# Patient Record
Sex: FEMALE | Race: WHITE | NOT HISPANIC OR LATINO | Employment: OTHER
[De-identification: names, ages, dates, MRNs, and addresses within clinical notes are randomized per-mention and may not be internally consistent; named-entity substitution may affect disease eponyms.]

---

## 2017-07-14 PROBLEM — N32.81 OAB (OVERACTIVE BLADDER): Status: ACTIVE | Noted: 2017-07-14

## 2017-08-09 ENCOUNTER — HOSPITAL (OUTPATIENT)
Dept: OTHER | Age: 82
End: 2017-08-09
Attending: INTERNAL MEDICINE

## 2017-08-09 LAB
ANALYZER ANC (IANC): ABNORMAL
BASOPHILS # BLD: 0 THOUSAND/MCL (ref 0–0.3)
BASOPHILS NFR BLD: 0 %
DIFFERENTIAL METHOD BLD: ABNORMAL
EOSINOPHIL # BLD: 0 THOUSAND/MCL (ref 0.1–0.5)
EOSINOPHIL NFR BLD: 0 %
ERYTHROCYTE [DISTWIDTH] IN BLOOD: 17 % (ref 11–15)
HEMATOCRIT: 35.6 % (ref 36–46.5)
HGB BLD-MCNC: 11.8 GM/DL (ref 12–15.5)
LYMPHOCYTES # BLD: 0.6 THOUSAND/MCL (ref 1–4)
LYMPHOCYTES NFR BLD: 12 %
MCH RBC QN AUTO: 28 PG (ref 26–34)
MCHC RBC AUTO-ENTMCNC: 33.1 GM/DL (ref 32–36.5)
MCV RBC AUTO: 84.4 FL (ref 78–100)
MONOCYTES # BLD: 0.5 THOUSAND/MCL (ref 0.3–0.9)
MONOCYTES NFR BLD: 9 %
NEUTROPHILS # BLD: 4 THOUSAND/MCL (ref 1.8–7.7)
NEUTS BAND NFR BLD: 6 % (ref 0–10)
NEUTS SEG NFR BLD: 73 %
PATH REV BLD -IMP: ABNORMAL
PLAT MORPH BLD: NORMAL
PLATELET # BLD: 151 THOUSAND/MCL (ref 140–450)
PROCALCITONIN SERPL IA-MCNC: <0.05 NG/ML
RBC # BLD: 4.22 MILLION/MCL (ref 4–5.2)
RBC MORPH BLD: NORMAL
WBC # BLD: 5 THOUSAND/MCL (ref 4.2–11)
WBC MORPH BLD: NORMAL

## 2017-11-27 ENCOUNTER — HOSPITAL (OUTPATIENT)
Dept: OTHER | Age: 82
End: 2017-11-27
Attending: INTERNAL MEDICINE

## 2017-11-29 ENCOUNTER — HOSPITAL (OUTPATIENT)
Dept: OTHER | Age: 82
End: 2017-11-29
Attending: INTERNAL MEDICINE

## 2017-12-18 ENCOUNTER — HOSPITAL (OUTPATIENT)
Dept: OTHER | Age: 82
End: 2017-12-18
Attending: INTERNAL MEDICINE

## 2018-08-30 PROBLEM — K21.9 GERD WITHOUT ESOPHAGITIS: Chronic | Status: ACTIVE | Noted: 2018-08-30

## 2018-08-30 PROBLEM — I10 ESSENTIAL HYPERTENSION: Chronic | Status: ACTIVE | Noted: 2018-08-30

## 2018-08-30 PROBLEM — J30.1 SEASONAL ALLERGIC RHINITIS DUE TO POLLEN: Chronic | Status: ACTIVE | Noted: 2018-08-30

## 2018-08-30 PROBLEM — E78.00 HYPERCHOLESTEROLEMIA: Chronic | Status: ACTIVE | Noted: 2018-08-30

## 2018-08-30 PROBLEM — M85.89 OSTEOPENIA OF MULTIPLE SITES: Chronic | Status: ACTIVE | Noted: 2018-08-30

## 2018-09-20 PROBLEM — F39 MOOD DISORDER IN FULL REMISSION (HCC): Chronic | Status: ACTIVE | Noted: 2018-09-20

## 2018-09-20 PROBLEM — F51.04 PSYCHOPHYSIOLOGICAL INSOMNIA: Chronic | Status: ACTIVE | Noted: 2018-09-20

## 2018-11-20 PROBLEM — R33.9 INCOMPLETE EMPTYING OF BLADDER: Status: ACTIVE | Noted: 2018-11-20

## 2021-10-13 PROBLEM — M54.50 CHRONIC MIDLINE LOW BACK PAIN WITHOUT SCIATICA: Chronic | Status: ACTIVE | Noted: 2021-10-13

## 2021-10-13 PROBLEM — G89.29 CHRONIC MIDLINE LOW BACK PAIN WITHOUT SCIATICA: Chronic | Status: ACTIVE | Noted: 2021-10-13

## 2022-11-11 ENCOUNTER — HOSPITAL ENCOUNTER (OUTPATIENT)
Dept: GENERAL RADIOLOGY | Age: 87
Discharge: HOME | End: 2022-11-11
Attending: FAMILY MEDICINE

## 2022-11-11 DIAGNOSIS — R05.2 SUBACUTE COUGH: ICD-10-CM

## 2022-11-11 PROCEDURE — 71046 X-RAY EXAM CHEST 2 VIEWS: CPT

## 2023-02-24 ENCOUNTER — HOSPITAL ENCOUNTER (OUTPATIENT)
Dept: GENERAL RADIOLOGY | Age: 88
Discharge: HOME | End: 2023-02-24
Attending: FAMILY MEDICINE

## 2023-02-24 DIAGNOSIS — R05.2 SUBACUTE COUGH: ICD-10-CM

## 2023-02-24 PROCEDURE — 71046 X-RAY EXAM CHEST 2 VIEWS: CPT

## 2023-03-13 ENCOUNTER — WALK IN (OUTPATIENT)
Dept: URGENT CARE | Age: 88
End: 2023-03-13
Attending: EMERGENCY MEDICINE

## 2023-03-13 VITALS
DIASTOLIC BLOOD PRESSURE: 73 MMHG | TEMPERATURE: 97.3 F | OXYGEN SATURATION: 98 % | HEART RATE: 88 BPM | SYSTOLIC BLOOD PRESSURE: 125 MMHG | RESPIRATION RATE: 16 BRPM

## 2023-03-13 DIAGNOSIS — M54.50 LOW BACK PAIN, UNSPECIFIED BACK PAIN LATERALITY, UNSPECIFIED CHRONICITY, UNSPECIFIED WHETHER SCIATICA PRESENT: Primary | ICD-10-CM

## 2023-03-13 DIAGNOSIS — N30.00 ACUTE CYSTITIS WITHOUT HEMATURIA: ICD-10-CM

## 2023-03-13 LAB
APPEARANCE, POC: CLEAR
BILIRUBIN, POC: NEGATIVE
COLOR, POC: YELLOW
GLUCOSE UR-MCNC: NORMAL MG/DL
KETONES, POC: NEGATIVE MG/DL
NITRITE, POC: NEGATIVE
OCCULT BLOOD, POC: NEGATIVE
PH UR: 7 [PH] (ref 5–7)
PROT UR-MCNC: NEGATIVE MG/DL
SP GR UR: 1.01 (ref 1–1.03)
UROBILINOGEN UR-MCNC: 0.2 MG/DL (ref 0–1)
WBC (LEUKOCYTE) ESTERASE, POC: ABNORMAL

## 2023-03-13 PROCEDURE — 87186 SC STD MICRODIL/AGAR DIL: CPT | Performed by: EMERGENCY MEDICINE

## 2023-03-13 PROCEDURE — 99203 OFFICE O/P NEW LOW 30 MIN: CPT

## 2023-03-13 PROCEDURE — 81003 URINALYSIS AUTO W/O SCOPE: CPT | Performed by: EMERGENCY MEDICINE

## 2023-03-13 RX ORDER — TRAZODONE HYDROCHLORIDE 50 MG/1
25 TABLET ORAL NIGHTLY
COMMUNITY
Start: 2023-01-19

## 2023-03-13 RX ORDER — CEPHALEXIN 500 MG/1
500 CAPSULE ORAL 3 TIMES DAILY
Qty: 30 CAPSULE | Refills: 0 | Status: SHIPPED | OUTPATIENT
Start: 2023-03-13 | End: 2023-03-23

## 2023-03-13 RX ORDER — VIBEGRON 75 MG/1
1 TABLET, FILM COATED ORAL DAILY
COMMUNITY
Start: 2023-03-08

## 2023-03-13 RX ORDER — TRAMADOL HYDROCHLORIDE 50 MG/1
50 TABLET ORAL
Status: ON HOLD | COMMUNITY
Start: 2023-03-10 | End: 2023-06-10 | Stop reason: HOSPADM

## 2023-03-13 RX ORDER — FEXOFENADINE HCL 180 MG/1
1 TABLET ORAL DAILY
COMMUNITY

## 2023-03-13 RX ORDER — SOLIFENACIN SUCCINATE 10 MG/1
10 TABLET, FILM COATED ORAL
COMMUNITY
Start: 2022-12-20 | End: 2023-12-20

## 2023-03-13 RX ORDER — UBIDECARENONE 100 MG
CAPSULE ORAL
COMMUNITY

## 2023-03-13 RX ORDER — LANSOPRAZOLE 30 MG/1
30 CAPSULE, DELAYED RELEASE ORAL DAILY
COMMUNITY
Start: 2023-03-07

## 2023-03-13 RX ORDER — CELECOXIB 200 MG/1
200 CAPSULE ORAL DAILY
COMMUNITY
Start: 2023-01-30

## 2023-03-13 RX ORDER — ATORVASTATIN CALCIUM 10 MG/1
10 TABLET, FILM COATED ORAL DAILY
COMMUNITY
Start: 2023-01-17

## 2023-03-13 RX ORDER — GABAPENTIN 300 MG/1
300 CAPSULE ORAL
COMMUNITY
Start: 2022-10-28

## 2023-03-13 RX ORDER — PSEUDOEPHEDRINE HCL 30 MG
100 TABLET ORAL
COMMUNITY

## 2023-03-13 RX ORDER — RAMIPRIL 5 MG/1
5 CAPSULE ORAL
COMMUNITY
Start: 2022-10-28

## 2023-03-13 RX ORDER — ESCITALOPRAM OXALATE 5 MG/1
5 TABLET ORAL DAILY
COMMUNITY

## 2023-03-13 ASSESSMENT — ENCOUNTER SYMPTOMS
RESPIRATORY NEGATIVE: 1
NEUROLOGICAL NEGATIVE: 1
ALLERGIC/IMMUNOLOGIC NEGATIVE: 1
PSYCHIATRIC NEGATIVE: 1
ENDOCRINE NEGATIVE: 1
GASTROINTESTINAL NEGATIVE: 1
HEMATOLOGIC/LYMPHATIC NEGATIVE: 1
EYES NEGATIVE: 1
CONSTITUTIONAL NEGATIVE: 1

## 2023-03-13 ASSESSMENT — PAIN SCALES - GENERAL: PAINLEVEL: 0

## 2023-03-15 LAB — BACTERIA UR CULT: ABNORMAL

## 2023-06-01 ENCOUNTER — APPOINTMENT (OUTPATIENT)
Dept: GENERAL RADIOLOGY | Age: 88
End: 2023-06-01
Attending: PHYSICIAN ASSISTANT

## 2023-06-01 ENCOUNTER — APPOINTMENT (OUTPATIENT)
Dept: ULTRASOUND IMAGING | Age: 88
End: 2023-06-01
Attending: EMERGENCY MEDICINE

## 2023-06-01 ENCOUNTER — HOSPITAL ENCOUNTER (EMERGENCY)
Age: 88
Discharge: HOME | End: 2023-06-01
Attending: EMERGENCY MEDICINE

## 2023-06-01 VITALS
TEMPERATURE: 99.4 F | RESPIRATION RATE: 20 BRPM | HEART RATE: 76 BPM | DIASTOLIC BLOOD PRESSURE: 53 MMHG | WEIGHT: 125 LBS | BODY MASS INDEX: 23 KG/M2 | OXYGEN SATURATION: 96 % | HEIGHT: 62 IN | SYSTOLIC BLOOD PRESSURE: 93 MMHG

## 2023-06-01 DIAGNOSIS — S42.291A CLOSED FRACTURE OF HEAD OF RIGHT HUMERUS, INITIAL ENCOUNTER: Primary | ICD-10-CM

## 2023-06-01 LAB
ALBUMIN SERPL-MCNC: 2.9 G/DL (ref 3.6–5.1)
ALBUMIN/GLOB SERPL: 1.4 {RATIO} (ref 1–2.4)
ALP SERPL-CCNC: 48 UNITS/L (ref 45–117)
ALT SERPL-CCNC: 20 UNITS/L
ANION GAP SERPL CALC-SCNC: 14 MMOL/L (ref 7–19)
APTT PPP: 22 SEC (ref 22–30)
AST SERPL-CCNC: 8 UNITS/L
BASOPHILS # BLD: 0 K/MCL (ref 0–0.3)
BASOPHILS NFR BLD: 0 %
BILIRUB SERPL-MCNC: 0.5 MG/DL (ref 0.2–1)
BUN SERPL-MCNC: 24 MG/DL (ref 6–20)
BUN/CREAT SERPL: 34 (ref 7–25)
CALCIUM SERPL-MCNC: 7.8 MG/DL (ref 8.4–10.2)
CHLORIDE SERPL-SCNC: 102 MMOL/L (ref 97–110)
CO2 SERPL-SCNC: 24 MMOL/L (ref 21–32)
CREAT SERPL-MCNC: 0.7 MG/DL (ref 0.51–0.95)
DEPRECATED RDW RBC: 53.4 FL (ref 39–50)
EOSINOPHIL # BLD: 0.1 K/MCL (ref 0–0.5)
EOSINOPHIL NFR BLD: 1 %
ERYTHROCYTE [DISTWIDTH] IN BLOOD: 17.2 % (ref 11–15)
FASTING DURATION TIME PATIENT: ABNORMAL H
GFR SERPLBLD BASED ON 1.73 SQ M-ARVRAT: 84 ML/MIN
GLOBULIN SER-MCNC: 2.1 G/DL (ref 2–4)
GLUCOSE SERPL-MCNC: 97 MG/DL (ref 70–99)
HCT VFR BLD CALC: 31.3 % (ref 36–46.5)
HGB BLD-MCNC: 9.9 G/DL (ref 12–15.5)
IMM GRANULOCYTES # BLD AUTO: 0 K/MCL (ref 0–0.2)
IMM GRANULOCYTES # BLD: 0 %
INR PPP: 1
LYMPHOCYTES # BLD: 0.4 K/MCL (ref 1–4)
LYMPHOCYTES NFR BLD: 5 %
MCH RBC QN AUTO: 26.8 PG (ref 26–34)
MCHC RBC AUTO-ENTMCNC: 31.6 G/DL (ref 32–36.5)
MCV RBC AUTO: 84.8 FL (ref 78–100)
MONOCYTES # BLD: 0.5 K/MCL (ref 0.3–0.9)
MONOCYTES NFR BLD: 6 %
NEUTROPHILS # BLD: 6.9 K/MCL (ref 1.8–7.7)
NEUTROPHILS NFR BLD: 88 %
NRBC BLD MANUAL-RTO: 0 /100 WBC
PLATELET # BLD AUTO: 187 K/MCL (ref 140–450)
POTASSIUM SERPL-SCNC: 4.5 MMOL/L (ref 3.4–5.1)
PROT SERPL-MCNC: 5 G/DL (ref 6.4–8.2)
PROTHROMBIN TIME: 10.1 SEC (ref 9.7–11.8)
RBC # BLD: 3.69 MIL/MCL (ref 4–5.2)
SODIUM SERPL-SCNC: 135 MMOL/L (ref 135–145)
WBC # BLD: 7.8 K/MCL (ref 4.2–11)

## 2023-06-01 PROCEDURE — 36415 COLL VENOUS BLD VENIPUNCTURE: CPT

## 2023-06-01 PROCEDURE — 80053 COMPREHEN METABOLIC PANEL: CPT | Performed by: PHYSICIAN ASSISTANT

## 2023-06-01 PROCEDURE — 85025 COMPLETE CBC W/AUTO DIFF WBC: CPT | Performed by: PHYSICIAN ASSISTANT

## 2023-06-01 PROCEDURE — 85730 THROMBOPLASTIN TIME PARTIAL: CPT | Performed by: PHYSICIAN ASSISTANT

## 2023-06-01 PROCEDURE — 99284 EMERGENCY DEPT VISIT MOD MDM: CPT

## 2023-06-01 PROCEDURE — 85610 PROTHROMBIN TIME: CPT | Performed by: PHYSICIAN ASSISTANT

## 2023-06-01 PROCEDURE — 73060 X-RAY EXAM OF HUMERUS: CPT

## 2023-06-01 ASSESSMENT — ENCOUNTER SYMPTOMS
PSYCHIATRIC NEGATIVE: 1
ABDOMINAL PAIN: 0
HEADACHES: 0
ENDOCRINE NEGATIVE: 1
WEAKNESS: 0
SHORTNESS OF BREATH: 0
GASTROINTESTINAL NEGATIVE: 1
RESPIRATORY NEGATIVE: 1
VOMITING: 0
BACK PAIN: 0
ALLERGIC/IMMUNOLOGIC NEGATIVE: 1
CHEST TIGHTNESS: 0
CONSTIPATION: 0
NAUSEA: 0
NEUROLOGICAL NEGATIVE: 1
BLOOD IN STOOL: 0
CONSTITUTIONAL NEGATIVE: 1
HEMATOLOGIC/LYMPHATIC NEGATIVE: 1
DIARRHEA: 0
EYES NEGATIVE: 1

## 2023-06-01 ASSESSMENT — PAIN DESCRIPTION - PAIN TYPE: TYPE: ACUTE PAIN

## 2023-06-01 ASSESSMENT — PAIN SCALES - GENERAL: PAINLEVEL_OUTOF10: 8

## 2023-06-05 ENCOUNTER — APPOINTMENT (OUTPATIENT)
Dept: GENERAL RADIOLOGY | Age: 88
DRG: 543 | End: 2023-06-05
Attending: EMERGENCY MEDICINE

## 2023-06-05 ENCOUNTER — APPOINTMENT (OUTPATIENT)
Dept: CT IMAGING | Age: 88
DRG: 543 | End: 2023-06-05
Attending: EMERGENCY MEDICINE

## 2023-06-05 ENCOUNTER — HOSPITAL ENCOUNTER (INPATIENT)
Age: 88
LOS: 2 days | Discharge: HOME HEALTH | DRG: 543 | End: 2023-06-08
Attending: EMERGENCY MEDICINE | Admitting: FAMILY MEDICINE

## 2023-06-05 DIAGNOSIS — S42.91XA CLOSED FRACTURE OF RIGHT SHOULDER, INITIAL ENCOUNTER: ICD-10-CM

## 2023-06-05 DIAGNOSIS — R29.6 FALLS FREQUENTLY: Primary | ICD-10-CM

## 2023-06-05 LAB
ALBUMIN SERPL-MCNC: 2.9 G/DL (ref 3.6–5.1)
ALBUMIN/GLOB SERPL: 1.4 {RATIO} (ref 1–2.4)
ALP SERPL-CCNC: 53 UNITS/L (ref 45–117)
ALT SERPL-CCNC: 26 UNITS/L
ANION GAP SERPL CALC-SCNC: 11 MMOL/L (ref 7–19)
AST SERPL-CCNC: 6 UNITS/L
BASOPHILS # BLD: 0 K/MCL (ref 0–0.3)
BASOPHILS NFR BLD: 0 %
BILIRUB SERPL-MCNC: 0.6 MG/DL (ref 0.2–1)
BUN SERPL-MCNC: 20 MG/DL (ref 6–20)
BUN/CREAT SERPL: 29 (ref 7–25)
CALCIUM SERPL-MCNC: 7.7 MG/DL (ref 8.4–10.2)
CHLORIDE SERPL-SCNC: 99 MMOL/L (ref 97–110)
CO2 SERPL-SCNC: 26 MMOL/L (ref 21–32)
CREAT SERPL-MCNC: 0.68 MG/DL (ref 0.51–0.95)
DEPRECATED RDW RBC: 55.5 FL (ref 39–50)
EOSINOPHIL # BLD: 0.2 K/MCL (ref 0–0.5)
EOSINOPHIL NFR BLD: 3 %
ERYTHROCYTE [DISTWIDTH] IN BLOOD: 17.7 % (ref 11–15)
FASTING DURATION TIME PATIENT: ABNORMAL H
GFR SERPLBLD BASED ON 1.73 SQ M-ARVRAT: 85 ML/MIN
GLOBULIN SER-MCNC: 2.1 G/DL (ref 2–4)
GLUCOSE SERPL-MCNC: 71 MG/DL (ref 70–99)
HCT VFR BLD CALC: 34.1 % (ref 36–46.5)
HGB BLD-MCNC: 10.6 G/DL (ref 12–15.5)
IMM GRANULOCYTES # BLD AUTO: 0.1 K/MCL (ref 0–0.2)
IMM GRANULOCYTES # BLD: 1 %
LYMPHOCYTES # BLD: 1.5 K/MCL (ref 1–4)
LYMPHOCYTES NFR BLD: 20 %
MCH RBC QN AUTO: 26.7 PG (ref 26–34)
MCHC RBC AUTO-ENTMCNC: 31.1 G/DL (ref 32–36.5)
MCV RBC AUTO: 85.9 FL (ref 78–100)
MONOCYTES # BLD: 0.9 K/MCL (ref 0.3–0.9)
MONOCYTES NFR BLD: 12 %
NEUTROPHILS # BLD: 4.8 K/MCL (ref 1.8–7.7)
NEUTROPHILS NFR BLD: 64 %
NRBC BLD MANUAL-RTO: 0 /100 WBC
PLATELET # BLD AUTO: 165 K/MCL (ref 140–450)
POTASSIUM SERPL-SCNC: 4.6 MMOL/L (ref 3.4–5.1)
PROT SERPL-MCNC: 5 G/DL (ref 6.4–8.2)
RBC # BLD: 3.97 MIL/MCL (ref 4–5.2)
SODIUM SERPL-SCNC: 131 MMOL/L (ref 135–145)
WBC # BLD: 7.5 K/MCL (ref 4.2–11)

## 2023-06-05 PROCEDURE — 80053 COMPREHEN METABOLIC PANEL: CPT | Performed by: EMERGENCY MEDICINE

## 2023-06-05 PROCEDURE — 10002807 HB RX 258: Performed by: EMERGENCY MEDICINE

## 2023-06-05 PROCEDURE — G0378 HOSPITAL OBSERVATION PER HR: HCPCS

## 2023-06-05 PROCEDURE — 10003579 HB TRAUMA W/O CRITICAL CARE

## 2023-06-05 PROCEDURE — G1004 CDSM NDSC: HCPCS

## 2023-06-05 PROCEDURE — 93005 ELECTROCARDIOGRAM TRACING: CPT | Performed by: EMERGENCY MEDICINE

## 2023-06-05 PROCEDURE — 72125 CT NECK SPINE W/O DYE: CPT

## 2023-06-05 PROCEDURE — 71045 X-RAY EXAM CHEST 1 VIEW: CPT

## 2023-06-05 PROCEDURE — 99285 EMERGENCY DEPT VISIT HI MDM: CPT

## 2023-06-05 PROCEDURE — 70450 CT HEAD/BRAIN W/O DYE: CPT

## 2023-06-05 PROCEDURE — 96360 HYDRATION IV INFUSION INIT: CPT

## 2023-06-05 PROCEDURE — 85025 COMPLETE CBC W/AUTO DIFF WBC: CPT | Performed by: EMERGENCY MEDICINE

## 2023-06-05 PROCEDURE — 73060 X-RAY EXAM OF HUMERUS: CPT

## 2023-06-05 RX ORDER — ESCITALOPRAM OXALATE 5 MG/1
5 TABLET ORAL DAILY
Status: DISCONTINUED | OUTPATIENT
Start: 2023-06-06 | End: 2023-06-06 | Stop reason: ALTCHOICE

## 2023-06-05 RX ORDER — ASPIRIN 81 MG/1
81 TABLET ORAL DAILY
COMMUNITY

## 2023-06-05 RX ORDER — ACYCLOVIR 200 MG/1
200 CAPSULE ORAL DAILY
Status: DISCONTINUED | OUTPATIENT
Start: 2023-06-06 | End: 2023-06-08 | Stop reason: HOSPADM

## 2023-06-05 RX ORDER — LENALIDOMIDE 10 MG/1
10 CAPSULE ORAL DAILY
COMMUNITY
Start: 2023-04-05

## 2023-06-05 RX ORDER — SERTRALINE HYDROCHLORIDE 25 MG/1
25 TABLET, FILM COATED ORAL DAILY
COMMUNITY
Start: 2023-04-21

## 2023-06-05 RX ORDER — ASPIRIN 81 MG/1
81 TABLET ORAL DAILY
Status: DISCONTINUED | OUTPATIENT
Start: 2023-06-06 | End: 2023-06-08 | Stop reason: HOSPADM

## 2023-06-05 RX ORDER — CELECOXIB 200 MG/1
200 CAPSULE ORAL DAILY
Status: DISCONTINUED | OUTPATIENT
Start: 2023-06-06 | End: 2023-06-08 | Stop reason: HOSPADM

## 2023-06-05 RX ORDER — LENALIDOMIDE 10 MG/1
10 CAPSULE ORAL SEE ADMIN INSTRUCTIONS
Status: DISCONTINUED | OUTPATIENT
Start: 2023-06-05 | End: 2023-06-06

## 2023-06-05 RX ORDER — GABAPENTIN 300 MG/1
300 CAPSULE ORAL NIGHTLY
Status: DISCONTINUED | OUTPATIENT
Start: 2023-06-06 | End: 2023-06-08 | Stop reason: HOSPADM

## 2023-06-05 RX ORDER — ACYCLOVIR 200 MG/1
200 CAPSULE ORAL DAILY
COMMUNITY

## 2023-06-05 RX ORDER — ATORVASTATIN CALCIUM 10 MG/1
10 TABLET, FILM COATED ORAL DAILY
Status: DISCONTINUED | OUTPATIENT
Start: 2023-06-06 | End: 2023-06-08 | Stop reason: HOSPADM

## 2023-06-05 RX ORDER — DEXAMETHASONE 4 MG/1
4 TABLET ORAL
COMMUNITY

## 2023-06-05 RX ORDER — POLYETHYLENE GLYCOL 3350 17 G/17G
17 POWDER, FOR SOLUTION ORAL DAILY PRN
COMMUNITY

## 2023-06-05 RX ORDER — SERTRALINE HYDROCHLORIDE 25 MG/1
25 TABLET, FILM COATED ORAL DAILY
Status: DISCONTINUED | OUTPATIENT
Start: 2023-06-06 | End: 2023-06-08 | Stop reason: HOSPADM

## 2023-06-05 RX ORDER — TRAMADOL HYDROCHLORIDE 50 MG/1
50 TABLET ORAL EVERY 12 HOURS PRN
Status: DISCONTINUED | OUTPATIENT
Start: 2023-06-05 | End: 2023-06-06

## 2023-06-05 RX ORDER — SENNOSIDES A AND B 8.6 MG/1
1 TABLET, FILM COATED ORAL DAILY PRN
COMMUNITY

## 2023-06-05 RX ADMIN — SODIUM CHLORIDE 500 ML: 9 INJECTION, SOLUTION INTRAVENOUS at 15:06

## 2023-06-05 SDOH — ECONOMIC STABILITY: HOUSING INSECURITY: WHAT IS YOUR LIVING SITUATION TODAY?: OTHER FACILITY RESIDENTS

## 2023-06-05 SDOH — ECONOMIC STABILITY: HOUSING INSECURITY: WHAT IS YOUR LIVING SITUATION TODAY?: ASSISTED LIVING

## 2023-06-05 SDOH — SOCIAL STABILITY: SOCIAL NETWORK: SUPPORT SYSTEMS: FAMILY MEMBERS

## 2023-06-05 SDOH — HEALTH STABILITY: PHYSICAL HEALTH: DO YOU HAVE SERIOUS DIFFICULTY WALKING OR CLIMBING STAIRS?: NO

## 2023-06-05 SDOH — HEALTH STABILITY: GENERAL: BECAUSE OF A PHYSICAL, MENTAL, OR EMOTIONAL CONDITION, DO YOU HAVE DIFFICULTY DOING ERRANDS ALONE?: YES

## 2023-06-05 SDOH — ECONOMIC STABILITY: HOUSING INSECURITY: ARE YOU WORRIED ABOUT LOSING YOUR HOUSING?: NO

## 2023-06-05 SDOH — HEALTH STABILITY: GENERAL
BECAUSE OF A PHYSICAL, MENTAL, OR EMOTIONAL CONDITION, DO YOU HAVE SERIOUS DIFFICULTY CONCENTRATING, REMEMBERING OR MAKING DECISIONS?: NO

## 2023-06-05 SDOH — ECONOMIC STABILITY: TRANSPORTATION INSECURITY
IN THE PAST 12 MONTHS, HAS THE LACK OF TRANSPORTATION KEPT YOU FROM MEDICAL APPOINTMENTS OR FROM GETTING MEDICATIONS?: NO

## 2023-06-05 SDOH — HEALTH STABILITY: PHYSICAL HEALTH: DO YOU HAVE DIFFICULTY DRESSING OR BATHING?: NO

## 2023-06-05 SDOH — ECONOMIC STABILITY: GENERAL

## 2023-06-05 SDOH — SOCIAL STABILITY: SOCIAL NETWORK
HOW OFTEN DO YOU SEE OR TALK TO PEOPLE THAT YOU CARE ABOUT AND FEEL CLOSE TO? (FOR EXAMPLE: TALKING TO FRIENDS ON THE PHONE, VISITING FRIENDS OR FAMILY, GOING TO CHURCH OR CLUB MEETINGS): 3 TO 5 TIMES A WEEK

## 2023-06-05 SDOH — ECONOMIC STABILITY: TRANSPORTATION INSECURITY
IN THE PAST 12 MONTHS, HAS LACK OF TRANSPORTATION KEPT YOU FROM MEETINGS, WORK, OR FROM GETTING THINGS NEEDED FOR DAILY LIVING?: NO

## 2023-06-05 SDOH — ECONOMIC STABILITY: FOOD INSECURITY: HOW OFTEN IN THE PAST 12 MONTHS WERE YOU WORRIED OR STRESSED ABOUT HAVING ENOUGH MONEY TO BUY NUTRITIOUS MEALS?: NEVER

## 2023-06-05 ASSESSMENT — COLUMBIA-SUICIDE SEVERITY RATING SCALE - C-SSRS
2. HAVE YOU ACTUALLY HAD ANY THOUGHTS OF KILLING YOURSELF?: NO
1. WITHIN THE PAST MONTH, HAVE YOU WISHED YOU WERE DEAD OR WISHED YOU COULD GO TO SLEEP AND NOT WAKE UP?: NO
6. HAVE YOU EVER DONE ANYTHING, STARTED TO DO ANYTHING, OR PREPARED TO DO ANYTHING TO END YOUR LIFE?: NO
IS THE PATIENT ABLE TO COMPLETE C-SSRS: YES

## 2023-06-05 ASSESSMENT — LIFESTYLE VARIABLES
HOW MANY STANDARD DRINKS CONTAINING ALCOHOL DO YOU HAVE ON A TYPICAL DAY: 0,1 OR 2
ALCOHOL_USE_STATUS: NO OR LOW RISK WITH VALIDATED TOOL
HOW OFTEN DO YOU HAVE A DRINK CONTAINING ALCOHOL: NEVER
HOW OFTEN DO YOU HAVE 6 OR MORE DRINKS ON ONE OCCASION: NEVER
AUDIT-C TOTAL SCORE: 0

## 2023-06-05 ASSESSMENT — PATIENT HEALTH QUESTIONNAIRE - PHQ9
IS PATIENT ABLE TO COMPLETE PHQ2 OR PHQ9: YES
SUM OF ALL RESPONSES TO PHQ9 QUESTIONS 1 AND 2: 0
CLINICAL INTERPRETATION OF PHQ2 SCORE: NO FURTHER SCREENING NEEDED
SUM OF ALL RESPONSES TO PHQ9 QUESTIONS 1 AND 2: 0
2. FEELING DOWN, DEPRESSED OR HOPELESS: NOT AT ALL
1. LITTLE INTEREST OR PLEASURE IN DOING THINGS: NOT AT ALL

## 2023-06-05 ASSESSMENT — ACTIVITIES OF DAILY LIVING (ADL)
ADL_SCORE: 12
ADL_BEFORE_ADMISSION: INDEPENDENT
RECENT_DECLINE_ADL: NO
ADL_SHORT_OF_BREATH: NO

## 2023-06-05 ASSESSMENT — PAIN SCALES - GENERAL
PAINLEVEL_OUTOF10: 4
PAINLEVEL_OUTOF10: 3

## 2023-06-06 LAB
ALBUMIN SERPL-MCNC: 2.6 G/DL (ref 3.6–5.1)
ALBUMIN/GLOB SERPL: 1.3 {RATIO} (ref 1–2.4)
ALP SERPL-CCNC: 49 UNITS/L (ref 45–117)
ALT SERPL-CCNC: 23 UNITS/L
ANION GAP SERPL CALC-SCNC: 12 MMOL/L (ref 7–19)
AST SERPL-CCNC: <5 UNITS/L
BASOPHILS # BLD: 0 K/MCL (ref 0–0.3)
BASOPHILS NFR BLD: 0 %
BILIRUB SERPL-MCNC: 0.6 MG/DL (ref 0.2–1)
BUN SERPL-MCNC: 15 MG/DL (ref 6–20)
BUN/CREAT SERPL: 24 (ref 7–25)
CALCIUM SERPL-MCNC: 7.6 MG/DL (ref 8.4–10.2)
CHLORIDE SERPL-SCNC: 103 MMOL/L (ref 97–110)
CO2 SERPL-SCNC: 26 MMOL/L (ref 21–32)
CREAT SERPL-MCNC: 0.63 MG/DL (ref 0.51–0.95)
DEPRECATED RDW RBC: 55 FL (ref 39–50)
EOSINOPHIL # BLD: 0.2 K/MCL (ref 0–0.5)
EOSINOPHIL NFR BLD: 5 %
ERYTHROCYTE [DISTWIDTH] IN BLOOD: 17.6 % (ref 11–15)
FASTING DURATION TIME PATIENT: ABNORMAL H
GFR SERPLBLD BASED ON 1.73 SQ M-ARVRAT: 86 ML/MIN
GLOBULIN SER-MCNC: 2 G/DL (ref 2–4)
GLUCOSE SERPL-MCNC: 66 MG/DL (ref 70–99)
HCT VFR BLD CALC: 32.6 % (ref 36–46.5)
HGB BLD-MCNC: 9.9 G/DL (ref 12–15.5)
IMM GRANULOCYTES # BLD AUTO: 0 K/MCL (ref 0–0.2)
IMM GRANULOCYTES # BLD: 1 %
LYMPHOCYTES # BLD: 0.7 K/MCL (ref 1–4)
LYMPHOCYTES NFR BLD: 17 %
MCH RBC QN AUTO: 26.1 PG (ref 26–34)
MCHC RBC AUTO-ENTMCNC: 30.4 G/DL (ref 32–36.5)
MCV RBC AUTO: 86 FL (ref 78–100)
MONOCYTES # BLD: 0.2 K/MCL (ref 0.3–0.9)
MONOCYTES NFR BLD: 6 %
NEUTROPHILS # BLD: 2.8 K/MCL (ref 1.8–7.7)
NEUTROPHILS NFR BLD: 71 %
NRBC BLD MANUAL-RTO: 0 /100 WBC
P AXIS (DEGREES): 47
PLATELET # BLD AUTO: 127 K/MCL (ref 140–450)
POTASSIUM SERPL-SCNC: 4 MMOL/L (ref 3.4–5.1)
PR-INTERVAL (MSEC): 193
PROT SERPL-MCNC: 4.6 G/DL (ref 6.4–8.2)
QRS-INTERVAL (MSEC): 165
QT-INTERVAL (MSEC): 459
QTC: 469
R AXIS (DEGREES): -22
RAINBOW EXTRA TUBES HOLD SPECIMEN: NORMAL
RBC # BLD: 3.79 MIL/MCL (ref 4–5.2)
REPORT TEXT: NORMAL
SODIUM SERPL-SCNC: 137 MMOL/L (ref 135–145)
T AXIS (DEGREES): -11
VENTRICULAR RATE EKG/MIN (BPM): 64
WBC # BLD: 4 K/MCL (ref 4.2–11)

## 2023-06-06 PROCEDURE — 10006031 HB ROOM CHARGE TELEMETRY

## 2023-06-06 PROCEDURE — 97535 SELF CARE MNGMENT TRAINING: CPT

## 2023-06-06 PROCEDURE — 36415 COLL VENOUS BLD VENIPUNCTURE: CPT | Performed by: FAMILY MEDICINE

## 2023-06-06 PROCEDURE — 85025 COMPLETE CBC W/AUTO DIFF WBC: CPT | Performed by: FAMILY MEDICINE

## 2023-06-06 PROCEDURE — 97530 THERAPEUTIC ACTIVITIES: CPT

## 2023-06-06 PROCEDURE — 10002803 HB RX 637: Performed by: FAMILY MEDICINE

## 2023-06-06 PROCEDURE — 97116 GAIT TRAINING THERAPY: CPT

## 2023-06-06 PROCEDURE — G0378 HOSPITAL OBSERVATION PER HR: HCPCS

## 2023-06-06 PROCEDURE — 10004651 HB RX, NO CHARGE ITEM: Performed by: FAMILY MEDICINE

## 2023-06-06 PROCEDURE — 97162 PT EVAL MOD COMPLEX 30 MIN: CPT

## 2023-06-06 PROCEDURE — 97166 OT EVAL MOD COMPLEX 45 MIN: CPT

## 2023-06-06 PROCEDURE — 80053 COMPREHEN METABOLIC PANEL: CPT | Performed by: FAMILY MEDICINE

## 2023-06-06 RX ORDER — TRAMADOL HYDROCHLORIDE 50 MG/1
50 TABLET ORAL EVERY 6 HOURS PRN
Status: DISCONTINUED | OUTPATIENT
Start: 2023-06-06 | End: 2023-06-08 | Stop reason: HOSPADM

## 2023-06-06 RX ORDER — PANTOPRAZOLE SODIUM 40 MG/1
40 TABLET, DELAYED RELEASE ORAL NIGHTLY
Status: DISCONTINUED | OUTPATIENT
Start: 2023-06-06 | End: 2023-06-08 | Stop reason: HOSPADM

## 2023-06-06 RX ORDER — LENALIDOMIDE 10 MG/1
10 CAPSULE ORAL NIGHTLY
Status: COMPLETED | OUTPATIENT
Start: 2023-06-07 | End: 2023-06-07

## 2023-06-06 RX ADMIN — PANTOPRAZOLE SODIUM 40 MG: 40 TABLET, DELAYED RELEASE ORAL at 21:16

## 2023-06-06 RX ADMIN — ATORVASTATIN CALCIUM 10 MG: 10 TABLET, FILM COATED ORAL at 09:19

## 2023-06-06 RX ADMIN — ACYCLOVIR 200 MG: 200 CAPSULE ORAL at 09:19

## 2023-06-06 RX ADMIN — CELECOXIB 200 MG: 200 CAPSULE ORAL at 09:19

## 2023-06-06 RX ADMIN — SERTRALINE HYDROCHLORIDE 25 MG: 25 TABLET, FILM COATED ORAL at 09:19

## 2023-06-06 RX ADMIN — ASPIRIN 81 MG: 81 TABLET, COATED ORAL at 09:19

## 2023-06-06 RX ADMIN — GABAPENTIN 300 MG: 300 CAPSULE ORAL at 20:10

## 2023-06-06 RX ADMIN — LENALIDOMIDE 10 MG: 10 CAPSULE ORAL at 21:14

## 2023-06-06 ASSESSMENT — ENCOUNTER SYMPTOMS
ABDOMINAL PAIN: 0
COLOR CHANGE: 0
APPETITE CHANGE: 0
SORE THROAT: 0
UNEXPECTED WEIGHT CHANGE: 0
BACK PAIN: 0
NAUSEA: 0
CONFUSION: 0
VOMITING: 0
DIARRHEA: 0
PAIN SEVERITY NOW: 0
HEADACHES: 0
SINUS PAIN: 0
VOICE CHANGE: 0
SINUS PRESSURE: 0
CHILLS: 0
NUMBNESS: 0
FEVER: 0
WHEEZING: 0
CONSTIPATION: 0
DIZZINESS: 0
COUGH: 0
AGITATION: 0
EYE REDNESS: 0
ADENOPATHY: 0
EYE PAIN: 0
SHORTNESS OF BREATH: 0
LIGHT-HEADEDNESS: 0
CHEST TIGHTNESS: 0

## 2023-06-06 ASSESSMENT — ACTIVITIES OF DAILY LIVING (ADL)
PRIOR_ADL_BATHING: TOTAL ASSIST (TOTAL)
GROOMING: SUPERVISION (SUPV)
PRIOR_ADL_TOILETING: MODIFIED INDEPENDENT
HOME_MANAGEMENT_TIME_ENTRY: 23

## 2023-06-06 ASSESSMENT — COGNITIVE AND FUNCTIONAL STATUS - GENERAL
HELP NEEDED FOR TOILETING: TOTAL
DAILY_ACTIVITY_RAW_SCORE: 13
BASIC_MOBILITY_CONVERTED_SCORE: 41.05
BASIC_MOBILITY_RAW_SCORE: 18
DAILY_ACTIVITY_CONVERTED_SCORE: 32.03
HELP NEEDED FOR PERSONAL GROOMING: A LITTLE
HELP NEEDED DRESSING REGULAR LOWER BODY CLOTHING: A LOT
HELP NEEDED DRESSING REGULAR UPPER BODY CLOTHING: A LOT
HELP NEEDED FOR BATHING: A LOT

## 2023-06-06 ASSESSMENT — PAIN SCALES - GENERAL
PAINLEVEL_OUTOF10: 2
PAINLEVEL_OUTOF10: 1
PAINLEVEL_OUTOF10: 5

## 2023-06-07 LAB
ALBUMIN SERPL-MCNC: 2.7 G/DL (ref 3.6–5.1)
ALBUMIN/GLOB SERPL: 1.1 {RATIO} (ref 1–2.4)
ALP SERPL-CCNC: 61 UNITS/L (ref 45–117)
ALT SERPL-CCNC: 26 UNITS/L
ANION GAP SERPL CALC-SCNC: 12 MMOL/L (ref 7–19)
AST SERPL-CCNC: 5 UNITS/L
BASOPHILS # BLD: 0 K/MCL (ref 0–0.3)
BASOPHILS NFR BLD: 1 %
BILIRUB SERPL-MCNC: 0.6 MG/DL (ref 0.2–1)
BUN SERPL-MCNC: 14 MG/DL (ref 6–20)
BUN/CREAT SERPL: 25 (ref 7–25)
CALCIUM SERPL-MCNC: 7.9 MG/DL (ref 8.4–10.2)
CHLORIDE SERPL-SCNC: 104 MMOL/L (ref 97–110)
CO2 SERPL-SCNC: 25 MMOL/L (ref 21–32)
CREAT SERPL-MCNC: 0.57 MG/DL (ref 0.51–0.95)
DEPRECATED RDW RBC: 55.3 FL (ref 39–50)
EOSINOPHIL # BLD: 0.3 K/MCL (ref 0–0.5)
EOSINOPHIL NFR BLD: 6 %
ERYTHROCYTE [DISTWIDTH] IN BLOOD: 18.1 % (ref 11–15)
FASTING DURATION TIME PATIENT: ABNORMAL H
GFR SERPLBLD BASED ON 1.73 SQ M-ARVRAT: 88 ML/MIN
GLOBULIN SER-MCNC: 2.4 G/DL (ref 2–4)
GLUCOSE SERPL-MCNC: 72 MG/DL (ref 70–99)
HCT VFR BLD CALC: 33.9 % (ref 36–46.5)
HGB BLD-MCNC: 10.7 G/DL (ref 12–15.5)
IMM GRANULOCYTES # BLD AUTO: 0.1 K/MCL (ref 0–0.2)
IMM GRANULOCYTES # BLD: 1 %
LYMPHOCYTES # BLD: 0.9 K/MCL (ref 1–4)
LYMPHOCYTES NFR BLD: 17 %
MCH RBC QN AUTO: 26.7 PG (ref 26–34)
MCHC RBC AUTO-ENTMCNC: 31.6 G/DL (ref 32–36.5)
MCV RBC AUTO: 84.5 FL (ref 78–100)
MONOCYTES # BLD: 0.3 K/MCL (ref 0.3–0.9)
MONOCYTES NFR BLD: 6 %
NEUTROPHILS # BLD: 3.8 K/MCL (ref 1.8–7.7)
NEUTROPHILS NFR BLD: 69 %
NRBC BLD MANUAL-RTO: 0 /100 WBC
PLATELET # BLD AUTO: 140 K/MCL (ref 140–450)
POTASSIUM SERPL-SCNC: 4.1 MMOL/L (ref 3.4–5.1)
PROT SERPL-MCNC: 5.1 G/DL (ref 6.4–8.2)
RAINBOW EXTRA TUBES HOLD SPECIMEN: NORMAL
RBC # BLD: 4.01 MIL/MCL (ref 4–5.2)
SODIUM SERPL-SCNC: 137 MMOL/L (ref 135–145)
WBC # BLD: 5.4 K/MCL (ref 4.2–11)

## 2023-06-07 PROCEDURE — 36415 COLL VENOUS BLD VENIPUNCTURE: CPT | Performed by: FAMILY MEDICINE

## 2023-06-07 PROCEDURE — 10002803 HB RX 637: Performed by: FAMILY MEDICINE

## 2023-06-07 PROCEDURE — 10004651 HB RX, NO CHARGE ITEM: Performed by: FAMILY MEDICINE

## 2023-06-07 PROCEDURE — 97535 SELF CARE MNGMENT TRAINING: CPT

## 2023-06-07 PROCEDURE — 97116 GAIT TRAINING THERAPY: CPT

## 2023-06-07 PROCEDURE — 85025 COMPLETE CBC W/AUTO DIFF WBC: CPT | Performed by: FAMILY MEDICINE

## 2023-06-07 PROCEDURE — 97530 THERAPEUTIC ACTIVITIES: CPT

## 2023-06-07 PROCEDURE — 80053 COMPREHEN METABOLIC PANEL: CPT | Performed by: FAMILY MEDICINE

## 2023-06-07 PROCEDURE — 10006031 HB ROOM CHARGE TELEMETRY

## 2023-06-07 RX ADMIN — SERTRALINE HYDROCHLORIDE 25 MG: 25 TABLET, FILM COATED ORAL at 08:57

## 2023-06-07 RX ADMIN — PANTOPRAZOLE SODIUM 40 MG: 40 TABLET, DELAYED RELEASE ORAL at 21:52

## 2023-06-07 RX ADMIN — CELECOXIB 200 MG: 200 CAPSULE ORAL at 08:57

## 2023-06-07 RX ADMIN — ATORVASTATIN CALCIUM 10 MG: 10 TABLET, FILM COATED ORAL at 08:57

## 2023-06-07 RX ADMIN — ASPIRIN 81 MG: 81 TABLET, COATED ORAL at 08:57

## 2023-06-07 RX ADMIN — ACYCLOVIR 200 MG: 200 CAPSULE ORAL at 08:57

## 2023-06-07 RX ADMIN — LENALIDOMIDE 10 MG: 10 CAPSULE ORAL at 21:53

## 2023-06-07 RX ADMIN — GABAPENTIN 300 MG: 300 CAPSULE ORAL at 21:52

## 2023-06-07 ASSESSMENT — PAIN SCALES - GENERAL
PAINLEVEL_OUTOF10: 0
PAINLEVEL_OUTOF10: 6

## 2023-06-07 ASSESSMENT — ENCOUNTER SYMPTOMS
HEADACHES: 0
APPETITE CHANGE: 0
DIZZINESS: 0
ACTIVITY CHANGE: 1
VOMITING: 0
FATIGUE: 1
DIARRHEA: 0
AGITATION: 0
ABDOMINAL PAIN: 0
NAUSEA: 0
COUGH: 0
FEVER: 0
WHEEZING: 0
SHORTNESS OF BREATH: 0
PAIN SEVERITY NOW: 5
WEAKNESS: 1
LIGHT-HEADEDNESS: 0

## 2023-06-07 ASSESSMENT — COGNITIVE AND FUNCTIONAL STATUS - GENERAL
HELP NEEDED DRESSING REGULAR LOWER BODY CLOTHING: A LOT
HELP NEEDED FOR TOILETING: A LOT
HELP NEEDED FOR BATHING: A LOT
HELP NEEDED FOR PERSONAL GROOMING: A LITTLE
DAILY_ACTIVITY_RAW_SCORE: 14
BASIC_MOBILITY_RAW_SCORE: 18
DAILY_ACTIVITY_CONVERTED_SCORE: 33.39
HELP NEEDED DRESSING REGULAR UPPER BODY CLOTHING: A LOT
BASIC_MOBILITY_CONVERTED_SCORE: 41.05

## 2023-06-07 ASSESSMENT — ACTIVITIES OF DAILY LIVING (ADL): HOME_MANAGEMENT_TIME_ENTRY: 32

## 2023-06-08 VITALS
WEIGHT: 133.38 LBS | OXYGEN SATURATION: 94 % | RESPIRATION RATE: 16 BRPM | BODY MASS INDEX: 25.18 KG/M2 | TEMPERATURE: 98.6 F | HEART RATE: 85 BPM | DIASTOLIC BLOOD PRESSURE: 66 MMHG | HEIGHT: 61 IN | SYSTOLIC BLOOD PRESSURE: 100 MMHG

## 2023-06-08 PROCEDURE — 10002803 HB RX 637: Performed by: FAMILY MEDICINE

## 2023-06-08 PROCEDURE — 97530 THERAPEUTIC ACTIVITIES: CPT

## 2023-06-08 PROCEDURE — 10004651 HB RX, NO CHARGE ITEM: Performed by: FAMILY MEDICINE

## 2023-06-08 PROCEDURE — 97116 GAIT TRAINING THERAPY: CPT

## 2023-06-08 RX ADMIN — SERTRALINE HYDROCHLORIDE 25 MG: 25 TABLET, FILM COATED ORAL at 08:33

## 2023-06-08 RX ADMIN — ACYCLOVIR 200 MG: 200 CAPSULE ORAL at 08:33

## 2023-06-08 RX ADMIN — CELECOXIB 200 MG: 200 CAPSULE ORAL at 08:33

## 2023-06-08 RX ADMIN — ATORVASTATIN CALCIUM 10 MG: 10 TABLET, FILM COATED ORAL at 08:33

## 2023-06-08 RX ADMIN — ASPIRIN 81 MG: 81 TABLET, COATED ORAL at 08:33

## 2023-06-08 ASSESSMENT — ORIENTATION MEMORY CONCENTRATION TEST (OMCT)
COUNT BACKWARDS FROM 20 TO 1: CORRECT
SAY THE MONTHS IN REVERSE ORDER STARTING WITH LAST MONTH: CORRECT
WHAT TIME IS IT (NO WATCH OR CLOCK): CORRECT
OMCT SCORE: 2
REPEAT THE NAME AND ADDRESS I ASKED YOU TO REMEMBER: 1 ERROR
WHAT YEAR IS IT NOW (MUST BE EXACT): CORRECT
WHAT MONTH IS IT NOW: CORRECT
OMCT INTERPRETATION: 0-6: NO SIGNIFICANT IMPAIRMENT

## 2023-06-08 ASSESSMENT — ENCOUNTER SYMPTOMS
AGITATION: 0
FEVER: 0
WHEEZING: 0
DIZZINESS: 0
LIGHT-HEADEDNESS: 0
ABDOMINAL PAIN: 0
APPETITE CHANGE: 0
FATIGUE: 1
PAIN SEVERITY NOW: 0
VOMITING: 0
COUGH: 0
ACTIVITY CHANGE: 1
SHORTNESS OF BREATH: 0
NAUSEA: 0
DIARRHEA: 0
HEADACHES: 0

## 2023-06-08 ASSESSMENT — COGNITIVE AND FUNCTIONAL STATUS - GENERAL
BASIC_MOBILITY_CONVERTED_SCORE: 39.67
BASIC_MOBILITY_RAW_SCORE: 17

## 2023-06-08 ASSESSMENT — PAIN SCALES - GENERAL: PAINLEVEL_OUTOF10: 1

## 2023-06-09 ENCOUNTER — APPOINTMENT (OUTPATIENT)
Dept: GENERAL RADIOLOGY | Age: 88
DRG: 842 | End: 2023-06-09
Attending: EMERGENCY MEDICINE

## 2023-06-09 ENCOUNTER — HOSPITAL ENCOUNTER (INPATIENT)
Age: 88
LOS: 1 days | Discharge: HOME HEALTH | DRG: 842 | End: 2023-06-10
Attending: EMERGENCY MEDICINE | Admitting: FAMILY MEDICINE

## 2023-06-09 DIAGNOSIS — R53.1 GENERALIZED WEAKNESS: Primary | ICD-10-CM

## 2023-06-09 DIAGNOSIS — N39.0 URINARY TRACT INFECTION WITHOUT HEMATURIA, SITE UNSPECIFIED: ICD-10-CM

## 2023-06-09 LAB
ALBUMIN SERPL-MCNC: 2.6 G/DL (ref 3.6–5.1)
ALBUMIN/GLOB SERPL: 1.1 {RATIO} (ref 1–2.4)
ALP SERPL-CCNC: 63 UNITS/L (ref 45–117)
ALT SERPL-CCNC: 22 UNITS/L
ANION GAP SERPL CALC-SCNC: 14 MMOL/L (ref 7–19)
APPEARANCE UR: ABNORMAL
APTT PPP: 24 SEC (ref 22–30)
AST SERPL-CCNC: 6 UNITS/L
BACTERIA #/AREA URNS HPF: ABNORMAL /HPF
BASOPHILS # BLD: 0 K/MCL (ref 0–0.3)
BASOPHILS NFR BLD: 0 %
BILIRUB SERPL-MCNC: 0.6 MG/DL (ref 0.2–1)
BILIRUB UR QL STRIP: NEGATIVE
BUN SERPL-MCNC: 14 MG/DL (ref 6–20)
BUN/CREAT SERPL: 25 (ref 7–25)
CALCIUM SERPL-MCNC: 7.8 MG/DL (ref 8.4–10.2)
CHLORIDE SERPL-SCNC: 100 MMOL/L (ref 97–110)
CO2 SERPL-SCNC: 26 MMOL/L (ref 21–32)
COLOR UR: ABNORMAL
CREAT SERPL-MCNC: 0.57 MG/DL (ref 0.51–0.95)
DEPRECATED RDW RBC: 55.6 FL (ref 39–50)
EOSINOPHIL # BLD: 0.2 K/MCL (ref 0–0.5)
EOSINOPHIL NFR BLD: 5 %
ERYTHROCYTE [DISTWIDTH] IN BLOOD: 18 % (ref 11–15)
FASTING DURATION TIME PATIENT: ABNORMAL H
GFR SERPLBLD BASED ON 1.73 SQ M-ARVRAT: 88 ML/MIN
GLOBULIN SER-MCNC: 2.4 G/DL (ref 2–4)
GLUCOSE SERPL-MCNC: 85 MG/DL (ref 70–99)
GLUCOSE UR STRIP-MCNC: NEGATIVE MG/DL
HCT VFR BLD CALC: 32.2 % (ref 36–46.5)
HGB BLD-MCNC: 9.9 G/DL (ref 12–15.5)
HGB UR QL STRIP: NEGATIVE
HYALINE CASTS #/AREA URNS LPF: ABNORMAL /LPF
IMM GRANULOCYTES # BLD AUTO: 0 K/MCL (ref 0–0.2)
IMM GRANULOCYTES # BLD: 0 %
INR PPP: 1
KETONES UR STRIP-MCNC: NEGATIVE MG/DL
LEUKOCYTE ESTERASE UR QL STRIP: ABNORMAL
LYMPHOCYTES # BLD: 0.5 K/MCL (ref 1–4)
LYMPHOCYTES NFR BLD: 11 %
MCH RBC QN AUTO: 26 PG (ref 26–34)
MCHC RBC AUTO-ENTMCNC: 30.7 G/DL (ref 32–36.5)
MCV RBC AUTO: 84.5 FL (ref 78–100)
MONOCYTES # BLD: 0.3 K/MCL (ref 0.3–0.9)
MONOCYTES NFR BLD: 8 %
NEUTROPHILS # BLD: 3.4 K/MCL (ref 1.8–7.7)
NEUTROPHILS NFR BLD: 76 %
NITRITE UR QL STRIP: POSITIVE
NRBC BLD MANUAL-RTO: 0 /100 WBC
NT-PROBNP SERPL-MCNC: 195 PG/ML
P AXIS (DEGREES): 24
PH UR STRIP: 7 [PH] (ref 5–7)
PLATELET # BLD AUTO: 158 K/MCL (ref 140–450)
POTASSIUM SERPL-SCNC: 3.8 MMOL/L (ref 3.4–5.1)
PR-INTERVAL (MSEC): 196
PROT SERPL-MCNC: 5 G/DL (ref 6.4–8.2)
PROT UR STRIP-MCNC: NEGATIVE MG/DL
PROTHROMBIN TIME: 10.5 SEC (ref 9.7–11.8)
QRS-INTERVAL (MSEC): 169
QT-INTERVAL (MSEC): 426
QTC: 457
R AXIS (DEGREES): -26
RBC # BLD: 3.81 MIL/MCL (ref 4–5.2)
RBC #/AREA URNS HPF: ABNORMAL /HPF
REPORT TEXT: NORMAL
SODIUM SERPL-SCNC: 136 MMOL/L (ref 135–145)
SP GR UR STRIP: 1.01 (ref 1–1.03)
SQUAMOUS #/AREA URNS HPF: ABNORMAL /HPF
T AXIS (DEGREES): -12
TROPONIN I SERPL DL<=0.01 NG/ML-MCNC: 14 NG/L
UROBILINOGEN UR STRIP-MCNC: 0.2 MG/DL
VENTRICULAR RATE EKG/MIN (BPM): 77
WBC # BLD: 4.5 K/MCL (ref 4.2–11)
WBC #/AREA URNS HPF: ABNORMAL /HPF

## 2023-06-09 PROCEDURE — 36415 COLL VENOUS BLD VENIPUNCTURE: CPT

## 2023-06-09 PROCEDURE — 96374 THER/PROPH/DIAG INJ IV PUSH: CPT

## 2023-06-09 PROCEDURE — 10002807 HB RX 258: Performed by: FAMILY MEDICINE

## 2023-06-09 PROCEDURE — 10004651 HB RX, NO CHARGE ITEM: Performed by: EMERGENCY MEDICINE

## 2023-06-09 PROCEDURE — 10004651 HB RX, NO CHARGE ITEM: Performed by: FAMILY MEDICINE

## 2023-06-09 PROCEDURE — 10000002 HB ROOM CHARGE MED SURG

## 2023-06-09 PROCEDURE — 10002803 HB RX 637: Performed by: FAMILY MEDICINE

## 2023-06-09 PROCEDURE — 81001 URINALYSIS AUTO W/SCOPE: CPT | Performed by: EMERGENCY MEDICINE

## 2023-06-09 PROCEDURE — 84484 ASSAY OF TROPONIN QUANT: CPT | Performed by: EMERGENCY MEDICINE

## 2023-06-09 PROCEDURE — 93005 ELECTROCARDIOGRAM TRACING: CPT | Performed by: EMERGENCY MEDICINE

## 2023-06-09 PROCEDURE — 85025 COMPLETE CBC W/AUTO DIFF WBC: CPT | Performed by: EMERGENCY MEDICINE

## 2023-06-09 PROCEDURE — 85610 PROTHROMBIN TIME: CPT | Performed by: EMERGENCY MEDICINE

## 2023-06-09 PROCEDURE — 10002807 HB RX 258: Performed by: EMERGENCY MEDICINE

## 2023-06-09 PROCEDURE — 99285 EMERGENCY DEPT VISIT HI MDM: CPT

## 2023-06-09 PROCEDURE — 87186 SC STD MICRODIL/AGAR DIL: CPT | Performed by: EMERGENCY MEDICINE

## 2023-06-09 PROCEDURE — 10002800 HB RX 250 W HCPCS: Performed by: EMERGENCY MEDICINE

## 2023-06-09 PROCEDURE — 71045 X-RAY EXAM CHEST 1 VIEW: CPT

## 2023-06-09 PROCEDURE — 85730 THROMBOPLASTIN TIME PARTIAL: CPT | Performed by: EMERGENCY MEDICINE

## 2023-06-09 PROCEDURE — 93010 ELECTROCARDIOGRAM REPORT: CPT | Performed by: INTERNAL MEDICINE

## 2023-06-09 PROCEDURE — P9612 CATHETERIZE FOR URINE SPEC: HCPCS

## 2023-06-09 PROCEDURE — 83880 ASSAY OF NATRIURETIC PEPTIDE: CPT | Performed by: EMERGENCY MEDICINE

## 2023-06-09 PROCEDURE — 80053 COMPREHEN METABOLIC PANEL: CPT | Performed by: EMERGENCY MEDICINE

## 2023-06-09 RX ORDER — SODIUM CHLORIDE 9 MG/ML
INJECTION, SOLUTION INTRAVENOUS CONTINUOUS
Status: DISCONTINUED | OUTPATIENT
Start: 2023-06-09 | End: 2023-06-10 | Stop reason: HOSPADM

## 2023-06-09 RX ORDER — POLYETHYLENE GLYCOL 3350 17 G/17G
17 POWDER, FOR SOLUTION ORAL DAILY PRN
Status: DISCONTINUED | OUTPATIENT
Start: 2023-06-09 | End: 2023-06-10 | Stop reason: HOSPADM

## 2023-06-09 RX ORDER — ACYCLOVIR 200 MG/1
200 CAPSULE ORAL DAILY
Status: DISCONTINUED | OUTPATIENT
Start: 2023-06-09 | End: 2023-06-10 | Stop reason: HOSPADM

## 2023-06-09 RX ORDER — CELECOXIB 200 MG/1
200 CAPSULE ORAL DAILY
Status: DISCONTINUED | OUTPATIENT
Start: 2023-06-09 | End: 2023-06-10 | Stop reason: HOSPADM

## 2023-06-09 RX ORDER — ATORVASTATIN CALCIUM 10 MG/1
10 TABLET, FILM COATED ORAL NIGHTLY
Status: DISCONTINUED | OUTPATIENT
Start: 2023-06-09 | End: 2023-06-10 | Stop reason: HOSPADM

## 2023-06-09 RX ORDER — DOCUSATE SODIUM 100 MG/1
100 CAPSULE, LIQUID FILLED ORAL DAILY PRN
Status: DISCONTINUED | OUTPATIENT
Start: 2023-06-09 | End: 2023-06-10 | Stop reason: HOSPADM

## 2023-06-09 RX ORDER — CEFAZOLIN SODIUM/WATER 1 G/10 ML
1000 SYRINGE (ML) INTRAVENOUS ONCE
Status: COMPLETED | OUTPATIENT
Start: 2023-06-09 | End: 2023-06-09

## 2023-06-09 RX ORDER — TOLTERODINE 4 MG/1
4 CAPSULE, EXTENDED RELEASE ORAL DAILY
Status: DISCONTINUED | OUTPATIENT
Start: 2023-06-09 | End: 2023-06-10 | Stop reason: HOSPADM

## 2023-06-09 RX ORDER — GABAPENTIN 300 MG/1
300 CAPSULE ORAL NIGHTLY
Status: DISCONTINUED | OUTPATIENT
Start: 2023-06-09 | End: 2023-06-10 | Stop reason: HOSPADM

## 2023-06-09 RX ORDER — 0.9 % SODIUM CHLORIDE 0.9 %
2 VIAL (ML) INJECTION EVERY 12 HOURS SCHEDULED
Status: DISCONTINUED | OUTPATIENT
Start: 2023-06-09 | End: 2023-06-10 | Stop reason: HOSPADM

## 2023-06-09 RX ORDER — TRAZODONE HYDROCHLORIDE 50 MG/1
25 TABLET ORAL NIGHTLY
Status: DISCONTINUED | OUTPATIENT
Start: 2023-06-09 | End: 2023-06-09

## 2023-06-09 RX ORDER — ASPIRIN 81 MG/1
81 TABLET ORAL DAILY
Status: DISCONTINUED | OUTPATIENT
Start: 2023-06-09 | End: 2023-06-10 | Stop reason: HOSPADM

## 2023-06-09 RX ORDER — ESCITALOPRAM OXALATE 5 MG/1
5 TABLET ORAL DAILY
Status: DISCONTINUED | OUTPATIENT
Start: 2023-06-09 | End: 2023-06-09

## 2023-06-09 RX ORDER — SERTRALINE HYDROCHLORIDE 25 MG/1
25 TABLET, FILM COATED ORAL DAILY
Status: DISCONTINUED | OUTPATIENT
Start: 2023-06-09 | End: 2023-06-10 | Stop reason: HOSPADM

## 2023-06-09 RX ORDER — TRAMADOL HYDROCHLORIDE 50 MG/1
50 TABLET ORAL EVERY 6 HOURS PRN
Status: DISCONTINUED | OUTPATIENT
Start: 2023-06-09 | End: 2023-06-10 | Stop reason: HOSPADM

## 2023-06-09 RX ORDER — LISINOPRIL 20 MG/1
20 TABLET ORAL DAILY
Status: DISCONTINUED | OUTPATIENT
Start: 2023-06-09 | End: 2023-06-10 | Stop reason: HOSPADM

## 2023-06-09 RX ORDER — CEFAZOLIN SODIUM/WATER 1 G/10 ML
1000 SYRINGE (ML) INTRAVENOUS DAILY
Status: DISCONTINUED | OUTPATIENT
Start: 2023-06-10 | End: 2023-06-10 | Stop reason: HOSPADM

## 2023-06-09 RX ORDER — DEXAMETHASONE 4 MG/1
4 TABLET ORAL
Status: DISCONTINUED | OUTPATIENT
Start: 2023-06-09 | End: 2023-06-10 | Stop reason: HOSPADM

## 2023-06-09 RX ADMIN — SODIUM CHLORIDE, PRESERVATIVE FREE 2 ML: 5 INJECTION INTRAVENOUS at 22:06

## 2023-06-09 RX ADMIN — SODIUM CHLORIDE: 9 INJECTION, SOLUTION INTRAVENOUS at 15:27

## 2023-06-09 RX ADMIN — GABAPENTIN 300 MG: 300 CAPSULE ORAL at 21:52

## 2023-06-09 RX ADMIN — ASPIRIN 81 MG: 81 TABLET, COATED ORAL at 21:52

## 2023-06-09 RX ADMIN — TOLTERODINE 4 MG: 4 CAPSULE, EXTENDED RELEASE ORAL at 21:52

## 2023-06-09 RX ADMIN — CELECOXIB 200 MG: 200 CAPSULE ORAL at 22:05

## 2023-06-09 RX ADMIN — CEFTRIAXONE SODIUM 1000 MG: 10 INJECTION, POWDER, FOR SOLUTION INTRAVENOUS at 15:28

## 2023-06-09 RX ADMIN — SODIUM CHLORIDE: 9 INJECTION, SOLUTION INTRAVENOUS at 17:32

## 2023-06-09 RX ADMIN — ATORVASTATIN CALCIUM 10 MG: 10 TABLET, FILM COATED ORAL at 21:52

## 2023-06-09 RX ADMIN — DEXAMETHASONE 4 MG: 4 TABLET ORAL at 21:52

## 2023-06-09 RX ADMIN — MIRABEGRON 25 MG: 25 TABLET, FILM COATED, EXTENDED RELEASE ORAL at 21:52

## 2023-06-09 RX ADMIN — LISINOPRIL 20 MG: 20 TABLET ORAL at 21:52

## 2023-06-09 RX ADMIN — ACYCLOVIR 200 MG: 200 CAPSULE ORAL at 21:52

## 2023-06-09 RX ADMIN — SERTRALINE HYDROCHLORIDE 25 MG: 25 TABLET, FILM COATED ORAL at 21:52

## 2023-06-09 SDOH — SOCIAL STABILITY: SOCIAL NETWORK: SUPPORT SYSTEMS: FAMILY MEMBERS

## 2023-06-09 SDOH — HEALTH STABILITY: PHYSICAL HEALTH: DO YOU HAVE SERIOUS DIFFICULTY WALKING OR CLIMBING STAIRS?: YES

## 2023-06-09 SDOH — HEALTH STABILITY: GENERAL: BECAUSE OF A PHYSICAL, MENTAL, OR EMOTIONAL CONDITION, DO YOU HAVE DIFFICULTY DOING ERRANDS ALONE?: YES

## 2023-06-09 SDOH — ECONOMIC STABILITY: HOUSING INSECURITY: WHAT IS YOUR LIVING SITUATION TODAY?: ASSISTED LIVING

## 2023-06-09 SDOH — ECONOMIC STABILITY: FOOD INSECURITY: HOW OFTEN IN THE PAST 12 MONTHS WERE YOU WORRIED OR STRESSED ABOUT HAVING ENOUGH MONEY TO BUY NUTRITIOUS MEALS?: NEVER

## 2023-06-09 SDOH — ECONOMIC STABILITY: HOUSING INSECURITY: ARE YOU WORRIED ABOUT LOSING YOUR HOUSING?: NO

## 2023-06-09 SDOH — ECONOMIC STABILITY: HOUSING INSECURITY: WHAT IS YOUR LIVING SITUATION TODAY?: OTHER FACILITY RESIDENTS

## 2023-06-09 SDOH — HEALTH STABILITY: PHYSICAL HEALTH: DO YOU HAVE DIFFICULTY DRESSING OR BATHING?: NO

## 2023-06-09 SDOH — ECONOMIC STABILITY: GENERAL

## 2023-06-09 ASSESSMENT — ENCOUNTER SYMPTOMS
PSYCHIATRIC NEGATIVE: 1
BLOOD IN STOOL: 0
COLOR CHANGE: 0
CONFUSION: 0
SINUS PAIN: 0
AGITATION: 0
DIARRHEA: 0
SINUS PRESSURE: 0
EYES NEGATIVE: 1
LIGHT-HEADEDNESS: 0
NUMBNESS: 0
ENDOCRINE NEGATIVE: 1
VOMITING: 0
NAUSEA: 0
CONSTIPATION: 0
CHILLS: 0
APPETITE CHANGE: 0
SORE THROAT: 0
DIZZINESS: 0
WEAKNESS: 1
HEMATOLOGIC/LYMPHATIC NEGATIVE: 1
ACTIVITY CHANGE: 1
ABDOMINAL PAIN: 0
CHEST TIGHTNESS: 0
COUGH: 0
EYE PAIN: 0
VOICE CHANGE: 0
GASTROINTESTINAL NEGATIVE: 1
UNEXPECTED WEIGHT CHANGE: 0
FATIGUE: 1
SHORTNESS OF BREATH: 1
BACK PAIN: 0
ADENOPATHY: 0
HEADACHES: 0
FEVER: 0
EYE REDNESS: 0
WHEEZING: 0
ALLERGIC/IMMUNOLOGIC NEGATIVE: 1

## 2023-06-09 ASSESSMENT — ORIENTATION MEMORY CONCENTRATION TEST (OMCT)
REPEAT THE NAME AND ADDRESS I ASKED YOU TO REMEMBER: CORRECT
WHAT MONTH IS IT NOW: CORRECT
SAY THE MONTHS IN REVERSE ORDER STARTING WITH LAST MONTH: 1 ERROR
WHAT TIME IS IT (NO WATCH OR CLOCK): INCORRECT
OMCT SCORE: 5
OMCT INTERPRETATION: 0-6: NO SIGNIFICANT IMPAIRMENT
WHAT YEAR IS IT NOW (MUST BE EXACT): CORRECT
COUNT BACKWARDS FROM 20 TO 1: CORRECT

## 2023-06-09 ASSESSMENT — ACTIVITIES OF DAILY LIVING (ADL)
RECENT_DECLINE_ADL: YES, DECLINE IN AMBULATION/TRANSFERRING, COLLABORATE WITH PROVIDER (T)
ADL_SHORT_OF_BREATH: YES
FEEDING: INDEPENDENT
ADL_SCORE: 7
TOILETING: NEEDS ASSISTANCE
DRESSING: NEEDS ASSISTANCE
ADL_BEFORE_ADMISSION: NEEDS/REQUIRES ASSISTANCE
BATHING: NEEDS ASSISTANCE

## 2023-06-09 ASSESSMENT — PATIENT HEALTH QUESTIONNAIRE - PHQ9
SUM OF ALL RESPONSES TO PHQ9 QUESTIONS 1 AND 2: 0
IS PATIENT ABLE TO COMPLETE PHQ2 OR PHQ9: YES
2. FEELING DOWN, DEPRESSED OR HOPELESS: NOT AT ALL
CLINICAL INTERPRETATION OF PHQ2 SCORE: NO FURTHER SCREENING NEEDED
SUM OF ALL RESPONSES TO PHQ9 QUESTIONS 1 AND 2: 0
1. LITTLE INTEREST OR PLEASURE IN DOING THINGS: NOT AT ALL

## 2023-06-09 ASSESSMENT — COLUMBIA-SUICIDE SEVERITY RATING SCALE - C-SSRS
IS THE PATIENT ABLE TO COMPLETE C-SSRS: YES
1. WITHIN THE PAST MONTH, HAVE YOU WISHED YOU WERE DEAD OR WISHED YOU COULD GO TO SLEEP AND NOT WAKE UP?: NO
6. HAVE YOU EVER DONE ANYTHING, STARTED TO DO ANYTHING, OR PREPARED TO DO ANYTHING TO END YOUR LIFE?: NO
2. HAVE YOU ACTUALLY HAD ANY THOUGHTS OF KILLING YOURSELF?: NO

## 2023-06-09 ASSESSMENT — LIFESTYLE VARIABLES
AUDIT-C TOTAL SCORE: 1
ALCOHOL_USE_STATUS: NO OR LOW RISK WITH VALIDATED TOOL
HOW OFTEN DO YOU HAVE A DRINK CONTAINING ALCOHOL: MONTHLY OR LESS
HOW OFTEN DO YOU HAVE 6 OR MORE DRINKS ON ONE OCCASION: NEVER
HOW MANY STANDARD DRINKS CONTAINING ALCOHOL DO YOU HAVE ON A TYPICAL DAY: 0,1 OR 2

## 2023-06-09 ASSESSMENT — PAIN SCALES - GENERAL
PAINLEVEL_OUTOF10: 0

## 2023-06-10 VITALS
HEART RATE: 84 BPM | HEIGHT: 60 IN | DIASTOLIC BLOOD PRESSURE: 75 MMHG | SYSTOLIC BLOOD PRESSURE: 113 MMHG | TEMPERATURE: 98.2 F | OXYGEN SATURATION: 95 % | WEIGHT: 121.69 LBS | RESPIRATION RATE: 16 BRPM | BODY MASS INDEX: 23.89 KG/M2

## 2023-06-10 LAB
ALBUMIN SERPL-MCNC: 2.4 G/DL (ref 3.6–5.1)
ALBUMIN/GLOB SERPL: 0.9 {RATIO} (ref 1–2.4)
ALP SERPL-CCNC: 64 UNITS/L (ref 45–117)
ALT SERPL-CCNC: 21 UNITS/L
ANION GAP SERPL CALC-SCNC: 11 MMOL/L (ref 7–19)
AST SERPL-CCNC: 6 UNITS/L
BASOPHILS # BLD: 0.1 K/MCL (ref 0–0.3)
BASOPHILS NFR BLD: 1 %
BILIRUB SERPL-MCNC: 0.5 MG/DL (ref 0.2–1)
BUN SERPL-MCNC: 11 MG/DL (ref 6–20)
BUN/CREAT SERPL: 24 (ref 7–25)
CALCIUM SERPL-MCNC: 7.4 MG/DL (ref 8.4–10.2)
CHLORIDE SERPL-SCNC: 104 MMOL/L (ref 97–110)
CO2 SERPL-SCNC: 24 MMOL/L (ref 21–32)
CREAT SERPL-MCNC: 0.45 MG/DL (ref 0.51–0.95)
DEPRECATED RDW RBC: 55 FL (ref 39–50)
EOSINOPHIL # BLD: 0 K/MCL (ref 0–0.5)
EOSINOPHIL NFR BLD: 0 %
ERYTHROCYTE [DISTWIDTH] IN BLOOD: 18 % (ref 11–15)
FASTING DURATION TIME PATIENT: ABNORMAL H
GFR SERPLBLD BASED ON 1.73 SQ M-ARVRAT: >90 ML/MIN
GLOBULIN SER-MCNC: 2.6 G/DL (ref 2–4)
GLUCOSE SERPL-MCNC: 122 MG/DL (ref 70–99)
HCT VFR BLD CALC: 32.6 % (ref 36–46.5)
HGB BLD-MCNC: 10.3 G/DL (ref 12–15.5)
LYMPHOCYTES # BLD: 0.4 K/MCL (ref 1–4)
LYMPHOCYTES NFR BLD: 6 %
MCH RBC QN AUTO: 27 PG (ref 26–34)
MCHC RBC AUTO-ENTMCNC: 31.6 G/DL (ref 32–36.5)
MCV RBC AUTO: 85.3 FL (ref 78–100)
MONOCYTES # BLD: 0.1 K/MCL (ref 0.3–0.9)
MONOCYTES NFR BLD: 1 %
NEUTROPHILS # BLD: 4.6 K/MCL (ref 1.8–7.7)
NEUTS BAND NFR BLD: 5 % (ref 0–10)
NEUTS SEG NFR BLD: 86 %
NRBC BLD MANUAL-RTO: 0 /100 WBC
OVALOCYTES BLD QL SMEAR: ABNORMAL
PLAT MORPH BLD: NORMAL
PLATELET # BLD AUTO: 181 K/MCL (ref 140–450)
POTASSIUM SERPL-SCNC: 4 MMOL/L (ref 3.4–5.1)
PROT SERPL-MCNC: 5 G/DL (ref 6.4–8.2)
RBC # BLD: 3.82 MIL/MCL (ref 4–5.2)
SODIUM SERPL-SCNC: 135 MMOL/L (ref 135–145)
VARIANT LYMPHS NFR BLD: 1 % (ref 0–5)
WBC # BLD: 5 K/MCL (ref 4.2–11)

## 2023-06-10 PROCEDURE — 36415 COLL VENOUS BLD VENIPUNCTURE: CPT | Performed by: FAMILY MEDICINE

## 2023-06-10 PROCEDURE — 97530 THERAPEUTIC ACTIVITIES: CPT

## 2023-06-10 PROCEDURE — G0378 HOSPITAL OBSERVATION PER HR: HCPCS

## 2023-06-10 PROCEDURE — 10002803 HB RX 637: Performed by: FAMILY MEDICINE

## 2023-06-10 PROCEDURE — 10004651 HB RX, NO CHARGE ITEM: Performed by: EMERGENCY MEDICINE

## 2023-06-10 PROCEDURE — 10002800 HB RX 250 W HCPCS: Performed by: FAMILY MEDICINE

## 2023-06-10 PROCEDURE — 96376 TX/PRO/DX INJ SAME DRUG ADON: CPT

## 2023-06-10 PROCEDURE — 10004651 HB RX, NO CHARGE ITEM: Performed by: FAMILY MEDICINE

## 2023-06-10 PROCEDURE — 80053 COMPREHEN METABOLIC PANEL: CPT | Performed by: FAMILY MEDICINE

## 2023-06-10 PROCEDURE — 97161 PT EVAL LOW COMPLEX 20 MIN: CPT

## 2023-06-10 PROCEDURE — 85027 COMPLETE CBC AUTOMATED: CPT | Performed by: FAMILY MEDICINE

## 2023-06-10 RX ORDER — CIPROFLOXACIN 500 MG/1
500 TABLET, FILM COATED ORAL 2 TIMES DAILY
Qty: 10 TABLET | Refills: 0 | Status: SHIPPED | OUTPATIENT
Start: 2023-06-10 | End: 2023-06-11 | Stop reason: HOSPADM

## 2023-06-10 RX ADMIN — MIRABEGRON 25 MG: 25 TABLET, FILM COATED, EXTENDED RELEASE ORAL at 08:29

## 2023-06-10 RX ADMIN — ASPIRIN 81 MG: 81 TABLET, COATED ORAL at 08:29

## 2023-06-10 RX ADMIN — TOLTERODINE 4 MG: 4 CAPSULE, EXTENDED RELEASE ORAL at 08:29

## 2023-06-10 RX ADMIN — LISINOPRIL 20 MG: 20 TABLET ORAL at 08:29

## 2023-06-10 RX ADMIN — CEFTRIAXONE SODIUM 1000 MG: 10 INJECTION, POWDER, FOR SOLUTION INTRAVENOUS at 08:26

## 2023-06-10 RX ADMIN — CELECOXIB 200 MG: 200 CAPSULE ORAL at 08:29

## 2023-06-10 RX ADMIN — SERTRALINE HYDROCHLORIDE 25 MG: 25 TABLET, FILM COATED ORAL at 08:29

## 2023-06-10 RX ADMIN — SODIUM CHLORIDE, PRESERVATIVE FREE 2 ML: 5 INJECTION INTRAVENOUS at 08:25

## 2023-06-10 RX ADMIN — DEXAMETHASONE 4 MG: 4 TABLET ORAL at 08:29

## 2023-06-10 RX ADMIN — ACYCLOVIR 200 MG: 200 CAPSULE ORAL at 08:29

## 2023-06-10 ASSESSMENT — ENCOUNTER SYMPTOMS
AGITATION: 0
DIARRHEA: 0
SHORTNESS OF BREATH: 0
ACTIVITY CHANGE: 0
WHEEZING: 0
NAUSEA: 0
HEADACHES: 0
DIZZINESS: 0
LIGHT-HEADEDNESS: 0
VOMITING: 0
COUGH: 0
APPETITE CHANGE: 0
FEVER: 0
SHORTNESS OF BREATH: 1
PAIN SEVERITY NOW: 6
ABDOMINAL PAIN: 0

## 2023-06-10 ASSESSMENT — COGNITIVE AND FUNCTIONAL STATUS - GENERAL
BASIC_MOBILITY_CONVERTED_SCORE: 39.67
BASIC_MOBILITY_RAW_SCORE: 17

## 2023-06-10 ASSESSMENT — PAIN SCALES - GENERAL: PAINLEVEL_OUTOF10: 0

## 2023-06-10 ASSESSMENT — PAIN SCALES - WONG BAKER: WONGBAKER_NUMERICALRESPONSE: 0

## 2023-06-11 LAB — BACTERIA UR CULT: ABNORMAL

## 2023-06-11 RX ORDER — NITROFURANTOIN 25; 75 MG/1; MG/1
100 CAPSULE ORAL 2 TIMES DAILY
Qty: 10 CAPSULE | Refills: 0 | Status: SHIPPED | OUTPATIENT
Start: 2023-06-11 | End: 2023-06-16

## 2023-08-08 ENCOUNTER — HOSPITAL ENCOUNTER (OUTPATIENT)
Dept: GENERAL RADIOLOGY | Age: 88
Discharge: HOME | End: 2023-08-08
Attending: INTERNAL MEDICINE

## 2023-08-08 DIAGNOSIS — C90.00 MULTIPLE MYELOMA NOT HAVING ACHIEVED REMISSION (CMD): Primary | ICD-10-CM

## 2023-08-08 DIAGNOSIS — E86.0 DEHYDRATION: ICD-10-CM

## 2023-08-08 DIAGNOSIS — D75.9 DISEASE OF BLOOD AND BLOOD-FORMING ORGANS, UNSPECIFIED: ICD-10-CM

## 2023-08-08 DIAGNOSIS — D47.2 MONOCLONAL GAMMOPATHY: ICD-10-CM

## 2023-08-08 DIAGNOSIS — C90.00 MULTIPLE MYELOMA NOT HAVING ACHIEVED REMISSION (CMD): ICD-10-CM

## 2023-08-08 DIAGNOSIS — R06.02 SOB (SHORTNESS OF BREATH): ICD-10-CM

## 2023-08-08 DIAGNOSIS — R06.2 WHEEZING: ICD-10-CM

## 2023-08-08 PROCEDURE — 71046 X-RAY EXAM CHEST 2 VIEWS: CPT

## 2023-08-09 ENCOUNTER — HOSPITAL ENCOUNTER (OUTPATIENT)
Dept: ULTRASOUND IMAGING | Age: 88
Discharge: HOME | End: 2023-08-09
Attending: INTERNAL MEDICINE

## 2023-08-09 DIAGNOSIS — E86.0 DEHYDRATION: ICD-10-CM

## 2023-08-09 DIAGNOSIS — D47.2 MONOCLONAL GAMMOPATHY: ICD-10-CM

## 2023-08-09 DIAGNOSIS — D75.9 DISEASE OF BLOOD AND BLOOD-FORMING ORGANS, UNSPECIFIED: ICD-10-CM

## 2023-08-09 PROCEDURE — 76700 US EXAM ABDOM COMPLETE: CPT

## 2023-09-19 DIAGNOSIS — K86.2 PANCREATIC CYST: Primary | ICD-10-CM

## 2023-10-02 ENCOUNTER — HOSPITAL ENCOUNTER (OUTPATIENT)
Dept: CT IMAGING | Age: 88
Discharge: HOME | End: 2023-10-02
Attending: STUDENT IN AN ORGANIZED HEALTH CARE EDUCATION/TRAINING PROGRAM

## 2023-10-02 DIAGNOSIS — K86.2 PANCREATIC CYST: ICD-10-CM

## 2023-10-02 PROCEDURE — 10002805 HB CONTRAST AGENT: Performed by: STUDENT IN AN ORGANIZED HEALTH CARE EDUCATION/TRAINING PROGRAM

## 2023-10-02 PROCEDURE — 74170 CT ABD WO CNTRST FLWD CNTRST: CPT

## 2023-10-02 PROCEDURE — G1004 CDSM NDSC: HCPCS

## 2023-10-02 RX ADMIN — IOHEXOL 75 ML: 350 INJECTION, SOLUTION INTRAVENOUS at 15:31

## 2023-11-02 ENCOUNTER — HOSPITAL ENCOUNTER (OUTPATIENT)
Dept: GENERAL RADIOLOGY | Age: 88
Discharge: HOME | End: 2023-11-02
Attending: INTERNAL MEDICINE

## 2023-11-02 DIAGNOSIS — D51.9 VITAMIN B12 DEFICIENCY ANEMIA, UNSPECIFIED: ICD-10-CM

## 2023-11-02 DIAGNOSIS — C90.00 MULTIPLE MYELOMA NOT HAVING ACHIEVED REMISSION (CMD): ICD-10-CM

## 2023-11-02 DIAGNOSIS — D75.9 DISEASE OF BLOOD AND BLOOD-FORMING ORGANS, UNSPECIFIED: ICD-10-CM

## 2023-11-02 DIAGNOSIS — D47.2 MONOCLONAL GAMMOPATHY: ICD-10-CM

## 2023-11-02 DIAGNOSIS — E86.0 DEHYDRATION: ICD-10-CM

## 2023-11-02 DIAGNOSIS — R05.9 COUGH: ICD-10-CM

## 2023-11-02 DIAGNOSIS — K86.2 CYST OF PANCREAS: ICD-10-CM

## 2023-11-02 PROCEDURE — 71046 X-RAY EXAM CHEST 2 VIEWS: CPT

## 2023-11-10 ENCOUNTER — LAB SERVICES (OUTPATIENT)
Dept: LAB | Age: 88
End: 2023-11-10

## 2023-11-10 DIAGNOSIS — R35.0 FREQUENCY OF URINATION: ICD-10-CM

## 2023-11-10 DIAGNOSIS — R82.90 BAD ODOR OF URINE: ICD-10-CM

## 2023-11-10 DIAGNOSIS — R30.0 DYSURIA: Primary | ICD-10-CM

## 2023-11-10 LAB
APPEARANCE UR: ABNORMAL
BACTERIA #/AREA URNS HPF: ABNORMAL /HPF
BILIRUB UR QL STRIP: NEGATIVE
COLOR UR: YELLOW
GLUCOSE UR STRIP-MCNC: NEGATIVE MG/DL
HGB UR QL STRIP: NEGATIVE
HYALINE CASTS #/AREA URNS LPF: ABNORMAL /LPF
KETONES UR STRIP-MCNC: NEGATIVE MG/DL
LEUKOCYTE ESTERASE UR QL STRIP: ABNORMAL
MUCOUS THREADS URNS QL MICRO: PRESENT
NITRITE UR QL STRIP: POSITIVE
PH UR STRIP: 6.5 [PH] (ref 5–7)
PROT UR STRIP-MCNC: NEGATIVE MG/DL
RBC #/AREA URNS HPF: ABNORMAL /HPF
SP GR UR STRIP: 1.01 (ref 1–1.03)
SQUAMOUS #/AREA URNS HPF: ABNORMAL /HPF
UROBILINOGEN UR STRIP-MCNC: 0.2 MG/DL
WBC #/AREA URNS HPF: ABNORMAL /HPF

## 2023-11-10 PROCEDURE — 81001 URINALYSIS AUTO W/SCOPE: CPT

## 2023-11-10 PROCEDURE — 87186 SC STD MICRODIL/AGAR DIL: CPT

## 2023-11-12 LAB — BACTERIA UR CULT: ABNORMAL

## 2023-11-30 RX ORDER — LIDOCAINE 50 MG/G
1 PATCH TOPICAL DAILY PRN
COMMUNITY

## 2023-11-30 RX ORDER — ACETAMINOPHEN 325 MG/1
650 TABLET ORAL EVERY 4 HOURS PRN
COMMUNITY

## 2023-11-30 RX ORDER — LOPERAMIDE HYDROCHLORIDE 2 MG/1
2 CAPSULE ORAL DAILY PRN
COMMUNITY

## 2023-11-30 RX ORDER — IPRATROPIUM BROMIDE 21 UG/1
2 SPRAY, METERED NASAL DAILY PRN
COMMUNITY

## 2023-11-30 RX ORDER — AMLODIPINE BESYLATE 2.5 MG/1
2.5 TABLET ORAL EVERY MORNING
COMMUNITY

## 2023-11-30 RX ORDER — MENTHOL 5 %
ADHESIVE PATCH, MEDICATED TOPICAL DAILY PRN
COMMUNITY

## 2023-11-30 RX ORDER — MIDODRINE HYDROCHLORIDE 5 MG/1
5 TABLET ORAL 3 TIMES DAILY PRN
COMMUNITY

## 2023-11-30 ASSESSMENT — ACTIVITIES OF DAILY LIVING (ADL)
CONTINENCE: INDEPENDENT
TRANSFERRING: INDEPENDENT
HISTORY OF FALLING IN THE LAST YEAR (PRIOR TO ADMISSION): YES
MOBILITY_ASSIST_DEVICES: WHEELCHAIR;STANDARD WALKER
BATHING: NEEDS ASSISTANCE
TOILETING: INDEPENDENT
FEEDING: INDEPENDENT
ADL_BEFORE_ADMISSION: NEEDS/REQUIRES ASSISTANCE
RECENT_DECLINE_ADL: NO
ADL_SCORE: 10
DRESSING: NEEDS ASSISTANCE

## 2023-11-30 ASSESSMENT — COGNITIVE AND FUNCTIONAL STATUS - GENERAL
ARE YOU BLIND OR DO YOU HAVE SERIOUS DIFFICULTY SEEING, EVEN WHEN WEARING GLASSES: NO
ARE YOU DEAF OR DO YOU HAVE SERIOUS DIFFICULTY  HEARING: NO

## 2023-12-04 ENCOUNTER — HOSPITAL ENCOUNTER (OUTPATIENT)
Dept: GASTROENTEROLOGY | Age: 88
Discharge: HOME | End: 2023-12-04
Attending: INTERNAL MEDICINE

## 2023-12-04 DIAGNOSIS — Z01.818 PREOP EXAMINATION: Primary | ICD-10-CM

## 2023-12-07 ENCOUNTER — LAB REQUISITION (OUTPATIENT)
Dept: LAB | Facility: HOSPITAL | Age: 88
End: 2023-12-07
Payer: MEDICARE

## 2023-12-07 DIAGNOSIS — Z00.00 ENCOUNTER FOR GENERAL ADULT MEDICAL EXAMINATION WITHOUT ABNORMAL FINDINGS: ICD-10-CM

## 2023-12-07 LAB
ALBUMIN SERPL-MCNC: 2.5 G/DL (ref 3.4–5)
ALBUMIN/GLOB SERPL: 0.9 {RATIO} (ref 1–2)
ALP LIVER SERPL-CCNC: 60 U/L
ALT SERPL-CCNC: 38 U/L
ANION GAP SERPL CALC-SCNC: 8 MMOL/L (ref 0–18)
AST SERPL-CCNC: 7 U/L (ref 15–37)
BASOPHILS # BLD AUTO: 0.01 X10(3) UL (ref 0–0.2)
BASOPHILS NFR BLD AUTO: 0.1 %
BILIRUB SERPL-MCNC: 0.4 MG/DL (ref 0.1–2)
BUN BLD-MCNC: 16 MG/DL (ref 9–23)
CALCIUM BLD-MCNC: 8.2 MG/DL (ref 8.5–10.1)
CHLORIDE SERPL-SCNC: 102 MMOL/L (ref 98–112)
CO2 SERPL-SCNC: 25 MMOL/L (ref 21–32)
CREAT BLD-MCNC: 0.48 MG/DL
EGFRCR SERPLBLD CKD-EPI 2021: 92 ML/MIN/1.73M2 (ref 60–?)
EOSINOPHIL # BLD AUTO: 0.03 X10(3) UL (ref 0–0.7)
EOSINOPHIL NFR BLD AUTO: 0.4 %
ERYTHROCYTE [DISTWIDTH] IN BLOOD BY AUTOMATED COUNT: 21.2 %
FASTING STATUS PATIENT QL REPORTED: YES
GLOBULIN PLAS-MCNC: 2.8 G/DL (ref 2.8–4.4)
GLUCOSE BLD-MCNC: 80 MG/DL (ref 70–99)
HCT VFR BLD AUTO: 33.2 %
HGB BLD-MCNC: 10.4 G/DL
IMM GRANULOCYTES # BLD AUTO: 0.17 X10(3) UL (ref 0–1)
IMM GRANULOCYTES NFR BLD: 2.2 %
LYMPHOCYTES # BLD AUTO: 1.05 X10(3) UL (ref 1–4)
LYMPHOCYTES NFR BLD AUTO: 13.5 %
MCH RBC QN AUTO: 23 PG (ref 26–34)
MCHC RBC AUTO-ENTMCNC: 31.3 G/DL (ref 31–37)
MCV RBC AUTO: 73.3 FL
MONOCYTES # BLD AUTO: 0.52 X10(3) UL (ref 0.1–1)
MONOCYTES NFR BLD AUTO: 6.7 %
NEUTROPHILS # BLD AUTO: 6.01 X10 (3) UL (ref 1.5–7.7)
NEUTROPHILS # BLD AUTO: 6.01 X10(3) UL (ref 1.5–7.7)
NEUTROPHILS NFR BLD AUTO: 77.1 %
OSMOLALITY SERPL CALC.SUM OF ELEC: 280 MOSM/KG (ref 275–295)
PLATELET # BLD AUTO: 229 10(3)UL (ref 150–450)
POTASSIUM SERPL-SCNC: 4.2 MMOL/L (ref 3.5–5.1)
PROT SERPL-MCNC: 5.3 G/DL (ref 6.4–8.2)
RBC # BLD AUTO: 4.53 X10(6)UL
SODIUM SERPL-SCNC: 135 MMOL/L (ref 136–145)
WBC # BLD AUTO: 7.8 X10(3) UL (ref 4–11)

## 2023-12-07 PROCEDURE — 85025 COMPLETE CBC W/AUTO DIFF WBC: CPT | Performed by: FAMILY MEDICINE

## 2023-12-07 PROCEDURE — 80053 COMPREHEN METABOLIC PANEL: CPT | Performed by: FAMILY MEDICINE

## 2023-12-11 ENCOUNTER — INITIAL APN SNF VISIT (OUTPATIENT)
Dept: INTERNAL MEDICINE CLINIC | Age: 88
End: 2023-12-11

## 2023-12-11 VITALS
TEMPERATURE: 98 F | HEART RATE: 91 BPM | SYSTOLIC BLOOD PRESSURE: 134 MMHG | RESPIRATION RATE: 19 BRPM | DIASTOLIC BLOOD PRESSURE: 81 MMHG | OXYGEN SATURATION: 93 %

## 2023-12-11 DIAGNOSIS — K21.9 GERD WITHOUT ESOPHAGITIS: Chronic | ICD-10-CM

## 2023-12-11 DIAGNOSIS — N39.0 URINARY TRACT INFECTION WITHOUT HEMATURIA, SITE UNSPECIFIED: ICD-10-CM

## 2023-12-11 DIAGNOSIS — Z86.718 HISTORY OF DVT (DEEP VEIN THROMBOSIS): ICD-10-CM

## 2023-12-11 DIAGNOSIS — I48.91 ATRIAL FIBRILLATION, UNSPECIFIED TYPE (HCC): ICD-10-CM

## 2023-12-11 DIAGNOSIS — I26.99 PULMONARY EMBOLISM, UNSPECIFIED CHRONICITY, UNSPECIFIED PULMONARY EMBOLISM TYPE, UNSPECIFIED WHETHER ACUTE COR PULMONALE PRESENT (HCC): Primary | ICD-10-CM

## 2023-12-11 DIAGNOSIS — R53.1 WEAKNESS: ICD-10-CM

## 2023-12-11 DIAGNOSIS — N39.0 FREQUENT UTI: ICD-10-CM

## 2023-12-11 DIAGNOSIS — E78.00 HYPERCHOLESTEROLEMIA: Chronic | ICD-10-CM

## 2023-12-11 DIAGNOSIS — Z85.51 HISTORY OF BLADDER CANCER: ICD-10-CM

## 2023-12-11 DIAGNOSIS — I10 ESSENTIAL HYPERTENSION: Chronic | ICD-10-CM

## 2023-12-11 DIAGNOSIS — B34.8 RHINOVIRUS: ICD-10-CM

## 2023-12-11 DIAGNOSIS — C90.00 MULTIPLE MYELOMA, REMISSION STATUS UNSPECIFIED (HCC): ICD-10-CM

## 2023-12-11 DIAGNOSIS — I95.9 HYPOTENSION, UNSPECIFIED HYPOTENSION TYPE: ICD-10-CM

## 2023-12-11 DIAGNOSIS — F32.A DEPRESSION, UNSPECIFIED DEPRESSION TYPE: ICD-10-CM

## 2023-12-11 PROCEDURE — 1123F ACP DISCUSS/DSCN MKR DOCD: CPT | Performed by: NURSE PRACTITIONER

## 2023-12-11 PROCEDURE — 99310 SBSQ NF CARE HIGH MDM 45: CPT | Performed by: NURSE PRACTITIONER

## 2023-12-11 PROCEDURE — 99499 UNLISTED E&M SERVICE: CPT | Performed by: NURSE PRACTITIONER

## 2023-12-11 RX ORDER — ACETAMINOPHEN 325 MG/1
650 TABLET ORAL EVERY 4 HOURS PRN
COMMUNITY

## 2023-12-11 RX ORDER — IPRATROPIUM BROMIDE 21 UG/1
2 SPRAY, METERED NASAL DAILY PRN
COMMUNITY

## 2023-12-11 RX ORDER — RUFINAMIDE 40 MG/ML
1 SUSPENSION ORAL DAILY
COMMUNITY

## 2023-12-11 RX ORDER — SENNOSIDES 8.6 MG
17.2 TABLET ORAL NIGHTLY
COMMUNITY

## 2023-12-11 RX ORDER — POLYETHYLENE GLYCOL 3350 17 G/17G
17 POWDER, FOR SOLUTION ORAL DAILY PRN
COMMUNITY

## 2023-12-11 RX ORDER — SOLIFENACIN SUCCINATE 10 MG/1
10 TABLET, FILM COATED ORAL DAILY
COMMUNITY

## 2023-12-11 RX ORDER — MIDODRINE HYDROCHLORIDE 5 MG/1
5 TABLET ORAL EVERY 8 HOURS PRN
COMMUNITY

## 2023-12-11 RX ORDER — DEXAMETHASONE 2 MG/1
2 TABLET ORAL
COMMUNITY

## 2023-12-11 RX ORDER — AMLODIPINE BESYLATE 2.5 MG/1
2.5 TABLET ORAL DAILY
COMMUNITY

## 2023-12-11 RX ORDER — SERTRALINE HYDROCHLORIDE 25 MG/1
25 TABLET, FILM COATED ORAL DAILY
COMMUNITY

## 2023-12-11 RX ORDER — GARLIC EXTRACT 500 MG
1 CAPSULE ORAL DAILY
COMMUNITY

## 2023-12-12 ENCOUNTER — HOSPITAL ENCOUNTER (OUTPATIENT)
Dept: GASTROENTEROLOGY | Age: 88
End: 2023-12-12
Attending: INTERNAL MEDICINE

## 2023-12-15 ENCOUNTER — SNF VISIT (OUTPATIENT)
Dept: INTERNAL MEDICINE CLINIC | Age: 88
End: 2023-12-15

## 2023-12-15 VITALS
BODY MASS INDEX: 29 KG/M2 | TEMPERATURE: 97 F | RESPIRATION RATE: 20 BRPM | WEIGHT: 150.38 LBS | DIASTOLIC BLOOD PRESSURE: 66 MMHG | HEART RATE: 62 BPM | SYSTOLIC BLOOD PRESSURE: 140 MMHG | OXYGEN SATURATION: 94 %

## 2023-12-15 DIAGNOSIS — N30.00 ACUTE CYSTITIS WITHOUT HEMATURIA: ICD-10-CM

## 2023-12-15 DIAGNOSIS — N39.0 FREQUENT UTI: ICD-10-CM

## 2023-12-15 DIAGNOSIS — I82.4Y3 ACUTE DEEP VEIN THROMBOSIS (DVT) OF PROXIMAL VEIN OF BOTH LOWER EXTREMITIES (HCC): ICD-10-CM

## 2023-12-15 DIAGNOSIS — C90.00 MULTIPLE MYELOMA, REMISSION STATUS UNSPECIFIED (HCC): ICD-10-CM

## 2023-12-15 DIAGNOSIS — Z78.9 DECREASED ACTIVITIES OF DAILY LIVING (ADL): ICD-10-CM

## 2023-12-15 DIAGNOSIS — I10 ESSENTIAL HYPERTENSION: Chronic | ICD-10-CM

## 2023-12-15 DIAGNOSIS — R53.1 WEAKNESS: ICD-10-CM

## 2023-12-15 DIAGNOSIS — I26.99 ACUTE PULMONARY EMBOLISM WITHOUT ACUTE COR PULMONALE, UNSPECIFIED PULMONARY EMBOLISM TYPE (HCC): Primary | ICD-10-CM

## 2023-12-15 PROCEDURE — 99310 SBSQ NF CARE HIGH MDM 45: CPT | Performed by: NURSE PRACTITIONER

## 2023-12-17 ENCOUNTER — HOSPITAL ENCOUNTER (EMERGENCY)
Facility: HOSPITAL | Age: 88
Discharge: HOME | End: 2023-12-17
Attending: EMERGENCY MEDICINE
Payer: MEDICARE

## 2023-12-17 ENCOUNTER — APPOINTMENT (OUTPATIENT)
Dept: GENERAL RADIOLOGY | Facility: HOSPITAL | Age: 88
End: 2023-12-17
Attending: EMERGENCY MEDICINE
Payer: MEDICARE

## 2023-12-17 ENCOUNTER — HOSPITAL ENCOUNTER (EMERGENCY)
Facility: HOSPITAL | Age: 88
Discharge: NURSING, REHAB OR LONG-TERM CARE | End: 2023-12-17
Attending: EMERGENCY MEDICINE
Payer: MEDICARE

## 2023-12-17 VITALS
WEIGHT: 150.38 LBS | HEART RATE: 58 BPM | BODY MASS INDEX: 29 KG/M2 | DIASTOLIC BLOOD PRESSURE: 88 MMHG | OXYGEN SATURATION: 99 % | SYSTOLIC BLOOD PRESSURE: 142 MMHG | TEMPERATURE: 97 F | RESPIRATION RATE: 21 BRPM

## 2023-12-17 DIAGNOSIS — B33.8 RSV INFECTION: ICD-10-CM

## 2023-12-17 DIAGNOSIS — R06.00 DYSPNEA, UNSPECIFIED TYPE: Primary | ICD-10-CM

## 2023-12-17 LAB
ALBUMIN SERPL-MCNC: 2.5 G/DL (ref 3.4–5)
ALBUMIN/GLOB SERPL: 1 {RATIO} (ref 1–2)
ALP LIVER SERPL-CCNC: 63 U/L
ALT SERPL-CCNC: 37 U/L
ANION GAP SERPL CALC-SCNC: 2 MMOL/L (ref 0–18)
AST SERPL-CCNC: 3 U/L (ref 15–37)
ATRIAL RATE: 81 BPM
BASOPHILS # BLD AUTO: 0.01 X10(3) UL (ref 0–0.2)
BASOPHILS NFR BLD AUTO: 0.1 %
BILIRUB SERPL-MCNC: 0.2 MG/DL (ref 0.1–2)
BUN BLD-MCNC: 14 MG/DL (ref 9–23)
CALCIUM BLD-MCNC: 7.9 MG/DL (ref 8.5–10.1)
CHLORIDE SERPL-SCNC: 103 MMOL/L (ref 98–112)
CO2 SERPL-SCNC: 30 MMOL/L (ref 21–32)
CREAT BLD-MCNC: 0.5 MG/DL
EGFRCR SERPLBLD CKD-EPI 2021: 91 ML/MIN/1.73M2 (ref 60–?)
EOSINOPHIL # BLD AUTO: 0.02 X10(3) UL (ref 0–0.7)
EOSINOPHIL NFR BLD AUTO: 0.2 %
ERYTHROCYTE [DISTWIDTH] IN BLOOD BY AUTOMATED COUNT: 21.1 %
FLUAV + FLUBV RNA SPEC NAA+PROBE: NEGATIVE
FLUAV + FLUBV RNA SPEC NAA+PROBE: NEGATIVE
GLOBULIN PLAS-MCNC: 2.6 G/DL (ref 2.8–4.4)
GLUCOSE BLD-MCNC: 84 MG/DL (ref 70–99)
HCT VFR BLD AUTO: 31.8 %
HGB BLD-MCNC: 10.1 G/DL
IMM GRANULOCYTES # BLD AUTO: 0.3 X10(3) UL (ref 0–1)
IMM GRANULOCYTES NFR BLD: 2.6 %
LYMPHOCYTES # BLD AUTO: 0.81 X10(3) UL (ref 1–4)
LYMPHOCYTES NFR BLD AUTO: 7 %
MCH RBC QN AUTO: 23.4 PG (ref 26–34)
MCHC RBC AUTO-ENTMCNC: 31.8 G/DL (ref 31–37)
MCV RBC AUTO: 73.8 FL
MONOCYTES # BLD AUTO: 0.56 X10(3) UL (ref 0.1–1)
MONOCYTES NFR BLD AUTO: 4.9 %
NEUTROPHILS # BLD AUTO: 9.84 X10 (3) UL (ref 1.5–7.7)
NEUTROPHILS # BLD AUTO: 9.84 X10(3) UL (ref 1.5–7.7)
NEUTROPHILS NFR BLD AUTO: 85.2 %
NT-PROBNP SERPL-MCNC: 177 PG/ML (ref ?–450)
OSMOLALITY SERPL CALC.SUM OF ELEC: 280 MOSM/KG (ref 275–295)
P AXIS: 45 DEGREES
P-R INTERVAL: 182 MS
PLATELET # BLD AUTO: 292 10(3)UL (ref 150–450)
POTASSIUM SERPL-SCNC: 3.3 MMOL/L (ref 3.5–5.1)
PROT SERPL-MCNC: 5.1 G/DL (ref 6.4–8.2)
Q-T INTERVAL: 444 MS
QRS DURATION: 164 MS
QTC CALCULATION (BEZET): 515 MS
R AXIS: -4 DEGREES
RBC # BLD AUTO: 4.31 X10(6)UL
RSV RNA SPEC NAA+PROBE: POSITIVE
SARS-COV-2 RNA RESP QL NAA+PROBE: NOT DETECTED
SODIUM SERPL-SCNC: 135 MMOL/L (ref 136–145)
T AXIS: -25 DEGREES
TROPONIN I SERPL HS-MCNC: 26 NG/L
VENTRICULAR RATE: 81 BPM
WBC # BLD AUTO: 11.5 X10(3) UL (ref 4–11)

## 2023-12-17 PROCEDURE — 80053 COMPREHEN METABOLIC PANEL: CPT | Performed by: EMERGENCY MEDICINE

## 2023-12-17 PROCEDURE — 93010 ELECTROCARDIOGRAM REPORT: CPT

## 2023-12-17 PROCEDURE — 99285 EMERGENCY DEPT VISIT HI MDM: CPT

## 2023-12-17 PROCEDURE — 71045 X-RAY EXAM CHEST 1 VIEW: CPT | Performed by: EMERGENCY MEDICINE

## 2023-12-17 PROCEDURE — 84484 ASSAY OF TROPONIN QUANT: CPT | Performed by: EMERGENCY MEDICINE

## 2023-12-17 PROCEDURE — 36415 COLL VENOUS BLD VENIPUNCTURE: CPT

## 2023-12-17 PROCEDURE — 85025 COMPLETE CBC W/AUTO DIFF WBC: CPT | Performed by: EMERGENCY MEDICINE

## 2023-12-17 PROCEDURE — 99284 EMERGENCY DEPT VISIT MOD MDM: CPT

## 2023-12-17 PROCEDURE — 0241U SARS-COV-2/FLU A AND B/RSV BY PCR (GENEXPERT): CPT | Performed by: EMERGENCY MEDICINE

## 2023-12-17 PROCEDURE — 93005 ELECTROCARDIOGRAM TRACING: CPT

## 2023-12-17 PROCEDURE — 83880 ASSAY OF NATRIURETIC PEPTIDE: CPT | Performed by: EMERGENCY MEDICINE

## 2023-12-17 NOTE — ED INITIAL ASSESSMENT (HPI)
PT states that she has been experiencing shortness of breath since yesterday afternoon.  PT currently receiving IV antibiotic treatment via PICC line due to UTI infection

## 2023-12-18 ENCOUNTER — HOSPITAL ENCOUNTER (EMERGENCY)
Facility: HOSPITAL | Age: 88
Discharge: HOME | End: 2023-12-18
Attending: EMERGENCY MEDICINE
Payer: MEDICARE

## 2023-12-18 VITALS
WEIGHT: 149.94 LBS | HEART RATE: 88 BPM | DIASTOLIC BLOOD PRESSURE: 64 MMHG | SYSTOLIC BLOOD PRESSURE: 119 MMHG | BODY MASS INDEX: 29 KG/M2 | OXYGEN SATURATION: 96 % | RESPIRATION RATE: 11 BRPM

## 2023-12-18 DIAGNOSIS — J21.0 ACUTE BRONCHIOLITIS DUE TO RESPIRATORY SYNCYTIAL VIRUS (RSV): Primary | ICD-10-CM

## 2023-12-18 PROCEDURE — 99284 EMERGENCY DEPT VISIT MOD MDM: CPT

## 2023-12-18 PROCEDURE — 94640 AIRWAY INHALATION TREATMENT: CPT

## 2023-12-18 RX ORDER — IPRATROPIUM BROMIDE AND ALBUTEROL SULFATE 2.5; .5 MG/3ML; MG/3ML
3 SOLUTION RESPIRATORY (INHALATION) ONCE
Status: COMPLETED | OUTPATIENT
Start: 2023-12-18 | End: 2023-12-18

## 2023-12-18 RX ORDER — ALBUTEROL SULFATE 90 UG/1
2 AEROSOL, METERED RESPIRATORY (INHALATION) EVERY 4 HOURS PRN
Qty: 1 EACH | Refills: 0 | Status: SHIPPED | OUTPATIENT
Start: 2023-12-18 | End: 2024-01-17

## 2023-12-18 NOTE — ED QUICK NOTES
Report received from [facility] at [employer]. RN reports patient woke up around 0400 complaining of SOB, no other symptoms.

## 2023-12-19 ENCOUNTER — SNF VISIT (OUTPATIENT)
Dept: INTERNAL MEDICINE CLINIC | Age: 88
End: 2023-12-19

## 2023-12-19 VITALS
SYSTOLIC BLOOD PRESSURE: 160 MMHG | TEMPERATURE: 98 F | DIASTOLIC BLOOD PRESSURE: 89 MMHG | RESPIRATION RATE: 19 BRPM | WEIGHT: 150.38 LBS | OXYGEN SATURATION: 95 % | HEART RATE: 93 BPM | BODY MASS INDEX: 29 KG/M2

## 2023-12-19 DIAGNOSIS — I26.99 ACUTE PULMONARY EMBOLISM WITHOUT ACUTE COR PULMONALE, UNSPECIFIED PULMONARY EMBOLISM TYPE (HCC): ICD-10-CM

## 2023-12-19 DIAGNOSIS — J21.0 RSV BRONCHIOLITIS: Primary | ICD-10-CM

## 2023-12-19 DIAGNOSIS — Z78.9 DECREASED ACTIVITIES OF DAILY LIVING (ADL): ICD-10-CM

## 2023-12-19 DIAGNOSIS — C90.00 MULTIPLE MYELOMA, REMISSION STATUS UNSPECIFIED (HCC): ICD-10-CM

## 2023-12-19 DIAGNOSIS — I82.4Y3 ACUTE DEEP VEIN THROMBOSIS (DVT) OF PROXIMAL VEIN OF BOTH LOWER EXTREMITIES (HCC): ICD-10-CM

## 2023-12-19 DIAGNOSIS — I10 ESSENTIAL HYPERTENSION: ICD-10-CM

## 2023-12-19 DIAGNOSIS — R53.1 WEAKNESS: ICD-10-CM

## 2023-12-19 DIAGNOSIS — N30.00 ACUTE CYSTITIS WITHOUT HEMATURIA: ICD-10-CM

## 2023-12-19 PROCEDURE — 99309 SBSQ NF CARE MODERATE MDM 30: CPT | Performed by: NURSE PRACTITIONER

## 2023-12-19 RX ORDER — IPRATROPIUM BROMIDE AND ALBUTEROL SULFATE 2.5; .5 MG/3ML; MG/3ML
3 SOLUTION RESPIRATORY (INHALATION) EVERY 4 HOURS PRN
COMMUNITY

## 2023-12-19 RX ORDER — GUAIFENESIN 600 MG/1
600 TABLET, EXTENDED RELEASE ORAL 2 TIMES DAILY
COMMUNITY

## 2023-12-21 ENCOUNTER — SNF VISIT (OUTPATIENT)
Dept: INTERNAL MEDICINE CLINIC | Age: 88
End: 2023-12-21

## 2023-12-21 VITALS
WEIGHT: 151 LBS | HEART RATE: 96 BPM | DIASTOLIC BLOOD PRESSURE: 68 MMHG | BODY MASS INDEX: 29 KG/M2 | TEMPERATURE: 97 F | RESPIRATION RATE: 18 BRPM | OXYGEN SATURATION: 95 % | SYSTOLIC BLOOD PRESSURE: 122 MMHG

## 2023-12-21 DIAGNOSIS — C90.00 MULTIPLE MYELOMA, REMISSION STATUS UNSPECIFIED (HCC): ICD-10-CM

## 2023-12-21 DIAGNOSIS — R53.1 WEAKNESS: ICD-10-CM

## 2023-12-21 DIAGNOSIS — N30.00 ACUTE CYSTITIS WITHOUT HEMATURIA: ICD-10-CM

## 2023-12-21 DIAGNOSIS — I82.4Y3 ACUTE DEEP VEIN THROMBOSIS (DVT) OF PROXIMAL VEIN OF BOTH LOWER EXTREMITIES (HCC): ICD-10-CM

## 2023-12-21 DIAGNOSIS — Z78.9 DECREASED ACTIVITIES OF DAILY LIVING (ADL): ICD-10-CM

## 2023-12-21 DIAGNOSIS — I26.99 ACUTE PULMONARY EMBOLISM WITHOUT ACUTE COR PULMONALE, UNSPECIFIED PULMONARY EMBOLISM TYPE (HCC): ICD-10-CM

## 2023-12-21 DIAGNOSIS — J21.0 RSV BRONCHIOLITIS: Primary | ICD-10-CM

## 2023-12-21 PROCEDURE — 99309 SBSQ NF CARE MODERATE MDM 30: CPT | Performed by: NURSE PRACTITIONER

## 2023-12-26 ENCOUNTER — SNF VISIT (OUTPATIENT)
Dept: INTERNAL MEDICINE CLINIC | Age: 88
End: 2023-12-26

## 2023-12-26 DIAGNOSIS — I82.4Y3 ACUTE DEEP VEIN THROMBOSIS (DVT) OF PROXIMAL VEIN OF BOTH LOWER EXTREMITIES (HCC): ICD-10-CM

## 2023-12-26 DIAGNOSIS — I10 ESSENTIAL HYPERTENSION: ICD-10-CM

## 2023-12-26 DIAGNOSIS — N39.0 FREQUENT UTI: ICD-10-CM

## 2023-12-26 DIAGNOSIS — I26.99 ACUTE PULMONARY EMBOLISM WITHOUT ACUTE COR PULMONALE, UNSPECIFIED PULMONARY EMBOLISM TYPE (HCC): ICD-10-CM

## 2023-12-26 DIAGNOSIS — Z78.9 DECREASED ACTIVITIES OF DAILY LIVING (ADL): ICD-10-CM

## 2023-12-26 DIAGNOSIS — R53.1 WEAKNESS: ICD-10-CM

## 2023-12-26 DIAGNOSIS — J18.9 PNEUMONIA OF LEFT LOWER LOBE DUE TO INFECTIOUS ORGANISM: ICD-10-CM

## 2023-12-26 DIAGNOSIS — J21.0 RSV BRONCHIOLITIS: Primary | ICD-10-CM

## 2023-12-26 DIAGNOSIS — C90.00 MULTIPLE MYELOMA, REMISSION STATUS UNSPECIFIED (HCC): ICD-10-CM

## 2023-12-26 PROCEDURE — 99309 SBSQ NF CARE MODERATE MDM 30: CPT | Performed by: NURSE PRACTITIONER

## 2023-12-27 VITALS
BODY MASS INDEX: 30 KG/M2 | OXYGEN SATURATION: 93 % | WEIGHT: 152.31 LBS | TEMPERATURE: 98 F | DIASTOLIC BLOOD PRESSURE: 76 MMHG | RESPIRATION RATE: 18 BRPM | SYSTOLIC BLOOD PRESSURE: 134 MMHG | HEART RATE: 100 BPM

## 2023-12-28 ENCOUNTER — SNF VISIT (OUTPATIENT)
Dept: INTERNAL MEDICINE CLINIC | Age: 88
End: 2023-12-28

## 2023-12-28 VITALS
TEMPERATURE: 97 F | OXYGEN SATURATION: 93 % | DIASTOLIC BLOOD PRESSURE: 95 MMHG | BODY MASS INDEX: 30 KG/M2 | WEIGHT: 153.81 LBS | RESPIRATION RATE: 18 BRPM | SYSTOLIC BLOOD PRESSURE: 159 MMHG | HEART RATE: 89 BPM

## 2023-12-28 DIAGNOSIS — C90.00 MULTIPLE MYELOMA, REMISSION STATUS UNSPECIFIED (HCC): ICD-10-CM

## 2023-12-28 DIAGNOSIS — I26.99 ACUTE PULMONARY EMBOLISM WITHOUT ACUTE COR PULMONALE, UNSPECIFIED PULMONARY EMBOLISM TYPE (HCC): ICD-10-CM

## 2023-12-28 DIAGNOSIS — N39.0 FREQUENT UTI: ICD-10-CM

## 2023-12-28 DIAGNOSIS — I10 ESSENTIAL HYPERTENSION: ICD-10-CM

## 2023-12-28 DIAGNOSIS — R53.1 WEAKNESS: ICD-10-CM

## 2023-12-28 DIAGNOSIS — J18.9 PNEUMONIA OF LEFT LOWER LOBE DUE TO INFECTIOUS ORGANISM: ICD-10-CM

## 2023-12-28 DIAGNOSIS — I82.4Y3 ACUTE DEEP VEIN THROMBOSIS (DVT) OF PROXIMAL VEIN OF BOTH LOWER EXTREMITIES (HCC): ICD-10-CM

## 2023-12-28 DIAGNOSIS — J21.0 RSV BRONCHIOLITIS: Primary | ICD-10-CM

## 2023-12-28 DIAGNOSIS — Z78.9 DECREASED ACTIVITIES OF DAILY LIVING (ADL): ICD-10-CM

## 2023-12-28 PROCEDURE — 99309 SBSQ NF CARE MODERATE MDM 30: CPT | Performed by: NURSE PRACTITIONER

## 2024-01-01 ENCOUNTER — APPOINTMENT (OUTPATIENT)
Dept: GENERAL RADIOLOGY | Facility: HOSPITAL | Age: 89
End: 2024-01-01
Attending: NURSE PRACTITIONER
Payer: MEDICARE

## 2024-01-01 ENCOUNTER — LAB REQUISITION (OUTPATIENT)
Dept: LAB | Facility: HOSPITAL | Age: 89
End: 2024-01-01
Payer: MEDICARE

## 2024-01-01 ENCOUNTER — APPOINTMENT (OUTPATIENT)
Dept: CV DIAGNOSTICS | Facility: HOSPITAL | Age: 89
End: 2024-01-01
Attending: NURSE PRACTITIONER
Payer: MEDICARE

## 2024-01-01 ENCOUNTER — APPOINTMENT (OUTPATIENT)
Dept: LAB | Age: 89
End: 2024-01-01
Attending: FAMILY MEDICINE
Payer: MEDICARE

## 2024-01-01 ENCOUNTER — APPOINTMENT (OUTPATIENT)
Dept: GENERAL RADIOLOGY | Facility: HOSPITAL | Age: 89
End: 2024-01-01
Payer: MEDICARE

## 2024-01-01 ENCOUNTER — HOSPITAL ENCOUNTER (INPATIENT)
Facility: HOSPITAL | Age: 89
LOS: 6 days | End: 2024-01-01
Attending: HOSPITALIST | Admitting: HOSPITALIST
Payer: OTHER MISCELLANEOUS

## 2024-01-01 ENCOUNTER — APPOINTMENT (OUTPATIENT)
Dept: GENERAL RADIOLOGY | Facility: HOSPITAL | Age: 89
End: 2024-01-01
Attending: HOSPITALIST
Payer: MEDICARE

## 2024-01-01 ENCOUNTER — APPOINTMENT (OUTPATIENT)
Dept: GENERAL RADIOLOGY | Facility: HOSPITAL | Age: 89
End: 2024-01-01
Attending: EMERGENCY MEDICINE
Payer: MEDICARE

## 2024-01-01 ENCOUNTER — HOSPITAL ENCOUNTER (INPATIENT)
Facility: HOSPITAL | Age: 89
LOS: 14 days | Discharge: HOSPICE | End: 2024-01-01
Attending: EMERGENCY MEDICINE | Admitting: INTERNAL MEDICINE
Payer: MEDICARE

## 2024-01-01 ENCOUNTER — APPOINTMENT (OUTPATIENT)
Dept: GENERAL RADIOLOGY | Facility: HOSPITAL | Age: 89
End: 2024-01-01
Attending: INTERNAL MEDICINE
Payer: MEDICARE

## 2024-01-01 ENCOUNTER — APPOINTMENT (OUTPATIENT)
Dept: ULTRASOUND IMAGING | Facility: HOSPITAL | Age: 89
End: 2024-01-01
Attending: HOSPITALIST
Payer: MEDICARE

## 2024-01-01 VITALS
TEMPERATURE: 97 F | DIASTOLIC BLOOD PRESSURE: 55 MMHG | BODY MASS INDEX: 29.08 KG/M2 | HEART RATE: 75 BPM | SYSTOLIC BLOOD PRESSURE: 128 MMHG | OXYGEN SATURATION: 100 % | HEIGHT: 62 IN | RESPIRATION RATE: 17 BRPM | WEIGHT: 158.06 LBS

## 2024-01-01 VITALS
OXYGEN SATURATION: 55 % | TEMPERATURE: 98 F | RESPIRATION RATE: 12 BRPM | DIASTOLIC BLOOD PRESSURE: 54 MMHG | SYSTOLIC BLOOD PRESSURE: 78 MMHG | HEART RATE: 64 BPM

## 2024-01-01 DIAGNOSIS — R79.89 ELEVATED TROPONIN: ICD-10-CM

## 2024-01-01 DIAGNOSIS — J96.01 ACUTE RESPIRATORY FAILURE WITH HYPOXIA (HCC): ICD-10-CM

## 2024-01-01 DIAGNOSIS — I26.99 OTHER PULMONARY EMBOLISM WITHOUT ACUTE COR PULMONALE (HCC): ICD-10-CM

## 2024-01-01 DIAGNOSIS — Z00.00 ENCOUNTER FOR GENERAL ADULT MEDICAL EXAMINATION WITHOUT ABNORMAL FINDINGS: ICD-10-CM

## 2024-01-01 DIAGNOSIS — E87.20 LACTIC ACIDOSIS: ICD-10-CM

## 2024-01-01 DIAGNOSIS — B33.8 RSV INFECTION: ICD-10-CM

## 2024-01-01 DIAGNOSIS — J18.9 MULTIFOCAL PNEUMONIA: Primary | ICD-10-CM

## 2024-01-01 DIAGNOSIS — A41.9 SEPSIS, DUE TO UNSPECIFIED ORGANISM, UNSPECIFIED WHETHER ACUTE ORGAN DYSFUNCTION PRESENT (HCC): ICD-10-CM

## 2024-01-01 DIAGNOSIS — J96.91: ICD-10-CM

## 2024-01-01 DIAGNOSIS — E87.6 HYPOKALEMIA: ICD-10-CM

## 2024-01-01 DIAGNOSIS — I48.91 UNSPECIFIED ATRIAL FIBRILLATION (HCC): ICD-10-CM

## 2024-01-01 LAB
ADENOVIRUS PCR:: NOT DETECTED
ALBUMIN SERPL-MCNC: 1.7 G/DL (ref 3.4–5)
ALBUMIN SERPL-MCNC: 1.8 G/DL (ref 3.4–5)
ALBUMIN SERPL-MCNC: 1.8 G/DL (ref 3.4–5)
ALBUMIN SERPL-MCNC: 1.9 G/DL (ref 3.4–5)
ALBUMIN SERPL-MCNC: 2 G/DL (ref 3.4–5)
ALBUMIN SERPL-MCNC: 2 G/DL (ref 3.4–5)
ALBUMIN SERPL-MCNC: 2.2 G/DL (ref 3.4–5)
ALBUMIN SERPL-MCNC: 2.4 G/DL (ref 3.4–5)
ALBUMIN/GLOB SERPL: 0.5 {RATIO} (ref 1–2)
ALBUMIN/GLOB SERPL: 0.6 {RATIO} (ref 1–2)
ALBUMIN/GLOB SERPL: 0.8 {RATIO} (ref 1–2)
ALBUMIN/GLOB SERPL: 0.9 {RATIO} (ref 1–2)
ALP LIVER SERPL-CCNC: 156 U/L
ALP LIVER SERPL-CCNC: 171 U/L
ALP LIVER SERPL-CCNC: 176 U/L
ALP LIVER SERPL-CCNC: 69 U/L
ALP LIVER SERPL-CCNC: 74 U/L
ALP LIVER SERPL-CCNC: 75 U/L
ALP LIVER SERPL-CCNC: 76 U/L
ALP LIVER SERPL-CCNC: 85 U/L
ALP LIVER SERPL-CCNC: 91 U/L
ALP LIVER SERPL-CCNC: 95 U/L
ALT SERPL-CCNC: 20 U/L
ALT SERPL-CCNC: 21 U/L
ALT SERPL-CCNC: 24 U/L
ALT SERPL-CCNC: 24 U/L
ALT SERPL-CCNC: 25 U/L
ALT SERPL-CCNC: 32 U/L
ALT SERPL-CCNC: 32 U/L
ALT SERPL-CCNC: 40 U/L
ALT SERPL-CCNC: 45 U/L
ALT SERPL-CCNC: 45 U/L
AMMONIA PLAS-MCNC: 23 UMOL/L (ref 11–32)
ANION GAP SERPL CALC-SCNC: 0 MMOL/L (ref 0–18)
ANION GAP SERPL CALC-SCNC: 10 MMOL/L (ref 0–18)
ANION GAP SERPL CALC-SCNC: 2 MMOL/L (ref 0–18)
ANION GAP SERPL CALC-SCNC: 3 MMOL/L (ref 0–18)
ANION GAP SERPL CALC-SCNC: 3 MMOL/L (ref 0–18)
ANION GAP SERPL CALC-SCNC: 4 MMOL/L (ref 0–18)
ANION GAP SERPL CALC-SCNC: 4 MMOL/L (ref 0–18)
ANION GAP SERPL CALC-SCNC: 5 MMOL/L (ref 0–18)
ANION GAP SERPL CALC-SCNC: 6 MMOL/L (ref 0–18)
ANION GAP SERPL CALC-SCNC: 7 MMOL/L (ref 0–18)
ANION GAP SERPL CALC-SCNC: 8 MMOL/L (ref 0–18)
ANION GAP SERPL CALC-SCNC: 8 MMOL/L (ref 0–18)
ANTIBODY SCREEN: NEGATIVE
ANTIBODY SCREEN: NEGATIVE
ARTERIAL PATENCY WRIST A: POSITIVE
AST SERPL-CCNC: 10 U/L (ref 15–37)
AST SERPL-CCNC: 15 U/L (ref 15–37)
AST SERPL-CCNC: 4 U/L (ref 15–37)
AST SERPL-CCNC: 6 U/L (ref 15–37)
AST SERPL-CCNC: 8 U/L (ref 15–37)
AST SERPL-CCNC: <3 U/L (ref 15–37)
AST SERPL-CCNC: <3 U/L (ref 15–37)
ATRIAL RATE: 108 BPM
ATRIAL RATE: 147 BPM
ATRIAL RATE: 75 BPM
ATRIAL RATE: 86 BPM
ATRIAL RATE: 89 BPM
B PARAPERT DNA SPEC QL NAA+PROBE: NOT DETECTED
B PERT DNA SPEC QL NAA+PROBE: NOT DETECTED
BASE EXCESS BLDA CALC-SCNC: 16 MMOL/L (ref ?–2)
BASE EXCESS BLDA CALC-SCNC: 3.4 MMOL/L (ref ?–2)
BASE EXCESS BLDA CALC-SCNC: 8.5 MMOL/L (ref ?–2)
BASOPHILS # BLD AUTO: 0 X10(3) UL (ref 0–0.2)
BASOPHILS # BLD AUTO: 0.01 X10(3) UL (ref 0–0.2)
BASOPHILS # BLD AUTO: 0.02 X10(3) UL (ref 0–0.2)
BASOPHILS # BLD AUTO: 0.04 X10(3) UL (ref 0–0.2)
BASOPHILS # BLD AUTO: 0.04 X10(3) UL (ref 0–0.2)
BASOPHILS # BLD: 0 X10(3) UL (ref 0–0.2)
BASOPHILS NFR BLD AUTO: 0 %
BASOPHILS NFR BLD AUTO: 0.1 %
BASOPHILS NFR BLD AUTO: 0.2 %
BASOPHILS NFR BLD AUTO: 0.4 %
BASOPHILS NFR BLD AUTO: 0.6 %
BASOPHILS NFR BLD AUTO: 0.8 %
BASOPHILS NFR BLD: 0 %
BILIRUB DIRECT SERPL-MCNC: 0.1 MG/DL (ref 0–0.2)
BILIRUB SERPL-MCNC: 0.3 MG/DL (ref 0.1–2)
BILIRUB SERPL-MCNC: 0.3 MG/DL (ref 0.1–2)
BILIRUB SERPL-MCNC: 0.4 MG/DL (ref 0.1–2)
BILIRUB SERPL-MCNC: 0.4 MG/DL (ref 0.1–2)
BILIRUB SERPL-MCNC: 0.5 MG/DL (ref 0.1–2)
BILIRUB SERPL-MCNC: 0.5 MG/DL (ref 0.1–2)
BILIRUB SERPL-MCNC: 0.6 MG/DL (ref 0.1–2)
BILIRUB UR QL STRIP.AUTO: NEGATIVE
BLOOD TYPE BARCODE: 6200
BLOOD TYPE BARCODE: 6200
BODY TEMPERATURE: 98.6 F
BUN BLD-MCNC: 12 MG/DL (ref 9–23)
BUN BLD-MCNC: 15 MG/DL (ref 9–23)
BUN BLD-MCNC: 18 MG/DL (ref 9–23)
BUN BLD-MCNC: 19 MG/DL (ref 9–23)
BUN BLD-MCNC: 21 MG/DL (ref 9–23)
BUN BLD-MCNC: 22 MG/DL (ref 9–23)
BUN BLD-MCNC: 22 MG/DL (ref 9–23)
BUN BLD-MCNC: 23 MG/DL (ref 9–23)
BUN BLD-MCNC: 25 MG/DL (ref 9–23)
BUN BLD-MCNC: 26 MG/DL (ref 9–23)
BUN BLD-MCNC: 26 MG/DL (ref 9–23)
BUN BLD-MCNC: 28 MG/DL (ref 9–23)
BUN BLD-MCNC: 29 MG/DL (ref 9–23)
BUN BLD-MCNC: 29 MG/DL (ref 9–23)
BUN BLD-MCNC: 30 MG/DL (ref 9–23)
BUN BLD-MCNC: 32 MG/DL (ref 9–23)
BUN BLD-MCNC: 36 MG/DL (ref 9–23)
C PNEUM DNA SPEC QL NAA+PROBE: NOT DETECTED
CA-I BLD-SCNC: 1.15 MMOL/L (ref 0.95–1.32)
CA-I BLD-SCNC: 1.29 MMOL/L (ref 0.95–1.32)
CALCIUM BLD-MCNC: 10 MG/DL (ref 8.5–10.1)
CALCIUM BLD-MCNC: 7.4 MG/DL (ref 8.5–10.1)
CALCIUM BLD-MCNC: 7.6 MG/DL (ref 8.5–10.1)
CALCIUM BLD-MCNC: 7.7 MG/DL (ref 8.5–10.1)
CALCIUM BLD-MCNC: 7.8 MG/DL (ref 8.5–10.1)
CALCIUM BLD-MCNC: 7.9 MG/DL (ref 8.5–10.1)
CALCIUM BLD-MCNC: 7.9 MG/DL (ref 8.5–10.1)
CALCIUM BLD-MCNC: 8 MG/DL (ref 8.5–10.1)
CALCIUM BLD-MCNC: 8.2 MG/DL (ref 8.5–10.1)
CALCIUM BLD-MCNC: 8.2 MG/DL (ref 8.5–10.1)
CALCIUM BLD-MCNC: 8.3 MG/DL (ref 8.5–10.1)
CALCIUM BLD-MCNC: 8.5 MG/DL (ref 8.5–10.1)
CALCIUM BLD-MCNC: 9.2 MG/DL (ref 8.5–10.1)
CALCIUM BLD-MCNC: 9.2 MG/DL (ref 8.5–10.1)
CHLORIDE SERPL-SCNC: 100 MMOL/L (ref 98–112)
CHLORIDE SERPL-SCNC: 101 MMOL/L (ref 98–112)
CHLORIDE SERPL-SCNC: 101 MMOL/L (ref 98–112)
CHLORIDE SERPL-SCNC: 102 MMOL/L (ref 98–112)
CHLORIDE SERPL-SCNC: 102 MMOL/L (ref 98–112)
CHLORIDE SERPL-SCNC: 105 MMOL/L (ref 98–112)
CHLORIDE SERPL-SCNC: 106 MMOL/L (ref 98–112)
CHLORIDE SERPL-SCNC: 108 MMOL/L (ref 98–112)
CHLORIDE SERPL-SCNC: 109 MMOL/L (ref 98–112)
CHLORIDE SERPL-SCNC: 109 MMOL/L (ref 98–112)
CHLORIDE SERPL-SCNC: 110 MMOL/L (ref 98–112)
CHLORIDE SERPL-SCNC: 111 MMOL/L (ref 98–112)
CHLORIDE SERPL-SCNC: 111 MMOL/L (ref 98–112)
CHLORIDE SERPL-SCNC: 87 MMOL/L (ref 98–112)
CHLORIDE SERPL-SCNC: 90 MMOL/L (ref 98–112)
CHLORIDE SERPL-SCNC: 97 MMOL/L (ref 98–112)
CHLORIDE SERPL-SCNC: 98 MMOL/L (ref 98–112)
CHOLEST SERPL-MCNC: 118 MG/DL (ref ?–200)
CK SERPL-CCNC: 11 U/L
CLARITY UR REFRACT.AUTO: CLEAR
CO2 SERPL-SCNC: 28 MMOL/L (ref 21–32)
CO2 SERPL-SCNC: 29 MMOL/L (ref 21–32)
CO2 SERPL-SCNC: 29 MMOL/L (ref 21–32)
CO2 SERPL-SCNC: 30 MMOL/L (ref 21–32)
CO2 SERPL-SCNC: 31 MMOL/L (ref 21–32)
CO2 SERPL-SCNC: 31 MMOL/L (ref 21–32)
CO2 SERPL-SCNC: 32 MMOL/L (ref 21–32)
CO2 SERPL-SCNC: 33 MMOL/L (ref 21–32)
CO2 SERPL-SCNC: 33 MMOL/L (ref 21–32)
CO2 SERPL-SCNC: 34 MMOL/L (ref 21–32)
CO2 SERPL-SCNC: 35 MMOL/L (ref 21–32)
COHGB MFR BLD: 1.3 % SAT (ref 0–3)
COHGB MFR BLD: 2.2 % SAT (ref 0–3)
COHGB MFR BLD: 2.5 % SAT (ref 0–3)
COLOR UR AUTO: YELLOW
CORONAVIRUS 229E PCR:: NOT DETECTED
CORONAVIRUS HKU1 PCR:: NOT DETECTED
CORONAVIRUS NL63 PCR:: NOT DETECTED
CORONAVIRUS OC43 PCR:: NOT DETECTED
CREAT BLD-MCNC: 0.28 MG/DL
CREAT BLD-MCNC: 0.32 MG/DL
CREAT BLD-MCNC: 0.34 MG/DL
CREAT BLD-MCNC: 0.36 MG/DL
CREAT BLD-MCNC: 0.36 MG/DL
CREAT BLD-MCNC: 0.37 MG/DL
CREAT BLD-MCNC: 0.39 MG/DL
CREAT BLD-MCNC: 0.41 MG/DL
CREAT BLD-MCNC: 0.43 MG/DL
CREAT BLD-MCNC: 0.46 MG/DL
CREAT BLD-MCNC: 0.49 MG/DL
CREAT BLD-MCNC: 0.49 MG/DL
CREAT BLD-MCNC: 0.51 MG/DL
CREAT BLD-MCNC: 0.53 MG/DL
CREAT BLD-MCNC: 0.53 MG/DL
CREAT BLD-MCNC: 0.6 MG/DL
CREAT BLD-MCNC: 0.69 MG/DL
CRP SERPL-MCNC: 0.73 MG/DL (ref ?–0.3)
DEPRECATED HBV CORE AB SER IA-ACNC: 271.9 NG/ML
EGFRCR SERPLBLD CKD-EPI 2021: 100 ML/MIN/1.73M2 (ref 60–?)
EGFRCR SERPLBLD CKD-EPI 2021: 101 ML/MIN/1.73M2 (ref 60–?)
EGFRCR SERPLBLD CKD-EPI 2021: 104 ML/MIN/1.73M2 (ref 60–?)
EGFRCR SERPLBLD CKD-EPI 2021: 84 ML/MIN/1.73M2 (ref 60–?)
EGFRCR SERPLBLD CKD-EPI 2021: 87 ML/MIN/1.73M2 (ref 60–?)
EGFRCR SERPLBLD CKD-EPI 2021: 89 ML/MIN/1.73M2 (ref 60–?)
EGFRCR SERPLBLD CKD-EPI 2021: 89 ML/MIN/1.73M2 (ref 60–?)
EGFRCR SERPLBLD CKD-EPI 2021: 90 ML/MIN/1.73M2 (ref 60–?)
EGFRCR SERPLBLD CKD-EPI 2021: 91 ML/MIN/1.73M2 (ref 60–?)
EGFRCR SERPLBLD CKD-EPI 2021: 91 ML/MIN/1.73M2 (ref 60–?)
EGFRCR SERPLBLD CKD-EPI 2021: 93 ML/MIN/1.73M2 (ref 60–?)
EGFRCR SERPLBLD CKD-EPI 2021: 94 ML/MIN/1.73M2 (ref 60–?)
EGFRCR SERPLBLD CKD-EPI 2021: 95 ML/MIN/1.73M2 (ref 60–?)
EGFRCR SERPLBLD CKD-EPI 2021: 96 ML/MIN/1.73M2 (ref 60–?)
EGFRCR SERPLBLD CKD-EPI 2021: 98 ML/MIN/1.73M2 (ref 60–?)
EOSINOPHIL # BLD AUTO: 0 X10(3) UL (ref 0–0.7)
EOSINOPHIL # BLD AUTO: 0.04 X10(3) UL (ref 0–0.7)
EOSINOPHIL # BLD AUTO: 0.04 X10(3) UL (ref 0–0.7)
EOSINOPHIL # BLD: 0 X10(3) UL (ref 0–0.7)
EOSINOPHIL # BLD: 0.07 X10(3) UL (ref 0–0.7)
EOSINOPHIL NFR BLD AUTO: 0 %
EOSINOPHIL NFR BLD AUTO: 0.6 %
EOSINOPHIL NFR BLD AUTO: 0.6 %
EOSINOPHIL NFR BLD: 0 %
EOSINOPHIL NFR BLD: 1 %
ERYTHROCYTE [DISTWIDTH] IN BLOOD BY AUTOMATED COUNT: 19.2 %
ERYTHROCYTE [DISTWIDTH] IN BLOOD BY AUTOMATED COUNT: 19.3 %
ERYTHROCYTE [DISTWIDTH] IN BLOOD BY AUTOMATED COUNT: 19.4 %
ERYTHROCYTE [DISTWIDTH] IN BLOOD BY AUTOMATED COUNT: 19.9 %
ERYTHROCYTE [DISTWIDTH] IN BLOOD BY AUTOMATED COUNT: 19.9 %
ERYTHROCYTE [DISTWIDTH] IN BLOOD BY AUTOMATED COUNT: 20 %
ERYTHROCYTE [DISTWIDTH] IN BLOOD BY AUTOMATED COUNT: 20.1 %
ERYTHROCYTE [DISTWIDTH] IN BLOOD BY AUTOMATED COUNT: 20.3 %
ERYTHROCYTE [DISTWIDTH] IN BLOOD BY AUTOMATED COUNT: 20.4 %
ERYTHROCYTE [DISTWIDTH] IN BLOOD BY AUTOMATED COUNT: 22.2 %
ERYTHROCYTE [DISTWIDTH] IN BLOOD BY AUTOMATED COUNT: 22.3 %
ERYTHROCYTE [DISTWIDTH] IN BLOOD BY AUTOMATED COUNT: 22.4 %
ERYTHROCYTE [DISTWIDTH] IN BLOOD BY AUTOMATED COUNT: 22.5 %
ERYTHROCYTE [DISTWIDTH] IN BLOOD BY AUTOMATED COUNT: 23.1 %
ERYTHROCYTE [DISTWIDTH] IN BLOOD BY AUTOMATED COUNT: 24.5 %
ERYTHROCYTE [DISTWIDTH] IN BLOOD BY AUTOMATED COUNT: 25 %
FASTING STATUS PATIENT QL REPORTED: YES
FASTING STATUS PATIENT QL REPORTED: YES
FIO2: 50 %
FIO2: 50 %
FLUAV + FLUBV RNA SPEC NAA+PROBE: NEGATIVE
FLUAV + FLUBV RNA SPEC NAA+PROBE: NEGATIVE
FLUAV RNA SPEC QL NAA+PROBE: NOT DETECTED
FLUBV RNA SPEC QL NAA+PROBE: NOT DETECTED
GLOBULIN PLAS-MCNC: 2.6 G/DL (ref 2.8–4.4)
GLOBULIN PLAS-MCNC: 2.8 G/DL (ref 2.8–4.4)
GLOBULIN PLAS-MCNC: 3.1 G/DL (ref 2.8–4.4)
GLOBULIN PLAS-MCNC: 3.2 G/DL (ref 2.8–4.4)
GLOBULIN PLAS-MCNC: 3.3 G/DL (ref 2.8–4.4)
GLOBULIN PLAS-MCNC: 3.5 G/DL (ref 2.8–4.4)
GLOBULIN PLAS-MCNC: 3.5 G/DL (ref 2.8–4.4)
GLOBULIN PLAS-MCNC: 3.6 G/DL (ref 2.8–4.4)
GLOBULIN PLAS-MCNC: 3.7 G/DL (ref 2.8–4.4)
GLUCOSE BLD-MCNC: 112 MG/DL (ref 70–99)
GLUCOSE BLD-MCNC: 112 MG/DL (ref 70–99)
GLUCOSE BLD-MCNC: 113 MG/DL (ref 70–99)
GLUCOSE BLD-MCNC: 122 MG/DL (ref 70–99)
GLUCOSE BLD-MCNC: 126 MG/DL (ref 70–99)
GLUCOSE BLD-MCNC: 127 MG/DL (ref 70–99)
GLUCOSE BLD-MCNC: 129 MG/DL (ref 70–99)
GLUCOSE BLD-MCNC: 130 MG/DL (ref 70–99)
GLUCOSE BLD-MCNC: 134 MG/DL (ref 70–99)
GLUCOSE BLD-MCNC: 135 MG/DL (ref 70–99)
GLUCOSE BLD-MCNC: 138 MG/DL (ref 70–99)
GLUCOSE BLD-MCNC: 139 MG/DL (ref 70–99)
GLUCOSE BLD-MCNC: 139 MG/DL (ref 70–99)
GLUCOSE BLD-MCNC: 143 MG/DL (ref 70–99)
GLUCOSE BLD-MCNC: 145 MG/DL (ref 70–99)
GLUCOSE BLD-MCNC: 147 MG/DL (ref 70–99)
GLUCOSE BLD-MCNC: 148 MG/DL (ref 70–99)
GLUCOSE BLD-MCNC: 149 MG/DL (ref 70–99)
GLUCOSE BLD-MCNC: 156 MG/DL (ref 70–99)
GLUCOSE BLD-MCNC: 162 MG/DL (ref 70–99)
GLUCOSE BLD-MCNC: 167 MG/DL (ref 70–99)
GLUCOSE BLD-MCNC: 167 MG/DL (ref 70–99)
GLUCOSE BLD-MCNC: 169 MG/DL (ref 70–99)
GLUCOSE BLD-MCNC: 172 MG/DL (ref 70–99)
GLUCOSE BLD-MCNC: 172 MG/DL (ref 70–99)
GLUCOSE BLD-MCNC: 177 MG/DL (ref 70–99)
GLUCOSE BLD-MCNC: 180 MG/DL (ref 70–99)
GLUCOSE BLD-MCNC: 181 MG/DL (ref 70–99)
GLUCOSE BLD-MCNC: 183 MG/DL (ref 70–99)
GLUCOSE BLD-MCNC: 185 MG/DL (ref 70–99)
GLUCOSE BLD-MCNC: 188 MG/DL (ref 70–99)
GLUCOSE BLD-MCNC: 190 MG/DL (ref 70–99)
GLUCOSE BLD-MCNC: 193 MG/DL (ref 70–99)
GLUCOSE BLD-MCNC: 197 MG/DL (ref 70–99)
GLUCOSE BLD-MCNC: 198 MG/DL (ref 70–99)
GLUCOSE BLD-MCNC: 206 MG/DL (ref 70–99)
GLUCOSE BLD-MCNC: 206 MG/DL (ref 70–99)
GLUCOSE BLD-MCNC: 207 MG/DL (ref 70–99)
GLUCOSE BLD-MCNC: 208 MG/DL (ref 70–99)
GLUCOSE BLD-MCNC: 218 MG/DL (ref 70–99)
GLUCOSE BLD-MCNC: 218 MG/DL (ref 70–99)
GLUCOSE BLD-MCNC: 220 MG/DL (ref 70–99)
GLUCOSE BLD-MCNC: 222 MG/DL (ref 70–99)
GLUCOSE BLD-MCNC: 229 MG/DL (ref 70–99)
GLUCOSE BLD-MCNC: 230 MG/DL (ref 70–99)
GLUCOSE BLD-MCNC: 232 MG/DL (ref 70–99)
GLUCOSE BLD-MCNC: 239 MG/DL (ref 70–99)
GLUCOSE BLD-MCNC: 240 MG/DL (ref 70–99)
GLUCOSE BLD-MCNC: 241 MG/DL (ref 70–99)
GLUCOSE BLD-MCNC: 245 MG/DL (ref 70–99)
GLUCOSE BLD-MCNC: 247 MG/DL (ref 70–99)
GLUCOSE BLD-MCNC: 248 MG/DL (ref 70–99)
GLUCOSE BLD-MCNC: 249 MG/DL (ref 70–99)
GLUCOSE BLD-MCNC: 251 MG/DL (ref 70–99)
GLUCOSE BLD-MCNC: 259 MG/DL (ref 70–99)
GLUCOSE BLD-MCNC: 263 MG/DL (ref 70–99)
GLUCOSE BLD-MCNC: 76 MG/DL (ref 70–99)
GLUCOSE BLD-MCNC: 85 MG/DL (ref 70–99)
GLUCOSE UR STRIP.AUTO-MCNC: NORMAL MG/DL
HCO3 BLDA-SCNC: 27.6 MEQ/L (ref 21–27)
HCO3 BLDA-SCNC: 31.6 MEQ/L (ref 21–27)
HCO3 BLDA-SCNC: 37.4 MEQ/L (ref 21–27)
HCT VFR BLD AUTO: 19.1 %
HCT VFR BLD AUTO: 22 %
HCT VFR BLD AUTO: 23.4 %
HCT VFR BLD AUTO: 23.5 %
HCT VFR BLD AUTO: 23.7 %
HCT VFR BLD AUTO: 23.8 %
HCT VFR BLD AUTO: 24.8 %
HCT VFR BLD AUTO: 25.7 %
HCT VFR BLD AUTO: 25.9 %
HCT VFR BLD AUTO: 26.4 %
HCT VFR BLD AUTO: 26.9 %
HCT VFR BLD AUTO: 27.1 %
HCT VFR BLD AUTO: 27.1 %
HCT VFR BLD AUTO: 27.2 %
HCT VFR BLD AUTO: 27.2 %
HCT VFR BLD AUTO: 28.1 %
HCT VFR BLD AUTO: 28.2 %
HCT VFR BLD AUTO: 29 %
HCT VFR BLD AUTO: 29.9 %
HDLC SERPL-MCNC: 72 MG/DL (ref 40–59)
HGB BLD-MCNC: 10.5 G/DL
HGB BLD-MCNC: 6 G/DL
HGB BLD-MCNC: 6.7 G/DL
HGB BLD-MCNC: 7.5 G/DL
HGB BLD-MCNC: 7.7 G/DL
HGB BLD-MCNC: 7.8 G/DL
HGB BLD-MCNC: 7.8 G/DL
HGB BLD-MCNC: 8.1 G/DL
HGB BLD-MCNC: 8.2 G/DL
HGB BLD-MCNC: 8.3 G/DL
HGB BLD-MCNC: 8.6 G/DL
HGB BLD-MCNC: 8.6 G/DL
HGB BLD-MCNC: 8.7 G/DL
HGB BLD-MCNC: 8.8 G/DL
HGB BLD-MCNC: 8.8 G/DL
HGB BLD-MCNC: 8.9 G/DL
HGB BLD-MCNC: 8.9 G/DL
HGB BLD-MCNC: 9.1 G/DL
HGB BLD-MCNC: 9.1 G/DL
HGB BLD-MCNC: 9.2 G/DL
HGB BLD-MCNC: 9.5 G/DL
HGB BLD-MCNC: 9.6 G/DL
HYALINE CASTS #/AREA URNS AUTO: PRESENT /LPF
IMM GRANULOCYTES # BLD AUTO: 0.05 X10(3) UL (ref 0–1)
IMM GRANULOCYTES # BLD AUTO: 0.09 X10(3) UL (ref 0–1)
IMM GRANULOCYTES # BLD AUTO: 0.21 X10(3) UL (ref 0–1)
IMM GRANULOCYTES # BLD AUTO: 0.22 X10(3) UL (ref 0–1)
IMM GRANULOCYTES # BLD AUTO: 0.23 X10(3) UL (ref 0–1)
IMM GRANULOCYTES # BLD AUTO: 0.24 X10(3) UL (ref 0–1)
IMM GRANULOCYTES # BLD AUTO: 0.24 X10(3) UL (ref 0–1)
IMM GRANULOCYTES # BLD AUTO: 0.25 X10(3) UL (ref 0–1)
IMM GRANULOCYTES # BLD AUTO: 0.26 X10(3) UL (ref 0–1)
IMM GRANULOCYTES # BLD AUTO: 0.27 X10(3) UL (ref 0–1)
IMM GRANULOCYTES # BLD AUTO: 0.29 X10(3) UL (ref 0–1)
IMM GRANULOCYTES # BLD AUTO: 0.3 X10(3) UL (ref 0–1)
IMM GRANULOCYTES NFR BLD: 0.7 %
IMM GRANULOCYTES NFR BLD: 1 %
IMM GRANULOCYTES NFR BLD: 2.8 %
IMM GRANULOCYTES NFR BLD: 3.2 %
IMM GRANULOCYTES NFR BLD: 3.8 %
IMM GRANULOCYTES NFR BLD: 3.9 %
IMM GRANULOCYTES NFR BLD: 4.2 %
IMM GRANULOCYTES NFR BLD: 4.2 %
IMM GRANULOCYTES NFR BLD: 4.3 %
IMM GRANULOCYTES NFR BLD: 4.5 %
IMM GRANULOCYTES NFR BLD: 4.7 %
IMM GRANULOCYTES NFR BLD: 4.7 %
IRON SATN MFR SERPL: 21 %
IRON SERPL-MCNC: 50 UG/DL
KETONES UR STRIP.AUTO-MCNC: NEGATIVE MG/DL
L PNEUMO AG UR QL: NEGATIVE
L/M: 15 L/MIN
L/M: 30 L/MIN
L/M: 30 L/MIN
LACTATE BLD-SCNC: 1.7 MMOL/L (ref 0.5–2)
LACTATE BLD-SCNC: 2.6 MMOL/L (ref 0.5–2)
LACTATE SERPL-SCNC: 1.6 MMOL/L (ref 0.4–2)
LACTATE SERPL-SCNC: 1.9 MMOL/L (ref 0.4–2)
LACTATE SERPL-SCNC: 2.6 MMOL/L (ref 0.4–2)
LACTATE SERPL-SCNC: 3.1 MMOL/L (ref 0.4–2)
LDLC SERPL CALC-MCNC: 25 MG/DL (ref ?–100)
LEUKOCYTE ESTERASE UR QL STRIP.AUTO: NEGATIVE
LIPASE SERPL-CCNC: 12 U/L (ref ?–300)
LYMPHOCYTES # BLD AUTO: 0.18 X10(3) UL (ref 1–4)
LYMPHOCYTES # BLD AUTO: 0.18 X10(3) UL (ref 1–4)
LYMPHOCYTES # BLD AUTO: 0.22 X10(3) UL (ref 1–4)
LYMPHOCYTES # BLD AUTO: 0.26 X10(3) UL (ref 1–4)
LYMPHOCYTES # BLD AUTO: 0.28 X10(3) UL (ref 1–4)
LYMPHOCYTES # BLD AUTO: 0.3 X10(3) UL (ref 1–4)
LYMPHOCYTES # BLD AUTO: 0.32 X10(3) UL (ref 1–4)
LYMPHOCYTES # BLD AUTO: 0.34 X10(3) UL (ref 1–4)
LYMPHOCYTES # BLD AUTO: 0.39 X10(3) UL (ref 1–4)
LYMPHOCYTES # BLD AUTO: 0.46 X10(3) UL (ref 1–4)
LYMPHOCYTES # BLD AUTO: 0.48 X10(3) UL (ref 1–4)
LYMPHOCYTES # BLD AUTO: 0.65 X10(3) UL (ref 1–4)
LYMPHOCYTES NFR BLD AUTO: 10.1 %
LYMPHOCYTES NFR BLD AUTO: 2.7 %
LYMPHOCYTES NFR BLD AUTO: 3.5 %
LYMPHOCYTES NFR BLD AUTO: 3.7 %
LYMPHOCYTES NFR BLD AUTO: 3.9 %
LYMPHOCYTES NFR BLD AUTO: 4.5 %
LYMPHOCYTES NFR BLD AUTO: 4.7 %
LYMPHOCYTES NFR BLD AUTO: 4.9 %
LYMPHOCYTES NFR BLD AUTO: 4.9 %
LYMPHOCYTES NFR BLD AUTO: 5.1 %
LYMPHOCYTES NFR BLD AUTO: 6.2 %
LYMPHOCYTES NFR BLD AUTO: 7.2 %
LYMPHOCYTES NFR BLD: 0 %
LYMPHOCYTES NFR BLD: 0 X10(3) UL (ref 1–4)
LYMPHOCYTES NFR BLD: 0.18 X10(3) UL (ref 1–4)
LYMPHOCYTES NFR BLD: 0.22 X10(3) UL (ref 1–4)
LYMPHOCYTES NFR BLD: 0.32 X10(3) UL (ref 1–4)
LYMPHOCYTES NFR BLD: 0.49 X10(3) UL (ref 1–4)
LYMPHOCYTES NFR BLD: 0.58 X10(3) UL (ref 1–4)
LYMPHOCYTES NFR BLD: 3 %
LYMPHOCYTES NFR BLD: 3 %
LYMPHOCYTES NFR BLD: 6 %
MAGNESIUM SERPL-MCNC: 2.1 MG/DL (ref 1.6–2.6)
MAGNESIUM SERPL-MCNC: 2.3 MG/DL (ref 1.6–2.6)
MAGNESIUM SERPL-MCNC: 2.8 MG/DL (ref 1.6–2.6)
MCH RBC QN AUTO: 26.1 PG (ref 26–34)
MCH RBC QN AUTO: 26.2 PG (ref 26–34)
MCH RBC QN AUTO: 26.2 PG (ref 26–34)
MCH RBC QN AUTO: 26.5 PG (ref 26–34)
MCH RBC QN AUTO: 26.6 PG (ref 26–34)
MCH RBC QN AUTO: 26.8 PG (ref 26–34)
MCH RBC QN AUTO: 27 PG (ref 26–34)
MCH RBC QN AUTO: 27.4 PG (ref 26–34)
MCH RBC QN AUTO: 27.5 PG (ref 26–34)
MCH RBC QN AUTO: 27.6 PG (ref 26–34)
MCH RBC QN AUTO: 27.8 PG (ref 26–34)
MCH RBC QN AUTO: 27.8 PG (ref 26–34)
MCH RBC QN AUTO: 27.9 PG (ref 26–34)
MCH RBC QN AUTO: 28 PG (ref 26–34)
MCH RBC QN AUTO: 28 PG (ref 26–34)
MCH RBC QN AUTO: 28.2 PG (ref 26–34)
MCH RBC QN AUTO: 28.4 PG (ref 26–34)
MCH RBC QN AUTO: 28.4 PG (ref 26–34)
MCHC RBC AUTO-ENTMCNC: 30.5 G/DL (ref 31–37)
MCHC RBC AUTO-ENTMCNC: 31.4 G/DL (ref 31–37)
MCHC RBC AUTO-ENTMCNC: 31.6 G/DL (ref 31–37)
MCHC RBC AUTO-ENTMCNC: 31.7 G/DL (ref 31–37)
MCHC RBC AUTO-ENTMCNC: 32 G/DL (ref 31–37)
MCHC RBC AUTO-ENTMCNC: 32.1 G/DL (ref 31–37)
MCHC RBC AUTO-ENTMCNC: 32.1 G/DL (ref 31–37)
MCHC RBC AUTO-ENTMCNC: 32.3 G/DL (ref 31–37)
MCHC RBC AUTO-ENTMCNC: 32.3 G/DL (ref 31–37)
MCHC RBC AUTO-ENTMCNC: 32.4 G/DL (ref 31–37)
MCHC RBC AUTO-ENTMCNC: 32.4 G/DL (ref 31–37)
MCHC RBC AUTO-ENTMCNC: 32.5 G/DL (ref 31–37)
MCHC RBC AUTO-ENTMCNC: 32.7 G/DL (ref 31–37)
MCHC RBC AUTO-ENTMCNC: 32.7 G/DL (ref 31–37)
MCHC RBC AUTO-ENTMCNC: 32.8 G/DL (ref 31–37)
MCHC RBC AUTO-ENTMCNC: 33 G/DL (ref 31–37)
MCHC RBC AUTO-ENTMCNC: 33.2 G/DL (ref 31–37)
MCHC RBC AUTO-ENTMCNC: 33.6 G/DL (ref 31–37)
MCV RBC AUTO: 79.7 FL
MCV RBC AUTO: 79.8 FL
MCV RBC AUTO: 81.1 FL
MCV RBC AUTO: 81.1 FL
MCV RBC AUTO: 82 FL
MCV RBC AUTO: 83.2 FL
MCV RBC AUTO: 84.2 FL
MCV RBC AUTO: 85.4 FL
MCV RBC AUTO: 85.7 FL
MCV RBC AUTO: 85.9 FL
MCV RBC AUTO: 85.9 FL
MCV RBC AUTO: 86.1 FL
MCV RBC AUTO: 87 FL
MCV RBC AUTO: 87.2 FL
MCV RBC AUTO: 87.2 FL
MCV RBC AUTO: 87.3 FL
MCV RBC AUTO: 88.3 FL
MCV RBC AUTO: 88.8 FL
METAMYELOCYTES # BLD: 0.05 X10(3) UL
METAMYELOCYTES # BLD: 0.08 X10(3) UL
METAMYELOCYTES # BLD: 0.1 X10(3) UL
METAMYELOCYTES NFR BLD: 1 %
METAPNEUMOVIRUS PCR:: NOT DETECTED
METHGB MFR BLD: 0 % SAT (ref 0.4–1.5)
METHGB MFR BLD: 0.2 % SAT (ref 0.4–1.5)
METHGB MFR BLD: 0.3 % SAT (ref 0.4–1.5)
MONOCYTES # BLD AUTO: 0.09 X10(3) UL (ref 0.1–1)
MONOCYTES # BLD AUTO: 0.09 X10(3) UL (ref 0.1–1)
MONOCYTES # BLD AUTO: 0.11 X10(3) UL (ref 0.1–1)
MONOCYTES # BLD AUTO: 0.14 X10(3) UL (ref 0.1–1)
MONOCYTES # BLD AUTO: 0.14 X10(3) UL (ref 0.1–1)
MONOCYTES # BLD AUTO: 0.16 X10(3) UL (ref 0.1–1)
MONOCYTES # BLD AUTO: 0.21 X10(3) UL (ref 0.1–1)
MONOCYTES # BLD AUTO: 0.22 X10(3) UL (ref 0.1–1)
MONOCYTES # BLD AUTO: 0.22 X10(3) UL (ref 0.1–1)
MONOCYTES # BLD AUTO: 0.26 X10(3) UL (ref 0.1–1)
MONOCYTES # BLD AUTO: 0.27 X10(3) UL (ref 0.1–1)
MONOCYTES # BLD AUTO: 0.36 X10(3) UL (ref 0.1–1)
MONOCYTES # BLD: 0 X10(3) UL (ref 0.1–1)
MONOCYTES # BLD: 0 X10(3) UL (ref 0.1–1)
MONOCYTES # BLD: 0.08 X10(3) UL (ref 0.1–1)
MONOCYTES # BLD: 0.1 X10(3) UL (ref 0.1–1)
MONOCYTES # BLD: 0.11 X10(3) UL (ref 0.1–1)
MONOCYTES # BLD: 0.12 X10(3) UL (ref 0.1–1)
MONOCYTES NFR BLD AUTO: 1.3 %
MONOCYTES NFR BLD AUTO: 1.9 %
MONOCYTES NFR BLD AUTO: 1.9 %
MONOCYTES NFR BLD AUTO: 2.1 %
MONOCYTES NFR BLD AUTO: 2.3 %
MONOCYTES NFR BLD AUTO: 3.5 %
MONOCYTES NFR BLD AUTO: 3.6 %
MONOCYTES NFR BLD AUTO: 3.6 %
MONOCYTES NFR BLD AUTO: 4.5 %
MONOCYTES NFR BLD AUTO: 5.6 %
MONOCYTES NFR BLD: 0 %
MONOCYTES NFR BLD: 0 %
MONOCYTES NFR BLD: 1 %
MONOCYTES NFR BLD: 2 %
MORPHOLOGY: NORMAL
MORPHOLOGY: NORMAL
MRSA DNA SPEC QL NAA+PROBE: POSITIVE
MYCOPLASMA PNEUMONIA PCR:: NOT DETECTED
MYELOCYTES # BLD: 0.06 X10(3) UL
MYELOCYTES # BLD: 0.32 X10(3) UL
MYELOCYTES NFR BLD: 1 %
MYELOCYTES NFR BLD: 3 %
NEUTROPHILS # BLD AUTO: 12.22 X10 (3) UL (ref 1.5–7.7)
NEUTROPHILS # BLD AUTO: 12.22 X10(3) UL (ref 1.5–7.7)
NEUTROPHILS # BLD AUTO: 4.17 X10 (3) UL (ref 1.5–7.7)
NEUTROPHILS # BLD AUTO: 4.17 X10(3) UL (ref 1.5–7.7)
NEUTROPHILS # BLD AUTO: 4.46 X10 (3) UL (ref 1.5–7.7)
NEUTROPHILS # BLD AUTO: 4.47 X10 (3) UL (ref 1.5–7.7)
NEUTROPHILS # BLD AUTO: 4.47 X10(3) UL (ref 1.5–7.7)
NEUTROPHILS # BLD AUTO: 5 X10 (3) UL (ref 1.5–7.7)
NEUTROPHILS # BLD AUTO: 5 X10(3) UL (ref 1.5–7.7)
NEUTROPHILS # BLD AUTO: 5.01 X10 (3) UL (ref 1.5–7.7)
NEUTROPHILS # BLD AUTO: 5.01 X10(3) UL (ref 1.5–7.7)
NEUTROPHILS # BLD AUTO: 5.11 X10 (3) UL (ref 1.5–7.7)
NEUTROPHILS # BLD AUTO: 5.11 X10(3) UL (ref 1.5–7.7)
NEUTROPHILS # BLD AUTO: 5.39 X10 (3) UL (ref 1.5–7.7)
NEUTROPHILS # BLD AUTO: 5.39 X10(3) UL (ref 1.5–7.7)
NEUTROPHILS # BLD AUTO: 5.4 X10 (3) UL (ref 1.5–7.7)
NEUTROPHILS # BLD AUTO: 5.4 X10(3) UL (ref 1.5–7.7)
NEUTROPHILS # BLD AUTO: 5.41 X10 (3) UL (ref 1.5–7.7)
NEUTROPHILS # BLD AUTO: 5.76 X10 (3) UL (ref 1.5–7.7)
NEUTROPHILS # BLD AUTO: 5.76 X10(3) UL (ref 1.5–7.7)
NEUTROPHILS # BLD AUTO: 5.9 X10 (3) UL (ref 1.5–7.7)
NEUTROPHILS # BLD AUTO: 5.9 X10(3) UL (ref 1.5–7.7)
NEUTROPHILS # BLD AUTO: 6.08 X10 (3) UL (ref 1.5–7.7)
NEUTROPHILS # BLD AUTO: 6.08 X10(3) UL (ref 1.5–7.7)
NEUTROPHILS # BLD AUTO: 6.45 X10 (3) UL (ref 1.5–7.7)
NEUTROPHILS # BLD AUTO: 6.93 X10 (3) UL (ref 1.5–7.7)
NEUTROPHILS # BLD AUTO: 7.59 X10 (3) UL (ref 1.5–7.7)
NEUTROPHILS # BLD AUTO: 7.59 X10(3) UL (ref 1.5–7.7)
NEUTROPHILS # BLD AUTO: 8.36 X10 (3) UL (ref 1.5–7.7)
NEUTROPHILS # BLD AUTO: 9.21 X10 (3) UL (ref 1.5–7.7)
NEUTROPHILS NFR BLD AUTO: 79.6 %
NEUTROPHILS NFR BLD AUTO: 86.3 %
NEUTROPHILS NFR BLD AUTO: 86.3 %
NEUTROPHILS NFR BLD AUTO: 86.6 %
NEUTROPHILS NFR BLD AUTO: 86.8 %
NEUTROPHILS NFR BLD AUTO: 87.1 %
NEUTROPHILS NFR BLD AUTO: 88.3 %
NEUTROPHILS NFR BLD AUTO: 88.4 %
NEUTROPHILS NFR BLD AUTO: 91.3 %
NEUTROPHILS NFR BLD AUTO: 91.4 %
NEUTROPHILS NFR BLD AUTO: 92.2 %
NEUTROPHILS NFR BLD AUTO: 93.6 %
NEUTROPHILS NFR BLD: 85 %
NEUTROPHILS NFR BLD: 86 %
NEUTROPHILS NFR BLD: 87 %
NEUTROPHILS NFR BLD: 88 %
NEUTROPHILS NFR BLD: 89 %
NEUTROPHILS NFR BLD: 90 %
NEUTS BAND NFR BLD: 4 %
NEUTS BAND NFR BLD: 6 %
NEUTS BAND NFR BLD: 6 %
NEUTS BAND NFR BLD: 7 %
NEUTS BAND NFR BLD: 7 %
NEUTS BAND NFR BLD: 8 %
NEUTS HYPERSEG # BLD: 10.08 X10(3) UL (ref 1.5–7.7)
NEUTS HYPERSEG # BLD: 4.93 X10(3) UL (ref 1.5–7.7)
NEUTS HYPERSEG # BLD: 5.73 X10(3) UL (ref 1.5–7.7)
NEUTS HYPERSEG # BLD: 7.1 X10(3) UL (ref 1.5–7.7)
NEUTS HYPERSEG # BLD: 7.54 X10(3) UL (ref 1.5–7.7)
NEUTS HYPERSEG # BLD: 8.92 X10(3) UL (ref 1.5–7.7)
NITRITE UR QL STRIP.AUTO: NEGATIVE
NONHDLC SERPL-MCNC: 46 MG/DL (ref ?–130)
NRBC BLD MANUAL-RTO: 2 %
NRBC BLD MANUAL-RTO: 3 %
NT-PROBNP SERPL-MCNC: 578 PG/ML (ref ?–450)
NT-PROBNP SERPL-MCNC: 647 PG/ML (ref ?–450)
OSMOLALITY SERPL CALC.SUM OF ELEC: 278 MOSM/KG (ref 275–295)
OSMOLALITY SERPL CALC.SUM OF ELEC: 279 MOSM/KG (ref 275–295)
OSMOLALITY SERPL CALC.SUM OF ELEC: 279 MOSM/KG (ref 275–295)
OSMOLALITY SERPL CALC.SUM OF ELEC: 281 MOSM/KG (ref 275–295)
OSMOLALITY SERPL CALC.SUM OF ELEC: 283 MOSM/KG (ref 275–295)
OSMOLALITY SERPL CALC.SUM OF ELEC: 286 MOSM/KG (ref 275–295)
OSMOLALITY SERPL CALC.SUM OF ELEC: 289 MOSM/KG (ref 275–295)
OSMOLALITY SERPL CALC.SUM OF ELEC: 290 MOSM/KG (ref 275–295)
OSMOLALITY SERPL CALC.SUM OF ELEC: 292 MOSM/KG (ref 275–295)
OSMOLALITY SERPL CALC.SUM OF ELEC: 292 MOSM/KG (ref 275–295)
OSMOLALITY SERPL CALC.SUM OF ELEC: 294 MOSM/KG (ref 275–295)
OSMOLALITY SERPL CALC.SUM OF ELEC: 297 MOSM/KG (ref 275–295)
OSMOLALITY SERPL CALC.SUM OF ELEC: 302 MOSM/KG (ref 275–295)
OSMOLALITY SERPL CALC.SUM OF ELEC: 302 MOSM/KG (ref 275–295)
OSMOLALITY SERPL CALC.SUM OF ELEC: 303 MOSM/KG (ref 275–295)
OSMOLALITY SERPL CALC.SUM OF ELEC: 304 MOSM/KG (ref 275–295)
OSMOLALITY SERPL CALC.SUM OF ELEC: 305 MOSM/KG (ref 275–295)
OSMOLALITY SERPL CALC.SUM OF ELEC: 306 MOSM/KG (ref 275–295)
OSMOLALITY SERPL CALC.SUM OF ELEC: 309 MOSM/KG (ref 275–295)
OXYHGB MFR BLDA: 96.7 % (ref 92–100)
OXYHGB MFR BLDA: 97.2 % (ref 92–100)
OXYHGB MFR BLDA: 97.4 % (ref 92–100)
P AXIS: 32 DEGREES
P AXIS: 41 DEGREES
P AXIS: 58 DEGREES
P-R INTERVAL: 164 MS
P-R INTERVAL: 170 MS
P-R INTERVAL: 182 MS
PARAINFLUENZA 1 PCR:: NOT DETECTED
PARAINFLUENZA 2 PCR:: NOT DETECTED
PARAINFLUENZA 3 PCR:: NOT DETECTED
PARAINFLUENZA 4 PCR:: NOT DETECTED
PCO2 BLDA: 32 MM HG (ref 35–45)
PCO2 BLDA: 38 MM HG (ref 35–45)
PCO2 BLDA: 42 MM HG (ref 35–45)
PH BLDA: 7.52 [PH] (ref 7.35–7.45)
PH BLDA: 7.53 [PH] (ref 7.35–7.45)
PH BLDA: 7.58 [PH] (ref 7.35–7.45)
PH UR STRIP.AUTO: 5.5 [PH] (ref 5–8)
PHOSPHATE SERPL-MCNC: 1.6 MG/DL (ref 2.5–4.9)
PHOSPHATE SERPL-MCNC: 2.7 MG/DL (ref 2.5–4.9)
PHOSPHATE SERPL-MCNC: 2.7 MG/DL (ref 2.5–4.9)
PLATELET # BLD AUTO: 106 10(3)UL (ref 150–450)
PLATELET # BLD AUTO: 113 10(3)UL (ref 150–450)
PLATELET # BLD AUTO: 131 10(3)UL (ref 150–450)
PLATELET # BLD AUTO: 151 10(3)UL (ref 150–450)
PLATELET # BLD AUTO: 155 10(3)UL (ref 150–450)
PLATELET # BLD AUTO: 159 10(3)UL (ref 150–450)
PLATELET # BLD AUTO: 166 10(3)UL (ref 150–450)
PLATELET # BLD AUTO: 172 10(3)UL (ref 150–450)
PLATELET # BLD AUTO: 178 10(3)UL (ref 150–450)
PLATELET # BLD AUTO: 179 10(3)UL (ref 150–450)
PLATELET # BLD AUTO: 195 10(3)UL (ref 150–450)
PLATELET # BLD AUTO: 202 10(3)UL (ref 150–450)
PLATELET # BLD AUTO: 203 10(3)UL (ref 150–450)
PLATELET # BLD AUTO: 220 10(3)UL (ref 150–450)
PLATELET # BLD AUTO: 223 10(3)UL (ref 150–450)
PLATELET # BLD AUTO: 235 10(3)UL (ref 150–450)
PLATELET # BLD AUTO: 249 10(3)UL (ref 150–450)
PLATELET # BLD AUTO: 99 10(3)UL (ref 150–450)
PLATELET MORPHOLOGY: NORMAL
PO2 BLDA: 83 MM HG (ref 80–100)
PO2 BLDA: 84 MM HG (ref 80–100)
PO2 BLDA: 88 MM HG (ref 80–100)
POTASSIUM BLD-SCNC: 2.7 MMOL/L (ref 3.6–5.1)
POTASSIUM BLD-SCNC: 4.5 MMOL/L (ref 3.6–5.1)
POTASSIUM SERPL-SCNC: 2.3 MMOL/L (ref 3.5–5.1)
POTASSIUM SERPL-SCNC: 2.7 MMOL/L (ref 3.5–5.1)
POTASSIUM SERPL-SCNC: 2.8 MMOL/L (ref 3.5–5.1)
POTASSIUM SERPL-SCNC: 3 MMOL/L (ref 3.5–5.1)
POTASSIUM SERPL-SCNC: 3 MMOL/L (ref 3.5–5.1)
POTASSIUM SERPL-SCNC: 3.1 MMOL/L (ref 3.5–5.1)
POTASSIUM SERPL-SCNC: 3.1 MMOL/L (ref 3.5–5.1)
POTASSIUM SERPL-SCNC: 3.2 MMOL/L (ref 3.5–5.1)
POTASSIUM SERPL-SCNC: 3.3 MMOL/L (ref 3.5–5.1)
POTASSIUM SERPL-SCNC: 3.5 MMOL/L (ref 3.5–5.1)
POTASSIUM SERPL-SCNC: 3.5 MMOL/L (ref 3.5–5.1)
POTASSIUM SERPL-SCNC: 3.6 MMOL/L (ref 3.5–5.1)
POTASSIUM SERPL-SCNC: 3.8 MMOL/L (ref 3.5–5.1)
POTASSIUM SERPL-SCNC: 3.8 MMOL/L (ref 3.5–5.1)
POTASSIUM SERPL-SCNC: 3.9 MMOL/L (ref 3.5–5.1)
POTASSIUM SERPL-SCNC: 4 MMOL/L (ref 3.5–5.1)
POTASSIUM SERPL-SCNC: 4.2 MMOL/L (ref 3.5–5.1)
POTASSIUM SERPL-SCNC: 5 MMOL/L (ref 3.5–5.1)
PROCALCITONIN SERPL-MCNC: 0.24 NG/ML (ref ?–0.16)
PROT SERPL-MCNC: 4.7 G/DL (ref 6.4–8.2)
PROT SERPL-MCNC: 5 G/DL (ref 6.4–8.2)
PROT SERPL-MCNC: 5.1 G/DL (ref 6.4–8.2)
PROT SERPL-MCNC: 5.2 G/DL (ref 6.4–8.2)
PROT SERPL-MCNC: 5.3 G/DL (ref 6.4–8.2)
PROT SERPL-MCNC: 5.3 G/DL (ref 6.4–8.2)
PROT SERPL-MCNC: 5.5 G/DL (ref 6.4–8.2)
PROT SERPL-MCNC: 5.6 G/DL (ref 6.4–8.2)
PROT UR STRIP.AUTO-MCNC: NEGATIVE MG/DL
Q-T INTERVAL: 332 MS
Q-T INTERVAL: 368 MS
Q-T INTERVAL: 420 MS
Q-T INTERVAL: 420 MS
Q-T INTERVAL: 472 MS
QRS DURATION: 122 MS
QRS DURATION: 152 MS
QRS DURATION: 152 MS
QRS DURATION: 154 MS
QRS DURATION: 74 MS
QTC CALCULATION (BEZET): 447 MS
QTC CALCULATION (BEZET): 469 MS
QTC CALCULATION (BEZET): 502 MS
QTC CALCULATION (BEZET): 527 MS
QTC CALCULATION (BEZET): 625 MS
R AXIS: -21 DEGREES
R AXIS: -31 DEGREES
R AXIS: -5 DEGREES
R AXIS: 101 DEGREES
R AXIS: 18 DEGREES
RBC # BLD AUTO: 2.19 X10(6)UL
RBC # BLD AUTO: 2.56 X10(6)UL
RBC # BLD AUTO: 2.68 X10(6)UL
RBC # BLD AUTO: 2.77 X10(6)UL
RBC # BLD AUTO: 2.79 X10(6)UL
RBC # BLD AUTO: 2.85 X10(6)UL
RBC # BLD AUTO: 2.85 X10(6)UL
RBC # BLD AUTO: 2.97 X10(6)UL
RBC # BLD AUTO: 3.03 X10(6)UL
RBC # BLD AUTO: 3.08 X10(6)UL
RBC # BLD AUTO: 3.09 X10(6)UL
RBC # BLD AUTO: 3.16 X10(6)UL
RBC # BLD AUTO: 3.17 X10(6)UL
RBC # BLD AUTO: 3.34 X10(6)UL
RBC # BLD AUTO: 3.4 X10(6)UL
RBC # BLD AUTO: 3.44 X10(6)UL
RBC # BLD AUTO: 3.49 X10(6)UL
RBC # BLD AUTO: 3.52 X10(6)UL
RH BLOOD TYPE: POSITIVE
RH BLOOD TYPE: POSITIVE
RHINOVIRUS/ENTERO PCR:: NOT DETECTED
RSV RNA SPEC NAA+PROBE: POSITIVE
RSV RNA SPEC QL NAA+PROBE: DETECTED
SARS-COV-2 RNA NPH QL NAA+NON-PROBE: NOT DETECTED
SARS-COV-2 RNA RESP QL NAA+PROBE: NOT DETECTED
SODIUM BLD-SCNC: 133 MMOL/L (ref 135–145)
SODIUM BLD-SCNC: 138 MMOL/L (ref 135–145)
SODIUM SERPL-SCNC: 131 MMOL/L (ref 136–145)
SODIUM SERPL-SCNC: 133 MMOL/L (ref 136–145)
SODIUM SERPL-SCNC: 134 MMOL/L (ref 136–145)
SODIUM SERPL-SCNC: 134 MMOL/L (ref 136–145)
SODIUM SERPL-SCNC: 135 MMOL/L (ref 136–145)
SODIUM SERPL-SCNC: 136 MMOL/L (ref 136–145)
SODIUM SERPL-SCNC: 136 MMOL/L (ref 136–145)
SODIUM SERPL-SCNC: 137 MMOL/L (ref 136–145)
SODIUM SERPL-SCNC: 138 MMOL/L (ref 136–145)
SODIUM SERPL-SCNC: 140 MMOL/L (ref 136–145)
SODIUM SERPL-SCNC: 140 MMOL/L (ref 136–145)
SODIUM SERPL-SCNC: 141 MMOL/L (ref 136–145)
SODIUM SERPL-SCNC: 142 MMOL/L (ref 136–145)
SODIUM SERPL-SCNC: 142 MMOL/L (ref 136–145)
SODIUM SERPL-SCNC: 143 MMOL/L (ref 136–145)
SP GR UR STRIP.AUTO: 1.02 (ref 1–1.03)
STAPHYLOCOCCUS AUREUS, MRSA BY PCR: DETECTED
STREP PNEUMO ANTIGEN, URINE: NEGATIVE
T AXIS: -13 DEGREES
T AXIS: -19 DEGREES
T AXIS: -48 DEGREES
T AXIS: -59 DEGREES
T AXIS: -7 DEGREES
TIBC SERPL-MCNC: 243 UG/DL (ref 240–450)
TOTAL CELLS COUNTED BLD: 100
TRANSFERRIN SERPL-MCNC: 129 MG/DL (ref 200–360)
TRANSFERRIN SERPL-MCNC: 163 MG/DL (ref 200–360)
TRIGL SERPL-MCNC: 119 MG/DL (ref 30–149)
TROPONIN I SERPL HS-MCNC: 69 NG/L
TROPONIN I SERPL HS-MCNC: 75 NG/L
UNIT VOLUME: 350 ML
UNIT VOLUME: 350 ML
UROBILINOGEN UR STRIP.AUTO-MCNC: NORMAL MG/DL
VANCOMYCIN PEAK SERPL-MCNC: 25 UG/ML (ref 30–50)
VANCOMYCIN TROUGH SERPL-MCNC: 10.5 UG/ML (ref 10–20)
VANCOMYCIN TROUGH SERPL-MCNC: 13 UG/ML (ref 10–20)
VENTRICULAR RATE: 120 BPM
VENTRICULAR RATE: 133 BPM
VENTRICULAR RATE: 75 BPM
VENTRICULAR RATE: 86 BPM
VENTRICULAR RATE: 89 BPM
VLDLC SERPL CALC-MCNC: 16 MG/DL (ref 0–30)
WBC # BLD AUTO: 10.5 X10(3) UL (ref 4–11)
WBC # BLD AUTO: 13.1 X10(3) UL (ref 4–11)
WBC # BLD AUTO: 4.7 X10(3) UL (ref 4–11)
WBC # BLD AUTO: 4.9 X10(3) UL (ref 4–11)
WBC # BLD AUTO: 5.3 X10(3) UL (ref 4–11)
WBC # BLD AUTO: 5.8 X10(3) UL (ref 4–11)
WBC # BLD AUTO: 5.8 X10(3) UL (ref 4–11)
WBC # BLD AUTO: 6.1 X10(3) UL (ref 4–11)
WBC # BLD AUTO: 6.2 X10(3) UL (ref 4–11)
WBC # BLD AUTO: 6.3 X10(3) UL (ref 4–11)
WBC # BLD AUTO: 6.4 X10(3) UL (ref 4–11)
WBC # BLD AUTO: 6.7 X10(3) UL (ref 4–11)
WBC # BLD AUTO: 7.4 X10(3) UL (ref 4–11)
WBC # BLD AUTO: 8.2 X10(3) UL (ref 4–11)
WBC # BLD AUTO: 8.3 X10(3) UL (ref 4–11)
WBC # BLD AUTO: 9.7 X10(3) UL (ref 4–11)

## 2024-01-01 PROCEDURE — 71045 X-RAY EXAM CHEST 1 VIEW: CPT | Performed by: NURSE PRACTITIONER

## 2024-01-01 PROCEDURE — 93306 TTE W/DOPPLER COMPLETE: CPT | Performed by: NURSE PRACTITIONER

## 2024-01-01 PROCEDURE — 99233 SBSQ HOSP IP/OBS HIGH 50: CPT | Performed by: NURSE PRACTITIONER

## 2024-01-01 PROCEDURE — 99291 CRITICAL CARE FIRST HOUR: CPT | Performed by: INTERNAL MEDICINE

## 2024-01-01 PROCEDURE — G0316 PROLNG IP/OBS E/M EA ADDL 15 MIN: HCPCS | Performed by: NURSE PRACTITIONER

## 2024-01-01 PROCEDURE — 85025 COMPLETE CBC W/AUTO DIFF WBC: CPT | Performed by: NURSE PRACTITIONER

## 2024-01-01 PROCEDURE — 3E033XZ INTRODUCTION OF VASOPRESSOR INTO PERIPHERAL VEIN, PERCUTANEOUS APPROACH: ICD-10-PCS | Performed by: HOSPITALIST

## 2024-01-01 PROCEDURE — 73560 X-RAY EXAM OF KNEE 1 OR 2: CPT | Performed by: INTERNAL MEDICINE

## 2024-01-01 PROCEDURE — 93970 EXTREMITY STUDY: CPT | Performed by: HOSPITALIST

## 2024-01-01 PROCEDURE — 99223 1ST HOSP IP/OBS HIGH 75: CPT | Performed by: NURSE PRACTITIONER

## 2024-01-01 PROCEDURE — 71045 X-RAY EXAM CHEST 1 VIEW: CPT | Performed by: EMERGENCY MEDICINE

## 2024-01-01 PROCEDURE — 99232 SBSQ HOSP IP/OBS MODERATE 35: CPT | Performed by: NURSE PRACTITIONER

## 2024-01-01 PROCEDURE — 5A0955A ASSISTANCE WITH RESPIRATORY VENTILATION, GREATER THAN 96 CONSECUTIVE HOURS, HIGH NASAL FLOW/VELOCITY: ICD-10-PCS | Performed by: HOSPITALIST

## 2024-01-01 PROCEDURE — 73502 X-RAY EXAM HIP UNI 2-3 VIEWS: CPT | Performed by: INTERNAL MEDICINE

## 2024-01-01 PROCEDURE — 80053 COMPREHEN METABOLIC PANEL: CPT | Performed by: NURSE PRACTITIONER

## 2024-01-01 PROCEDURE — 73600 X-RAY EXAM OF ANKLE: CPT | Performed by: INTERNAL MEDICINE

## 2024-01-01 PROCEDURE — 71045 X-RAY EXAM CHEST 1 VIEW: CPT | Performed by: INTERNAL MEDICINE

## 2024-01-01 PROCEDURE — 05HB33Z INSERTION OF INFUSION DEVICE INTO RIGHT BASILIC VEIN, PERCUTANEOUS APPROACH: ICD-10-PCS | Performed by: HOSPITALIST

## 2024-01-01 PROCEDURE — 99233 SBSQ HOSP IP/OBS HIGH 50: CPT | Performed by: INTERNAL MEDICINE

## 2024-01-01 PROCEDURE — 80048 BASIC METABOLIC PNL TOTAL CA: CPT | Performed by: FAMILY MEDICINE

## 2024-01-01 PROCEDURE — 74018 RADEX ABDOMEN 1 VIEW: CPT

## 2024-01-01 PROCEDURE — 71045 X-RAY EXAM CHEST 1 VIEW: CPT

## 2024-01-01 PROCEDURE — 30233N1 TRANSFUSION OF NONAUTOLOGOUS RED BLOOD CELLS INTO PERIPHERAL VEIN, PERCUTANEOUS APPROACH: ICD-10-PCS | Performed by: HOSPITALIST

## 2024-01-01 PROCEDURE — 73590 X-RAY EXAM OF LOWER LEG: CPT | Performed by: HOSPITALIST

## 2024-01-01 PROCEDURE — 74230 X-RAY XM SWLNG FUNCJ C+: CPT | Performed by: HOSPITALIST

## 2024-01-01 PROCEDURE — 85025 COMPLETE CBC W/AUTO DIFF WBC: CPT | Performed by: FAMILY MEDICINE

## 2024-01-01 RX ORDER — GLYCOPYRROLATE 0.2 MG/ML
0.4 INJECTION, SOLUTION INTRAMUSCULAR; INTRAVENOUS
Status: DISCONTINUED | OUTPATIENT
Start: 2024-01-01 | End: 2024-01-01

## 2024-01-01 RX ORDER — SCOLOPAMINE TRANSDERMAL SYSTEM 1 MG/1
1 PATCH, EXTENDED RELEASE TRANSDERMAL
Status: DISCONTINUED | OUTPATIENT
Start: 2024-01-01 | End: 2024-01-01

## 2024-01-01 RX ORDER — LORAZEPAM 1 MG/1
1 TABLET ORAL ONCE
Status: CANCELLED | OUTPATIENT
Start: 2024-01-01 | End: 2024-01-01

## 2024-01-01 RX ORDER — POLYETHYLENE GLYCOL 3350 17 G/17G
17 POWDER, FOR SOLUTION ORAL DAILY PRN
Status: DISCONTINUED | OUTPATIENT
Start: 2024-01-01 | End: 2024-01-01

## 2024-01-01 RX ORDER — GUAIFENESIN 600 MG/1
600 TABLET, EXTENDED RELEASE ORAL 2 TIMES DAILY
Status: DISCONTINUED | OUTPATIENT
Start: 2024-01-01 | End: 2024-01-01

## 2024-01-01 RX ORDER — BISACODYL 10 MG
10 SUPPOSITORY, RECTAL RECTAL
Status: DISCONTINUED | OUTPATIENT
Start: 2024-01-01 | End: 2024-01-01

## 2024-01-01 RX ORDER — FUROSEMIDE 10 MG/ML
40 INJECTION INTRAMUSCULAR; INTRAVENOUS EVERY 8 HOURS PRN
Status: DISCONTINUED | OUTPATIENT
Start: 2024-01-01 | End: 2024-01-01

## 2024-01-01 RX ORDER — GLYCOPYRROLATE 0.2 MG/ML
0.4 INJECTION, SOLUTION INTRAMUSCULAR; INTRAVENOUS
Status: CANCELLED | OUTPATIENT
Start: 2024-01-01

## 2024-01-01 RX ORDER — ACETAMINOPHEN 325 MG/1
650 TABLET ORAL EVERY 4 HOURS PRN
Status: DISCONTINUED | OUTPATIENT
Start: 2024-01-01 | End: 2024-01-01

## 2024-01-01 RX ORDER — FUROSEMIDE 40 MG/1
40 TABLET ORAL
Status: DISCONTINUED | OUTPATIENT
Start: 2024-01-01 | End: 2024-01-01

## 2024-01-01 RX ORDER — FUROSEMIDE 10 MG/ML
20 INJECTION INTRAMUSCULAR; INTRAVENOUS ONCE
Status: COMPLETED | OUTPATIENT
Start: 2024-01-01 | End: 2024-01-01

## 2024-01-01 RX ORDER — MELATONIN
3 NIGHTLY PRN
Status: DISCONTINUED | OUTPATIENT
Start: 2024-01-01 | End: 2024-01-01

## 2024-01-01 RX ORDER — AMIODARONE HYDROCHLORIDE 200 MG/1
200 TABLET ORAL DAILY
Status: DISCONTINUED | OUTPATIENT
Start: 2024-01-01 | End: 2024-01-01

## 2024-01-01 RX ORDER — POTASSIUM CHLORIDE 20 MEQ/1
40 TABLET, EXTENDED RELEASE ORAL ONCE
Status: COMPLETED | OUTPATIENT
Start: 2024-01-01 | End: 2024-01-01

## 2024-01-01 RX ORDER — SODIUM CHLORIDE 9 MG/ML
INJECTION, SOLUTION INTRAVENOUS ONCE
Status: COMPLETED | OUTPATIENT
Start: 2024-01-01 | End: 2024-01-01

## 2024-01-01 RX ORDER — DEXTROSE MONOHYDRATE 25 G/50ML
50 INJECTION, SOLUTION INTRAVENOUS
Status: DISCONTINUED | OUTPATIENT
Start: 2024-01-01 | End: 2024-01-01

## 2024-01-01 RX ORDER — MIDODRINE HYDROCHLORIDE 5 MG/1
5 TABLET ORAL 3 TIMES DAILY PRN
Status: DISCONTINUED | OUTPATIENT
Start: 2024-01-01 | End: 2024-01-01

## 2024-01-01 RX ORDER — MELATONIN
100 DAILY
Status: COMPLETED | OUTPATIENT
Start: 2024-01-01 | End: 2024-01-01

## 2024-01-01 RX ORDER — LORAZEPAM 2 MG/ML
1 CONCENTRATE ORAL EVERY 6 HOURS PRN
Status: DISCONTINUED | OUTPATIENT
Start: 2024-01-01 | End: 2024-01-01

## 2024-01-01 RX ORDER — DILTIAZEM HYDROCHLORIDE 120 MG/1
240 CAPSULE, EXTENDED RELEASE ORAL DAILY
Status: DISCONTINUED | OUTPATIENT
Start: 2024-01-01 | End: 2024-01-01

## 2024-01-01 RX ORDER — GUAIFENESIN 400 MG/1
400 TABLET ORAL
Status: DISCONTINUED | OUTPATIENT
Start: 2024-01-01 | End: 2024-01-01

## 2024-01-01 RX ORDER — POTASSIUM CHLORIDE 1.5 G/1.58G
40 POWDER, FOR SOLUTION ORAL EVERY 4 HOURS
Status: COMPLETED | OUTPATIENT
Start: 2024-01-01 | End: 2024-01-01

## 2024-01-01 RX ORDER — LORAZEPAM 2 MG/ML
2 CONCENTRATE ORAL EVERY 4 HOURS PRN
Status: DISCONTINUED | OUTPATIENT
Start: 2024-01-01 | End: 2024-01-01

## 2024-01-01 RX ORDER — MORPHINE SULFATE 2 MG/ML
1 INJECTION, SOLUTION INTRAMUSCULAR; INTRAVENOUS EVERY 4 HOURS
Status: DISCONTINUED | OUTPATIENT
Start: 2024-01-01 | End: 2024-01-01

## 2024-01-01 RX ORDER — VANCOMYCIN 1.75 GRAM/500 ML IN 0.9 % SODIUM CHLORIDE INTRAVENOUS
25 EVERY 6 HOURS
Status: DISCONTINUED | OUTPATIENT
Start: 2024-01-01 | End: 2024-01-01

## 2024-01-01 RX ORDER — HALOPERIDOL 5 MG/ML
2 INJECTION INTRAMUSCULAR
Status: DISCONTINUED | OUTPATIENT
Start: 2024-01-01 | End: 2024-01-01

## 2024-01-01 RX ORDER — MIDODRINE HYDROCHLORIDE 5 MG/1
5 TABLET ORAL 3 TIMES DAILY
Status: DISCONTINUED | OUTPATIENT
Start: 2024-01-01 | End: 2024-01-01

## 2024-01-01 RX ORDER — SODIUM BICARBONATE 325 MG/1
325 TABLET ORAL AS NEEDED
Status: DISCONTINUED | OUTPATIENT
Start: 2024-01-01 | End: 2024-01-01 | Stop reason: ALTCHOICE

## 2024-01-01 RX ORDER — MELATONIN
3 NIGHTLY
Status: DISCONTINUED | OUTPATIENT
Start: 2024-01-01 | End: 2024-01-01

## 2024-01-01 RX ORDER — DIAZEPAM 5 MG/ML
5 INJECTION, SOLUTION INTRAMUSCULAR; INTRAVENOUS ONCE
Status: COMPLETED | OUTPATIENT
Start: 2024-01-01 | End: 2024-01-01

## 2024-01-01 RX ORDER — GUAIFENESIN 400 MG/1
400 TABLET ORAL EVERY 4 HOURS PRN
Status: DISCONTINUED | OUTPATIENT
Start: 2024-01-01 | End: 2024-01-01

## 2024-01-01 RX ORDER — FUROSEMIDE 10 MG/ML
40 INJECTION INTRAMUSCULAR; INTRAVENOUS
Status: DISCONTINUED | OUTPATIENT
Start: 2024-01-01 | End: 2024-01-01

## 2024-01-01 RX ORDER — POTASSIUM CHLORIDE 14.9 MG/ML
20 INJECTION INTRAVENOUS ONCE
Status: COMPLETED | OUTPATIENT
Start: 2024-01-01 | End: 2024-01-01

## 2024-01-01 RX ORDER — SODIUM CHLORIDE FOR INHALATION 3 %
3 VIAL, NEBULIZER (ML) INHALATION
Status: DISCONTINUED | OUTPATIENT
Start: 2024-01-01 | End: 2024-01-01

## 2024-01-01 RX ORDER — ACETAMINOPHEN 500 MG
500 TABLET ORAL EVERY 4 HOURS PRN
Status: DISCONTINUED | OUTPATIENT
Start: 2024-01-01 | End: 2024-01-01

## 2024-01-01 RX ORDER — TRAMADOL HYDROCHLORIDE 50 MG/1
50 TABLET ORAL EVERY 6 HOURS PRN
Status: DISCONTINUED | OUTPATIENT
Start: 2024-01-01 | End: 2024-01-01

## 2024-01-01 RX ORDER — LORAZEPAM 2 MG/ML
2 INJECTION INTRAMUSCULAR EVERY 4 HOURS PRN
Status: DISCONTINUED | OUTPATIENT
Start: 2024-01-01 | End: 2024-01-01

## 2024-01-01 RX ORDER — QUETIAPINE FUMARATE 25 MG/1
25 TABLET, FILM COATED ORAL NIGHTLY
Status: DISCONTINUED | OUTPATIENT
Start: 2024-01-01 | End: 2024-01-01

## 2024-01-01 RX ORDER — LORAZEPAM 2 MG/ML
1 INJECTION INTRAMUSCULAR EVERY 4 HOURS PRN
Status: DISCONTINUED | OUTPATIENT
Start: 2024-01-01 | End: 2024-01-01

## 2024-01-01 RX ORDER — MORPHINE SULFATE 2 MG/ML
1 INJECTION, SOLUTION INTRAMUSCULAR; INTRAVENOUS
Status: DISCONTINUED | OUTPATIENT
Start: 2024-01-01 | End: 2024-01-01

## 2024-01-01 RX ORDER — LORAZEPAM 2 MG/ML
1 INJECTION INTRAMUSCULAR EVERY 4 HOURS PRN
Status: CANCELLED | OUTPATIENT
Start: 2024-01-01

## 2024-01-01 RX ORDER — FUROSEMIDE 10 MG/ML
40 INJECTION INTRAMUSCULAR; INTRAVENOUS DAILY
Status: DISCONTINUED | OUTPATIENT
Start: 2024-01-01 | End: 2024-01-01

## 2024-01-01 RX ORDER — BISACODYL 10 MG
10 SUPPOSITORY, RECTAL RECTAL
Status: CANCELLED | OUTPATIENT
Start: 2024-01-01

## 2024-01-01 RX ORDER — BENZONATATE 200 MG/1
200 CAPSULE ORAL 3 TIMES DAILY PRN
Status: DISCONTINUED | OUTPATIENT
Start: 2024-01-01 | End: 2024-01-01

## 2024-01-01 RX ORDER — POTASSIUM CHLORIDE 1.5 G/1.58G
40 POWDER, FOR SOLUTION ORAL ONCE
Status: COMPLETED | OUTPATIENT
Start: 2024-01-01 | End: 2024-01-01

## 2024-01-01 RX ORDER — DILTIAZEM HYDROCHLORIDE 5 MG/ML
5 INJECTION INTRAVENOUS ONCE
Status: COMPLETED | OUTPATIENT
Start: 2024-01-01 | End: 2024-01-01

## 2024-01-01 RX ORDER — ONDANSETRON 2 MG/ML
4 INJECTION INTRAMUSCULAR; INTRAVENOUS EVERY 6 HOURS PRN
Status: DISCONTINUED | OUTPATIENT
Start: 2024-01-01 | End: 2024-01-01

## 2024-01-01 RX ORDER — HYDROCODONE BITARTRATE AND ACETAMINOPHEN 5; 325 MG/1; MG/1
2 TABLET ORAL EVERY 4 HOURS PRN
Status: DISCONTINUED | OUTPATIENT
Start: 2024-01-01 | End: 2024-01-01

## 2024-01-01 RX ORDER — KETOROLAC TROMETHAMINE 15 MG/ML
15 INJECTION, SOLUTION INTRAMUSCULAR; INTRAVENOUS EVERY 6 HOURS PRN
Status: DISPENSED | OUTPATIENT
Start: 2024-01-01 | End: 2024-01-01

## 2024-01-01 RX ORDER — POTASSIUM CHLORIDE 14.9 MG/ML
20 INJECTION INTRAVENOUS ONCE
Qty: 100 ML | Refills: 0 | Status: COMPLETED | OUTPATIENT
Start: 2024-01-01 | End: 2024-01-01

## 2024-01-01 RX ORDER — LANSOPRAZOLE 30 MG/1
30 TABLET, ORALLY DISINTEGRATING, DELAYED RELEASE ORAL
Status: DISCONTINUED | OUTPATIENT
Start: 2024-01-01 | End: 2024-01-01

## 2024-01-01 RX ORDER — QUETIAPINE FUMARATE 25 MG/1
25 TABLET, FILM COATED ORAL DAILY PRN
Status: DISCONTINUED | OUTPATIENT
Start: 2024-01-01 | End: 2024-01-01

## 2024-01-01 RX ORDER — IPRATROPIUM BROMIDE AND ALBUTEROL SULFATE 2.5; .5 MG/3ML; MG/3ML
3 SOLUTION RESPIRATORY (INHALATION)
Status: DISCONTINUED | OUTPATIENT
Start: 2024-01-01 | End: 2024-01-01

## 2024-01-01 RX ORDER — ONDANSETRON 2 MG/ML
4 INJECTION INTRAMUSCULAR; INTRAVENOUS EVERY 6 HOURS PRN
Status: CANCELLED | OUTPATIENT
Start: 2024-01-01

## 2024-01-01 RX ORDER — MORPHINE SULFATE 2 MG/ML
2 INJECTION, SOLUTION INTRAMUSCULAR; INTRAVENOUS
Status: DISCONTINUED | OUTPATIENT
Start: 2024-01-01 | End: 2024-01-01

## 2024-01-01 RX ORDER — VANCOMYCIN 1.75 GRAM/500 ML IN 0.9 % SODIUM CHLORIDE INTRAVENOUS
25 ONCE
Status: COMPLETED | OUTPATIENT
Start: 2024-01-01 | End: 2024-01-01

## 2024-01-01 RX ORDER — LORAZEPAM 1 MG/1
1 TABLET ORAL EVERY 6 HOURS PRN
Status: CANCELLED | OUTPATIENT
Start: 2024-01-01

## 2024-01-01 RX ORDER — SCOLOPAMINE TRANSDERMAL SYSTEM 1 MG/1
1 PATCH, EXTENDED RELEASE TRANSDERMAL
Status: CANCELLED | OUTPATIENT
Start: 2024-01-01

## 2024-01-01 RX ORDER — FUROSEMIDE 10 MG/ML
40 INJECTION INTRAMUSCULAR; INTRAVENOUS EVERY 8 HOURS PRN
Status: CANCELLED | OUTPATIENT
Start: 2024-01-01

## 2024-01-01 RX ORDER — FUROSEMIDE 10 MG/ML
40 INJECTION INTRAMUSCULAR; INTRAVENOUS ONCE
Status: COMPLETED | OUTPATIENT
Start: 2024-01-01 | End: 2024-01-01

## 2024-01-01 RX ORDER — LORAZEPAM 2 MG/ML
1 CONCENTRATE ORAL EVERY 6 HOURS PRN
Status: CANCELLED | OUTPATIENT
Start: 2024-01-01

## 2024-01-01 RX ORDER — ALBUMIN (HUMAN) 12.5 G/50ML
25 SOLUTION INTRAVENOUS ONCE
Status: COMPLETED | OUTPATIENT
Start: 2024-01-01 | End: 2024-01-01

## 2024-01-01 RX ORDER — MIDODRINE HYDROCHLORIDE 5 MG/1
5 TABLET ORAL EVERY 8 HOURS
Status: DISCONTINUED | OUTPATIENT
Start: 2024-01-01 | End: 2024-01-01

## 2024-01-01 RX ORDER — NICOTINE POLACRILEX 4 MG
30 LOZENGE BUCCAL
Status: DISCONTINUED | OUTPATIENT
Start: 2024-01-01 | End: 2024-01-01

## 2024-01-01 RX ORDER — NICOTINE POLACRILEX 4 MG
15 LOZENGE BUCCAL
Status: DISCONTINUED | OUTPATIENT
Start: 2024-01-01 | End: 2024-01-01

## 2024-01-01 RX ORDER — LORAZEPAM 2 MG/ML
1 CONCENTRATE ORAL EVERY 4 HOURS PRN
Status: DISCONTINUED | OUTPATIENT
Start: 2024-01-01 | End: 2024-01-01

## 2024-01-01 RX ORDER — ACETAMINOPHEN 650 MG/1
650 SUPPOSITORY RECTAL EVERY 6 HOURS PRN
Status: DISCONTINUED | OUTPATIENT
Start: 2024-01-01 | End: 2024-01-01

## 2024-01-01 RX ORDER — IPRATROPIUM BROMIDE AND ALBUTEROL SULFATE 2.5; .5 MG/3ML; MG/3ML
3 SOLUTION RESPIRATORY (INHALATION) EVERY 6 HOURS PRN
Status: DISCONTINUED | OUTPATIENT
Start: 2024-01-01 | End: 2024-01-01

## 2024-01-01 RX ORDER — SENNOSIDES 8.6 MG
17.2 TABLET ORAL NIGHTLY PRN
Status: DISCONTINUED | OUTPATIENT
Start: 2024-01-01 | End: 2024-01-01

## 2024-01-01 RX ORDER — ENEMA 19; 7 G/133ML; G/133ML
1 ENEMA RECTAL ONCE AS NEEDED
Status: DISCONTINUED | OUTPATIENT
Start: 2024-01-01 | End: 2024-01-01

## 2024-01-01 RX ORDER — LORAZEPAM 2 MG/ML
2 INJECTION INTRAMUSCULAR EVERY 4 HOURS PRN
Status: CANCELLED | OUTPATIENT
Start: 2024-01-01

## 2024-01-01 RX ORDER — GABAPENTIN 100 MG/1
100 CAPSULE ORAL 2 TIMES DAILY
Status: DISCONTINUED | OUTPATIENT
Start: 2024-01-01 | End: 2024-01-01

## 2024-01-01 RX ORDER — METOPROLOL TARTRATE 1 MG/ML
5 INJECTION, SOLUTION INTRAVENOUS ONCE
Status: COMPLETED | OUTPATIENT
Start: 2024-01-01 | End: 2024-01-01

## 2024-01-01 RX ORDER — METOCLOPRAMIDE HYDROCHLORIDE 5 MG/ML
10 INJECTION INTRAMUSCULAR; INTRAVENOUS EVERY 8 HOURS PRN
Status: DISCONTINUED | OUTPATIENT
Start: 2024-01-01 | End: 2024-01-01

## 2024-01-01 RX ORDER — TRAMADOL HYDROCHLORIDE 50 MG/1
TABLET ORAL EVERY 6 HOURS PRN
Status: DISCONTINUED | OUTPATIENT
Start: 2024-01-01 | End: 2024-01-01

## 2024-01-01 RX ORDER — HALOPERIDOL 5 MG/ML
1 INJECTION INTRAMUSCULAR
Status: DISCONTINUED | OUTPATIENT
Start: 2024-01-01 | End: 2024-01-01

## 2024-01-01 RX ORDER — LORAZEPAM 1 MG/1
1 TABLET ORAL EVERY 6 HOURS PRN
Status: DISCONTINUED | OUTPATIENT
Start: 2024-01-01 | End: 2024-01-01

## 2024-01-01 RX ORDER — VANCOMYCIN HYDROCHLORIDE
1.25 EVERY 12 HOURS
Qty: 8000 ML | Refills: 0 | Status: SHIPPED | OUTPATIENT
Start: 2024-01-01 | End: 2024-01-01

## 2024-01-01 RX ORDER — IPRATROPIUM BROMIDE AND ALBUTEROL SULFATE 2.5; .5 MG/3ML; MG/3ML
SOLUTION RESPIRATORY (INHALATION)
Status: COMPLETED
Start: 2024-01-01 | End: 2024-01-01

## 2024-01-01 RX ORDER — VANCOMYCIN HYDROCHLORIDE
1250 EVERY 24 HOURS
Status: DISCONTINUED | OUTPATIENT
Start: 2024-01-01 | End: 2024-01-01

## 2024-01-01 RX ORDER — LORAZEPAM 1 MG/1
1 TABLET ORAL ONCE
Status: DISCONTINUED | OUTPATIENT
Start: 2024-01-01 | End: 2024-01-01

## 2024-01-01 RX ORDER — ALPRAZOLAM 0.5 MG/1
0.5 TABLET ORAL ONCE
Status: COMPLETED | OUTPATIENT
Start: 2024-01-01 | End: 2024-01-01

## 2024-01-01 RX ORDER — QUETIAPINE FUMARATE 25 MG/1
25 TABLET, FILM COATED ORAL NIGHTLY
Status: CANCELLED | OUTPATIENT
Start: 2024-01-01

## 2024-01-01 RX ORDER — ACETAMINOPHEN 500 MG
500 TABLET ORAL EVERY 6 HOURS
Status: DISCONTINUED | OUTPATIENT
Start: 2024-01-01 | End: 2024-01-01

## 2024-01-01 RX ORDER — HYDROCODONE BITARTRATE AND ACETAMINOPHEN 5; 325 MG/1; MG/1
1 TABLET ORAL EVERY 4 HOURS PRN
Status: DISCONTINUED | OUTPATIENT
Start: 2024-01-01 | End: 2024-01-01

## 2024-01-01 RX ORDER — ATROPINE SULFATE 10 MG/ML
2 SOLUTION/ DROPS OPHTHALMIC EVERY 2 HOUR PRN
Status: DISCONTINUED | OUTPATIENT
Start: 2024-01-01 | End: 2024-01-01

## 2024-01-01 RX ORDER — MORPHINE SULFATE 2 MG/ML
1 INJECTION, SOLUTION INTRAMUSCULAR; INTRAVENOUS
Status: CANCELLED | OUTPATIENT
Start: 2024-01-01

## 2024-01-01 RX ORDER — SODIUM CHLORIDE 9 MG/ML
INJECTION, SOLUTION INTRAVENOUS CONTINUOUS
Status: DISCONTINUED | OUTPATIENT
Start: 2024-01-01 | End: 2024-01-01

## 2024-01-01 RX ORDER — SODIUM CHLORIDE AND POTASSIUM CHLORIDE 150; 900 MG/100ML; MG/100ML
INJECTION, SOLUTION INTRAVENOUS CONTINUOUS
Status: DISCONTINUED | OUTPATIENT
Start: 2024-01-01 | End: 2024-01-01

## 2024-01-02 ENCOUNTER — SNF VISIT (OUTPATIENT)
Dept: INTERNAL MEDICINE CLINIC | Age: 89
End: 2024-01-02

## 2024-01-02 DIAGNOSIS — I26.99 ACUTE PULMONARY EMBOLISM WITHOUT ACUTE COR PULMONALE, UNSPECIFIED PULMONARY EMBOLISM TYPE (HCC): ICD-10-CM

## 2024-01-02 DIAGNOSIS — Z78.9 DECREASED ACTIVITIES OF DAILY LIVING (ADL): ICD-10-CM

## 2024-01-02 DIAGNOSIS — J21.0 RSV BRONCHIOLITIS: Primary | ICD-10-CM

## 2024-01-02 DIAGNOSIS — I82.4Y3 ACUTE DEEP VEIN THROMBOSIS (DVT) OF PROXIMAL VEIN OF BOTH LOWER EXTREMITIES (HCC): ICD-10-CM

## 2024-01-02 DIAGNOSIS — N39.0 FREQUENT UTI: ICD-10-CM

## 2024-01-02 DIAGNOSIS — R53.1 WEAKNESS: ICD-10-CM

## 2024-01-02 DIAGNOSIS — C90.00 MULTIPLE MYELOMA, REMISSION STATUS UNSPECIFIED (HCC): ICD-10-CM

## 2024-01-02 DIAGNOSIS — I10 ESSENTIAL HYPERTENSION: ICD-10-CM

## 2024-01-02 PROCEDURE — 99309 SBSQ NF CARE MODERATE MDM 30: CPT | Performed by: NURSE PRACTITIONER

## 2024-01-03 VITALS
OXYGEN SATURATION: 95 % | HEART RATE: 100 BPM | WEIGHT: 154 LBS | BODY MASS INDEX: 30 KG/M2 | RESPIRATION RATE: 17 BRPM | DIASTOLIC BLOOD PRESSURE: 79 MMHG | SYSTOLIC BLOOD PRESSURE: 118 MMHG | TEMPERATURE: 98 F

## 2024-01-03 NOTE — PROGRESS NOTES
Nelly Potter, 8/28/1935, 88 year old, female    Chief Complaint:    Chief Complaint   Patient presents with    Follow - Up     Weakness, RSV, cough, hypoxia        Subjective:   TODAY:  Blanca is seen in her room today. Caregiver is present at the bedside. Staff is trying to toilet her, there are two CNAs and myself, she has no leg strength to stand, attempted david steady, failed. She is not mahendra to assist at all. Full ift and transfer back to the wheelchair and then back to the bed.   Blanca is feeling tired and gets out of breath easily. Her cough is productive. Using oxygen as needed 2L max. Currently on Room air but using oxygen at night.   Cefdinir completed on 12/31/23.No chest pain, no fevers.  Has required oxygen overnights.   DW patient and CG at bedside. Remove isolation on 1/5/24  O2 sats stable, on room air now  DW nursing.    Denies insomnia, +fatigue, fever/chills, +cough, + SOB, +dyspnea, angina, palpitations, n/v, diarrhea, constipation, and urinary sxs.      Objective:  /79   Pulse 100   Temp 98 °F (36.7 °C)   Resp 17   Wt 154 lb (69.9 kg)   SpO2 95%   BMI 30.08 kg/m²     PHYSICAL EXAM:  GENERAL HEALTH: frail elderly female, no acute distress  LINES, TUBES, DRAINS:  none  SKIN: no rashes, no suspicious lesions  WOUND: see wound assessment   EYES: PERRLA, conjunctiva normal; no drainage from eyes  HENT: normocephalic; normal nose, no nasal drainage, mucous membranes pink, moist, rounded face  RESPIRATORY: clearing chest,  + productive cough, no crackles, no rhonchi, no accessory muscle use; on room air now, mild dyspnea  CARDIOVASCULAR: S1, S2 normal, RRR; LLE with +1 pedal and ankle edema, + DVT  ABDOMEN: normal active BS+, soft, non-distended; non tender  : Deferred  LYMPHATIC: no lymphedema  MUSCULOSKELETAL: no acute synovitis upper or lower extremity.    EXTREMITIES/VASCULAR: no cyanosis, clubbing, LLE 1+ pedal and ankle edema, pulses intact BLE, RLE trace pretibial  edema  NEUROLOGIC:  alert and oriented x 2-3, intact; no sensorimotor deficit  PSYCHIATRIC:; affect appropriate, intermittently confused, calm    Therapy:  Not ambulating  Max assist transfers and ADLS  DC plan to return to assisted living with caregiver support on DC extending one more week with RSV and weakness around 1/11/24 now Osei Prieto to [facility] for return.     Medications reviewed: Yes      Current Outpatient Medications:     ipratropium-albuterol 0.5-2.5 (3) MG/3ML Inhalation Solution, Take 3 mL by nebulization every 6 (six) hours while awake., Disp: , Rfl:     guaiFENesin  MG Oral Tablet 12 Hr, Take 1 tablet (600 mg total) by mouth 2 (two) times daily., Disp: , Rfl:     albuterol 108 (90 Base) MCG/ACT Inhalation Aero Soln, Inhale 2 puffs into the lungs every 4 (four) hours as needed for Wheezing., Disp: 1 each, Rfl: 0    ipratropium 0.03 % Nasal Solution, 2 sprays by Nasal route daily as needed for Rhinitis., Disp: , Rfl:     Vibegron 75 MG Oral Tab, Take 75 mg by mouth daily., Disp: , Rfl:     multivitamin Oral Chew Tab, Chew 1 tablet by mouth daily., Disp: , Rfl:     dexamethasone 2 MG Oral Tab, Take 1 tablet (2 mg total) by mouth daily with breakfast., Disp: , Rfl:     acidophilus-pectin Oral Cap, Take 1 capsule by mouth daily., Disp: , Rfl:     midodrine 5 MG Oral Tab, Take 1 tablet (5 mg total) by mouth every 8 (eight) hours as needed (SBP less than 95 and DBP less than 50)., Disp: , Rfl:     sennosides 8.6 MG Oral Tab, Take 2 tablets (17.2 mg total) by mouth at bedtime., Disp: , Rfl:     Solifenacin Succinate 10 MG Oral Tab, Take 1 tablet (10 mg total) by mouth daily., Disp: , Rfl:     polyethylene glycol, PEG 3350, 17 g Oral Powd Pack, Take 17 g by mouth daily as needed (constipation)., Disp: , Rfl:     acetaminophen 325 MG Oral Tab, Take 2 tablets (650 mg total) by mouth every 4 (four) hours as needed for Pain., Disp: , Rfl:     rivaroxaban (XARELTO) 20 MG Oral Tab, Take 1 tablet (20 mg total)  by mouth daily with food., Disp: , Rfl:     sertraline 25 MG Oral Tab, Take 1 tablet (25 mg total) by mouth daily., Disp: , Rfl:     amLODIPine 2.5 MG Oral Tab, Take 1 tablet (2.5 mg total) by mouth daily., Disp: , Rfl:     gabapentin 300 MG Oral Cap, Take 1 capsule (300 mg total) by mouth 3 (three) times daily. (Patient taking differently: Take 1 capsule (300 mg total) by mouth in the morning and 1 capsule (300 mg total) before bedtime.), Disp: 270 capsule, Rfl: 1    fexofenadine 180 MG Oral Tab, Take 1 tablet (180 mg total) by mouth daily as needed., Disp: 90 tablet, Rfl: 3    lansoprazole 30 MG Oral Capsule Delayed Release, Take 1 capsule (30 mg total) by mouth daily. Before meal, Disp: 90 capsule, Rfl: 3    atorvastatin 10 MG Oral Tab, Take 1 tablet (10 mg total) by mouth daily., Disp: 90 tablet, Rfl: 3    Docusate Sodium 100 MG Oral Cap, Take 1 capsule (100 mg total) by mouth daily as needed for constipation., Disp: , Rfl:       Diagnostics reviewed:    Lab Results   Component Value Date    WBC 5.2 01/02/2024    RBC 4.04 01/02/2024    HGB 9.8 (L) 01/02/2024    HCT 31.2 (L) 01/02/2024    MCV 77.2 (L) 01/02/2024    MCH 24.3 (L) 01/02/2024    MCHC 31.4 01/02/2024    RDW 22.9 01/02/2024    .0 01/02/2024     Lab Results   Component Value Date    GLU 87 01/02/2024    BUN 14 01/02/2024    BUNCREA 17.0 10/08/2021    CREATSERUM 0.38 (L) 01/02/2024    ANIONGAP 5 01/02/2024    CA 8.0 (L) 01/02/2024    OSMOCALC 284 01/02/2024    ALKPHO 74 01/02/2024    AST <3 (L) 01/02/2024    ALT 33 01/02/2024    BILT 0.4 01/02/2024    TP 5.2 (L) 01/02/2024    ALB 2.5 (L) 01/02/2024    GLOBULIN 2.7 (L) 01/02/2024    AGRATIO 2.1 09/11/2013     01/02/2024    K 3.7 01/02/2024     01/02/2024    CO2 29.0 01/02/2024       Assessment and plan:    RSV  Isolation Droplet continues now day #17, still with cough  Mucinex BID  Duonebs Q6  Monitoring VS q 4 and prn  Chest xray effusion small to moderal left, and infiltrate.  Completed cefdinir now  Cough productive, with weakness, fatigue  Oxygen at night  Supportive care, rest, fluids, simple meals  Labs monitoring  DW ID NP    Physical Deconditioning/Impaired mobility and ADLs/At risk for falling  Fall Precautions  PT/OT/ST to evaluate and treat  DOLLY team to establish care plan meeting with patient and POA/family as appropriate  Anticipate DC on or before 1/11/24; SW to assist patient/family w/ DC planning  DC Plan:  return to assisted Living on DC with 24 hour caregiver support exend an additional week with acute infection RSV and UTI, Osei Prieto to eval for return     UTI/Hx Frequent UTI/ retention  - Ecoli ESBL   Treated in hospital with IV Zosyn. Urology consulted in hospital   Monitor symptoms   Solifenacin succinate 10 mg daily   Vibegron 75 mg daily   Repeat UA and culture noted. DW ID NP  Ertapenem completed 12/22/23 may remove IV site  Labs  CBC, CMP      PE/ BLE DVT/ A fib   Monitor symptoms + SOB and fatigue  Vital signs q shift and prn   Labs weekly and prn   Xarelto 20 mg daily starting 12/24   DW daughter sx, treatment and follow up for PE  Also noted US + DVT BLE on 12/2/23 no  compression socks, elevate for edema only        HTN/HL/CAD  BRENNA on 12/2 EF 55% normal RV size function   Vital signs q shift and prn   Labs weekly and prn   Amlodipine 2.5 mg daily   Atorvastatin 10 mg nightly   BP controlled     Dementia   Monitor   Emotional support offered  Monitoring for safety     Multiple Myeloma   Monitor labs   Outpatient follow up labs drawn for Heme/Onc follow up  Remains on steroids now     GERD/Known Duodenal ulcer / GI prophylaxis  EGD scheduled and patient off AC for procedure but then developed PE. EGD on hold for now.   Monitor symptoms   Monitor labs  Lansoprazole 30 mg daily   Follow up with GI outpatient      Hypotension   Initially on pressors in hospital. On high dose steroids decreased down to Dexamethasone 4 mg daily- records state need to be titrated  down  DW daughter, will wait until improved and plan for outpatient taper  Monitor vital signs  Midodrine 5 mg every 8 hrs prn for SBP less than 95 and DBP less than 50      Hx depression   Monitor symptoms   Sertraline 25 mg daily      Neuropathy  Gabapentin 300 mg bid      Allergic rhinitis  Monitor symptoms   Fexofenadine 180 mg daily prn   Ipratropium 0.03% 2 spray daily prn      DVT Prophylaxis   Encourage early mobilization and participation in PT/OT as able  Xarelto      Pain Management  Offer to pre-medicate 30-60 min prior to therapy  Physiatry evaluation with management appreciated  Acetaminophen 650 mg every 4 hrs prn      Bowel Management Regimen/Constipation   Monitor BM status   Colace 100 mg daily prn   Miralax daily prn   Senna 2 tabs nightly      Vitamins/supplements as r/t deficiencies  [facility] RD to follow while in rehab; supplementation/diet as per [facility] RD  May continue home supplements  MVI daily      LABS  CBC/CMP weekly while in [facility]- repeat tomorrow    *Follow-Up with PCP within 1-2 weeks following [facility] discharge.   Follow up with Urologist at discharge for recurrent UTIs  Follow up with Heme/Onc at discharge for steroid taper  Follow up with GI outpatient   In house ID following    *Established patient; follow-up moderately complex visit/ greater than 30     35 minutes spent w/ patient and staff, including but not limited to/ reviewing present status, needs, abilities with disciplines, reviewing medical records, vital signs, labs, completing medication reconciliation and entering orders for continued care in [facility]. DW ID NP today.      Note to patient: The 21st Century Cures Act makes medical notes like these available to patients in the interest of transparency. However, this is a medical document intended as peer to peer communication. It is written in medical language and may contain abbreviations or verbiage that are unfamiliar. It may appear blunt or direct. Medical documents are intended to  carry relevant information, facts as evident, and the clinical opinion of the practitioner who signs the document.    Jessie Villagran, APRN  1/2/2024

## 2024-01-04 ENCOUNTER — SNF VISIT (OUTPATIENT)
Dept: INTERNAL MEDICINE CLINIC | Age: 89
End: 2024-01-04

## 2024-01-04 DIAGNOSIS — J21.0 RSV BRONCHIOLITIS: Primary | ICD-10-CM

## 2024-01-04 DIAGNOSIS — I82.4Y3 ACUTE DEEP VEIN THROMBOSIS (DVT) OF PROXIMAL VEIN OF BOTH LOWER EXTREMITIES (HCC): ICD-10-CM

## 2024-01-04 DIAGNOSIS — I95.9 HYPOTENSION, UNSPECIFIED HYPOTENSION TYPE: ICD-10-CM

## 2024-01-04 DIAGNOSIS — R53.1 WEAKNESS: ICD-10-CM

## 2024-01-04 DIAGNOSIS — I26.99 ACUTE PULMONARY EMBOLISM WITHOUT ACUTE COR PULMONALE, UNSPECIFIED PULMONARY EMBOLISM TYPE (HCC): ICD-10-CM

## 2024-01-04 DIAGNOSIS — C90.00 MULTIPLE MYELOMA, REMISSION STATUS UNSPECIFIED (HCC): ICD-10-CM

## 2024-01-04 DIAGNOSIS — I10 ESSENTIAL HYPERTENSION: ICD-10-CM

## 2024-01-04 DIAGNOSIS — Z78.9 DECREASED ACTIVITIES OF DAILY LIVING (ADL): ICD-10-CM

## 2024-01-04 DIAGNOSIS — N39.0 FREQUENT UTI: ICD-10-CM

## 2024-01-04 PROCEDURE — 99309 SBSQ NF CARE MODERATE MDM 30: CPT | Performed by: NURSE PRACTITIONER

## 2024-01-05 VITALS
DIASTOLIC BLOOD PRESSURE: 88 MMHG | TEMPERATURE: 98 F | RESPIRATION RATE: 18 BRPM | OXYGEN SATURATION: 93 % | SYSTOLIC BLOOD PRESSURE: 158 MMHG | BODY MASS INDEX: 30 KG/M2 | HEART RATE: 80 BPM | WEIGHT: 155 LBS

## 2024-01-05 NOTE — PROGRESS NOTES
Nelly Potter, 8/28/1935, 88 year old, female    Chief Complaint:    Chief Complaint   Patient presents with    Cough     RSV, fagitue, weakness, hx MM        Subjective:   TODAY:  Blanca is seen in her room today. Caregiver is present at the bedside.   Blanca is feeling tired and gets out of breath easily. Her cough is productive. Using oxygen as needed 2L max mostly at night.  Currently on Room air. Cefdinir completed on 12/31/23.  No chest pain, no fevers.   DW patient, son and CG at bedside. Remove isolation on 1/5/24  DW nursing.    Denies insomnia, +fatigue, fever/chills, +cough, + SOB, +dyspnea, angina, palpitations, n/v, diarrhea, constipation, and urinary sxs.      Objective:  /88   Pulse 80   Temp 97.8 °F (36.6 °C)   Resp 18   Wt 155 lb (70.3 kg)   SpO2 93%   BMI 30.27 kg/m²     PHYSICAL EXAM:  GENERAL HEALTH: frail elderly female, no acute distress  LINES, TUBES, DRAINS:  none  SKIN: no rashes, no suspicious lesions  WOUND: see wound assessment   EYES: PERRLA, conjunctiva normal; no drainage from eyes  HENT: normocephalic; normal nose, no nasal drainage, mucous membranes pink, moist, rounded face  RESPIRATORY: clearing chest,  + productive cough, no crackles, no rhonchi, no accessory muscle use; on room air now, mild dyspnea  CARDIOVASCULAR: S1, S2 normal, RRR; LLE with +1 pedal and ankle edema, + DVT  ABDOMEN: normal active BS+, soft, non-distended; non tender  : Deferred  LYMPHATIC: no lymphedema  MUSCULOSKELETAL: no acute synovitis upper or lower extremity.    EXTREMITIES/VASCULAR: no cyanosis, clubbing, LLE 1+ pedal and ankle edema, pulses intact BLE, RLE trace pretibial edema  NEUROLOGIC:  alert and oriented x 2-3, intact; no sensorimotor deficit  PSYCHIATRIC:; affect appropriate, intermittently confused, calm    Therapy:  Not ambulating  Max assist transfers and ADLS  DC plan now to continue with long term care/respite on DC. Family working with social work.     Medications  reviewed: Yes      Current Outpatient Medications:     ipratropium-albuterol 0.5-2.5 (3) MG/3ML Inhalation Solution, Take 3 mL by nebulization every 6 (six) hours while awake., Disp: , Rfl:     guaiFENesin  MG Oral Tablet 12 Hr, Take 1 tablet (600 mg total) by mouth 2 (two) times daily., Disp: , Rfl:     albuterol 108 (90 Base) MCG/ACT Inhalation Aero Soln, Inhale 2 puffs into the lungs every 4 (four) hours as needed for Wheezing., Disp: 1 each, Rfl: 0    ipratropium 0.03 % Nasal Solution, 2 sprays by Nasal route daily as needed for Rhinitis., Disp: , Rfl:     Vibegron 75 MG Oral Tab, Take 75 mg by mouth daily., Disp: , Rfl:     multivitamin Oral Chew Tab, Chew 1 tablet by mouth daily., Disp: , Rfl:     dexamethasone 2 MG Oral Tab, Take 1 tablet (2 mg total) by mouth daily with breakfast., Disp: , Rfl:     acidophilus-pectin Oral Cap, Take 1 capsule by mouth daily., Disp: , Rfl:     midodrine 5 MG Oral Tab, Take 1 tablet (5 mg total) by mouth every 8 (eight) hours as needed (SBP less than 95 and DBP less than 50)., Disp: , Rfl:     sennosides 8.6 MG Oral Tab, Take 2 tablets (17.2 mg total) by mouth at bedtime., Disp: , Rfl:     Solifenacin Succinate 10 MG Oral Tab, Take 1 tablet (10 mg total) by mouth daily., Disp: , Rfl:     polyethylene glycol, PEG 3350, 17 g Oral Powd Pack, Take 17 g by mouth daily as needed (constipation)., Disp: , Rfl:     acetaminophen 325 MG Oral Tab, Take 2 tablets (650 mg total) by mouth every 4 (four) hours as needed for Pain., Disp: , Rfl:     rivaroxaban (XARELTO) 20 MG Oral Tab, Take 1 tablet (20 mg total) by mouth daily with food., Disp: , Rfl:     sertraline 25 MG Oral Tab, Take 1 tablet (25 mg total) by mouth daily., Disp: , Rfl:     amLODIPine 2.5 MG Oral Tab, Take 1 tablet (2.5 mg total) by mouth daily., Disp: , Rfl:     gabapentin 300 MG Oral Cap, Take 1 capsule (300 mg total) by mouth 3 (three) times daily. (Patient taking differently: Take 1 capsule (300 mg total) by mouth  in the morning and 1 capsule (300 mg total) before bedtime.), Disp: 270 capsule, Rfl: 1    fexofenadine 180 MG Oral Tab, Take 1 tablet (180 mg total) by mouth daily as needed., Disp: 90 tablet, Rfl: 3    lansoprazole 30 MG Oral Capsule Delayed Release, Take 1 capsule (30 mg total) by mouth daily. Before meal, Disp: 90 capsule, Rfl: 3    atorvastatin 10 MG Oral Tab, Take 1 tablet (10 mg total) by mouth daily., Disp: 90 tablet, Rfl: 3    Docusate Sodium 100 MG Oral Cap, Take 1 capsule (100 mg total) by mouth daily as needed for constipation., Disp: , Rfl:       Diagnostics reviewed:    Lab Results   Component Value Date    WBC 6.3 01/05/2024    RBC 3.94 01/05/2024    HGB 9.6 (L) 01/05/2024    HCT 30.9 (L) 01/05/2024    MCV 78.4 (L) 01/05/2024    MCH 24.4 (L) 01/05/2024    MCHC 31.1 01/05/2024    RDW 22.5 01/05/2024    .0 01/05/2024     Lab Results   Component Value Date    GLU 95 01/05/2024    BUN 17 01/05/2024    BUNCREA 17.0 10/08/2021    CREATSERUM 0.60 01/05/2024    ANIONGAP 6 01/05/2024    CA 8.2 (L) 01/05/2024    OSMOCALC 285 01/05/2024    ALKPHO 74 01/02/2024    AST <3 (L) 01/02/2024    ALT 33 01/02/2024    BILT 0.4 01/02/2024    TP 5.2 (L) 01/02/2024    ALB 2.5 (L) 01/02/2024    GLOBULIN 2.7 (L) 01/02/2024    AGRATIO 2.1 09/11/2013     01/05/2024    K 3.8 01/05/2024     01/05/2024    CO2 29.0 01/05/2024       Assessment and plan:    RSV  Isolation Droplet continues now day #19, still with cough will DC day 20 1/5/2024  Mucinex BID  Duonebs Q6  Monitoring VS q 4 and prn  Chest xray effusion small to moderal left, and infiltrate.   Completed cefdinir now  Cough productive, with weakness, fatigue  Oxygen at night  Supportive care, rest, fluids, simple meals  Labs monitoring  DW ID NP    Physical Deconditioning/Impaired mobility and ADLs/At risk for falling  Fall Precautions  PT/OT/ST to evaluate and treat  DOLLY team to establish care plan meeting with patient and POA/family as  appropriate  Anticipate DC on or before 1/11/24; SW to assist patient/family w/ DC planning  DC Plan:  long term care on DC now, SW working with family may stay respite until placement     Hx Frequent UTI/ retention    Treated in hospital with IV Zosyn. Urology consulted in hospital Ecoli ESBL   Monitor symptoms   Solifenacin succinate 10 mg daily   Vibegron 75 mg daily   Repeat UA and culture noted. DW ID NP  Ertapenem completed 12/22/23 may remove IV site  Labs  CBC, CMP stable     PE/ BLE DVT/ A fib   Monitor symptoms + SOB and fatigue  Vital signs q shift and prn   Labs weekly and prn   Xarelto 20 mg daily  DW daughter sx, treatment and follow up for PE  Also noted US + DVT BLE on 12/2/23 no  compression socks, elevate for edema only        HTN/HL/CAD  BRENNA on 12/2 EF 55% normal RV size function   Vital signs q shift and prn   Labs weekly and prn   Amlodipine 2.5 mg daily   Atorvastatin 10 mg nightly   BP controlled     Dementia   Monitor   Emotional support offered  Monitoring for safety     Multiple Myeloma   Monitor labs   Outpatient follow up labs drawn for Heme/Onc follow up - faxed today  Remains on steroids now     GERD/Known Duodenal ulcer / GI prophylaxis  EGD scheduled and patient off AC for procedure but then developed PE. EGD on hold for now.   Monitor symptoms   Monitor labs  Lansoprazole 30 mg daily   Follow up with GI outpatient   CBC hgb stable 9.6     Hypotension   Initially on pressors in hospital. On high dose steroids decreased down to Dexamethasone 4 mg daily- records state need to be titrated down  DW daughter, will wait until improved and plan for outpatient taper  Monitor vital signs  Midodrine 5 mg every 8 hrs prn for SBP less than 95 and DBP less than 50      Hx depression   Monitor symptoms   Sertraline 25 mg daily increased to 50 mg daily now and monitoring     Neuropathy  Gabapentin 300 mg bid      Allergic rhinitis  Monitor symptoms   Fexofenadine 180 mg daily prn   Ipratropium 0.03%  2 spray daily prn      DVT Prophylaxis   Encourage early mobilization and participation in PT/OT as able  Xarelto      Pain Management  Offer to pre-medicate 30-60 min prior to therapy  Physiatry evaluation with management appreciated  Acetaminophen 650 mg every 4 hrs prn      Bowel Management Regimen/Constipation   Monitor BM status   Colace 100 mg daily prn   Miralax daily prn   Senna 2 tabs nightly      Vitamins/supplements as r/t deficiencies  [facility] RD to follow while in rehab; supplementation/diet as per [facility] RD  May continue home supplements  MVI daily      LABS  CBC/CMP weekly while in [facility]-    *Follow-Up with PCP within 1-2 weeks following [facility] discharge.     Follow up with Urologist at discharge for recurrent UTIs  Follow up with Heme/Onc at discharge for steroid taper  Follow up with GI outpatient   In house ID following    *Established patient; follow-up moderately complex visit/ greater than 30     32 minutes spent w/ patient and staff, including but not limited to/ reviewing present status, needs, abilities with disciplines, reviewing medical records, vital signs, labs, completing medication reconciliation and entering orders for continued care in [facility]. DW ID NP today.      Note to patient: The 21st Century Cures Act makes medical notes like these available to patients in the interest of transparency. However, this is a medical document intended as peer to peer communication. It is written in medical language and may contain abbreviations or verbiage that are unfamiliar. It may appear blunt or direct. Medical documents are intended to carry relevant information, facts as evident, and the clinical opinion of the practitioner who signs the document.    Jessie Villagran, APRN  1/4/2024

## 2024-01-09 ENCOUNTER — SNF VISIT (OUTPATIENT)
Dept: INTERNAL MEDICINE CLINIC | Age: 89
End: 2024-01-09

## 2024-01-09 DIAGNOSIS — R53.1 WEAKNESS: ICD-10-CM

## 2024-01-09 DIAGNOSIS — C90.00 MULTIPLE MYELOMA, REMISSION STATUS UNSPECIFIED (HCC): ICD-10-CM

## 2024-01-09 DIAGNOSIS — I82.4Y3 ACUTE DEEP VEIN THROMBOSIS (DVT) OF PROXIMAL VEIN OF BOTH LOWER EXTREMITIES (HCC): ICD-10-CM

## 2024-01-09 DIAGNOSIS — I26.99 ACUTE PULMONARY EMBOLISM WITHOUT ACUTE COR PULMONALE, UNSPECIFIED PULMONARY EMBOLISM TYPE (HCC): ICD-10-CM

## 2024-01-09 DIAGNOSIS — Z78.9 DECREASED ACTIVITIES OF DAILY LIVING (ADL): ICD-10-CM

## 2024-01-09 DIAGNOSIS — J21.0 RSV BRONCHIOLITIS: Primary | ICD-10-CM

## 2024-01-09 DIAGNOSIS — F32.A DEPRESSION, UNSPECIFIED DEPRESSION TYPE: ICD-10-CM

## 2024-01-09 PROCEDURE — 99309 SBSQ NF CARE MODERATE MDM 30: CPT | Performed by: NURSE PRACTITIONER

## 2024-01-10 VITALS
TEMPERATURE: 98 F | SYSTOLIC BLOOD PRESSURE: 150 MMHG | HEART RATE: 96 BPM | DIASTOLIC BLOOD PRESSURE: 94 MMHG | WEIGHT: 156.31 LBS | OXYGEN SATURATION: 96 % | RESPIRATION RATE: 16 BRPM | BODY MASS INDEX: 31 KG/M2

## 2024-01-16 ENCOUNTER — SNF VISIT (OUTPATIENT)
Dept: INTERNAL MEDICINE CLINIC | Age: 89
End: 2024-01-16

## 2024-01-16 VITALS
WEIGHT: 156.19 LBS | DIASTOLIC BLOOD PRESSURE: 86 MMHG | SYSTOLIC BLOOD PRESSURE: 131 MMHG | RESPIRATION RATE: 18 BRPM | OXYGEN SATURATION: 95 % | TEMPERATURE: 98 F | BODY MASS INDEX: 31 KG/M2 | HEART RATE: 92 BPM

## 2024-01-16 DIAGNOSIS — A49.8 INFECTION DUE TO ESBL-PRODUCING ESCHERICHIA COLI: ICD-10-CM

## 2024-01-16 DIAGNOSIS — N39.0 FREQUENT UTI: ICD-10-CM

## 2024-01-16 DIAGNOSIS — R53.1 WEAKNESS: ICD-10-CM

## 2024-01-16 DIAGNOSIS — Z78.9 DECREASED ACTIVITIES OF DAILY LIVING (ADL): ICD-10-CM

## 2024-01-16 DIAGNOSIS — I26.99 ACUTE PULMONARY EMBOLISM WITHOUT ACUTE COR PULMONALE, UNSPECIFIED PULMONARY EMBOLISM TYPE (HCC): ICD-10-CM

## 2024-01-16 DIAGNOSIS — J21.0 RSV BRONCHIOLITIS: Primary | ICD-10-CM

## 2024-01-16 DIAGNOSIS — I82.4Y3 ACUTE DEEP VEIN THROMBOSIS (DVT) OF PROXIMAL VEIN OF BOTH LOWER EXTREMITIES (HCC): ICD-10-CM

## 2024-01-16 DIAGNOSIS — C90.00 MULTIPLE MYELOMA, REMISSION STATUS UNSPECIFIED (HCC): ICD-10-CM

## 2024-01-16 DIAGNOSIS — F32.A DEPRESSION, UNSPECIFIED DEPRESSION TYPE: ICD-10-CM

## 2024-01-16 DIAGNOSIS — Z16.12 INFECTION DUE TO ESBL-PRODUCING ESCHERICHIA COLI: ICD-10-CM

## 2024-01-16 DIAGNOSIS — I10 ESSENTIAL HYPERTENSION: ICD-10-CM

## 2024-01-16 PROCEDURE — 99309 SBSQ NF CARE MODERATE MDM 30: CPT | Performed by: NURSE PRACTITIONER

## 2024-01-16 NOTE — PROGRESS NOTES
Nelly Potter, 8/28/1935, 88 year old, female    Chief Complaint:    Chief Complaint   Patient presents with    Follow - Up     UTI, weakness, RSV cough        Subjective:   TODAY:  Blanca is seen in her room today. Caregiver present at the bedside.   Blanca is feeling improved, less shortness of breath. Her cough is dry now.   No chest pain, no fevers. Left hip pain this morning.   DW nursing and rehab she is still very weak.     Denies insomnia, +fatigue, fever/chills, +cough, + SOB, dyspnea, angina, palpitations, n/v, diarrhea, constipation, and urinary sxs.      Objective:  /86   Pulse 92   Temp 98.4 °F (36.9 °C)   Resp 18   Wt 156 lb 3.2 oz (70.9 kg)   SpO2 95%   BMI 30.51 kg/m²     PHYSICAL EXAM:  GENERAL HEALTH: frail elderly female, no acute distress  LINES, TUBES, DRAINS:  none  SKIN: no rashes, no suspicious lesions  WOUND: see wound assessment   EYES: PERRLA, conjunctiva normal; no drainage from eyes  HENT: normocephalic; normal nose, no nasal drainage, mucous membranes pink, moist, rounded face  RESPIRATORY: clear,  + dry cough, no crackles, no rhonchi, no accessory muscle use; on room air no dyspnea  NO cough with deep breathing on exam or with talking  CARDIOVASCULAR: S1, S2 normal, RRR; LLE with minimal edema much improved,+ DVT  ABDOMEN: normal active BS+, soft, non-distended; non tender  : Deferred  LYMPHATIC: no lymphedema  MUSCULOSKELETAL: no acute synovitis upper or lower extremity.    EXTREMITIES/VASCULAR: no cyanosis, clubbing, pulses intact BLE, BLE without edema  NEUROLOGIC:  alert and oriented x 2-3, intact; no sensorimotor deficit  PSYCHIATRIC:; affect appropriate, intermittently confused, calm    Therapy:  Not ambulating  Max assist to dependent with transfers and ADLS  DC plan now to continue with long term care/respite on DC. Family working with social work.     Medications reviewed: Yes      Current Outpatient Medications:     ipratropium-albuterol 0.5-2.5 (3) MG/3ML  Inhalation Solution, Take 3 mL by nebulization every 6 (six) hours while awake., Disp: , Rfl:     albuterol 108 (90 Base) MCG/ACT Inhalation Aero Soln, Inhale 2 puffs into the lungs every 4 (four) hours as needed for Wheezing., Disp: 1 each, Rfl: 0    ipratropium 0.03 % Nasal Solution, 2 sprays by Nasal route daily as needed for Rhinitis., Disp: , Rfl:     Vibegron 75 MG Oral Tab, Take 75 mg by mouth daily., Disp: , Rfl:     multivitamin Oral Chew Tab, Chew 1 tablet by mouth daily., Disp: , Rfl:     dexamethasone 2 MG Oral Tab, Take 1 tablet (2 mg total) by mouth daily with breakfast., Disp: , Rfl:     acidophilus-pectin Oral Cap, Take 1 capsule by mouth daily., Disp: , Rfl:     midodrine 5 MG Oral Tab, Take 1 tablet (5 mg total) by mouth every 8 (eight) hours as needed (SBP less than 95 and DBP less than 50)., Disp: , Rfl:     sennosides 8.6 MG Oral Tab, Take 2 tablets (17.2 mg total) by mouth at bedtime., Disp: , Rfl:     Solifenacin Succinate 10 MG Oral Tab, Take 1 tablet (10 mg total) by mouth daily., Disp: , Rfl:     polyethylene glycol, PEG 3350, 17 g Oral Powd Pack, Take 17 g by mouth daily as needed (constipation)., Disp: , Rfl:     acetaminophen 325 MG Oral Tab, Take 2 tablets (650 mg total) by mouth every 4 (four) hours as needed for Pain., Disp: , Rfl:     rivaroxaban (XARELTO) 20 MG Oral Tab, Take 1 tablet (20 mg total) by mouth daily with food., Disp: , Rfl:     sertraline 25 MG Oral Tab, Take 1 tablet (25 mg total) by mouth daily., Disp: , Rfl:     amLODIPine 2.5 MG Oral Tab, Take 1 tablet (2.5 mg total) by mouth daily., Disp: , Rfl:     gabapentin 300 MG Oral Cap, Take 1 capsule (300 mg total) by mouth 3 (three) times daily. (Patient taking differently: Take 1 capsule (300 mg total) by mouth in the morning and 1 capsule (300 mg total) before bedtime.), Disp: 270 capsule, Rfl: 1    fexofenadine 180 MG Oral Tab, Take 1 tablet (180 mg total) by mouth daily as needed., Disp: 90 tablet, Rfl: 3     lansoprazole 30 MG Oral Capsule Delayed Release, Take 1 capsule (30 mg total) by mouth daily. Before meal, Disp: 90 capsule, Rfl: 3    atorvastatin 10 MG Oral Tab, Take 1 tablet (10 mg total) by mouth daily., Disp: 90 tablet, Rfl: 3    Docusate Sodium 100 MG Oral Cap, Take 1 capsule (100 mg total) by mouth daily as needed for constipation., Disp: , Rfl:       Diagnostics reviewed:    Lab Results   Component Value Date    WBC 10.0 01/15/2024    RBC 4.13 01/15/2024    HGB 10.3 (L) 01/15/2024    HCT 32.1 (L) 01/15/2024    MCV 77.7 (L) 01/15/2024    MCH 24.9 (L) 01/15/2024    MCHC 32.1 01/15/2024    RDW 24.0 01/15/2024    .0 01/15/2024     Lab Results   Component Value Date    GLU 80 01/15/2024    BUN 14 01/15/2024    BUNCREA 17.0 10/08/2021    CREATSERUM 0.39 (L) 01/15/2024    ANIONGAP 5 01/15/2024    CA 8.2 (L) 01/15/2024    OSMOCALC 279 01/15/2024    ALKPHO 74 01/02/2024    AST <3 (L) 01/02/2024    ALT 33 01/02/2024    BILT 0.4 01/02/2024    TP 5.2 (L) 01/02/2024    TP 4.9 (L) 01/02/2024    ALB 2.5 (L) 01/02/2024    ALB 2.71 (L) 01/02/2024    GLOBULIN 2.7 (L) 01/02/2024    AGRATIO 2.1 09/11/2013     (L) 01/15/2024    K 4.3 01/15/2024     01/15/2024    CO2 27.0 01/15/2024   Collected 1/12/2024 12:17 AM       Status: Final result       Dx: Urinary tract infection, site not spe...    Specimen Information: Urine, clean catch   0 Result Notes  URINE CULTURE >100,000 CFU/ML Escherichia coli  ESBL Pos Abnormal    Meropenem is the drug of choice for ESBL. Pip/Tazo is effective for ESBL E.coli originating from the urinary tract”.        Resulting Agency: [facility] ([facility])     Susceptibility     Escherichia coli  ESBL Pos     Not Specified    Cefazolin >=64 Resistant    Cefepime >=64 Resistant    Ceftazidime  Resistant    Ceftriaxone >=64 Resistant    Ciprofloxacin >=4 Resistant    Gentamicin <=1 Sensitive    Levofloxacin >=8 Resistant    Meropenem <=0.25 Sensitive    Nitrofurantoin <=16 Sensitive     Piperacillin + Tazobactam <=4 Sensitive    Trimethoprim/Sulfa <=20 Sensitive              Linear View         Specimen Collected: 01/12/24 12:17 AM Last Resulted: 01/14/24  7:43 AM                 Assessment and plan:    UTI ECOLI ESBL  - on call had placed on merpenem again, DW ID< no symptoms. No fevers, WBC snl  Change to Macrobid 1/15-1/22  and monitor  CBC, BMP weekly and prn    RSV, post vial cough  Robitussin prn  Duonebs Q6 prn  Monitoring VS q shift and prn  Cough dry, with weakness, fatigue  Labs monitoring  DW ID NP    Physical Deconditioning/Impaired mobility and ADLs/At risk for falling  Fall Precautions  PT/OT/ST to evaluate and treat  [facility] team to establish care plan meeting with patient and POA/family as appropriate  Anticipate DC on or before 1/16/24; SW to assist patient/family w/ DC planning  DC Plan:  long term care on DC now, SW working with family, stay respite until placement     Hx Frequent UTI/ retention    Treated in hospital with IV Zosyn. Urology consulted in hospital Ecoli ESBL , ertapenem here completed now on Macrobid  Monitor symptoms   Solifenacin succinate 10 mg daily   Vibegron 75 mg daily   Labs  CBC, CMP stable     PE/ BLE DVT/ A fib   Monitor symptoms + SOB and fatigue  Vital signs q shift and prn   Labs weekly and prn   Xarelto 20 mg daily  DW daughter sx, treatment and follow up for PE  Also noted US + DVT BLE on 12/2/23 no  compression socks, elevate for edema only        HTN/HL/CAD  BRENNA on 12/2 EF 55% normal RV size function   Vital signs q shift and prn   Labs weekly and prn   Amlodipine 2.5 mg daily   Atorvastatin 10 mg nightly   BP controlled     Dementia   Monitor   Emotional support offered  Monitoring for safety     Multiple Myeloma   Monitor labs   Outpatient follow up labs drawn for Heme/Onc follow up - faxed and they received, no new orders  Remains on steroids now     GERD/Known Duodenal ulcer / GI prophylaxis  EGD scheduled and patient off AC for procedure but  then developed PE. EGD on hold for now.   Monitor symptoms   Monitor labs  Lansoprazole 30 mg daily   Follow up with GI outpatient   CBC hgb improving  10.3     Hypotension  - managed  Initially on pressors in hospital. On high dose steroids decreased down to Dexamethasone 4 mg daily- records state need to be titrated down  DW daughter, will wait until improved and plan for outpatient taper  Monitor vital signs  Midodrine 5 mg every 8 hrs prn for SBP less than 95 and DBP less than 50      Hx depression   Monitor symptoms   Sertraline 50 mg daily now and monitoring  Appears improved mood      Neuropathy  Gabapentin 300 mg bid      Allergic rhinitis  Monitor symptoms   Fexofenadine 180 mg daily prn   Ipratropium 0.03% 2 spray daily prn      DVT Prophylaxis   Encourage early mobilization and participation in PT/OT as able  Xarelto      Pain Management  Offer to pre-medicate 30-60 min prior to therapy  Physiatry evaluation with management appreciated  Acetaminophen 650 mg every 4 hrs prn      Bowel Management Regimen/Constipation   Monitor BM status   Colace 100 mg daily prn   Miralax daily prn   Senna 2 tabs nightly      Vitamins/supplements as r/t deficiencies  [facility] RD to follow while in rehab; supplementation/diet as per [facility] RD  May continue home supplements  MVI daily      LABS  CBC/CMP weekly while in [facility]-    *Follow-Up with PCP within 1-2 weeks following [facility] discharge.     Follow up with Urologist at discharge for recurrent UTIs  Follow up with Heme/Onc at discharge for steroid taper  Follow up with GI outpatient   In house ID following    *Established patient; follow-up moderately complex visit/ greater than 30     32 minutes spent w/ patient and staff, including but not limited to/ reviewing present status, needs, abilities with disciplines, reviewing medical records, vital signs, labs, completing medication reconciliation and entering orders for continued care in [facility]. DW ID NP today.      Note to patient: The  21st Century Cures Act makes medical notes like these available to patients in the interest of transparency. However, this is a medical document intended as peer to peer communication. It is written in medical language and may contain abbreviations or verbiage that are unfamiliar. It may appear blunt or direct. Medical documents are intended to carry relevant information, facts as evident, and the clinical opinion of the practitioner who signs the document.    Jessie Villagran, APRN  1/16/2024

## 2024-01-18 ENCOUNTER — APPOINTMENT (OUTPATIENT)
Dept: LAB | Age: 89
End: 2024-01-18
Attending: NURSE PRACTITIONER
Payer: MEDICARE

## 2024-01-19 ENCOUNTER — SNF VISIT (OUTPATIENT)
Dept: INTERNAL MEDICINE CLINIC | Age: 89
End: 2024-01-19

## 2024-01-19 VITALS
DIASTOLIC BLOOD PRESSURE: 98 MMHG | OXYGEN SATURATION: 97 % | SYSTOLIC BLOOD PRESSURE: 161 MMHG | WEIGHT: 156.38 LBS | RESPIRATION RATE: 18 BRPM | HEART RATE: 89 BPM | TEMPERATURE: 98 F | BODY MASS INDEX: 31 KG/M2

## 2024-01-19 DIAGNOSIS — Z16.12 INFECTION DUE TO ESBL-PRODUCING ESCHERICHIA COLI: ICD-10-CM

## 2024-01-19 DIAGNOSIS — G93.39 OTHER POST INFECTION AND RELATED FATIGUE SYNDROMES: ICD-10-CM

## 2024-01-19 DIAGNOSIS — R53.1 WEAKNESS: Primary | ICD-10-CM

## 2024-01-19 DIAGNOSIS — D50.0 IRON DEFICIENCY ANEMIA DUE TO CHRONIC BLOOD LOSS: ICD-10-CM

## 2024-01-19 DIAGNOSIS — J21.0 RSV BRONCHIOLITIS: ICD-10-CM

## 2024-01-19 DIAGNOSIS — I26.99 ACUTE PULMONARY EMBOLISM WITHOUT ACUTE COR PULMONALE, UNSPECIFIED PULMONARY EMBOLISM TYPE (HCC): ICD-10-CM

## 2024-01-19 DIAGNOSIS — A49.8 INFECTION DUE TO ESBL-PRODUCING ESCHERICHIA COLI: ICD-10-CM

## 2024-01-19 DIAGNOSIS — F32.A DEPRESSION, UNSPECIFIED DEPRESSION TYPE: ICD-10-CM

## 2024-01-19 DIAGNOSIS — Z78.9 DECREASED ACTIVITIES OF DAILY LIVING (ADL): ICD-10-CM

## 2024-01-19 DIAGNOSIS — I82.4Y2 DEEP VEIN THROMBOSIS (DVT) OF PROXIMAL VEIN OF LEFT LOWER EXTREMITY, UNSPECIFIED CHRONICITY (HCC): ICD-10-CM

## 2024-01-19 DIAGNOSIS — N39.0 FREQUENT UTI: ICD-10-CM

## 2024-01-19 PROCEDURE — 99309 SBSQ NF CARE MODERATE MDM 30: CPT | Performed by: NURSE PRACTITIONER

## 2024-01-19 NOTE — PROGRESS NOTES
Nelly Potter, 8/28/1935, 88 year old, female    Chief Complaint:    Chief Complaint   Patient presents with    Follow - Up     Weakness, left hip pain, UTI        Subjective:   TODAY:  Blanca is seen in her room today. Caregiver present at the bedside.   Blanca is feeling improved, less shortness of breath. Her cough is dry now and much less.   No chest pain, no fevers. Left hip pain persisting. Will check xray of hip and pelvis with persistent pains.  BLE with edema, LLE known DVT, repeat Venous doppler to monitor improvement and prior to compressive sleeve use. Elevate now  Updated patient on DW daughter to trial very slow taper of dexamethasone to see if she has cany steroid myopathy that might be impacting her strength. DW her likely causes of weakness could include multiple recent infections, dementia, MM - in remission, dehydration and electrolytes are stable, and steroid myopathy.   DW nursing and rehab she is still very weak.     Denies insomnia, +fatigue, fever/chills, +cough,  SOB, dyspnea, angina, palpitations, n/v, diarrhea, constipation, and urinary sxs.      Objective:  BP (!) 161/98   Pulse 89   Temp 98.4 °F (36.9 °C)   Resp 18   Wt 156 lb 6.4 oz (70.9 kg)   SpO2 97%   BMI 30.54 kg/m²     PHYSICAL EXAM:  GENERAL HEALTH: frail elderly female, no acute distress  LINES, TUBES, DRAINS:  none  SKIN: no rashes, no suspicious lesions  WOUND: see wound assessment   EYES: PERRLA, conjunctiva normal; no drainage from eyes  HENT: normocephalic; normal nose, no nasal drainage, mucous membranes pink, moist, rounded face  RESPIRATORY: clear,  + dry cough, no crackles, no rhonchi, no accessory muscle use; on room air no dyspnea  NO cough with deep breathing on exam or with talking  CARDIOVASCULAR: S1, S2 normal, RRR; LLE 1+ pretibial and pedal edema much improved,+ DVT; RLE with 1+ pretibial and pedal edema  ABDOMEN: normal active BS+, soft, non-distended; non tender  : Deferred  LYMPHATIC: no  lymphedema  MUSCULOSKELETAL: no acute synovitis upper or lower extremity.    EXTREMITIES/VASCULAR: no cyanosis, clubbing, pulses intact BLE, BLE with 1+ edema  NEUROLOGIC:  alert and oriented x 2-3, intact; no sensorimotor deficit  PSYCHIATRIC:; affect appropriate, intermittently confused, calm    Therapy:  Not ambulating  Max assist to dependent with transfers and ADLS  DC plan now to continue with long term care/respite on DC. Family working with social work.  Trial of steroid weaning to 3.75 mg daily to see if any myopathy is present, may need neurology input     Medications reviewed: Yes      Current Outpatient Medications:     ipratropium-albuterol 0.5-2.5 (3) MG/3ML Inhalation Solution, Take 3 mL by nebulization every 6 (six) hours while awake., Disp: , Rfl:     ipratropium 0.03 % Nasal Solution, 2 sprays by Nasal route daily as needed for Rhinitis., Disp: , Rfl:     Vibegron 75 MG Oral Tab, Take 75 mg by mouth daily., Disp: , Rfl:     multivitamin Oral Chew Tab, Chew 1 tablet by mouth daily., Disp: , Rfl:     dexamethasone 2 MG Oral Tab, Take 3.75 mg by mouth daily with breakfast. Slow taper now to 3.75 starting 1/19/24, Disp: , Rfl:     acidophilus-pectin Oral Cap, Take 1 capsule by mouth daily., Disp: , Rfl:     midodrine 5 MG Oral Tab, Take 1 tablet (5 mg total) by mouth every 8 (eight) hours as needed (SBP less than 95 and DBP less than 50)., Disp: , Rfl:     sennosides 8.6 MG Oral Tab, Take 2 tablets (17.2 mg total) by mouth at bedtime., Disp: , Rfl:     Solifenacin Succinate 10 MG Oral Tab, Take 1 tablet (10 mg total) by mouth daily., Disp: , Rfl:     polyethylene glycol, PEG 3350, 17 g Oral Powd Pack, Take 17 g by mouth daily as needed (constipation)., Disp: , Rfl:     acetaminophen 325 MG Oral Tab, Take 2 tablets (650 mg total) by mouth every 4 (four) hours as needed for Pain., Disp: , Rfl:     rivaroxaban (XARELTO) 20 MG Oral Tab, Take 1 tablet (20 mg total) by mouth daily with food., Disp: , Rfl:      sertraline 25 MG Oral Tab, Take 1 tablet (25 mg total) by mouth daily., Disp: , Rfl:     amLODIPine 2.5 MG Oral Tab, Take 1 tablet (2.5 mg total) by mouth daily., Disp: , Rfl:     gabapentin 300 MG Oral Cap, Take 1 capsule (300 mg total) by mouth 3 (three) times daily. (Patient taking differently: Take 1 capsule (300 mg total) by mouth in the morning and 1 capsule (300 mg total) before bedtime.), Disp: 270 capsule, Rfl: 1    fexofenadine 180 MG Oral Tab, Take 1 tablet (180 mg total) by mouth daily as needed., Disp: 90 tablet, Rfl: 3    lansoprazole 30 MG Oral Capsule Delayed Release, Take 1 capsule (30 mg total) by mouth daily. Before meal, Disp: 90 capsule, Rfl: 3    atorvastatin 10 MG Oral Tab, Take 1 tablet (10 mg total) by mouth daily., Disp: 90 tablet, Rfl: 3    Docusate Sodium 100 MG Oral Cap, Take 1 capsule (100 mg total) by mouth daily as needed for constipation., Disp: , Rfl:       Diagnostics reviewed:    Lab Results   Component Value Date    WBC 7.1 01/18/2024    RBC 3.91 01/18/2024    HGB 9.8 (L) 01/18/2024    HCT 30.7 (L) 01/18/2024    MCV 78.5 (L) 01/18/2024    MCH 25.1 (L) 01/18/2024    MCHC 31.9 01/18/2024    RDW 24.1 01/18/2024    .0 01/18/2024     Lab Results   Component Value Date    GLU 76 01/18/2024    BUN 15 01/18/2024    BUNCREA 17.0 10/08/2021    CREATSERUM 0.49 (L) 01/18/2024    ANIONGAP 4 01/18/2024    CA 7.7 (L) 01/18/2024    OSMOCALC 284 01/18/2024    ALKPHO 74 01/02/2024    AST <3 (L) 01/02/2024    ALT 33 01/02/2024    BILT 0.4 01/02/2024    TP 5.2 (L) 01/02/2024    TP 4.9 (L) 01/02/2024    ALB 2.5 (L) 01/02/2024    ALB 2.71 (L) 01/02/2024    GLOBULIN 2.7 (L) 01/02/2024    AGRATIO 2.1 09/11/2013     01/18/2024    K 3.4 (L) 01/18/2024     01/18/2024    CO2 29.0 01/18/2024   Collected 1/12/2024 12:17 AM       Status: Final result       Dx: Urinary tract infection, site not spe...    Specimen Information: Urine, clean catch   0 Result Notes  URINE CULTURE >100,000 CFU/ML  Escherichia coli  ESBL Pos Abnormal    Meropenem is the drug of choice for ESBL. Pip/Tazo is effective for ESBL E.coli originating from the urinary tract”.        Resulting Agency: [facility] ([facility])       Assessment and plan:    UTI ECOLI ESBL  - on call had placed on merpenem again, DW ID< no symptoms. No fevers, WBC snl  Change to Macrobid 1/15-1/22  and monitor  NO fevers, no chills, no nausea, labs stable to improved  CBC, BMP weekly and prn    RSV, post viral cough - improving  Robitussin prn  Duonebs Q6 prn  Monitoring VS q shift and prn  Cough dry, labs stable monitoring  DW ID NP    Weakness, deconditioning  Multiple recent infections  Hx MM in remission  Dementia - functional decline?  Depression - increased sertraline to 50 mg daily mood appears improved  Not on any narcotics  Has anemia on iron and lansoprazole, was to have EGD but got clot off AC so now on hold, monitoring CBC 10.3-9.8  ? 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 Urology consulted in hospital Ecoli ESBL , ertapenem here completed now on Macrobid  Monitor symptoms   Solifenacin succinate 10 mg daily   Vibegron 75 mg daily   Labs  CBC, CMP stable     PE/ BLE DVT/ A fib   Monitor symptoms: improving less  SOB and fatigue  Vital signs q shift and prn   Labs weekly and prn   Xarelto 20 mg daily  DW daughter sx, treatment and follow up for PE  Also noted US + DVT BLE on 12/2/23 no  compression socks, elevate for edema only  Repeat doppler for monitoring  of DVT and to check for resolution - DW nursing        HTN/HL/CAD  BRENNA on 12/2 EF 55% normal RV size function   Vital signs q shift and prn   Labs weekly and prn   Amlodipine 2.5 mg daily   Atorvastatin 10 mg nightly   BP controlled     Dementia   Monitor   Emotional support offered  Monitoring for safety  ? Functional decline  If not improved with steroid wean consider neuro eval     Multiple Myeloma   Monitor labs   Outpatient follow up labs drawn for Heme/Onc follow up - faxed and they received, she is stable per PA, no new orders  Remains on steroids now at slow taper     GERD/Known Duodenal ulcer / GI prophylaxis  EGD scheduled and patient off AC for procedure but then developed PE. EGD on hold for now.   Monitor symptoms   Monitor labs  Lansoprazole 30 mg daily   Follow up with GI outpatient   CBC  weekly, Hgbs 10.3, 9.8  Iron daily     Hypotension  - managed  Initially on pressors in hospital. On high dose steroids decreased down to Dexamethasone 4 mg daily- records state need to be titrated down  DW daughter, start at 3.75 daily now and monitor   Monitor vital signs  Midodrine 5 mg every 8 hrs prn for SBP less than 95 and DBP less than 50      Hx depression   Monitor symptoms   Sertraline 50 mg daily now and monitoring  Appears improved mood      Neuropathy  Gabapentin 300 mg bid      Allergic rhinitis  Monitor symptoms   Fexofenadine 180 mg daily prn   Ipratropium 0.03% 2 spray daily prn      DVT Prophylaxis   Encourage early mobilization and participation in PT/OT as able  Xarelto      Pain Management  Offer to pre-medicate 30-60 min prior to therapy  Physiatry evaluation with management appreciated  Acetaminophen 650 mg every 4 hrs prn      Bowel Management Regimen/Constipation   Monitor BM status   Colace 100 mg daily prn   Miralax daily prn   Senna 2 tabs nightly      Vitamins/supplements as r/t deficiencies  [facility] RD to follow while in rehab; supplementation/diet as per DO[facility] RD  May continue home  supplements  MVI daily      LABS  CBC/CMP weekly while in [facility]    *Follow-Up with PCP within 1-2 weeks following [facility] discharge.     Follow up with Urologist at discharge for recurrent UTIs  Follow up with Heme/Onc at discharge for steroid taper  Follow up with GI outpatient     *Established patient; follow-up moderately complex visit/ greater than 30     38 minutes spent w/ patient and staff, including but not limited to/ reviewing present status, needs, abilities with disciplines, reviewing medical records, vital signs, labs, completing medication reconciliation and entering orders for continued care in [facility]       Note to patient: The 21st Century Cures Act makes medical notes like these available to patients in the interest of transparency. However, this is a medical document intended as peer to peer communication. It is written in medical language and may contain abbreviations or verbiage that are unfamiliar. It may appear blunt or direct. Medical documents are intended to carry relevant information, facts as evident, and the clinical opinion of the practitioner who signs the document.    Jessie Villagran, APRN  1/19/2024

## 2024-01-21 PROCEDURE — 87086 URINE CULTURE/COLONY COUNT: CPT | Performed by: FAMILY MEDICINE

## 2024-01-21 PROCEDURE — 81003 URINALYSIS AUTO W/O SCOPE: CPT | Performed by: FAMILY MEDICINE

## 2024-01-22 ENCOUNTER — LAB REQUISITION (OUTPATIENT)
Dept: LAB | Facility: HOSPITAL | Age: 89
End: 2024-01-22
Payer: MEDICARE

## 2024-01-22 ENCOUNTER — APPOINTMENT (OUTPATIENT)
Dept: LAB | Age: 89
End: 2024-01-22
Attending: NURSE PRACTITIONER
Payer: MEDICARE

## 2024-01-22 ENCOUNTER — SNF VISIT (OUTPATIENT)
Dept: INTERNAL MEDICINE CLINIC | Age: 89
End: 2024-01-22

## 2024-01-22 VITALS
HEART RATE: 85 BPM | BODY MASS INDEX: 30 KG/M2 | WEIGHT: 153.38 LBS | OXYGEN SATURATION: 95 % | DIASTOLIC BLOOD PRESSURE: 88 MMHG | TEMPERATURE: 97 F | RESPIRATION RATE: 18 BRPM | SYSTOLIC BLOOD PRESSURE: 145 MMHG

## 2024-01-22 DIAGNOSIS — E55.9 VITAMIN D DEFICIENCY: ICD-10-CM

## 2024-01-22 DIAGNOSIS — R33.9 RETENTION OF URINE, UNSPECIFIED: ICD-10-CM

## 2024-01-22 DIAGNOSIS — R33.9 URINARY RETENTION WITH INCOMPLETE BLADDER EMPTYING: ICD-10-CM

## 2024-01-22 DIAGNOSIS — R53.1 WEAKNESS: ICD-10-CM

## 2024-01-22 DIAGNOSIS — D52.0 DIETARY FOLATE DEFICIENCY ANEMIA: ICD-10-CM

## 2024-01-22 DIAGNOSIS — J21.0 RSV BRONCHIOLITIS: ICD-10-CM

## 2024-01-22 DIAGNOSIS — I26.99 ACUTE PULMONARY EMBOLISM WITHOUT ACUTE COR PULMONALE, UNSPECIFIED PULMONARY EMBOLISM TYPE (HCC): ICD-10-CM

## 2024-01-22 DIAGNOSIS — C90.00 MULTIPLE MYELOMA, REMISSION STATUS UNSPECIFIED (HCC): ICD-10-CM

## 2024-01-22 DIAGNOSIS — N39.0 FREQUENT UTI: Primary | ICD-10-CM

## 2024-01-22 DIAGNOSIS — D50.0 IRON DEFICIENCY ANEMIA DUE TO CHRONIC BLOOD LOSS: ICD-10-CM

## 2024-01-22 DIAGNOSIS — I26.99 OTHER PULMONARY EMBOLISM WITHOUT ACUTE COR PULMONALE (HCC): ICD-10-CM

## 2024-01-22 LAB
ALBUMIN SERPL-MCNC: 2.3 G/DL (ref 3.4–5)
ALBUMIN/GLOB SERPL: 1 {RATIO} (ref 1–2)
ALP LIVER SERPL-CCNC: 75 U/L
ANION GAP SERPL CALC-SCNC: 7 MMOL/L (ref 0–18)
AST SERPL-CCNC: <3 U/L (ref 15–37)
BASOPHILS # BLD AUTO: 0.02 X10(3) UL (ref 0–0.2)
BASOPHILS NFR BLD AUTO: 0.3 %
BILIRUB SERPL-MCNC: 0.4 MG/DL (ref 0.1–2)
BILIRUB UR QL STRIP.AUTO: NEGATIVE
BUN BLD-MCNC: 12 MG/DL (ref 9–23)
CALCIUM BLD-MCNC: 8 MG/DL (ref 8.5–10.1)
CHLORIDE SERPL-SCNC: 104 MMOL/L (ref 98–112)
CLARITY UR REFRACT.AUTO: CLEAR
CO2 SERPL-SCNC: 28 MMOL/L (ref 21–32)
COLOR UR AUTO: YELLOW
CREAT BLD-MCNC: 0.39 MG/DL
EGFRCR SERPLBLD CKD-EPI 2021: 96 ML/MIN/1.73M2 (ref 60–?)
EOSINOPHIL # BLD AUTO: 0.04 X10(3) UL (ref 0–0.7)
EOSINOPHIL NFR BLD AUTO: 0.5 %
ERYTHROCYTE [DISTWIDTH] IN BLOOD BY AUTOMATED COUNT: 23.7 %
FASTING STATUS PATIENT QL REPORTED: YES
FOLATE SERPL-MCNC: 7.9 NG/ML (ref 8.7–?)
GLOBULIN PLAS-MCNC: 2.4 G/DL (ref 2.8–4.4)
GLUCOSE BLD-MCNC: 78 MG/DL (ref 70–99)
GLUCOSE UR STRIP.AUTO-MCNC: NORMAL MG/DL
HCT VFR BLD AUTO: 29 %
HGB BLD-MCNC: 9.4 G/DL
IMM GRANULOCYTES # BLD AUTO: 0.13 X10(3) UL (ref 0–1)
IMM GRANULOCYTES NFR BLD: 1.7 %
KETONES UR STRIP.AUTO-MCNC: NEGATIVE MG/DL
LEUKOCYTE ESTERASE UR QL STRIP.AUTO: NEGATIVE
LYMPHOCYTES # BLD AUTO: 0.56 X10(3) UL (ref 1–4)
LYMPHOCYTES NFR BLD AUTO: 7.4 %
MCH RBC QN AUTO: 25.1 PG (ref 26–34)
MCHC RBC AUTO-ENTMCNC: 32.4 G/DL (ref 31–37)
MCV RBC AUTO: 77.3 FL
MONOCYTES # BLD AUTO: 0.19 X10(3) UL (ref 0.1–1)
MONOCYTES NFR BLD AUTO: 2.5 %
NEUTROPHILS # BLD AUTO: 6.58 X10 (3) UL (ref 1.5–7.7)
NEUTROPHILS # BLD AUTO: 6.58 X10(3) UL (ref 1.5–7.7)
NEUTROPHILS NFR BLD AUTO: 87.6 %
NITRITE UR QL STRIP.AUTO: NEGATIVE
OSMOLALITY SERPL CALC.SUM OF ELEC: 287 MOSM/KG (ref 275–295)
PH UR STRIP.AUTO: 6.5 [PH] (ref 5–8)
PLATELET # BLD AUTO: 158 10(3)UL (ref 150–450)
PLATELET MORPHOLOGY: NORMAL
POTASSIUM SERPL-SCNC: 3.6 MMOL/L (ref 3.5–5.1)
PROT SERPL-MCNC: 4.7 G/DL (ref 6.4–8.2)
PROT UR STRIP.AUTO-MCNC: NEGATIVE MG/DL
RBC # BLD AUTO: 3.75 X10(6)UL
RBC UR QL AUTO: NEGATIVE
SODIUM SERPL-SCNC: 139 MMOL/L (ref 136–145)
SP GR UR STRIP.AUTO: 1.02 (ref 1–1.03)
T4 FREE SERPL-MCNC: 1 NG/DL (ref 0.8–1.7)
TSI SER-ACNC: 1.9 MIU/ML (ref 0.36–3.74)
UROBILINOGEN UR STRIP.AUTO-MCNC: NORMAL MG/DL
VIT B12 SERPL-MCNC: 681 PG/ML (ref 193–986)
VIT D+METAB SERPL-MCNC: 20.6 NG/ML (ref 30–100)
WBC # BLD AUTO: 7.5 X10(3) UL (ref 4–11)

## 2024-01-22 PROCEDURE — 80053 COMPREHEN METABOLIC PANEL: CPT | Performed by: FAMILY MEDICINE

## 2024-01-22 PROCEDURE — 82306 VITAMIN D 25 HYDROXY: CPT | Performed by: FAMILY MEDICINE

## 2024-01-22 PROCEDURE — 85025 COMPLETE CBC W/AUTO DIFF WBC: CPT | Performed by: FAMILY MEDICINE

## 2024-01-22 PROCEDURE — 82607 VITAMIN B-12: CPT | Performed by: FAMILY MEDICINE

## 2024-01-22 PROCEDURE — 84443 ASSAY THYROID STIM HORMONE: CPT | Performed by: FAMILY MEDICINE

## 2024-01-22 PROCEDURE — 82746 ASSAY OF FOLIC ACID SERUM: CPT | Performed by: FAMILY MEDICINE

## 2024-01-22 PROCEDURE — 84439 ASSAY OF FREE THYROXINE: CPT | Performed by: FAMILY MEDICINE

## 2024-01-22 NOTE — PROGRESS NOTES
Nelly Potter, 8/28/1935, 88 year old, female    Chief Complaint:    Chief Complaint   Patient presents with    Follow - Up     Urinary retention, weakness, low folate, low vitamin D        Subjective:   TODAY:  Blanca is seen in her room today. Caregiver and son and daughter present at the bedside.   Blanca had urinary retention noted over the weekend. PVRs over 400s x3, straight cath done. UA repeat negative, still on antibiotics now.  Doppler was negative for LLE DVT, it has resolved.  Hip and pelvis xray negative for acute changes, osteoarthritis and hx of arthoplasty.  BLE with edema now posterior leg with wound.    DW daughter/son: to trial very slow taper of dexamethasone to see if she has cany steroid myopathy that might be impacting her strength.   DW her likely causes of weakness could include multiple recent infections, dementia, MM - in remission, dehydration and electrolytes are stable, and steroid myopathy.   TSH, T4 wnl, B12 wnl. Folate and Vit D low adding supplements.   With urinary retention insert gaffney now, stop OAB meds and start flomax. Will attempt voiding trial in 5-7 days. IF not improved will need urology eval  They are in agreement.   Chart review reveals hx bladder cancer, with high PVRs and hx of following with Dr Cheng, [facility].       Denies insomnia, +fatigue, fever/chills, +cough,  SOB, dyspnea, angina, palpitations, n/v, diarrhea, constipation, and urinary sxs.      Objective:  /88   Pulse 85   Temp 97.3 °F (36.3 °C)   Resp 18   Wt 153 lb 6.4 oz (69.6 kg)   SpO2 95%   BMI 29.96 kg/m²     PHYSICAL EXAM:  GENERAL HEALTH: frail elderly female, no acute distress  LINES, TUBES, DRAINS:  none  SKIN: no rashes, no suspicious lesions  WOUND: see wound assessment   EYES: PERRLA, conjunctiva normal; no drainage from eyes  HENT: normocephalic; normal nose, no nasal drainage, mucous membranes pink, moist, rounded face  RESPIRATORY: clear,  + dry cough, no crackles, no rhonchi,  no accessory muscle use; on room air no dyspnea  NO cough with deep breathing on exam or with talking  CARDIOVASCULAR: S1, S2 normal, RRR; BLE with 1+ pretibial and pedal edema  ABDOMEN: normal active BS+, soft, non-distended; non tender  : Deferred  LYMPHATIC: no lymphedema  MUSCULOSKELETAL: no acute synovitis upper or lower extremity.    EXTREMITIES/VASCULAR: no cyanosis, clubbing, pulses intact BLE, BLE with 1+ edema  NEUROLOGIC:  alert and oriented x 2-3, intact; no sensorimotor deficit  PSYCHIATRIC:; affect appropriate, intermittently confused, calm    Therapy:  Not ambulating  Max assist to dependent with transfers and ADLS  DC plan now to continue with long term care/respite on DC. Family working with social work.  Trial of steroid weaning to 3.75 mg daily to see if any myopathy is present, may need neurology input     Medications reviewed: Yes      Current Outpatient Medications:     ipratropium-albuterol 0.5-2.5 (3) MG/3ML Inhalation Solution, Take 3 mL by nebulization every 6 (six) hours while awake., Disp: , Rfl:     ipratropium 0.03 % Nasal Solution, 2 sprays by Nasal route daily as needed for Rhinitis., Disp: , Rfl:     Vibegron 75 MG Oral Tab, Take 75 mg by mouth daily. (Patient not taking: Reported on 1/22/2024), Disp: , Rfl:     multivitamin Oral Chew Tab, Chew 1 tablet by mouth daily., Disp: , Rfl:     dexamethasone 2 MG Oral Tab, Take 3.75 mg by mouth daily with breakfast. Slow taper now to 3.75 starting 1/19/24, Disp: , Rfl:     acidophilus-pectin Oral Cap, Take 1 capsule by mouth daily., Disp: , Rfl:     midodrine 5 MG Oral Tab, Take 1 tablet (5 mg total) by mouth every 8 (eight) hours as needed (SBP less than 95 and DBP less than 50)., Disp: , Rfl:     sennosides 8.6 MG Oral Tab, Take 2 tablets (17.2 mg total) by mouth at bedtime., Disp: , Rfl:     Solifenacin Succinate 10 MG Oral Tab, Take 1 tablet (10 mg total) by mouth daily. (Patient not taking: Reported on 1/22/2024), Disp: , Rfl:      polyethylene glycol, PEG 3350, 17 g Oral Powd Pack, Take 17 g by mouth daily as needed (constipation)., Disp: , Rfl:     acetaminophen 325 MG Oral Tab, Take 2 tablets (650 mg total) by mouth every 4 (four) hours as needed for Pain., Disp: , Rfl:     rivaroxaban (XARELTO) 20 MG Oral Tab, Take 1 tablet (20 mg total) by mouth daily with food., Disp: , Rfl:     sertraline 25 MG Oral Tab, Take 1 tablet (25 mg total) by mouth daily., Disp: , Rfl:     amLODIPine 2.5 MG Oral Tab, Take 1 tablet (2.5 mg total) by mouth daily., Disp: , Rfl:     gabapentin 300 MG Oral Cap, Take 1 capsule (300 mg total) by mouth 3 (three) times daily. (Patient taking differently: Take 1 capsule (300 mg total) by mouth in the morning and 1 capsule (300 mg total) before bedtime.), Disp: 270 capsule, Rfl: 1    fexofenadine 180 MG Oral Tab, Take 1 tablet (180 mg total) by mouth daily as needed., Disp: 90 tablet, Rfl: 3    lansoprazole 30 MG Oral Capsule Delayed Release, Take 1 capsule (30 mg total) by mouth daily. Before meal, Disp: 90 capsule, Rfl: 3    atorvastatin 10 MG Oral Tab, Take 1 tablet (10 mg total) by mouth daily., Disp: 90 tablet, Rfl: 3    Docusate Sodium 100 MG Oral Cap, Take 1 capsule (100 mg total) by mouth daily as needed for constipation., Disp: , Rfl:       Diagnostics reviewed:    Lab Results   Component Value Date    WBC 7.5 01/22/2024    RBC 3.75 (L) 01/22/2024    HGB 9.4 (L) 01/22/2024    HCT 29.0 (L) 01/22/2024    MCV 77.3 (L) 01/22/2024    MCH 25.1 (L) 01/22/2024    MCHC 32.4 01/22/2024    RDW 23.7 01/22/2024    .0 01/22/2024     Lab Results   Component Value Date    GLU 78 01/22/2024    BUN 12 01/22/2024    BUNCREA 17.0 10/08/2021    CREATSERUM 0.39 (L) 01/22/2024    ANIONGAP 7 01/22/2024    CA 8.0 (L) 01/22/2024    OSMOCALC 287 01/22/2024    ALKPHO 75 01/22/2024    AST <3 (L) 01/22/2024    ALT  01/22/2024      Comment:      Due to  backorder we are temporarily unable to offer hospital-based ALT testing  at [facility].   If urgently needed, please order ALT test code [id number].   The new order will need a new venipuncture and will be sent to [facility] for testing.   The expected turnaround time will be within 24 hours.     BILT 0.4 01/22/2024    TP 4.7 (L) 01/22/2024    ALB 2.3 (L) 01/22/2024    GLOBULIN 2.4 (L) 01/22/2024    AGRATIO 2.1 09/11/2013     01/22/2024    K 3.6 01/22/2024     01/22/2024    CO2 28.0 01/22/2024     Lab Results   Component Value Date    VITD 20.6 (L) 01/22/2024     Lab Results   Component Value Date    B12 681 01/22/2024     Lab Results   Component Value Date    FOLIC 7.9 (L) 01/22/2024     Lab Results   Component Value Date    T4F 1.0 01/22/2024    TSH 1.900 01/22/2024       Assessment and plan:    UTI ECOLI ESBL  - on call had placed on merpenem again, DW ID< no symptoms. No fevers, WBC snl  Change to Macrobid 1/15-1/22  and monitor  NO fevers, no chills, no nausea, labs stable to improved  CBC, BMP weekly and prn    Now with urinary retention  PVRs x3 over 400  Insert gaffney  Start flomax daily  Will attempt voiding trial in 5-7 days  Stop OAB meds for now  May need urology follow up if not successful - has seen Dr Cheng with [facility] in the past    Folate low  Start folic acid daily    Low vitamin D  Start vit D3 50K weekly for 8 weeks  Repeat in 10 weeks    RSV, post viral cough - improving  Robitussin prn  Duonebs Q6 prn  Monitoring VS q shift and prn  Cough dry, labs stable monitoring  DW ID NP    Weakness, deconditioning  Multiple recent infections  Hx MM in remission  Dementia - functional decline?  Depression - increased sertraline to 50 mg daily mood appears improved  Not on any narcotics  Has anemia on iron and lansoprazole, was to have EGD but got clot off AC so now on hold, monitoring CBC 10.3-9.8  ? 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 Urology consulted in hospital Ecoli ESBL , ertapenem here completed now on Macrobid  Monitor symptoms   Solifenacin succinate 10 mg daily  holding now  Vibegron 75 mg daily holding now  Insert gaffney and start flomax  Plan voiding trial in 5-7 days  Repeat UA negative  Monitoring  Labs  CBC, CMP stable     PE/ BLE DVT/ A fib   Monitor symptoms: improving less  SOB and fatigue  Vital signs q shift and prn   Labs weekly and prn   Xarelto 20 mg daily  DW daughter sx, treatment and follow up for PE - will need CT chest within 6 mos to stop meds  Also noted US + DVT BLE on 12/2/23 no  compression socks, elevate for edema only   Repeat doppler for monitoring of DVT and to check for resolution - DW nursing - negative may use compression socks        HTN/HL/CAD  BRENNA on 12/2 EF 55% normal RV size function   Vital signs q shift and prn   Labs weekly and prn   Amlodipine 2.5 mg daily   Atorvastatin 10 mg nightly   BP controlled     Dementia   Monitor   Emotional support offered  Monitoring for safety  ? Functional decline  If not improved with steroid wean consider neuro eval     Multiple Myeloma   Monitor labs   Outpatient follow up labs drawn for Heme/Onc follow up - faxed and they received, she is stable per PA, no new orders  Remains on steroids now at slow taper     GERD/Known Duodenal ulcer / GI prophylaxis  EGD scheduled and patient off AC for procedure but then developed PE. EGD on hold for now.   Monitor symptoms   Monitor labs  Lansoprazole 30 mg daily   Follow up with GI outpatient   CBC  weekly, Hgbs 10.3,  9.8  Iron daily  Added folate as well level was low     Hypotension  - managed  Initially on pressors in hospital. On high dose steroids decreased down to Dexamethasone 4 mg daily- records state need to be titrated down  DW daughter, start at 3.75 daily now and monitor   Monitor vital signs  Midodrine 5 mg every 8 hrs prn for SBP less than 95 and DBP less than 50      Hx depression   Monitor symptoms   Sertraline 50 mg daily now and monitoring  Appears improved mood      Neuropathy  Gabapentin 300 mg bid      Allergic rhinitis  Monitor symptoms   Fexofenadine 180 mg daily prn   Ipratropium 0.03% 2 spray daily prn      DVT Prophylaxis   Encourage early mobilization and participation in PT/OT as able  Xarelto      Pain Management  Offer to pre-medicate 30-60 min prior to therapy  Physiatry evaluation with management appreciated  Acetaminophen 650 mg every 4 hrs prn      Bowel Management Regimen/Constipation   Monitor BM status   Colace 100 mg daily prn   Miralax daily prn   Senna 2 tabs nightly      Vitamins/supplements as r/t deficiencies  [facility] RD to follow while in rehab; supplementation/diet as per [facility] RD  May continue home supplements  MVI daily      LABS  CBC/CMP weekly while in [facility]-    *Follow-Up with PCP within 1-2 weeks following [facility] discharge.     Follow up with Urologist at discharge for recurrent UTIs  Follow up with Heme/Onc at discharge for steroid taper  Follow up with GI outpatient     *Established patient; follow-up moderately complex visit/ greater than 30     36 minutes spent w/ patient and staff, including but not limited to/ reviewing present status, needs, abilities with disciplines, reviewing medical records, vital signs, labs, completing medication reconciliation and entering orders for continued care in [facility].       Note to patient: The 21st Century Cures Act makes medical notes like these available to patients in the interest of transparency. However, this is a medical document intended as peer  to peer communication. It is written in medical language and may contain abbreviations or verbiage that are unfamiliar. It may appear blunt or direct. Medical documents are intended to carry relevant information, facts as evident, and the clinical opinion of the practitioner who signs the document.    Jessie Villagran, APRN  1/22/2024

## 2024-01-25 ENCOUNTER — SNF VISIT (OUTPATIENT)
Dept: INTERNAL MEDICINE CLINIC | Age: 89
End: 2024-01-25

## 2024-01-25 DIAGNOSIS — N39.0 FREQUENT UTI: ICD-10-CM

## 2024-01-25 DIAGNOSIS — C90.00 MULTIPLE MYELOMA, REMISSION STATUS UNSPECIFIED (HCC): ICD-10-CM

## 2024-01-25 DIAGNOSIS — F32.A DEPRESSION, UNSPECIFIED DEPRESSION TYPE: ICD-10-CM

## 2024-01-25 DIAGNOSIS — E55.9 VITAMIN D DEFICIENCY: ICD-10-CM

## 2024-01-25 DIAGNOSIS — R53.1 WEAKNESS: ICD-10-CM

## 2024-01-25 DIAGNOSIS — Z78.9 DECREASED ACTIVITIES OF DAILY LIVING (ADL): ICD-10-CM

## 2024-01-25 DIAGNOSIS — G93.39 OTHER POST INFECTION AND RELATED FATIGUE SYNDROMES: ICD-10-CM

## 2024-01-25 DIAGNOSIS — R33.9 URINARY RETENTION WITH INCOMPLETE BLADDER EMPTYING: ICD-10-CM

## 2024-01-25 DIAGNOSIS — D50.0 IRON DEFICIENCY ANEMIA DUE TO CHRONIC BLOOD LOSS: ICD-10-CM

## 2024-01-25 DIAGNOSIS — R10.32 LEFT LOWER QUADRANT ABDOMINAL PAIN: Primary | ICD-10-CM

## 2024-01-25 DIAGNOSIS — D52.0 DIETARY FOLATE DEFICIENCY ANEMIA: ICD-10-CM

## 2024-01-25 PROCEDURE — 99310 SBSQ NF CARE HIGH MDM 45: CPT | Performed by: NURSE PRACTITIONER

## 2024-01-26 ENCOUNTER — LAB REQUISITION (OUTPATIENT)
Dept: LAB | Facility: HOSPITAL | Age: 89
End: 2024-01-26
Payer: MEDICARE

## 2024-01-26 ENCOUNTER — TELEPHONE (OUTPATIENT)
Dept: INTERNAL MEDICINE CLINIC | Age: 89
End: 2024-01-26

## 2024-01-26 VITALS
OXYGEN SATURATION: 95 % | TEMPERATURE: 98 F | WEIGHT: 155.38 LBS | HEART RATE: 88 BPM | DIASTOLIC BLOOD PRESSURE: 90 MMHG | SYSTOLIC BLOOD PRESSURE: 130 MMHG | RESPIRATION RATE: 16 BRPM | BODY MASS INDEX: 30 KG/M2

## 2024-01-26 DIAGNOSIS — N39.0 URINARY TRACT INFECTION, SITE NOT SPECIFIED: ICD-10-CM

## 2024-01-26 LAB
BILIRUB UR QL STRIP.AUTO: NEGATIVE
COLOR UR AUTO: YELLOW
GLUCOSE UR STRIP.AUTO-MCNC: NORMAL MG/DL
KETONES UR STRIP.AUTO-MCNC: NEGATIVE MG/DL
LEUKOCYTE ESTERASE UR QL STRIP.AUTO: 500
PH UR STRIP.AUTO: 6 [PH] (ref 5–8)
RBC UR QL AUTO: NEGATIVE
SP GR UR STRIP.AUTO: 1.03 (ref 1–1.03)
UROBILINOGEN UR STRIP.AUTO-MCNC: NORMAL MG/DL
WBC #/AREA URNS AUTO: >50 /HPF

## 2024-01-26 PROCEDURE — 87086 URINE CULTURE/COLONY COUNT: CPT | Performed by: NURSE PRACTITIONER

## 2024-01-26 PROCEDURE — 81001 URINALYSIS AUTO W/SCOPE: CPT | Performed by: NURSE PRACTITIONER

## 2024-01-26 PROCEDURE — 87077 CULTURE AEROBIC IDENTIFY: CPT | Performed by: NURSE PRACTITIONER

## 2024-01-26 PROCEDURE — 87186 SC STD MICRODIL/AGAR DIL: CPT | Performed by: NURSE PRACTITIONER

## 2024-01-26 NOTE — PROGRESS NOTES
Nelly Potter, 8/28/1935, 88 year old, female    Chief Complaint:    Chief Complaint   Patient presents with    Follow - Up     Abdominal pain, urinary retention        Subjective:   TODAY:  Blanca is seen in the wheelchair today. Caregiver is present.  Follow up on abdominal pain. Blanca denies at this time. Had BM per nursing overnight last night.   KUB pending for abdominal pains.     With urinary retention insert gaffney now, stopped OAB meds and started flomax.   Antibiotics done now, repeat UA tomorrow.   Will attempt voiding trial in 5-7 days. IF not improved will need urology eval  They are in agreement.   Chart review reveals hx bladder cancer, with high PVRs and hx of following with Dr Cheng, [facility].       Denies insomnia, +fatigue, fever/chills, +cough,  SOB, dyspnea, angina, palpitations, n/v, diarrhea, constipation, and urinary sxs.      Objective:  /90   Pulse 88   Temp 97.9 °F (36.6 °C)   Resp 16   Wt 155 lb 6.4 oz (70.5 kg)   SpO2 95%   BMI 30.35 kg/m²     PHYSICAL EXAM:  GENERAL HEALTH: frail elderly female, no acute distress  LINES, TUBES, DRAINS:  none  SKIN: no rashes, no suspicious lesions  WOUND: see wound assessment   EYES: PERRLA, conjunctiva normal; no drainage from eyes  HENT: normocephalic; normal nose, no nasal drainage, mucous membranes pink, moist, rounded face  RESPIRATORY: clear,  + minimal dry cough, no crackles, no rhonchi, no accessory muscle use; on room air no dyspnea  No cough with deep breathing on exam or with talking  CARDIOVASCULAR: S1, S2 normal, RRR; BLE with 1+ pretibial and pedal edema  ABDOMEN: normal active BS+, soft, non-distended; non tender, small firm area noted left mid abdomen, nontender  : Deferred  LYMPHATIC: no lymphedema  MUSCULOSKELETAL: no acute synovitis upper or lower extremity.    EXTREMITIES/VASCULAR: no cyanosis, clubbing, pulses intact BLE, BLE with 1+ edema  NEUROLOGIC:  alert and oriented x 2-3, intact; no sensorimotor  deficit  PSYCHIATRIC:; affect appropriate, intermittently confused, calm    Therapy:  Not ambulating  Max assist to dependent with transfers and ADLS  DC plan:  Continue at [facility] in long term care. Family working with social work.  Trial of steroid weaning to 3.75 mg daily to see if any myopathy is present, may need neurology input     Medications reviewed: Yes      Current Outpatient Medications:     ipratropium-albuterol 0.5-2.5 (3) MG/3ML Inhalation Solution, Take 3 mL by nebulization every 6 (six) hours while awake., Disp: , Rfl:     ipratropium 0.03 % Nasal Solution, 2 sprays by Nasal route daily as needed for Rhinitis., Disp: , Rfl:     Vibegron 75 MG Oral Tab, Take 75 mg by mouth daily. (Patient not taking: Reported on 1/22/2024), Disp: , Rfl:     multivitamin Oral Chew Tab, Chew 1 tablet by mouth daily., Disp: , Rfl:     dexamethasone 2 MG Oral Tab, Take 3.75 mg by mouth daily with breakfast. Slow taper now to 3.75 starting 1/19/24, Disp: , Rfl:     acidophilus-pectin Oral Cap, Take 1 capsule by mouth daily., Disp: , Rfl:     midodrine 5 MG Oral Tab, Take 1 tablet (5 mg total) by mouth every 8 (eight) hours as needed (SBP less than 95 and DBP less than 50)., Disp: , Rfl:     sennosides 8.6 MG Oral Tab, Take 2 tablets (17.2 mg total) by mouth at bedtime., Disp: , Rfl:     Solifenacin Succinate 10 MG Oral Tab, Take 1 tablet (10 mg total) by mouth daily. (Patient not taking: Reported on 1/22/2024), Disp: , Rfl:     polyethylene glycol, PEG 3350, 17 g Oral Powd Pack, Take 17 g by mouth daily as needed (constipation)., Disp: , Rfl:     acetaminophen 325 MG Oral Tab, Take 2 tablets (650 mg total) by mouth every 4 (four) hours as needed for Pain., Disp: , Rfl:     rivaroxaban (XARELTO) 20 MG Oral Tab, Take 1 tablet (20 mg total) by mouth daily with food., Disp: , Rfl:     sertraline 25 MG Oral Tab, Take 1 tablet (25 mg total) by mouth daily., Disp: , Rfl:     amLODIPine 2.5 MG Oral Tab, Take 1 tablet (2.5 mg  total) by mouth daily., Disp: , Rfl:     gabapentin 300 MG Oral Cap, Take 1 capsule (300 mg total) by mouth 3 (three) times daily. (Patient taking differently: Take 1 capsule (300 mg total) by mouth in the morning and 1 capsule (300 mg total) before bedtime.), Disp: 270 capsule, Rfl: 1    fexofenadine 180 MG Oral Tab, Take 1 tablet (180 mg total) by mouth daily as needed., Disp: 90 tablet, Rfl: 3    lansoprazole 30 MG Oral Capsule Delayed Release, Take 1 capsule (30 mg total) by mouth daily. Before meal, Disp: 90 capsule, Rfl: 3    atorvastatin 10 MG Oral Tab, Take 1 tablet (10 mg total) by mouth daily., Disp: 90 tablet, Rfl: 3    Docusate Sodium 100 MG Oral Cap, Take 1 capsule (100 mg total) by mouth daily as needed for constipation., Disp: , Rfl:       Diagnostics reviewed:    Lab Results   Component Value Date    WBC 7.5 01/22/2024    RBC 3.75 (L) 01/22/2024    HGB 9.4 (L) 01/22/2024    HCT 29.0 (L) 01/22/2024    MCV 77.3 (L) 01/22/2024    MCH 25.1 (L) 01/22/2024    MCHC 32.4 01/22/2024    RDW 23.7 01/22/2024    .0 01/22/2024     Lab Results   Component Value Date    GLU 78 01/22/2024    BUN 12 01/22/2024    BUNCREA 17.0 10/08/2021    CREATSERUM 0.39 (L) 01/22/2024    ANIONGAP 7 01/22/2024    CA 8.0 (L) 01/22/2024    OSMOCALC 287 01/22/2024    ALKPHO 75 01/22/2024    AST <3 (L) 01/22/2024    ALT  01/22/2024      Comment:      Due to  backorder we are temporarily unable to offer hospital-based ALT testing at [facility].   If urgently needed, please order ALT test code [id number].   The new order will need a new venipuncture and will be sent to [facility] for testing.   The expected turnaround time will be within 24 hours.     BILT 0.4 01/22/2024    TP 4.7 (L) 01/22/2024    ALB 2.3 (L) 01/22/2024    GLOBULIN 2.4 (L) 01/22/2024    AGRATIO 2.1 09/11/2013     01/22/2024    K 3.6 01/22/2024     01/22/2024    CO2 28.0 01/22/2024     Assessment and plan:    Intermittent  abdominal  pain  Check KUB, ordered 1/24 will be done 1/25 per nursing  No fevers, no chills, no vomiting  Non tender abdomen on exam  Pain mild per patient  Having BM with miralax now    UTI ECOLI ESBL  - on call had placed on merpenem again, DW ID: no symptoms. No fevers, WBC snl  Macrobid now completed  NO fevers, no chills, no nausea, labs stable to improved  CBC, BMP weekly and prn    Now with urinary retention  PVRs x3 over 400  Inserted gaffney  Start flomax daily  Will attempt voiding trial in 5-7 days  Stop OAB meds for now  May need urology follow up if not successful - has seen Dr Cheng with [facility] in the past  UA repeat tomorrow    Folate low  Start folic acid daily    Low vitamin D  Start vit D3 50K weekly for 8 weeks  Repeat in 10 weeks    RSV, post viral cough - much improved  Robitussin prn  Duonebs Q6 prn  Monitoring VS q shift and prn  Cough dry, labs stable monitoring  DW ID NP    Weakness, deconditioning   likely causes of weakness could include multiple recent infections, dementia, MM - in remission,  and steroid myopathy.   Multiple recent infections  Hx MM in remission  Dementia - functional decline?  Depression - increased sertraline to 50 mg daily mood appears improved  Not on any narcotics  Has anemia on iron and lansoprazole, was to have EGD but got clot off AC so now on hold, monitoring CBC 10.3-9.8, 9.3  ? 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 Urology  consulted in hospital Ecoli ESBL , ertapenem- completed  Macrobid Completed  Monitor symptoms   Solifenacin succinate 10 mg daily  holding now  Vibegron 75 mg daily holding now  Inserted gaffney and start flomax  Plan voiding trial in 5-7 days  Repeat UA tomorrow  Monitoring  Labs  CBC, CMP stable     PE/ BLE DVT/ A fib   Monitor symptoms: improving less  SOB and fatigue  Vital signs q shift and prn   Labs weekly and prn   Xarelto 20 mg daily  DW daughter sx, treatment and follow up for PE - will need CT chest within 6 mos to stop meds  Also noted US + DVT BLE on 12/2/23  Repeat doppler for monitoring of DVT and to check for resolution - DW nursing - negative may use compression socks now        HTN/HL/CAD  BRENNA on 12/2 EF 55% normal RV size function   Vital signs q shift and prn   Labs weekly and prn   Amlodipine 2.5 mg daily   Atorvastatin 10 mg nightly   BP controlled     Dementia   Monitor   Emotional support offered  Monitoring for safety  ? Functional decline  If not improved with steroid wean consider neuro eval but this could be difficult to obtain with large stress on patient to get to an appointment     Multiple Myeloma   Monitor labs   Outpatient follow up labs drawn for Heme/Onc follow up - faxed and they received, she is stable per PA, no new orders  Remains on steroids now at slow taper     GERD/Known Duodenal ulcer / GI prophylaxis  EGD scheduled and patient off AC for procedure but then developed PE. EGD on hold for now.   Monitor symptoms   Monitor labs  Lansoprazole 30 mg daily   Follow up with GI outpatient   CBC  weekly, Hgbs 10.3, 9.8, 9.3  Iron daily  Folate as well level was low     Hypotension  - managed  Initially on pressors in hospital. On high dose steroids decreased down to Dexamethasone 4 mg daily- records state need to be titrated down  DW daughter, start at 3.75 daily now and monitor   Monitor vital signs  Midodrine 5 mg every 8 hrs prn for SBP less than 95 and DBP less than 50      Hx  depression   Monitor symptoms   Sertraline 50 mg daily now and monitoring  Appears improved mood      Neuropathy  Gabapentin 300 mg bid      Allergic rhinitis  Monitor symptoms   Fexofenadine 180 mg daily prn   Ipratropium 0.03% 2 spray daily prn      DVT Prophylaxis   Encourage early mobilization and participation in PT/OT as able  Xarelto      Pain Management  Offer to pre-medicate 30-60 min prior to therapy  Physiatry evaluation with management appreciated  Acetaminophen 650 mg every 4 hrs prn      Bowel Management Regimen/Constipation   Monitor BM status   Colace 100 mg daily prn   Miralax daily prn   Senna 2 tabs nightly      Vitamins/supplements as r/t deficiencies  [facility] RD to follow while in rehab; supplementation/diet as per [facility] RD  May continue home supplements  MVI daily      LABS  CBC/CMP weekly while in [facility]-    *Follow-Up with PCP within 1-2 weeks following [facility] discharge.     Follow up with Urologist at discharge for recurrent UTIs  Follow up with Heme/Onc at discharge for steroid taper  Follow up with GI outpatient   Consider Neurology eval      *Established patient; follow-up complex visit/ greater than 40    48 minutes spent w/ patient and staff, including but not limited to/ reviewing present status, needs, abilities with disciplines, reviewing medical records, vital signs, labs, completing medication reconciliation and entering orders for continued care in [facility].    Note to patient: The 21st Century Cures Act makes medical notes like these available to patients in the interest of transparency. However, this is a medical document intended as peer to peer communication. It is written in medical language and may contain abbreviations or verbiage that are unfamiliar. It may appear blunt or direct. Medical documents are intended to carry relevant information, facts as evident, and the clinical opinion of the practitioner who signs the document.    Jessie Villagran, APRN  1/25/2024

## 2024-01-26 NOTE — TELEPHONE ENCOUNTER
Called to see patient by nurse and son today.  She has been having intermittent abdominal pains. Son states since she moved to her new room she has been more agitated, confused. She is not eating well only vegetables, no proteins. She is more anxious and breathing fast at times.    KUB ordered, abdominal US was done it appears normal. KUB just now done and results pending.  repeat UA was done today as well, results pending.    Patient is seen lying in bed today. She is moaning and saying help me>  Balnca denies pain, nausea, vomiting, chills, appears confused and more agitated than usual.  Her gaffney is draining well and has yellow urine with white sediment and clumps noted.  Her belly appears distended. Bowel sounds present. she has diffuse abdominal tenderness today. NO fevers, no chills. Per documentation she had BM yesterday on evening shift and caregiver states she had one yesterday afternoon as well.    Son is worried she is not eating and more confused and thinks something might be wrong.    DW him, urinalysis is pending and so is KUB. VSS now. will monitor and make adjustments once results are in.  Adding colace as BID from Prn and she is on senna in the evening already.  Has Miralax prn and adding dulcolax prn.  updated Dr Amor and he will round later today as well.    Jessie PATIÑO   12:29 1/26/24      Updated patient family (son at bedside) on results. KUB with gaseous distention. Ultrasound, no hydronephrosis gaffney is draining the bladder well.  UA looks infected.  Consult to in house ID and case reviewed.  Stat CBC, CMP, Mag, Phos amylase and lipase. Procalcitonin. Stat chest xray, Stat midline  start IV antibiotics.  DW family, may need urology input if we can get her out to an appointment.  Not normal to have four back to back UTIs may have an additional process complicating her  tract.    DW nursing  Updated MD Jessie PATIÑO  1/26/24 13:29

## 2024-01-27 ENCOUNTER — APPOINTMENT (OUTPATIENT)
Dept: LAB | Age: 89
End: 2024-01-27
Attending: NURSE PRACTITIONER
Payer: MEDICARE

## 2024-01-29 ENCOUNTER — SNF VISIT (OUTPATIENT)
Dept: INTERNAL MEDICINE CLINIC | Age: 89
End: 2024-01-29

## 2024-01-29 ENCOUNTER — APPOINTMENT (OUTPATIENT)
Dept: LAB | Age: 89
End: 2024-01-29
Attending: NURSE PRACTITIONER
Payer: MEDICARE

## 2024-01-29 VITALS
BODY MASS INDEX: 30 KG/M2 | TEMPERATURE: 98 F | OXYGEN SATURATION: 96 % | HEART RATE: 78 BPM | RESPIRATION RATE: 18 BRPM | SYSTOLIC BLOOD PRESSURE: 135 MMHG | WEIGHT: 155.38 LBS | DIASTOLIC BLOOD PRESSURE: 81 MMHG

## 2024-01-29 DIAGNOSIS — R10.32 LEFT LOWER QUADRANT ABDOMINAL PAIN: ICD-10-CM

## 2024-01-29 DIAGNOSIS — C90.00 MULTIPLE MYELOMA, REMISSION STATUS UNSPECIFIED (HCC): ICD-10-CM

## 2024-01-29 DIAGNOSIS — N39.0 FREQUENT UTI: Primary | ICD-10-CM

## 2024-01-29 DIAGNOSIS — G93.39 OTHER POST INFECTION AND RELATED FATIGUE SYNDROMES: ICD-10-CM

## 2024-01-29 DIAGNOSIS — R33.9 URINARY RETENTION WITH INCOMPLETE BLADDER EMPTYING: ICD-10-CM

## 2024-01-29 DIAGNOSIS — Z78.9 DECREASED ACTIVITIES OF DAILY LIVING (ADL): ICD-10-CM

## 2024-01-29 DIAGNOSIS — Z16.12 INFECTION DUE TO ESBL-PRODUCING ESCHERICHIA COLI: ICD-10-CM

## 2024-01-29 DIAGNOSIS — D50.0 IRON DEFICIENCY ANEMIA DUE TO CHRONIC BLOOD LOSS: ICD-10-CM

## 2024-01-29 DIAGNOSIS — R53.1 WEAKNESS: ICD-10-CM

## 2024-01-29 DIAGNOSIS — A49.8 INFECTION DUE TO ESBL-PRODUCING ESCHERICHIA COLI: ICD-10-CM

## 2024-01-29 RX ORDER — TAMSULOSIN HYDROCHLORIDE 0.4 MG/1
0.4 CAPSULE ORAL EVERY EVENING
COMMUNITY

## 2024-01-29 NOTE — PROGRESS NOTES
Nelly Potter, 8/28/1935, 88 year old, female    Chief Complaint:    Chief Complaint   Patient presents with    Follow - Up     UTI, urinary retention, weakness, abdominal pain  and bloating        Subjective:   TODAY:  Blanca is seen in the wheelchair today. Caregiver and son are present.  Follow up on abdominal pain, UTI, retention  Blanca denies abdominal pain or nausea at this time.   Having smears of BM with all changes now, abdomen no distention, eating well.   KUB showed gaseous distention and gas x is helping. Iron changed to slow FE as well. Mental status improved to baseline, alert, conversant, sitting up in the wheelchair today.     Repeat UA/culture noted, on meropenem now IV  per ID NP.   Continues on Flomax, voiding trial started this am.   Hx following with Dr Cheng, [facility].       Denies insomnia, +fatigue, fever/chills, cough,  SOB, dyspnea, angina, palpitations, n/v, diarrhea, constipation, and urinary sxs.      Objective:  /81   Pulse 78   Temp 97.7 °F (36.5 °C)   Resp 18   Wt 155 lb 6.4 oz (70.5 kg)   SpO2 96%   BMI 30.35 kg/m²     PHYSICAL EXAM:  GENERAL HEALTH: frail elderly female, no acute distress  LINES, TUBES, DRAINS:  Left upper arm with midline IV, some dependent edema  SKIN: no rashes, no suspicious lesions  WOUND: see wound assessment   EYES: PERRLA, conjunctiva normal; no drainage from eyes  HENT: normocephalic; normal nose, no nasal drainage, mucous membranes pink, moist, rounded face  RESPIRATORY: clear,  no cough, no crackles, no rhonchi, no accessory muscle use; on room air no dyspnea  No cough with deep breathing on exam or with talking  CARDIOVASCULAR: S1, S2 normal, RRR; BLE no edema  ABDOMEN: normal active BS+, soft, non-distended; non tender,  : Deferred  LYMPHATIC: no lymphedema  MUSCULOSKELETAL: no acute synovitis upper or lower extremity.    EXTREMITIES/VASCULAR: no cyanosis, clubbing, pulses intact BLE, LUE with dependent cool edema distal to IV site,  site itself clean and no redness or drainage  NEUROLOGIC:  alert and oriented x 2-3, intact; no sensorimotor deficit  PSYCHIATRIC:; affect appropriate, intermittently confused, calm    Therapy:  Not ambulating  Max assist to dependent with transfers and ADLS  DC plan:  Continue at [facility] in long term care. Family working with social work.  Trial of steroid weaning to 3.75 mg daily to see if any myopathy is present, may need neurology input     Medications reviewed: Yes      Current Outpatient Medications:     tamsulosin 0.4 MG Oral Cap, Take 1 capsule (0.4 mg total) by mouth every evening., Disp: , Rfl:     ipratropium-albuterol 0.5-2.5 (3) MG/3ML Inhalation Solution, Take 3 mL by nebulization every 6 (six) hours while awake., Disp: , Rfl:     ipratropium 0.03 % Nasal Solution, 2 sprays by Nasal route daily as needed for Rhinitis., Disp: , Rfl:     Vibegron 75 MG Oral Tab, Take 75 mg by mouth daily. (Patient not taking: Reported on 1/22/2024), Disp: , Rfl:     multivitamin Oral Chew Tab, Chew 1 tablet by mouth daily., Disp: , Rfl:     dexamethasone 2 MG Oral Tab, Take 3.75 mg by mouth daily with breakfast. Slow taper now to 3.75 starting 1/19/24, Disp: , Rfl:     acidophilus-pectin Oral Cap, Take 1 capsule by mouth daily., Disp: , Rfl:     midodrine 5 MG Oral Tab, Take 1 tablet (5 mg total) by mouth every 8 (eight) hours as needed (SBP less than 95 and DBP less than 50)., Disp: , Rfl:     sennosides 8.6 MG Oral Tab, Take 2 tablets (17.2 mg total) by mouth daily as needed., Disp: , Rfl:     Solifenacin Succinate 10 MG Oral Tab, Take 1 tablet (10 mg total) by mouth daily. (Patient not taking: Reported on 1/22/2024), Disp: , Rfl:     polyethylene glycol, PEG 3350, 17 g Oral Powd Pack, Take 17 g by mouth daily as needed (constipation)., Disp: , Rfl:     acetaminophen 325 MG Oral Tab, Take 2 tablets (650 mg total) by mouth every 4 (four) hours as needed for Pain., Disp: , Rfl:     rivaroxaban (XARELTO) 20 MG Oral Tab,  Take 1 tablet (20 mg total) by mouth daily with food., Disp: , Rfl:     sertraline 25 MG Oral Tab, Take 1 tablet (25 mg total) by mouth daily., Disp: , Rfl:     amLODIPine 2.5 MG Oral Tab, Take 1 tablet (2.5 mg total) by mouth daily., Disp: , Rfl:     gabapentin 300 MG Oral Cap, Take 1 capsule (300 mg total) by mouth 3 (three) times daily. (Patient taking differently: Take 1 capsule (300 mg total) by mouth in the morning and 1 capsule (300 mg total) before bedtime.), Disp: 270 capsule, Rfl: 1    fexofenadine 180 MG Oral Tab, Take 1 tablet (180 mg total) by mouth daily as needed., Disp: 90 tablet, Rfl: 3    lansoprazole 30 MG Oral Capsule Delayed Release, Take 1 capsule (30 mg total) by mouth daily. Before meal, Disp: 90 capsule, Rfl: 3    atorvastatin 10 MG Oral Tab, Take 1 tablet (10 mg total) by mouth daily., Disp: 90 tablet, Rfl: 3    Docusate Sodium 100 MG Oral Cap, Take 1 capsule (100 mg total) by mouth daily as needed for constipation., Disp: , Rfl:       Diagnostics reviewed:    Lab Results   Component Value Date    WBC 4.8 01/29/2024    RBC 3.67 (L) 01/29/2024    HGB 9.3 (L) 01/29/2024    HCT 29.3 (L) 01/29/2024    MCV 79.8 (L) 01/29/2024    MCH 25.3 (L) 01/29/2024    MCHC 31.7 01/29/2024    RDW 24.3 01/29/2024    .0 01/29/2024     Lab Results   Component Value Date    GLU 90 01/29/2024    BUN 8 (L) 01/29/2024    BUNCREA 17.0 10/08/2021    CREATSERUM 0.34 (L) 01/29/2024    ANIONGAP 6 01/29/2024    CA 7.8 (L) 01/29/2024    OSMOCALC 274 (L) 01/29/2024    ALKPHO 65 01/29/2024    AST 9 (L) 01/29/2024    ALT  01/29/2024      Comment:      Due to  backorder we are temporarily unable to offer hospital-based ALT testing at [facility].   If urgently needed, please order ALT test code [id number].   The new order will need a new venipuncture and will be sent to [facility] for testing.   The expected turnaround time will be within 24 hours.     BILT 0.4 01/29/2024    TP 4.8 (L) 01/29/2024    ALB 2.3  (L) 01/29/2024    GLOBULIN 2.5 (L) 01/29/2024    AGRATIO 2.1 09/11/2013     (L) 01/29/2024    K 3.6 01/29/2024    CL 99 01/29/2024    CO2 28.0 01/29/2024     Component      Latest Ref Rng 1/27/2024 1/29/2024   Magnesium, Serum      1.6 - 2.6 mg/dL 2.5  2.5    PHOSPHORUS      2.5 - 4.9 mg/dL 3.2  3.0    Amylase, Serum      25 - 115 U/L 24 (L)  23 (L)    Lipase      <=300 U/L 31  33    PROCALCITONIN      <0.16 ng/mL <0.05  <0.05       Legend:  (L) Low  Collected 1/26/2024  4:00 AM       Status: Final result       Dx: Urinary tract infection, site not spe...    Specimen Information: Urine, clean catch   0 Result Notes  URINE CULTURE >100,000 CFU/ML Escherichia coli  ESBL Pos Abnormal    Meropenem is the drug of choice for ESBL. Pip/Tazo is effective for ESBL E.coli originating from the urinary tract”.   >100,000 CFU/ML Pseudomonas aeruginosa Abnormal            Resulting Agency: [facility] ([facility])     Susceptibility     Escherichia coli  ESBL Pos Pseudomonas aeruginosa     Not Specified Not Specified    Cefazolin >=64 Resistant      Cefepime  Resistant 16 Intermediate    Ceftazidime  Resistant 4 Sensitive    Ceftriaxone >=64 Resistant      Ciprofloxacin >=4 Resistant >2 Resistant    Gentamicin <=1 Sensitive      Levofloxacin >=8 Resistant >4 Resistant    Meropenem <=0.25 Sensitive <=1 Sensitive    Nitrofurantoin <=16 Sensitive      Piperacillin + Tazobactam <=4 Sensitive <=4 Sensitive    Tobramycin   2 Sensitive    Trimethoprim/Sulfa <=20 Sensitive                  Linear View         Specimen Collected: 01/26/24  4:00 AM Last Resulted: 01/28/24  7:04 AM             Assessment and plan:    Intermittent  abdominal pain - Gaseous distention  KUB pos gas  No fevers, no chills, no vomiting  Non tender abdomen on exam  Pain mild per patient  Amylase lipase wnl  Having BM now, on gas x and changed to Slow FE  resolved    UTI ECOLI ESBL  Meropenem IV now  Agitation and confusion markedly improved  No fevers, no chills,  no vomiting, no nausea,   labs stable to improved procal negative  CBC, CMP repeat Thursday     Now with urinary retention  PVRs x3 over 400, Inserted gaffney  No hydronephrosis on US last week  flomax daily  voiding trial today  Stop OAB meds for now  May need urology follow up if not successful - has seen Dr Cheng with [facility] in the past    Folate low  Folic acid daily    Low vitamin D  vit D3 50K weekly for 8 weeks  Repeat in 10 weeks    RSV, post viral cough - resolved  Robitussin prn  Duonebs Q6 prn  Monitoring VS q shift and prn  Cough dry, labs stable monitoring  DW ID NP    Weakness, deconditioning   likely causes of weakness could include multiple recent infections, dementia, MM - in remission,  and steroid myopathy.   Multiple recent infections  Hx MM in remission  Dementia - functional decline?  Depression - sertraline to 50 mg daily mood appears improved  Not on any narcotics  Has anemia on iron and lansoprazole, was to have EGD but got clot off AC so now on hold, monitoring CBC 10.3-9.8, 9.3  ? 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 Urology consulted in hospital Ecoli ESBL   ertapenem- completed  Macrobid Completed  Now on meropenem again  Monitor symptoms   Solifenacin succinate 10 mg daily  holding now  Vibegron 75 mg daily holding now  Flomax  Voiding trial now  Monitoring  Labs  CBC, CMP stable     PE/ BLE DVT/ A fib   Monitor symptoms:  improving less  SOB and fatigue  Vital signs q shift and prn   Labs weekly and prn   Xarelto 20 mg daily  DW daughter sx, treatment and follow up for PE - will need CT chest within 6 mos to stop meds  Also noted US + DVT BLE on 12/2/23  Repeat doppler for monitoring of DVT and to check for resolution - DW nursing - negative may use compression socks now        HTN/HL/CAD  BRENNA on 12/2 EF 55% normal RV size function   Vital signs q shift and prn   Labs weekly and prn   Amlodipine 2.5 mg daily   Atorvastatin 10 mg nightly   BP controlled     Dementia   Monitor   Emotional support offered  Monitoring for safety  ? Functional decline  If not improved with steroid wean consider neuro eval but this could be difficult to obtain with large stress on patient to get to an appointment     Multiple Myeloma   Monitor labs   Outpatient follow up labs drawn for Heme/Onc follow up - faxed and they received, she is stable per PA, no new orders  Remains on steroids now at slow taper     GERD/Known Duodenal ulcer / GI prophylaxis  EGD scheduled and patient off AC for procedure but then developed PE. EGD on hold for now.   Monitor symptoms   Monitor labs  Lansoprazole 30 mg daily   Follow up with GI outpatient   CBC  weekly, Hgbs 10.3, 9.8, 9.3  Iron daily slow FE now with bloating on regular  Folate as well level was low     Hypotension  - managed  Initially on pressors in hospital. On high dose steroids decreased down to Dexamethasone 4 mg daily- records state need to be titrated down  DW daughter, start at 3.75 daily now and monitor   Monitor vital signs  Midodrine 5 mg every 8 hrs prn for SBP less than 95 and DBP less than 50      Hx depression   Monitor symptoms   Sertraline 50 mg daily now and monitoring  Appears improved mood      Neuropathy  Gabapentin 300 mg bid      Allergic rhinitis  Monitor symptoms   Fexofenadine 180 mg daily prn   Ipratropium 0.03% 2 spray daily prn      DVT Prophylaxis   Encourage early mobilization and  participation in PT/OT as able  Xarelto      Pain Management  Offer to pre-medicate 30-60 min prior to therapy  Physiatry evaluation with management appreciated  Acetaminophen 650 mg every 4 hrs prn      Bowel Management Regimen/Constipation   Monitor BM status   Colace 100 mg daily prn   Miralax daily prn   Senna 2 tabs daily prn  GasX     Vitamins/supplements as r/t deficiencies  [facility] RD to follow while in rehab; supplementation/diet as per [facility] RD  May continue home supplements  MVI daily      LABS  CBC/CMP weekly while in [facility]-    *Follow-Up with PCP within 1-2 weeks following [facility] discharge.     Follow up with Urologist at discharge for recurrent UTIs  Follow up with Heme/Onc at discharge for steroid taper  Follow up with GI outpatient   Consider Neurology eval      *Established patient; follow-up complex visit/ greater than 40    42 minutes spent w/ patient and staff, including but not limited to/ reviewing present status, needs, abilities with disciplines, reviewing medical records, vital signs, labs, completing medication reconciliation and entering orders for continued care in [facility].    Note to patient: The 21st Century Cures Act makes medical notes like these available to patients in the interest of transparency. However, this is a medical document intended as peer to peer communication. It is written in medical language and may contain abbreviations or verbiage that are unfamiliar. It may appear blunt or direct. Medical documents are intended to carry relevant information, facts as evident, and the clinical opinion of the practitioner who signs the document.    Jessie Villagran, APRN  1/29/2024

## 2024-01-31 ENCOUNTER — SNF VISIT (OUTPATIENT)
Dept: INTERNAL MEDICINE CLINIC | Age: 89
End: 2024-01-31

## 2024-01-31 VITALS
TEMPERATURE: 98 F | SYSTOLIC BLOOD PRESSURE: 133 MMHG | BODY MASS INDEX: 30 KG/M2 | HEART RATE: 81 BPM | DIASTOLIC BLOOD PRESSURE: 88 MMHG | WEIGHT: 155.38 LBS | RESPIRATION RATE: 18 BRPM | OXYGEN SATURATION: 95 %

## 2024-01-31 DIAGNOSIS — Z78.9 DECREASED ACTIVITIES OF DAILY LIVING (ADL): ICD-10-CM

## 2024-01-31 DIAGNOSIS — D50.0 IRON DEFICIENCY ANEMIA DUE TO CHRONIC BLOOD LOSS: ICD-10-CM

## 2024-01-31 DIAGNOSIS — R53.1 WEAKNESS: ICD-10-CM

## 2024-01-31 DIAGNOSIS — Z16.12 INFECTION DUE TO ESBL-PRODUCING ESCHERICHIA COLI: Primary | ICD-10-CM

## 2024-01-31 DIAGNOSIS — I10 ESSENTIAL HYPERTENSION: ICD-10-CM

## 2024-01-31 DIAGNOSIS — G93.39 OTHER POST INFECTION AND RELATED FATIGUE SYNDROMES: ICD-10-CM

## 2024-01-31 DIAGNOSIS — A49.8 INFECTION DUE TO ESBL-PRODUCING ESCHERICHIA COLI: Primary | ICD-10-CM

## 2024-01-31 DIAGNOSIS — C90.00 MULTIPLE MYELOMA, REMISSION STATUS UNSPECIFIED (HCC): ICD-10-CM

## 2024-01-31 DIAGNOSIS — I26.99 ACUTE PULMONARY EMBOLISM WITHOUT ACUTE COR PULMONALE, UNSPECIFIED PULMONARY EMBOLISM TYPE (HCC): ICD-10-CM

## 2024-01-31 DIAGNOSIS — R33.9 URINARY RETENTION WITH INCOMPLETE BLADDER EMPTYING: ICD-10-CM

## 2024-01-31 DIAGNOSIS — N39.0 FREQUENT UTI: ICD-10-CM

## 2024-01-31 PROCEDURE — 99310 SBSQ NF CARE HIGH MDM 45: CPT | Performed by: NURSE PRACTITIONER

## 2024-01-31 NOTE — PROGRESS NOTES
Nelly Potter, 8/28/1935, 88 year old, female    Chief Complaint:    Chief Complaint   Patient presents with    Follow - Up     UTI, Urinary retention, generalized weakness        Subjective:   TODAY:  Blanca is seen in the wheelchair today.   She is having lunch and daughter is present.  Follow up on abdominal pain, UTI, retention and LUE edema.  Blanca denies abdominal pain or nausea at this time. BM normal now.   Lentz removed and she is having PVRs twice needed straight cath. Daughter states sometimes when they double check the PVR they see increased retention.    Mental status improved to baseline, alert, conversant, sitting up in the wheelchair today feeding herself.     Meropenem now IV per ID NP.   Continues on Flomax, voiding trial continues, Hx following with Dr Cheng, [facility].   Labs repeat tomorrow.    Denies insomnia, fatigue, fever/chills, cough,  SOB, dyspnea, angina, palpitations, n/v, diarrhea, constipation, and urinary sxs.      Objective:  /88   Pulse 81   Temp 97.5 °F (36.4 °C)   Resp 18   Wt 155 lb 6.4 oz (70.5 kg)   SpO2 95%   BMI 30.35 kg/m²     PHYSICAL EXAM:  GENERAL HEALTH: frail elderly female, no acute distress, well developed  LINES, TUBES, DRAINS:  Left upper arm with midline IV, some dependent edema still noted, cool, no erythema, no drainage, IV site is clean and no erythema  SKIN: no rashes, no suspicious lesions  WOUND: see wound assessment   EYES: PERRLA, conjunctiva normal; no drainage from eyes  HENT: normocephalic; normal nose, no nasal drainage, mucous membranes pink, moist, rounded face  RESPIRATORY: clear,  no cough, no crackles, no rhonchi, no accessory muscle use; on room air no dyspnea  CARDIOVASCULAR: S1, S2 normal, RRR; BLE minimal pedal edema  ABDOMEN: normal active BS+, soft, non-distended; non tender  : Deferred  LYMPHATIC: no lymphedema  MUSCULOSKELETAL: no acute synovitis upper or lower extremity.    EXTREMITIES/VASCULAR: no cyanosis, clubbing,  pulses intact BLE, LUE with dependent cool edema distal to IV site, site itself clean and no redness or drainage, improved  NEUROLOGIC:  alert and oriented x 2-3, intact; no sensorimotor deficit  PSYCHIATRIC:; affect appropriate, intermittently confused, calm    Therapy:  Not ambulating  Max assist to dependent with transfers and ADLS  DC plan:  Continue at [facility] in long term care. Family working with social work.  Trial of steroid weaning to 3.75 mg daily to see if any myopathy is present, may need neurology input     Medications reviewed: Yes      Current Outpatient Medications:     tamsulosin 0.4 MG Oral Cap, Take 1 capsule (0.4 mg total) by mouth every evening., Disp: , Rfl:     ipratropium-albuterol 0.5-2.5 (3) MG/3ML Inhalation Solution, Take 3 mL by nebulization every 6 (six) hours while awake., Disp: , Rfl:     ipratropium 0.03 % Nasal Solution, 2 sprays by Nasal route daily as needed for Rhinitis., Disp: , Rfl:     Vibegron 75 MG Oral Tab, Take 75 mg by mouth daily. (Patient not taking: Reported on 1/22/2024), Disp: , Rfl:     multivitamin Oral Chew Tab, Chew 1 tablet by mouth daily., Disp: , Rfl:     dexamethasone 2 MG Oral Tab, Take 3.75 mg by mouth daily with breakfast. Slow taper now to 3.75 starting 1/19/24, Disp: , Rfl:     acidophilus-pectin Oral Cap, Take 1 capsule by mouth daily., Disp: , Rfl:     midodrine 5 MG Oral Tab, Take 1 tablet (5 mg total) by mouth every 8 (eight) hours as needed (SBP less than 95 and DBP less than 50)., Disp: , Rfl:     sennosides 8.6 MG Oral Tab, Take 2 tablets (17.2 mg total) by mouth daily as needed., Disp: , Rfl:     Solifenacin Succinate 10 MG Oral Tab, Take 1 tablet (10 mg total) by mouth daily. (Patient not taking: Reported on 1/22/2024), Disp: , Rfl:     polyethylene glycol, PEG 3350, 17 g Oral Powd Pack, Take 17 g by mouth daily as needed (constipation)., Disp: , Rfl:     acetaminophen 325 MG Oral Tab, Take 2 tablets (650 mg total) by mouth every 4 (four) hours  as needed for Pain., Disp: , Rfl:     rivaroxaban (XARELTO) 20 MG Oral Tab, Take 1 tablet (20 mg total) by mouth daily with food., Disp: , Rfl:     sertraline 25 MG Oral Tab, Take 1 tablet (25 mg total) by mouth daily., Disp: , Rfl:     amLODIPine 2.5 MG Oral Tab, Take 1 tablet (2.5 mg total) by mouth daily., Disp: , Rfl:     gabapentin 300 MG Oral Cap, Take 1 capsule (300 mg total) by mouth 3 (three) times daily. (Patient taking differently: Take 1 capsule (300 mg total) by mouth in the morning and 1 capsule (300 mg total) before bedtime.), Disp: 270 capsule, Rfl: 1    fexofenadine 180 MG Oral Tab, Take 1 tablet (180 mg total) by mouth daily as needed., Disp: 90 tablet, Rfl: 3    lansoprazole 30 MG Oral Capsule Delayed Release, Take 1 capsule (30 mg total) by mouth daily. Before meal, Disp: 90 capsule, Rfl: 3    atorvastatin 10 MG Oral Tab, Take 1 tablet (10 mg total) by mouth daily., Disp: 90 tablet, Rfl: 3    Docusate Sodium 100 MG Oral Cap, Take 1 capsule (100 mg total) by mouth daily as needed for constipation., Disp: , Rfl:       Diagnostics reviewed:    Lab Results   Component Value Date    WBC 4.8 01/29/2024    RBC 3.67 (L) 01/29/2024    HGB 9.3 (L) 01/29/2024    HCT 29.3 (L) 01/29/2024    MCV 79.8 (L) 01/29/2024    MCH 25.3 (L) 01/29/2024    MCHC 31.7 01/29/2024    RDW 24.3 01/29/2024    .0 01/29/2024     Lab Results   Component Value Date    GLU 90 01/29/2024    BUN 8 (L) 01/29/2024    BUNCREA 17.0 10/08/2021    CREATSERUM 0.34 (L) 01/29/2024    ANIONGAP 6 01/29/2024    CA 7.8 (L) 01/29/2024    OSMOCALC 274 (L) 01/29/2024    ALKPHO 65 01/29/2024    AST 9 (L) 01/29/2024    ALT  01/29/2024      Comment:      Due to  backorder we are temporarily unable to offer hospital-based ALT testing at [facility].   If urgently needed, please order ALT test code [id number].   The new order will need a new venipuncture and will be sent to [facility] for testing.   The expected turnaround time will be  within 24 hours.     BILT 0.4 01/29/2024    TP 4.8 (L) 01/29/2024    ALB 2.3 (L) 01/29/2024    GLOBULIN 2.5 (L) 01/29/2024    AGRATIO 2.1 09/11/2013     (L) 01/29/2024    K 3.6 01/29/2024    CL 99 01/29/2024    CO2 28.0 01/29/2024     Assessment and plan:    Intermittent  abdominal pain - Gaseous distention - much improved  KUB pos gas; No fevers, no chills, no vomiting  Non tender abdomen on exam  Pain resolved per patient  Amylase lipase wnl  Having BM now, on gas x and changed to Slow FE  resolved    UTI ECOLI ESBL  Meropenem IV now  Agitation and confusion resolved  No fevers, no chills, no vomiting, no nausea,   labs stable to improved procal negative  CBC, CMP repeat Thursday     Now with urinary retention  PVRs x3 over 400, Inserted gaffney  No hydronephrosis on US last week  flomax daily  voiding trial underway, needed 2 straight cath  Stop OAB meds for now  May need urology follow up if not successful - has seen Dr Cheng with [facility] in the past      Hx Frequent UTI/ retention    Treated in hospital with IV Zosyn. Urology consulted in hospital Ecoli ESBL   ertapenem- completed  Macrobid Completed  Now on meropenem again  Monitor symptoms   Solifenacin succinate 10 mg daily  holding now  Vibegron 75 mg daily holding now  Flomax  Voiding trial now  Monitoring  Labs  CBC, CMP stable    Left upper arm with edema  Appears dependent  No erythema, no drainage, non tender  Elevate and monitor  Near to midline site, but IV functioning and no sign of infiltrate or infection    Folate low  Folic acid daily    Low vitamin D  vit D3 50K weekly for 8 weeks  Repeat in 10 weeks    Weakness, deconditioning   likely causes of weakness could include multiple recent infections, dementia, MM - in remission,  and steroid myopathy.   Multiple recent infections  Hx MM in remission  Dementia - functional decline?  Depression - sertraline to 50 mg daily mood appears improved  Not on any narcotics  Has anemia on iron and lansoprazole,  was to have EGD but got clot off AC so now on hold, monitoring CBC 10.3-9.8, 9.3  ? 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, been on dexamethasone for months, start 3.75 today and monitor  BUN, creat wnl stable, lytes OK, appetite is OK, weights are stable 152-156  Repeat Thyroid function tests, TSH, T4 wnl  B12, folate, vitamin D for any deficiencies that could cause fatigue/weakness - Vit D and folate low, supplements added, rest wnl  Monitoring now with repeat UTI and retention     PE/ BLE DVT/ A fib   Monitor symptoms: improving less  SOB and fatigue  Vital signs q shift and prn   Labs weekly and prn   Xarelto 20 mg daily  DW daughter sx, treatment and follow up for PE - will need CT chest within 6 mos to stop meds  Also noted US + DVT BLE on 12/2/23  Repeat doppler for monitoring of DVT and to check for resolution - DW nursing - negative may use compression socks now        HTN/HL/CAD  BRENNA on 12/2 EF 55% normal RV size function   Vital signs q shift and prn   Labs weekly and prn   Amlodipine 2.5 mg daily   Atorvastatin 10 mg nightly   BP controlled     Dementia   Monitor   Emotional support offered  Monitoring for safety  ? Functional decline  If not improved with steroid wean consider neuro eval but this could be difficult to obtain with large stress on patient to get to an appointment     Multiple Myeloma   Monitor labs   Outpatient follow up labs drawn for Heme/Onc follow up - faxed and they received, she is stable per PA, no new orders  Remains on steroids now at slow taper     GERD/Known Duodenal ulcer / GI prophylaxis  EGD scheduled and patient off AC for procedure but then developed PE. EGD on hold for now.   Monitor symptoms   Monitor labs  Lansoprazole 30 mg daily   Follow up with GI outpatient   CBC  weekly, Hgbs 10.3, 9.8, 9.3  Iron daily slow FE now with bloating on regular  Folate as well level was low     Hypotension  - managed  Initially on pressors in hospital. On high dose steroids  decreased down to Dexamethasone 4 mg daily- records state need to be titrated down  DW daughter, start at 3.75 daily now and monitor   Monitor vital signs  Midodrine 5 mg every 8 hrs prn for SBP less than 95 and DBP less than 50      Hx depression   Monitor symptoms   Sertraline 50 mg daily now and monitoring  Appears improved mood      Neuropathy  Gabapentin 300 mg bid      Allergic rhinitis  Monitor symptoms   Fexofenadine 180 mg daily prn   Ipratropium 0.03% 2 spray daily prn      DVT Prophylaxis   Encourage early mobilization and participation in PT/OT as able  Xarelto      Pain Management  Offer to pre-medicate 30-60 min prior to therapy  Physiatry evaluation with management appreciated  Acetaminophen 650 mg every 4 hrs prn      Bowel Management Regimen/Constipation   Monitor BM status   Colace 100 mg daily   Miralax daily prn   Senna 2 tabs daily prn  GasX     Vitamins/supplements as r/t deficiencies  [facility] RD to follow while in rehab; supplementation/diet as per [facility] RD  May continue home supplements  MVI daily      LABS  CBC/CMP weekly while in [facility]-    *Follow-Up with PCP within 1-2 weeks following [facility] discharge.     Follow up with Urologist at discharge for recurrent UTIs  Follow up with Heme/Onc at discharge for steroid taper  Follow up with GI outpatient   Consider Neurology eval    *Established patient; follow-up moderately complex visit/ greater than 30     32 minutes spent w/ patient and staff, including but not limited to/ reviewing present status, needs, abilities with disciplines, reviewing medical records, vital signs, labs, completing medication reconciliation and entering orders for continued care in [facility].      Note to patient: The 21st Century Cures Act makes medical notes like these available to patients in the interest of transparency. However, this is a medical document intended as peer to peer communication. It is written in medical language and may contain abbreviations or verbiage that are  unfamiliar. It may appear blunt or direct. Medical documents are intended to carry relevant information, facts as evident, and the clinical opinion of the practitioner who signs the document.    Jessie Villagran, APRN  1/31/2024

## 2024-02-05 ENCOUNTER — SNF VISIT (OUTPATIENT)
Dept: INTERNAL MEDICINE CLINIC | Age: 89
End: 2024-02-05

## 2024-02-05 VITALS
RESPIRATION RATE: 20 BRPM | WEIGHT: 157 LBS | DIASTOLIC BLOOD PRESSURE: 77 MMHG | BODY MASS INDEX: 31 KG/M2 | SYSTOLIC BLOOD PRESSURE: 131 MMHG | TEMPERATURE: 97 F | OXYGEN SATURATION: 96 % | HEART RATE: 87 BPM

## 2024-02-05 DIAGNOSIS — R10.84 GENERALIZED ABDOMINAL PAIN: Primary | ICD-10-CM

## 2024-02-05 DIAGNOSIS — R53.1 WEAKNESS: ICD-10-CM

## 2024-02-05 DIAGNOSIS — C90.00 MULTIPLE MYELOMA, REMISSION STATUS UNSPECIFIED (HCC): ICD-10-CM

## 2024-02-05 DIAGNOSIS — R33.9 URINARY RETENTION WITH INCOMPLETE BLADDER EMPTYING: ICD-10-CM

## 2024-02-05 DIAGNOSIS — D50.0 IRON DEFICIENCY ANEMIA DUE TO CHRONIC BLOOD LOSS: ICD-10-CM

## 2024-02-05 DIAGNOSIS — Z78.9 DECREASED ACTIVITIES OF DAILY LIVING (ADL): ICD-10-CM

## 2024-02-05 RX ORDER — TRAMADOL HYDROCHLORIDE 50 MG/1
25 TABLET ORAL EVERY 8 HOURS PRN
COMMUNITY

## 2024-02-05 RX ORDER — TRAMADOL HYDROCHLORIDE 50 MG/1
25 TABLET ORAL EVERY 8 HOURS PRN
Qty: 30 TABLET | Refills: 0 | Status: SHIPPED | OUTPATIENT
Start: 2024-02-05

## 2024-02-05 NOTE — PROGRESS NOTES
Nelly Potter, 8/28/1935, 88 year old, female    Chief Complaint:    Chief Complaint   Patient presents with    Follow - Up     Shortness of breath, edema, abdominal pains, poor appetite        Subjective:   TODAY:  Blanca is seen in bed today, son is present. She is having more abdominal pain today. Calling out stating help me.   Had large BM per nursing overnight. Did not eat much last night or anything this morning. Patient denies nausea or vomiting. She has abdominal pain now she states. Tenderness today to Right side. No fevers, no chills.     Follow up on abdominal pain, UTI, retention and LUE edema. Now with BLE 2+ edema and BUE dependent edema noted. Some fine crackles, she states cough sometimes.   PVR have been wnl.   DW nursing and son at bedside this am. Order KUB and chest xray stat. Labs for tomorrow. DW son and he is OK with giving low dose tramadol prn for comfort.   Will await results and have further discussion.     Denies insomnia, fatigue, fever/chills, +cough,  SOB, +dyspnea, angina, palpitations, n/v, diarrhea, constipation, and urinary sxs.      Objective:  /77   Pulse 87   Temp 97.3 °F (36.3 °C)   Resp 20   Wt 157 lb (71.2 kg)   SpO2 96%   BMI 30.66 kg/m²     PHYSICAL EXAM:  GENERAL HEALTH: frail elderly female, no acute distress, well developed  LINES, TUBES, DRAINS:  Left upper arm with midline IV, some dependent edema still noted, cool, no erythema, no drainage, IV site is clean and no erythema  SKIN: no rashes, no suspicious lesions  WOUND: see wound assessment   EYES: PERRLA, conjunctiva normal; no drainage from eyes  HENT: normocephalic; normal nose, no nasal drainage, mucous membranes pink, moist, rounded face  RESPIRATORY: fine crackles, no cough, no rhonchi, no accessory muscle use; on oxygen 3L/NC   CARDIOVASCULAR: S1, S2 normal, RRR; BLE 2+ pretibial and pedal edema  ABDOMEN: bloated, soft bowel sounds, tender at Right side, mild guarding  : no bladder distention  noted  LYMPHATIC: no lymphedema  MUSCULOSKELETAL: no acute synovitis upper or lower extremity.    EXTREMITIES/VASCULAR: no cyanosis, clubbing, pulses intact BLE, BUE with dependent cool edema, no redness or drainage, BLE 2+ pretibial and pedal edema  NEUROLOGIC:  alert and oriented x 2-3, intact; no sensorimotor deficit  PSYCHIATRIC:; affect appropriate, intermittently confused, calm    Therapy:  Not ambulating  Max assist to dependent with transfers and ADLS  DC plan:  Continue at [facility] in long term care. Family working with social work.  Trial of steroid weaning to 3.75 mg daily to see if any myopathy is present, may need neurology input     Medications reviewed: Yes      Current Outpatient Medications:     traMADol 50 MG Oral Tab, Take 0.5 tablets (25 mg total) by mouth every 8 (eight) hours as needed for Pain (severe pain not relieved by tylenol)., Disp: , Rfl:     traMADol 50 MG Oral Tab, Take 0.5 tablets (25 mg total) by mouth every 8 (eight) hours as needed for Pain (moderate to severe pain not relieved by tylenol)., Disp: 30 tablet, Rfl: 0    tamsulosin 0.4 MG Oral Cap, Take 1 capsule (0.4 mg total) by mouth every evening., Disp: , Rfl:     ipratropium-albuterol 0.5-2.5 (3) MG/3ML Inhalation Solution, Take 3 mL by nebulization every 6 (six) hours while awake., Disp: , Rfl:     ipratropium 0.03 % Nasal Solution, 2 sprays by Nasal route daily as needed for Rhinitis., Disp: , Rfl:     Vibegron 75 MG Oral Tab, Take 75 mg by mouth daily. (Patient not taking: Reported on 1/22/2024), Disp: , Rfl:     multivitamin Oral Chew Tab, Chew 1 tablet by mouth daily., Disp: , Rfl:     dexamethasone 2 MG Oral Tab, Take 3.75 mg by mouth daily with breakfast. Slow taper now to 3.75 starting 1/19/24, Disp: , Rfl:     acidophilus-pectin Oral Cap, Take 1 capsule by mouth daily., Disp: , Rfl:     midodrine 5 MG Oral Tab, Take 1 tablet (5 mg total) by mouth every 8 (eight) hours as needed (SBP less than 95 and DBP less than 50).,  Disp: , Rfl:     sennosides 8.6 MG Oral Tab, Take 2 tablets (17.2 mg total) by mouth daily as needed., Disp: , Rfl:     Solifenacin Succinate 10 MG Oral Tab, Take 1 tablet (10 mg total) by mouth daily. (Patient not taking: Reported on 1/22/2024), Disp: , Rfl:     polyethylene glycol, PEG 3350, 17 g Oral Powd Pack, Take 17 g by mouth daily as needed (constipation)., Disp: , Rfl:     acetaminophen 325 MG Oral Tab, Take 2 tablets (650 mg total) by mouth every 4 (four) hours as needed for Pain., Disp: , Rfl:     rivaroxaban (XARELTO) 20 MG Oral Tab, Take 1 tablet (20 mg total) by mouth daily with food., Disp: , Rfl:     sertraline 25 MG Oral Tab, Take 1 tablet (25 mg total) by mouth daily., Disp: , Rfl:     amLODIPine 2.5 MG Oral Tab, Take 1 tablet (2.5 mg total) by mouth daily., Disp: , Rfl:     gabapentin 300 MG Oral Cap, Take 1 capsule (300 mg total) by mouth 3 (three) times daily. (Patient taking differently: Take 1 capsule (300 mg total) by mouth in the morning and 1 capsule (300 mg total) before bedtime.), Disp: 270 capsule, Rfl: 1    fexofenadine 180 MG Oral Tab, Take 1 tablet (180 mg total) by mouth daily as needed., Disp: 90 tablet, Rfl: 3    lansoprazole 30 MG Oral Capsule Delayed Release, Take 1 capsule (30 mg total) by mouth daily. Before meal, Disp: 90 capsule, Rfl: 3    atorvastatin 10 MG Oral Tab, Take 1 tablet (10 mg total) by mouth daily., Disp: 90 tablet, Rfl: 3    Docusate Sodium 100 MG Oral Cap, Take 1 capsule (100 mg total) by mouth daily as needed for constipation., Disp: , Rfl:       Diagnostics reviewed:    Lab Results   Component Value Date    WBC 6.4 02/01/2024    RBC 3.52 (L) 02/01/2024    HGB 9.2 (L) 02/01/2024    HCT 28.1 (L) 02/01/2024    MCV 79.8 (L) 02/01/2024    MCH 26.1 02/01/2024    MCHC 32.7 02/01/2024    RDW 24.5 02/01/2024    .0 02/01/2024     Lab Results   Component Value Date    GLU 85 02/01/2024    BUN 12 02/01/2024    BUNCREA 17.0 10/08/2021    CREATSERUM 0.36 (L)  02/01/2024    ANIONGAP 3 02/01/2024    CA 8.0 (L) 02/01/2024    OSMOCALC 279 02/01/2024    ALKPHO 69 02/01/2024    AST <3 (L) 02/01/2024    ALT 32 02/01/2024    BILT 0.4 02/01/2024    TP 5.0 (L) 02/01/2024    ALB 2.4 (L) 02/01/2024    GLOBULIN 2.6 (L) 02/01/2024    AGRATIO 2.1 09/11/2013     (L) 02/01/2024    K 3.5 02/01/2024     02/01/2024    CO2 30.0 02/01/2024       Medical decision making  Abdominal bloating continues  Repeat KUB with tenderness, distention and soft bowel sounds  Had large BM overnight per staff    Shortness of breath and sats 92% today  Mild cough family states  No fevers, no chills, no distress  Recent course meropenem for UTI  ? Dysphagia  Check stat chest xray  Labs tomorrow CBC, CMP, BNP, procal        Assessment and plan:    Intermittent  abdominal pain - Gaseous distention  KUB repeat pending.   No fevers, no chills, no vomiting  Distended and mildly tender abdomen on exam  Having BM now, on gas x and changed to Slow FE    Urinary retention  PVRs x3 over 400, Inserted gaffney - No hydronephrosis on US  flomax daily  voiding trial successful, needed 2 straight cath  Stop OAB meds for now  May need urology follow up if not successful - has seen Dr Cheng with [facility] in the past  Voiding now and PVRs wnl today      Hx Frequent UTI/ retention    Treated in hospital with IV Zosyn. Urology consulted in hospital Ecoli ESBL   ertapenem- completed x2  Macrobid Completed  Monitor symptoms   Solifenacin succinate 10 mg daily  holding now  Vibegron 75 mg daily holding now  Flomax daily  Voiding trial successful  Monitoring  Labs  CBC, CMP stable    Folate low  Folic acid daily    Low vitamin D  vit D3 50K weekly for 8 weeks  Repeat in 10 weeks    Weakness, deconditioning   likely causes of weakness could include multiple recent infections, dementia, MM - in remission,  and steroid myopathy.   Multiple recent infections  Hx MM in remission  Dementia - functional decline?  Depression - sertraline  to 50 mg daily mood appears improved  Not on any narcotics  Has anemia on iron and lansoprazole, was to have EGD but got clot off AC so now on hold, monitoring CBC 10.3-9.8, 9.3  ? 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, been on dexamethasone for months, start 3.75 today and monitor  BUN, creat wnl stable, lytes OK, appetite is OK, weights are stable 152-156  Repeat Thyroid function tests, TSH, T4 wnl  B12, folate, vitamin D for any deficiencies that could cause fatigue/weakness - Vit D and folate low, supplements added, rest wnl  Monitoring - consideration for palliative care, daughter states goals are comfort during whatever time she has left, son is POA, started discussion with him today, awaiting xray results today and will discuss further     PE/ BLE DVT/ A fib   Monitor symptoms: improving less  SOB and fatigue  Vital signs q shift and prn   Labs weekly and prn   Xarelto 20 mg daily  DW daughter sx, treatment and follow up for PE - will need CT chest within 6 mos to stop meds  Also noted US + DVT BLE on 12/2/23  Repeat doppler for monitoring of DVT and to check for resolution - DW nursing - negative may use compression socks now        HTN/HL/CAD  BRENNA on 12/2 EF 55% normal RV size function   Vital signs q shift and prn   Labs weekly and prn   Amlodipine 2.5 mg daily   Atorvastatin 10 mg nightly   BP low at times on Midodrine Q8 prn      Dementia   Monitor   Emotional support offered  Monitoring for safety  ? Functional decline  If not improved with steroid wean consider neuro eval but this could be difficult to obtain with large stress on patient to get to an appointment, consider palliative care     Multiple Myeloma   Monitor labs   Outpatient follow up labs drawn for Heme/Onc follow up - faxed and they received, she is stable per PA, no new orders  Remains on steroids now at slow taper     GERD/Known Duodenal ulcer / GI prophylaxis  EGD scheduled and patient off AC for procedure but then developed PE.  EGD on hold for now.   Monitor symptoms   Monitor labs  Lansoprazole 30 mg daily   Follow up with GI outpatient   CBC  weekly, Hgbs 10.3, 9.8, 9.3  Iron daily slow FE now with bloating on regular  Folate as well level was low     Hypotension  - managed  Initially on pressors in hospital. On high dose steroids decreased down to Dexamethasone 4 mg daily- records state need to be titrated down  DW daughter, start at 3.75 daily now and monitor   Monitor vital signs  Midodrine 5 mg every 8 hrs prn for SBP less than 95 and DBP less than 50      Hx depression   Monitor symptoms   Sertraline 50 mg daily now and monitoring  Appears improved mood      Neuropathy  Gabapentin 300 mg bid      Allergic rhinitis  Monitor symptoms   Fexofenadine 180 mg daily prn   Ipratropium 0.03% 2 spray daily prn      DVT Prophylaxis   Encourage early mobilization and participation in PT/OT as able  Xarelto      Pain Management  Offer to pre-medicate 30-60 min prior to therapy  Physiatry evaluation with management appreciated  Acetaminophen 650 mg every 4 hrs prn      Bowel Management Regimen/Constipation   Monitor BM status   Colace 100 mg daily   Miralax daily prn , dulcolax prn  Senna QPM  GasX     Vitamins/supplements as r/t deficiencies  DOLLY RD to follow while in rehab; supplementation/diet as per [facility] RD  May continue home supplements  MVI daily      LABS  CBC/CMP weekly while in [facility]-  Tomorrow CBC, CMP, BNP, procal    *Follow-Up with PCP within 1-2 weeks following [facility] discharge.     Follow up with Urologist at discharge for recurrent UTIs  Follow up with Heme/Onc at discharge for steroid taper  Follow up with GI outpatient   Consider Neurology eval    *Established patient; follow-up moderately complex visit/ greater than 30     37 minutes spent w/ patient and staff, including but not limited to/ reviewing present status, needs, abilities with disciplines, reviewing medical records, vital signs, labs, completing medication reconciliation  and entering orders for continued care in [facility].      Note to patient: The 21st Century Cures Act makes medical notes like these available to patients in the interest of transparency. However, this is a medical document intended as peer to peer communication. It is written in medical language and may contain abbreviations or verbiage that are unfamiliar. It may appear blunt or direct. Medical documents are intended to carry relevant information, facts as evident, and the clinical opinion of the practitioner who signs the document.    Jessie Villagran, APRN  2/5/2024

## 2024-02-06 ENCOUNTER — APPOINTMENT (OUTPATIENT)
Dept: GENERAL RADIOLOGY | Facility: HOSPITAL | Age: 89
End: 2024-02-06
Attending: EMERGENCY MEDICINE
Payer: MEDICARE

## 2024-02-06 ENCOUNTER — HOSPITAL ENCOUNTER (INPATIENT)
Facility: HOSPITAL | Age: 89
LOS: 3 days | Discharge: NURSING, REHAB OR LONG-TERM CARE | End: 2024-02-09
Attending: EMERGENCY MEDICINE | Admitting: INTERNAL MEDICINE
Payer: MEDICARE

## 2024-02-06 ENCOUNTER — APPOINTMENT (OUTPATIENT)
Dept: CT IMAGING | Facility: HOSPITAL | Age: 89
End: 2024-02-06
Attending: EMERGENCY MEDICINE
Payer: MEDICARE

## 2024-02-06 DIAGNOSIS — K59.00 CONSTIPATION, UNSPECIFIED CONSTIPATION TYPE: ICD-10-CM

## 2024-02-06 DIAGNOSIS — I48.91 ATRIAL FIBRILLATION WITH RVR (HCC): Primary | ICD-10-CM

## 2024-02-06 LAB
ALBUMIN SERPL-MCNC: 2.3 G/DL (ref 3.4–5)
ALBUMIN/GLOB SERPL: 0.8 {RATIO} (ref 1–2)
ALP LIVER SERPL-CCNC: 65 U/L
ALT SERPL-CCNC: 29 U/L
ANION GAP SERPL CALC-SCNC: 5 MMOL/L (ref 0–18)
APTT PPP: 35.6 SECONDS (ref 23.3–35.6)
AST SERPL-CCNC: <3 U/L (ref 15–37)
ATRIAL RATE: 107 BPM
BASOPHILS # BLD AUTO: 0.03 X10(3) UL (ref 0–0.2)
BASOPHILS NFR BLD AUTO: 0.2 %
BILIRUB SERPL-MCNC: 0.7 MG/DL (ref 0.1–2)
BILIRUB UR QL STRIP.AUTO: NEGATIVE
BUN BLD-MCNC: 16 MG/DL (ref 9–23)
CALCIUM BLD-MCNC: 8.1 MG/DL (ref 8.5–10.1)
CHLORIDE SERPL-SCNC: 99 MMOL/L (ref 98–112)
CLARITY UR REFRACT.AUTO: CLEAR
CO2 SERPL-SCNC: 28 MMOL/L (ref 21–32)
COLOR UR AUTO: YELLOW
CREAT BLD-MCNC: 0.36 MG/DL
EGFRCR SERPLBLD CKD-EPI 2021: 98 ML/MIN/1.73M2 (ref 60–?)
EOSINOPHIL # BLD AUTO: 0.01 X10(3) UL (ref 0–0.7)
EOSINOPHIL NFR BLD AUTO: 0.1 %
ERYTHROCYTE [DISTWIDTH] IN BLOOD BY AUTOMATED COUNT: 24.9 %
GLOBULIN PLAS-MCNC: 3 G/DL (ref 2.8–4.4)
GLUCOSE BLD-MCNC: 92 MG/DL (ref 70–99)
GLUCOSE UR STRIP.AUTO-MCNC: NORMAL MG/DL
HCT VFR BLD AUTO: 28.8 %
HGB BLD-MCNC: 9.6 G/DL
IMM GRANULOCYTES # BLD AUTO: 0.19 X10(3) UL (ref 0–1)
IMM GRANULOCYTES NFR BLD: 1.2 %
INR BLD: 2 (ref 0.8–1.2)
KETONES UR STRIP.AUTO-MCNC: NEGATIVE MG/DL
LACTATE SERPL-SCNC: 1.5 MMOL/L (ref 0.4–2)
LEUKOCYTE ESTERASE UR QL STRIP.AUTO: NEGATIVE
LYMPHOCYTES # BLD AUTO: 0.45 X10(3) UL (ref 1–4)
LYMPHOCYTES NFR BLD AUTO: 2.8 %
MCH RBC QN AUTO: 26.6 PG (ref 26–34)
MCHC RBC AUTO-ENTMCNC: 33.3 G/DL (ref 31–37)
MCV RBC AUTO: 79.8 FL
MONOCYTES # BLD AUTO: 0.26 X10(3) UL (ref 0.1–1)
MONOCYTES NFR BLD AUTO: 1.6 %
NEUTROPHILS # BLD AUTO: 15.32 X10 (3) UL (ref 1.5–7.7)
NEUTROPHILS # BLD AUTO: 15.32 X10(3) UL (ref 1.5–7.7)
NEUTROPHILS NFR BLD AUTO: 94.1 %
NITRITE UR QL STRIP.AUTO: NEGATIVE
OSMOLALITY SERPL CALC.SUM OF ELEC: 275 MOSM/KG (ref 275–295)
P AXIS: 47 DEGREES
P-R INTERVAL: 186 MS
PH UR STRIP.AUTO: 7.5 [PH] (ref 5–8)
PLATELET # BLD AUTO: 225 10(3)UL (ref 150–450)
POTASSIUM SERPL-SCNC: 3.8 MMOL/L (ref 3.5–5.1)
PROT SERPL-MCNC: 5.3 G/DL (ref 6.4–8.2)
PROT UR STRIP.AUTO-MCNC: 50 MG/DL
PROTHROMBIN TIME: 22.9 SECONDS (ref 11.6–14.8)
Q-T INTERVAL: 382 MS
QRS DURATION: 156 MS
QTC CALCULATION (BEZET): 509 MS
R AXIS: -7 DEGREES
RBC # BLD AUTO: 3.61 X10(6)UL
RBC UR QL AUTO: NEGATIVE
SARS-COV-2 RNA RESP QL NAA+PROBE: NOT DETECTED
SODIUM SERPL-SCNC: 132 MMOL/L (ref 136–145)
SP GR UR STRIP.AUTO: 1.02 (ref 1–1.03)
T AXIS: -6 DEGREES
TROPONIN I SERPL HS-MCNC: 19 NG/L
UROBILINOGEN UR STRIP.AUTO-MCNC: NORMAL MG/DL
VENTRICULAR RATE: 107 BPM
WBC # BLD AUTO: 16.3 X10(3) UL (ref 4–11)

## 2024-02-06 PROCEDURE — 87077 CULTURE AEROBIC IDENTIFY: CPT | Performed by: EMERGENCY MEDICINE

## 2024-02-06 PROCEDURE — 93010 ELECTROCARDIOGRAM REPORT: CPT

## 2024-02-06 PROCEDURE — 81001 URINALYSIS AUTO W/SCOPE: CPT | Performed by: EMERGENCY MEDICINE

## 2024-02-06 PROCEDURE — 84484 ASSAY OF TROPONIN QUANT: CPT | Performed by: EMERGENCY MEDICINE

## 2024-02-06 PROCEDURE — 85610 PROTHROMBIN TIME: CPT | Performed by: EMERGENCY MEDICINE

## 2024-02-06 PROCEDURE — 85025 COMPLETE CBC W/AUTO DIFF WBC: CPT

## 2024-02-06 PROCEDURE — 94640 AIRWAY INHALATION TREATMENT: CPT

## 2024-02-06 PROCEDURE — 93005 ELECTROCARDIOGRAM TRACING: CPT

## 2024-02-06 PROCEDURE — 85025 COMPLETE CBC W/AUTO DIFF WBC: CPT | Performed by: EMERGENCY MEDICINE

## 2024-02-06 PROCEDURE — 71045 X-RAY EXAM CHEST 1 VIEW: CPT | Performed by: EMERGENCY MEDICINE

## 2024-02-06 PROCEDURE — 36415 COLL VENOUS BLD VENIPUNCTURE: CPT

## 2024-02-06 PROCEDURE — 83605 ASSAY OF LACTIC ACID: CPT

## 2024-02-06 PROCEDURE — 85730 THROMBOPLASTIN TIME PARTIAL: CPT | Performed by: EMERGENCY MEDICINE

## 2024-02-06 PROCEDURE — 83605 ASSAY OF LACTIC ACID: CPT | Performed by: EMERGENCY MEDICINE

## 2024-02-06 PROCEDURE — 87150 DNA/RNA AMPLIFIED PROBE: CPT | Performed by: EMERGENCY MEDICINE

## 2024-02-06 PROCEDURE — 80053 COMPREHEN METABOLIC PANEL: CPT | Performed by: EMERGENCY MEDICINE

## 2024-02-06 PROCEDURE — 84484 ASSAY OF TROPONIN QUANT: CPT

## 2024-02-06 PROCEDURE — 99285 EMERGENCY DEPT VISIT HI MDM: CPT

## 2024-02-06 PROCEDURE — 80053 COMPREHEN METABOLIC PANEL: CPT

## 2024-02-06 PROCEDURE — 96365 THER/PROPH/DIAG IV INF INIT: CPT

## 2024-02-06 PROCEDURE — 87040 BLOOD CULTURE FOR BACTERIA: CPT | Performed by: EMERGENCY MEDICINE

## 2024-02-06 PROCEDURE — 74177 CT ABD & PELVIS W/CONTRAST: CPT | Performed by: EMERGENCY MEDICINE

## 2024-02-06 RX ORDER — ATORVASTATIN CALCIUM 10 MG/1
10 TABLET, FILM COATED ORAL NIGHTLY
Status: DISCONTINUED | OUTPATIENT
Start: 2024-02-06 | End: 2024-02-09

## 2024-02-06 RX ORDER — IPRATROPIUM BROMIDE AND ALBUTEROL SULFATE 2.5; .5 MG/3ML; MG/3ML
3 SOLUTION RESPIRATORY (INHALATION)
Status: DISCONTINUED | OUTPATIENT
Start: 2024-02-06 | End: 2024-02-09

## 2024-02-06 RX ORDER — SERTRALINE HYDROCHLORIDE 25 MG/1
25 TABLET, FILM COATED ORAL DAILY
Status: DISCONTINUED | OUTPATIENT
Start: 2024-02-07 | End: 2024-02-09

## 2024-02-06 RX ORDER — METRONIDAZOLE 500 MG/100ML
500 INJECTION, SOLUTION INTRAVENOUS EVERY 8 HOURS
Status: DISCONTINUED | OUTPATIENT
Start: 2024-02-06 | End: 2024-02-07

## 2024-02-06 RX ORDER — TAMSULOSIN HYDROCHLORIDE 0.4 MG/1
0.4 CAPSULE ORAL EVERY EVENING
Status: DISCONTINUED | OUTPATIENT
Start: 2024-02-06 | End: 2024-02-09

## 2024-02-06 RX ORDER — ALBUTEROL SULFATE 90 UG/1
2 AEROSOL, METERED RESPIRATORY (INHALATION) EVERY 4 HOURS PRN
Status: DISCONTINUED | OUTPATIENT
Start: 2024-02-06 | End: 2024-02-09

## 2024-02-06 RX ORDER — POTASSIUM CHLORIDE 1.5 G/1.58G
40 POWDER, FOR SOLUTION ORAL ONCE
Status: COMPLETED | OUTPATIENT
Start: 2024-02-06 | End: 2024-02-06

## 2024-02-06 RX ORDER — FUROSEMIDE 10 MG/ML
20 INJECTION INTRAMUSCULAR; INTRAVENOUS ONCE
Status: COMPLETED | OUTPATIENT
Start: 2024-02-06 | End: 2024-02-06

## 2024-02-06 RX ORDER — SODIUM PHOSPHATE,MONO-DIBASIC 19G-7G/118
1 ENEMA (ML) RECTAL ONCE
Status: COMPLETED | OUTPATIENT
Start: 2024-02-06 | End: 2024-02-06

## 2024-02-06 RX ORDER — GABAPENTIN 300 MG/1
300 CAPSULE ORAL 2 TIMES DAILY
Status: DISCONTINUED | OUTPATIENT
Start: 2024-02-06 | End: 2024-02-09

## 2024-02-06 RX ORDER — IPRATROPIUM BROMIDE 21 UG/1
2 SPRAY, METERED NASAL DAILY PRN
Status: DISCONTINUED | OUTPATIENT
Start: 2024-02-06 | End: 2024-02-09

## 2024-02-06 RX ORDER — TRAMADOL HYDROCHLORIDE 50 MG/1
25 TABLET ORAL EVERY 8 HOURS PRN
Status: DISCONTINUED | OUTPATIENT
Start: 2024-02-06 | End: 2024-02-09

## 2024-02-06 RX ORDER — ONDANSETRON 2 MG/ML
4 INJECTION INTRAMUSCULAR; INTRAVENOUS EVERY 4 HOURS PRN
Status: ACTIVE | OUTPATIENT
Start: 2024-02-06 | End: 2024-02-06

## 2024-02-06 RX ORDER — ACETAMINOPHEN 500 MG
500 TABLET ORAL EVERY 4 HOURS PRN
Status: DISCONTINUED | OUTPATIENT
Start: 2024-02-06 | End: 2024-02-09

## 2024-02-06 RX ORDER — PANTOPRAZOLE SODIUM 40 MG/1
40 TABLET, DELAYED RELEASE ORAL
Status: DISCONTINUED | OUTPATIENT
Start: 2024-02-07 | End: 2024-02-09

## 2024-02-06 RX ORDER — LEVOFLOXACIN 5 MG/ML
500 INJECTION, SOLUTION INTRAVENOUS EVERY 24 HOURS
Status: DISCONTINUED | OUTPATIENT
Start: 2024-02-06 | End: 2024-02-09

## 2024-02-06 NOTE — ED QUICK NOTES
Orders for admission, patient is aware of plan and ready to go upstairs. Any questions, please call ED RN Milton at extension [phone].     Patient Covid vaccination status: Fully vaccinated     COVID Test Ordered in ED: None    COVID Suspicion at Admission: N/A    Running Infusions:    dilTIAZem 5 mg/hr (02/06/24 1342)        Mental Status/LOC at time of transport: A/OX1-2 per norm    Other pertinent information:   CIWA score: N/A   NIH score:  N/A

## 2024-02-06 NOTE — ED PROVIDER NOTES
Patient Seen in: [facility] Emergency Department      History     Chief Complaint   Patient presents with    Chest Pain Angina     From [place] with CP that started today. Per EMS, XR done today showing PNA. Patient denies pain.     Stated Complaint: From [place] with CP that started today. Per EMS, XR done today showing*    Subjective:   HPI    88-year-old female with past medical history of A-fib on Xarelto presents today for evaluation.  History is provided by her son.  2 days ago, patient's oxygen level started to be around 92%.  She has been on oxygen ever since.  She is receiving meropenem for ESBL UTI.  She has been complaining of abdominal pain for a long time without a clear source as to why she has pain.  Infectious disease doctor was running at the facility today and the patient complained of chest pain.  When asked, patient states she does not have any pain.    Objective:   Past Medical History:   Diagnosis Date    Allergic rhinitis     Anxiety     Chronic midline low back pain without sciatica 10/13/2021    Constipation     Depression     Esophageal reflux     History of bladder cancer 8/1/2012    Incontinence     Long QT interval     per pt. hx of long QT.    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    Osteoarthrosis, unspecified whether generalized or localized, unspecified site     Osteopenia of multiple sites 8/30/2018    Other and unspecified hyperlipidemia     Pleurisy 2012    Psychophysiological insomnia 9/20/2018    Unspecified essential hypertension     Urge incontinence     Vertigo               Past Surgical History:   Procedure Laterality Date    APPENDECTOMY      CATARACT      COLONOSCOPY  Nov 2012    normal    CYSTOSCOPY CHEMODENERVATION  10/17/2017    100 U    CYSTOURETHROSCOPY  6/27/12    [facility]    CYSTOURETHROSCOPY  10/24/12    [facility] Cheng     CYSTOURETHROSCOPY  4/29/13, 8/7/13, 11/13/13, 11/12/14, 5/6/15, 11/11/15    cysto- Dr. Cheng, [place]     CYSTOURETHROSCOPY  10/11/2017    cysto Dr. mathews    CYSTOURETHROSCOPY,FULGUR .5-2CM LESN  7/23/12    cystourethroscopy, TURBT x 2- Dr. Mathews    FOOT SURGERY Left 10/25/11    lt foot    HIP REPLACEMENT SURGERY      left    HYSTERECTOMY      KNEE REPLACEMENT SURGERY Bilateral     OTHER SURGICAL HISTORY  1983     arthroscopic left knee    PART EXCIS 5TH METATARSAL HEAD Right 10/2/2014    Procedure: TAILOR'S BUNIONECTOMY;  Surgeon: Adryan Prajapati DPM;  Location: [facility]    PATIENT DOCUMENTED NOT TO HAVE EXPERIENCED ANY OF THE FOLLOWING EVENTS Right 10/2/2014    Procedure: TAILOR'S BUNIONECTOMY;  Surgeon: Adryan Prajapati DPM;  Location: [facility]    PATIENT WITH PREOPERATIVE ORDER FOR IV ANTIBIOTIC SURGICAL SITE INFECT Right 10/2/2014    Procedure: TAILOR'S BUNIONECTOMY;  Surgeon: Adryan Prajapati DPM;  Location: [facility]    REPAIR OF HAMMERTOE,ONE Left 10/2/2014    Procedure: ARTHROPLASTY ONE (1) TOE;  Surgeon: Adryan Prajapati DPM;  Location: [facility]    SINUS SURGERY    1983 & 1999    sinus    SLING OPER STRES INCONTINENCE  8/06    sling    SLING OPER STRES INCONTINENCE  10/2011    sling repeat. TVT Exact. Dr. Dang at [facility]    TONSILLECTOMY                  Social History     Socioeconomic History    Marital status:    Tobacco Use    Smoking status: Never    Smokeless tobacco: Never   Vaping Use    Vaping Use: Never used   Substance and Sexual Activity    Alcohol use: Yes     Comment: 1 per day    Drug use: No              Review of Systems    Positive for stated complaint: From [place] with CP that started today. Per EMS, XR done today showing*  Other systems are as noted in HPI.  Constitutional and vital signs reviewed.      All other systems reviewed and negative except as noted above.    Physical Exam     ED Triage Vitals   BP 02/06/24 1007 96/57   Pulse 02/06/24 1009 110   Resp 02/06/24 1007 18   Temp 02/06/24 1154 97.9 °F (36.6 °C)   Temp src 02/06/24 1154 Temporal   SpO2  02/06/24 1007 96 %   O2 Device 02/06/24 1007 None (Room air)       Current:/81   Pulse 82   Temp 97.9 °F (36.6 °C) (Temporal)   Resp 16   Ht 167.6 cm (5' 6\")   Wt 70.8 kg   SpO2 96%   BMI 25.18 kg/m²         Physical Exam  Vitals and nursing note reviewed.   Constitutional:       Appearance: Normal appearance.   HENT:      Head: Normocephalic.      Nose: Nose normal.      Mouth/Throat:      Mouth: Mucous membranes are moist.   Eyes:      Extraocular Movements: Extraocular movements intact.   Cardiovascular:      Rate and Rhythm: Tachycardia present. Rhythm irregular.   Pulmonary:      Effort: Pulmonary effort is normal.      Breath sounds: Normal breath sounds.   Abdominal:      General: Abdomen is flat.      Comments: Generalized tenderness without guarding or rigidity   Musculoskeletal:         General: Normal range of motion.   Skin:     General: Skin is warm.   Neurological:      General: No focal deficit present.      Mental Status: She is alert.   Psychiatric:         Mood and Affect: Mood normal.            ED Course     Labs Reviewed   URINALYSIS WITH CULTURE REFLEX - Abnormal; Notable for the following components:       Result Value    Protein Urine 50 (*)     WBC Urine 6-10 (*)     Squamous Epi. Cells Few (*)     All other components within normal limits   COMP METABOLIC PANEL (14) - Abnormal; Notable for the following components:    Sodium 132 (*)     Creatinine 0.36 (*)     Calcium, Total 8.1 (*)     AST <3 (*)     Total Protein 5.3 (*)     Albumin 2.3 (*)     A/G Ratio 0.8 (*)     All other components within normal limits   PROTHROMBIN TIME (PT) - Abnormal; Notable for the following components:    PT 22.9 (*)     INR 2.00 (*)     All other components within normal limits   CBC W/ DIFFERENTIAL - Abnormal; Notable for the following components:    WBC 16.3 (*)     RBC 3.61 (*)     HGB 9.6 (*)     HCT 28.8 (*)     MCV 79.8 (*)     Neutrophil Absolute Prelim 15.32 (*)     Neutrophil Absolute  15.32 (*)     Lymphocyte Absolute 0.45 (*)     All other components within normal limits   LACTIC ACID, PLASMA - Normal   TROPONIN I HIGH SENSITIVITY - Normal   PTT, ACTIVATED - Normal   CBC WITH DIFFERENTIAL WITH PLATELET    Narrative:     The following orders were created for panel order CBC With Differential With Platelet.  Procedure                               Abnormality         Status                     ---------                               -----------         ------                     CBC W/ DIFFERENTIAL[[id number]]          Abnormal            Final result                 Please view results for these tests on the individual orders.   BLOOD CULTURE   BLOOD CULTURE   RAPID SARS-COV-2 BY PCR     EKG    Rate, intervals and axes as noted on EKG Report.  Rate: 107  Rhythm: Atrial Fibrillation  Reading: RVR, no STEMI                 CT ABDOMEN+PELVIS(CONTRAST ONLY)(CPT=74177)    Result Date: 2/6/2024  PROCEDURE:  CT ABDOMEN+PELVIS (CONTRAST ONLY) (CPT=74177)  COMPARISON:  None.  INDICATIONS:  Chest pain  TECHNIQUE:  CT scanning was performed from the dome of the diaphragm to the pubic symphysis with non-ionic intravenous contrast material. Post contrast coronal MPR imaging was performed.  Dose reduction techniques were used. Dose information is transmitted to the ACR (American College of Radiology) NRDR (National Radiology Data Registry) which includes the Dose Index Registry.  PATIENT STATED HISTORY:(As transcribed by Technologist)  Patient is here with abdominal discomfort and chest pain   CONTRAST USED:  90cc of Isovue 370  FINDINGS:  Incomplete visualization of the lung bases right basilar atelectasis versus consolidation present.  There are trace bilateral pleural effusions.  Liver contains numerous probable cysts.  Most lesions are too small for accurate characterization.  The largest measures up to 2.0 cm.  The spleen, pancreas, adrenal glands and kidneys have an essentially unremarkable postcontrast  appearance.  There is a limited assessment of the bowel without oral contrast.  No dilated loops of small bowel are seen.  There is massive distention of the rectum and distal sigmoid colon with a large fecal ball noted.  This may represent fecal impaction.  There is  some questionable wall thickening in the region of the rectum which could represent stercoral colitis.  There are some mild inflammatory changes noted with trace free fluid noted within the pelvis.  Urinary bladder is mildly distended and has an otherwise unremarkable appearance.  No pelvic lymphadenopathy is seen.  Streak artifact from a left hip prosthesis somewhat limits assessment.  There is a probable compression fracture of T8 with some nonspecific sclerosis in this region.  Correlation with the history is suggested.  If there is persistent concern then consider MRI.              CONCLUSION:  There is a large amount of fecal material centered on the distal sigmoid colon and rectum most likely representing constipation/fecal impaction.  There is some possible wall thickening of the colon in this region which could represent stercoral colitis.  Limited views of the chest demonstrate probable basilar atelectasis versus consolidation with trace bilateral pleural effusions.  There is a compression fracture of T8 which may be chronic although there is some sclerosis present in this region.  Correlation for symptoms is suggested.  If there is persistent concern then consider MRI.   LOCATION:  [id number]   Dictated by (CST): Adryan Zazueta MD on 2/06/2024 at 1:03 PM     Finalized by (CST): Adryan Zazueta MD on 2/06/2024 at 1:07 PM       XR CHEST AP PORTABLE  (CPT=71045)    Result Date: 2/6/2024  PROCEDURE:  XR CHEST AP PORTABLE  (CPT=71045)  TECHNIQUE:  AP chest radiograph was obtained.  COMPARISON:  [facility] , XR, XR CHEST AP PORTABLE  (CPT=71045), 12/17/2023, 10:52 AM.  INDICATIONS:  From [place] with CP that started today. Per EMS, XR done today showing  PNA. Patient denies pain.  PATIENT STATED HISTORY: (As transcribed by Technologist)  patient states she has had chest pain today.    FINDINGS:  Small left pleural effusion versus pleural scarring.  Mild scarring/atelectasis in the lower lungs.  Cardiomegaly with normal pulmonary vascularity.  Tortuous thoracic aorta with calcified plaque.  No pneumothorax.  Degenerative changes in the  spine and shoulders.            CONCLUSION:  Small left pleural effusion versus pleural scarring.  Mild scarring/atelectasis in the lower lungs.    LOCATION:  [id number]      Dictated by (CST): Basim Todd MD on 2/06/2024 at 11:35 AM     Finalized by (CST): Basim Todd MD on 2/06/2024 at 11:36 AM               [facility]      Differential Diagnosis  88-year-old female presents today for evaluation of chest pain.  Patient at this has no complaints although history is limited.  She says she has no abdominal pain although she does have generalized tenderness to palpation without guarding or rigidity.  Patient is in A-fib with RVR.  She has a borderline low blood pressure.  Will observe her hemodynamics closely.  Within the differential be A-fib with RVR, acute coronary syndrome, pneumonia, bowel obstruction, appendicitis, diverticulitis.  Plan for CT, chest x-ray along with labs.    1:36 pm  Patient has remained hemodynamically stable while in the ED.  Her evaluation demonstrates small pleural effusions.  She has been in A-fib with RVR, so will give diltiazem for rate control.  Patient takes Xarelto for anticoagulation.  CT demonstrated constipation without acute abnormality.  An enema was ordered.  Will admit to the hospital for continued care.  I spoke with the hospitalist in regards to admission.    (I was informed by Rn that the sat of 80% was an erroneous entry)    A total of 30 minutes of critical care time (exclusive of billable procedures) was administered to manage the patient's unstable vital signs due to her a fib rvr.  This  involved direct patient intervention, complex decision making, and/or extensive discussions with the patient, family, and clinical staff.    Discussions of Management  I spoke with the hospitalist in regards to admission  Admission disposition: 2/6/2024  1:36 PM                                        Medical Decision Making      Disposition and Plan     Clinical Impression:  1. Atrial fibrillation with RVR (HCC)    2. Constipation, unspecified constipation type         Disposition:  Admit  2/6/2024  1:36 pm    Follow-up:  No follow-up provider specified.        Medications Prescribed:  Current Discharge Medication List                            Hospital Problems       Present on Admission  Date Reviewed: 3/4/2022            ICD-10-CM Noted POA    Atrial fibrillation with RVR (HCC) I48.91 2/6/2024 Unknown

## 2024-02-06 NOTE — ED INITIAL ASSESSMENT (HPI)
From [place] with CP that started today. Per EMS, XR done today showing PNA. Patient denies pain.

## 2024-02-06 NOTE — H&P
[facility]   Hospitalist Team  History and Physical  Admit Date:  2/6/2024    Is this a shared or split note between Advanced Practice Provider and Physician? Yes    ASSESSMENT / PLAN:   Nelly Potter is an 88 year old female from [facility] with a PMHx significant for AFIB (on xarelto), COPD, asthma, neuropathy, anxiety/MDD, DVT, bladder CA, HLD, HTN, multiple myeloma, and vertigo presents to the hospital with chest pain.  See below for details.    Chest pain  AFIB RVR  Hypoxia  H/O DVT  -endorsed chest pain to [facility] ID MD that started today, has since resolved  -ECG w ST and frequent PVCs, RBBB  -troponin normal  -CXR with small left pleural effusion vs scarring, mild atelectasis at bases  -AFIB RVR identified on telemetry per ED MD  -IV diltiazem started  -cardiology consulted  -has been on 2L 02 x 2 days per the family, was initiated at [facility] for SP02 of 92, will give one dose of IV lasix  -xarelto    Abdominal pain  H/O UTI  H/O urinary retention  -endorsed abdominal pain  -CT abdomen/pelvis with constipation, possible thickened wall of colon which can indicate colitis, compression fx of T8 which may be chronic  -soap suds enema ordered in ED  -UA ok  -WBC 16.3, lactic ok  -will empirically start IV levaquin and IV flagyl given leukocytosis, abdominal pain and CT abdomen results  -blood cxs in process  -flomax    COPD  Asthma  -albuterol  -maintenance inhaler    H/O multiple myeloma  Hypotension  -dexamethasone   -midodrine started 2/2 hypotension following abrupt steroid withdrawal per the patient's son    HTN  -amlodipine    HLD  -statin    MDD/anxiety  -zoloft    Neuropathy  -gabapentin    GOC discussion with patient and son Murali, patient is a DNAR selective treatment    She states she took all of her morning meds this a.m.    GOC: DNAR selective treatment    MA/ACO Reach  -Re- Entry: no  -Consults: cardiology  -Discharge Needs: TBD  -Appointments: TBD     ROBERT carlisle in  am  -diet-cardiac    Prophy  -SCD    Dispo  -pending clinical course  PCP: Balta Wright MD       Outpatient records or previous hospital records reviewed.   Further recommendations pending patient's clinical course.  [facility] hospitalist  team to continue to follow patient while in house  Concerns regarding plan of care were discussed with patient. Patient agrees with plan as detailed above. Discussed plan of care with Dr. Mcgill    Note: This chart was prepared using voice recognition software and may contain unintended word substitution errors.     Williams RUVALCABA  [facility] Hospitalist Team   Available via Perfect Serve or Bubble Chat (check Availability)    2/6/2024      HISTORY:   CC:   Chief Complaint   Patient presents with    Chest Pain Angina     From [facility] with CP that started today. Per EMS, XR done today showing PNA. Patient denies pain.        PCP: Balta Wright MD    History of Present Illness: Nelly Potter is an 88 year old female from [facility] with a PMHx significant for AFIB (on xarelto), COPD, asthma, neuropathy, CHF, anxiety/MDD, DVT, bladder CA, HLD, HTN, multiple myeloma, and vertigo presents to the hospital with chest pain.  She has recently transitioned to assisted living at [facility].  The ID MD was rounding that morning and she endorsed chest pain so was brought to the Ed for evaluation.  Her chest pain has since resolved and not returned.  She arrived on 2L 02 per NC that was started 2 days ago for SP02 of 92. Troponin normal.  CXR w small left pleural effusion.  AFIB RVR identified by ED MD on telemetry, IV diltiazem initiated.  Cardiology consulted.  She endorsed abdominal pain, CT abdomen with constipation and possible thickened wall of colon.  Soap suds enema ordered in ED.        Review of Systems  12 point systems reviewed, please see HPI for pertinent positives, otherwise negative    OBJECTIVE:  /89   Pulse (!) 124   Temp 97.9 °F  (36.6 °C) (Temporal)   Resp (!) 31   Ht 5' 6\" (1.676 m)   Wt 156 lb (70.8 kg)   SpO2 100%   BMI 25.18 kg/m²     GENERAL: no apparent distress  NEUROLOGIC: A/A; Ox3: strength normal; sensations intact  HEENT: normocephalic, normal nose, moist mucus membranes  RESPIRATORY: normal expansion; non labored, CTA   CARDIOVASCULAR: AFIB RVR  ABDOMEN:  Soft, BS+; non distended, tender upon palpation, non rigid    EXTREMITIES: no LE edema    PMH  Past Medical History:   Diagnosis Date    Allergic rhinitis     Anxiety     Chronic midline low back pain without sciatica 10/13/2021    Constipation     Depression     Esophageal reflux     History of bladder cancer 8/1/2012    Incontinence     Long QT interval     per pt. hx of long QT.    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 Mathews    CYSTOURETHROSCOPY  10/24/12    BTS Cysto Dr. Mathews     CYSTOURETHROSCOPY  4/29/13, 8/7/13, 11/13/13, 11/12/14, 5/6/15, 11/11/15    cysto- Dr. Mathews, [place]    CYSTOURETHROSCOPY  10/11/2017    cysto Dr. mathews    CYSTOURETHROSCOPY,FULGUR .5-2CM LESN  7/23/12    cystourethroscopy, TURBT x 2- Dr. Mathews    FOOT SURGERY Left 10/25/11    lt foot    HIP REPLACEMENT SURGERY      left    HYSTERECTOMY      KNEE REPLACEMENT SURGERY Bilateral     OTHER SURGICAL HISTORY  1983     arthroscopic left knee    PART EXCIS 5TH METATARSAL HEAD Right 10/2/2014    Procedure: TAILOR'S BUNIONECTOMY;  Surgeon: Adryan Prajapati DPM;  Location: [facility]     PATIENT DOCUMENTED NOT TO HAVE EXPERIENCED ANY OF THE FOLLOWING EVENTS Right 10/2/2014    Procedure: TAILOR'S BUNIONECTOMY;  Surgeon: Adryan Prajapati DPM;  Location: [facility]    PATIENT WITH PREOPERATIVE ORDER FOR IV ANTIBIOTIC SURGICAL SITE INFECT Right 10/2/2014    Procedure: TAILOR'S BUNIONECTOMY;  Surgeon: Adryan Prajapati DPM;  Location: [facility]    REPAIR OF HAMMERTOE,ONE Left 10/2/2014    Procedure: ARTHROPLASTY ONE (1) TOE;  Surgeon: Adryan Prajapati DPM;  Location: [facility]    SINUS SURGERY    1983 & 1999    sinus    SLING OPER STRES INCONTINENCE  8/06    sling    SLING OPER STRES INCONTINENCE  10/2011    sling repeat. TVT Exact. Dr. Dang at [facility]    TONSILLECTOMY          ALL:  Allergies   Allergen Reactions    Myrbetriq [Mirabegron] NAUSEA ONLY and DIZZINESS    Codeine NAUSEA ONLY    Evista [Raloxifene Hydrochloride] RASH    Meclizine NAUSEA ONLY    Sulfamethoxazole-Trimethoprim DIZZINESS        Home Medications:  No outpatient medications have been marked as taking for the 2/6/24 encounter (Hospital Encounter).       Soc Hx  Social History     Tobacco Use    Smoking status: Never    Smokeless tobacco: Never   Substance Use Topics    Alcohol use: Yes     Comment: 1 per day        Fam Hx  Family History   Problem Relation Age of Onset    Other (Other) Son         CF    Other (Other) Son         CF         DIAGNOSTIC DATA:   CBC/Chem  Recent Labs   Lab 02/01/24 0643 02/06/24  0639 02/06/24  1016   WBC 6.4 13.1* 16.3*   HGB 9.2* 9.1* 9.6*   MCV 79.8* 79.7* 79.8*   .0 203.0 225.0       Recent Labs   Lab 02/01/24 0643 02/06/24  0639 02/06/24  1016   * 134* 132*   K 3.5 3.6 3.8    101 99   CO2 30.0 29.0 28.0   BUN 12 18 16   CREATSERUM 0.36* 0.49* 0.36*   GLU 85 76 92   CA 8.0* 7.6* 8.1*       Recent Labs   Lab 02/01/24 0643 02/06/24  0639 02/06/24  1016   ALT 32 32 29   AST <3* <3* <3*   ALB 2.4* 2.2* 2.3*       No results for input(s): \"TROP\" in the last 168  hours.    Additional Diagnostics: ECG: ST w freq PVCs, RBBB    CXR: image personally reviewed pleural effusion: on the left    Radiology: CT ABDOMEN+PELVIS(CONTRAST ONLY)(CPT=74177)    Result Date: 2/6/2024  CONCLUSION:  There is a large amount of fecal material centered on the distal sigmoid colon and rectum most likely representing constipation/fecal impaction.  There is some possible wall thickening of the colon in this region which could represent stercoral colitis.  Limited views of the chest demonstrate probable basilar atelectasis versus consolidation with trace bilateral pleural effusions.  There is a compression fracture of T8 which may be chronic although there is some sclerosis present in this region.  Correlation for symptoms is suggested.  If there is persistent concern then consider MRI.   LOCATION:  [id number]   Dictated by (CST): Adryan Zazueta MD on 2/06/2024 at 1:03 PM     Finalized by (CST): Adryan Zazueta MD on 2/06/2024 at 1:07 PM       XR CHEST AP PORTABLE  (CPT=71045)    Result Date: 2/6/2024  CONCLUSION:  Small left pleural effusion versus pleural scarring.  Mild scarring/atelectasis in the lower lungs.    LOCATION:  [id number]      Dictated by (CST): Basim Todd MD on 2/06/2024 at 11:35 AM     Finalized by (CST): Basim Todd MD on 2/06/2024 at 11:36 AM          SEE ATTENDING NOTE BELOW        Pt admitted for abdominal pain, afib w RVR and mild hypoxia.      Son at bedside in ER.      /89   Pulse (!) 124   Temp 97.9 °F (36.6 °C) (Temporal)   Resp (!) 31   Ht 5' 6\" (1.676 m)   Wt 156 lb (70.8 kg)   SpO2 100%   BMI 25.18 kg/m²     Sleepy  Decreased at bases  Irreg irreg rapid   Abd distended, + BS  No edema    Stercoral colitis  - aggressive bowel regimen w enemas etc.  Empiric abx given leukocytosis     Afib w RVR  - IV dilt, cards to see    Hypoxia  - CXR w pulm edema  - IV lasix      [facility] Hospitalist  Internal Medicine  Answering Service number:  [phone]

## 2024-02-06 NOTE — ED QUICK NOTES
Provider contacted regarding low systolic BP, instructed to lower cardizem to 2.5mg/hr, if no improvement then 250ml bolus of 0.9NS needed.

## 2024-02-07 LAB
ANION GAP SERPL CALC-SCNC: 5 MMOL/L (ref 0–18)
BASOPHILS # BLD AUTO: 0.01 X10(3) UL (ref 0–0.2)
BASOPHILS NFR BLD AUTO: 0.1 %
BUN BLD-MCNC: 17 MG/DL (ref 9–23)
CALCIUM BLD-MCNC: 7.4 MG/DL (ref 8.5–10.1)
CHLORIDE SERPL-SCNC: 103 MMOL/L (ref 98–112)
CO2 SERPL-SCNC: 25 MMOL/L (ref 21–32)
CREAT BLD-MCNC: 0.29 MG/DL
EGFRCR SERPLBLD CKD-EPI 2021: 103 ML/MIN/1.73M2 (ref 60–?)
EOSINOPHIL # BLD AUTO: 0.02 X10(3) UL (ref 0–0.7)
EOSINOPHIL NFR BLD AUTO: 0.2 %
ERYTHROCYTE [DISTWIDTH] IN BLOOD BY AUTOMATED COUNT: 25 %
GLUCOSE BLD-MCNC: 80 MG/DL (ref 70–99)
HCT VFR BLD AUTO: 24.9 %
HGB BLD-MCNC: 8 G/DL
IMM GRANULOCYTES # BLD AUTO: 0.12 X10(3) UL (ref 0–1)
IMM GRANULOCYTES NFR BLD: 1.3 %
LYMPHOCYTES # BLD AUTO: 0.35 X10(3) UL (ref 1–4)
LYMPHOCYTES NFR BLD AUTO: 3.8 %
MCH RBC QN AUTO: 26.2 PG (ref 26–34)
MCHC RBC AUTO-ENTMCNC: 32.1 G/DL (ref 31–37)
MCV RBC AUTO: 81.6 FL
MONOCYTES # BLD AUTO: 0.19 X10(3) UL (ref 0.1–1)
MONOCYTES NFR BLD AUTO: 2.1 %
NEUTROPHILS # BLD AUTO: 8.5 X10 (3) UL (ref 1.5–7.7)
NEUTROPHILS # BLD AUTO: 8.5 X10(3) UL (ref 1.5–7.7)
NEUTROPHILS NFR BLD AUTO: 92.5 %
OSMOLALITY SERPL CALC.SUM OF ELEC: 277 MOSM/KG (ref 275–295)
PLATELET # BLD AUTO: 176 10(3)UL (ref 150–450)
POTASSIUM SERPL-SCNC: 3.6 MMOL/L (ref 3.5–5.1)
POTASSIUM SERPL-SCNC: 3.6 MMOL/L (ref 3.5–5.1)
RBC # BLD AUTO: 3.05 X10(6)UL
SODIUM SERPL-SCNC: 133 MMOL/L (ref 136–145)
WBC # BLD AUTO: 9.2 X10(3) UL (ref 4–11)

## 2024-02-07 PROCEDURE — 84132 ASSAY OF SERUM POTASSIUM: CPT | Performed by: INTERNAL MEDICINE

## 2024-02-07 PROCEDURE — 80048 BASIC METABOLIC PNL TOTAL CA: CPT

## 2024-02-07 PROCEDURE — 85025 COMPLETE CBC W/AUTO DIFF WBC: CPT

## 2024-02-07 PROCEDURE — 94640 AIRWAY INHALATION TREATMENT: CPT

## 2024-02-07 PROCEDURE — 99213 OFFICE O/P EST LOW 20 MIN: CPT

## 2024-02-07 RX ORDER — ENEMA 19; 7 G/133ML; G/133ML
1 ENEMA RECTAL ONCE AS NEEDED
Status: DISCONTINUED | OUTPATIENT
Start: 2024-02-07 | End: 2024-02-09

## 2024-02-07 RX ORDER — MAGNESIUM CARB/ALUMINUM HYDROX 105-160MG
296 TABLET,CHEWABLE ORAL ONCE
Status: DISCONTINUED | OUTPATIENT
Start: 2024-02-07 | End: 2024-02-09

## 2024-02-07 RX ORDER — FOLIC ACID 1 MG/1
1 TABLET ORAL DAILY
Status: DISCONTINUED | OUTPATIENT
Start: 2024-02-07 | End: 2024-02-09

## 2024-02-07 RX ORDER — FUROSEMIDE 20 MG/1
20 TABLET ORAL DAILY
Status: DISCONTINUED | OUTPATIENT
Start: 2024-02-08 | End: 2024-02-08

## 2024-02-07 RX ORDER — SODIUM CHLORIDE 1 G/1
1 TABLET ORAL 2 TIMES DAILY
COMMUNITY

## 2024-02-07 RX ORDER — ENEMA 19; 7 G/133ML; G/133ML
1 ENEMA RECTAL ONCE
Status: COMPLETED | OUTPATIENT
Start: 2024-02-07 | End: 2024-02-07

## 2024-02-07 RX ORDER — FUROSEMIDE 20 MG/1
20 TABLET ORAL DAILY
COMMUNITY
End: 2024-02-09

## 2024-02-07 RX ORDER — SODIUM CHLORIDE 1 G/1
1 TABLET ORAL 2 TIMES DAILY
Status: DISCONTINUED | OUTPATIENT
Start: 2024-02-07 | End: 2024-02-09

## 2024-02-07 RX ORDER — BISACODYL 10 MG
10 SUPPOSITORY, RECTAL RECTAL AS NEEDED
COMMUNITY

## 2024-02-07 RX ORDER — METRONIDAZOLE 500 MG/100ML
500 INJECTION, SOLUTION INTRAVENOUS EVERY 8 HOURS
Status: DISCONTINUED | OUTPATIENT
Start: 2024-02-07 | End: 2024-02-09

## 2024-02-07 RX ORDER — POLYETHYLENE GLYCOL 3350 17 G/17G
17 POWDER, FOR SOLUTION ORAL DAILY PRN
Status: DISCONTINUED | OUTPATIENT
Start: 2024-02-07 | End: 2024-02-07

## 2024-02-07 RX ORDER — DOCUSATE SODIUM 100 MG/1
100 CAPSULE, LIQUID FILLED ORAL 2 TIMES DAILY
Status: DISCONTINUED | OUTPATIENT
Start: 2024-02-07 | End: 2024-02-09

## 2024-02-07 RX ORDER — FOLIC ACID 1 MG/1
1 TABLET ORAL DAILY
COMMUNITY

## 2024-02-07 RX ORDER — FUROSEMIDE 10 MG/ML
40 INJECTION INTRAMUSCULAR; INTRAVENOUS ONCE
Status: COMPLETED | OUTPATIENT
Start: 2024-02-07 | End: 2024-02-07

## 2024-02-07 RX ORDER — POLYETHYLENE GLYCOL 3350 17 G/17G
17 POWDER, FOR SOLUTION ORAL DAILY
Status: DISCONTINUED | OUTPATIENT
Start: 2024-02-08 | End: 2024-02-09

## 2024-02-07 RX ORDER — BISACODYL 10 MG
10 SUPPOSITORY, RECTAL RECTAL
Status: DISCONTINUED | OUTPATIENT
Start: 2024-02-07 | End: 2024-02-09

## 2024-02-07 NOTE — PLAN OF CARE
Assumed care for patient at 2330. Patient alert and oriented x1-2, confused at baseline. Afib on tele. Cardizem gtt, HR controlled. Up with total assist, q2 turns with pillow support. Maintaining o2 sats >90% on 2L NC. Briefed/incontinent, purewick in place. No c/o pain. L midline in place PTA, per MD, okay to use. Safety precautions put in place, bed alarm on.       Problem: Patient/Family Goals  Goal: Patient/Family Long Term Goal  Description: Patient's Long Term Goal: discharge back to facility    Interventions:  - Cards/ Hospitalist, labs, chest xray, CT abd, IV abx, 1x enema, cardizem gtt  - See additional Care Plan goals for specific interventions  2/7/2024 0117 by Aida Gifford, RN  Outcome: Progressing  2/7/2024 0114 by Aida Gifford, RN  Outcome: Progressing  Goal: Patient/Family Short Term Goal  Description: Patient's Short Term Goal: relieve pain    Interventions:   - IV abx, 1x enema  - See additional Care Plan goals for specific interventions  2/7/2024 0117 by Aida Gifford, RN  Outcome: Progressing  2/7/2024 0114 by Aida Gifford, RN  Outcome: Progressing

## 2024-02-07 NOTE — PROGRESS NOTES
Critical access hospital AND CARE   Progress Note  -  Nelly Potter Patient Status:  Inpatient    1935 MRN [id number]   Location [facility] 2NE-A Attending Castillo Mcgill MD   Hosp Day # 1 PCP Balta Wright MD     PCP: Balta Wright MD      SEE ATTENDING NOTE AT BOTTOM OF PAGE    Is this a shared or split note between Advanced Practice Provider and Physician? Yes    Assessment and Plan:    Nelly Potter is an 88 year old female from [facility] with a PMHx significant for AFIB (on xarelto), COPD, asthma, neuropathy, anxiety/MDD, DVT, bladder CA, HLD, HTN, multiple myeloma, and vertigo presents to the hospital with chest pain.  See below for details.     Chest pain  AFIB RVR  Hypoxia  H/O DVT  -endorsed chest pain to [facility] ID MD that started today, has since resolved  -ECG w ST and frequent PVCs, RBBB  -troponin normal  -CXR with small left pleural effusion vs scarring, mild atelectasis at bases  -AFIB RVR identified on telemetry per ED MD  -cardiology consulted  -IV diltiazem-->po dilt started per cards, wean drip as able when HR < 90  -no need to repeat ECHO per cards which was done in December and had normal LV function and no significant valvular disease  -has been on 2L 02 x 2 days per the family, was initiated at [facility] for SP02 of , will give one dose of IV lasix-->280 mL urine output following admin-->now on 1L  -IV lasix 40 today, received updated med list from [facility] today, will restart PO lasix tomorrow  -xarelto     Abdominal pain  H/O UTI  H/O urinary retention  -endorsed abdominal pain  -clear liquid diet was tolerated-->advance to low fiber  -CT abdomen/pelvis with constipation, possible thickened wall of colon which can indicate colitis, compression fx of T8 which may be chronic  -s/p fleet enema-->2 bowel movements reported  -bowel regime   -UA ok  -WBC 16.3, lactic ok-->resolved  -will empirically start IV levaquin and IV flagyl given leukocytosis,  abdominal pain and CT abdomen results  -blood cxs in process  -flomax     COPD  Asthma  -albuterol  -maintenance inhaler  -rapid SARS negative     H/O multiple myeloma  Hypotension  -dexamethasone   -midodrine started 2/2 hypotension following abrupt steroid withdrawal per the patient's son     HTN  -amlodipine, held  -po diltiazem started     HLD  -statin     MDD/anxiety  -zoloft     Neuropathy  -gabapentin    Hyponatremia  -NaCl tabs     GOC discussion with patient and son Murali, patient is a DNAR selective treatment    Discussed with patient, son Murali, and attending [facility] oV        GOC: DNAR selective treatment     MA/ACO Reach  -Re- Entry: no  -Consults: cardiology  -Discharge Needs: TBD  -Appointments: TBD      FEN  -lytes in am  -diet-cardiac     Prophy  -SCD     Dispo  -pending clinical course    PCP: Balta Wright MD      Concerns regarding plan of care were discussed with patient. Patient agrees with plan as detailed above. Discussed plan of care with Dr. Mcgill    Note: This chart was prepared using voice recognition software and may contain unintended word substitution errors.        Williams Liz CHRISTA  [facility] Hospitalist Team  Contact via [employer] (Check Availability)  2/7/2024    SUBJECTIVE:   In bed resting comfortable.  States her abdominal pain is improved from yesterday.  Not as tender upon palpation.  Denies cp/sob, n/v, f/c.  On 02 1L      OBJECTIVE:   Blood pressure 105/77, pulse 80, temperature 97.8 °F (36.6 °C), temperature source Oral, resp. rate 17, height 5' 6\" (1.676 m), weight 152 lb 9.6 oz (69.2 kg), SpO2 95%.    GENERAL: no apparent distress  NEURO: A/A Ox3  RESP: non labored, CTA  CARDIO: irregular, no murmur  ABD: soft, NT, ND, BS+  EXTREMITIES: no edema, no calf tenderness    DIAGNOSTIC DATA:   Labs:     Recent Labs   Lab 02/01/24  0643 02/06/24  0639 02/06/24  1016 02/06/24  1346 02/07/24  0621   WBC 6.4 13.1* 16.3*  --  9.2   HGB 9.2* 9.1* 9.6*   --  8.0*   MCV 79.8* 79.7* 79.8*  --  81.6   .0 203.0 225.0  --  176.0   INR  --   --   --  2.00*  --        Recent Labs   Lab 02/01/24  0643 02/06/24  0639 02/06/24  1016 02/07/24  0621   * 134* 132* 133*   K 3.5 3.6 3.8 3.6  3.6    101 99 103   CO2 30.0 29.0 28.0 25.0   BUN 12 18 16 17   CREATSERUM 0.36* 0.49* 0.36* 0.29*   CA 8.0* 7.6* 8.1* 7.4*   GLU 85 76 92 80       Recent Labs   Lab 02/01/24  0643 02/06/24  0639 02/06/24  1016   ALT 32 32 29   AST <3* <3* <3*   ALB 2.4* 2.2* 2.3*       No results for input(s): \"PGLU\" in the last 168 hours.    No results for input(s): \"TROP\" in the last 168 hours.        MEDICATIONS      Current Facility-Administered Medications   Medication Dose Route Frequency    metRONIDAZOLE in sodium chloride 0.79% (Flagyl) 5 mg/mL IVPB premix 500 mg  500 mg Intravenous Q8H    potassium chloride 40 mEq in 250mL sodium chloride 0.9% IVPB premix  40 mEq Intravenous Once    dilTIAZem (cardIZEM) tab 30 mg  30 mg Oral 4 times per day    dilTIAZem (cardIZEM) 100 mg in sodium chloride 0.9% 100 mL IVPB-ADDV  2.5-20 mg/hr Intravenous Continuous    acetaminophen (Tylenol Extra Strength) tab 500 mg  500 mg Oral Q4H PRN    levoFLOXacin in dextrose 5% (Levaquin) 500 mg/100mL IVPB premix 500 mg  500 mg Intravenous Q24H    albuterol (Ventolin HFA) 108 (90 Base) MCG/ACT inhaler 2 puff  2 puff Inhalation Q4H PRN    atorvastatin (Lipitor) tab 10 mg  10 mg Oral Nightly    gabapentin (Neurontin) cap 300 mg  300 mg Oral BID    ipratropium (Atrovent) 0.03 % nasal solution 2 spray  2 spray Nasal Daily PRN    ipratropium-albuterol (Duoneb) 0.5-2.5 (3) MG/3ML inhalation solution 3 mL  3 mL Nebulization Q6H WA    pantoprazole (Protonix) DR tab 40 mg  40 mg Oral QAM AC    rivaroxaban (Xarelto) tab 20 mg  20 mg Oral Daily with food    sertraline (Zoloft) tab 25 mg  25 mg Oral Daily    tamsulosin (Flomax) cap 0.4 mg  0.4 mg Oral QPM    traMADol (Ultram) tab 25 mg  25 mg Oral Q8H PRN          IMAGING     XR CHEST AP PORTABLE  (CPT=71045)    Addendum Date: 2/6/2024    ADDENDUM:  A left-sided PICC terminates in the region of the left axillary vein in the left axilla.  Dictated by (CST): Raffaele Maradiaga MD on 2/06/2024 at 11:38 PM     Finalized by (CST): Raffaele Maradiaga MD on 2/06/2024 at 11:38 PM             Result Date: 2/6/2024  CONCLUSION:  Small left pleural effusion versus pleural scarring.  Mild scarring/atelectasis in the lower lungs.    LOCATION:  [id number]      Dictated by (CST): Basim Todd MD on 2/06/2024 at 11:35 AM     Finalized by (CST): Basim Todd MD on 2/06/2024 at 11:36 AM       CT ABDOMEN+PELVIS(CONTRAST ONLY)(CPT=74177)    Result Date: 2/6/2024  CONCLUSION:  There is a large amount of fecal material centered on the distal sigmoid colon and rectum most likely representing constipation/fecal impaction.  There is some possible wall thickening of the colon in this region which could represent stercoral colitis.  Limited views of the chest demonstrate probable basilar atelectasis versus consolidation with trace bilateral pleural effusions.  There is a compression fracture of T8 which may be chronic although there is some sclerosis present in this region.  Correlation for symptoms is suggested.  If there is persistent concern then consider MRI.   LOCATION:  [id number]   Dictated by (CST): Adryan Zazueta MD on 2/06/2024 at 1:03 PM     Finalized by (CST): Adryan Zazueta MD on 2/06/2024 at 1:07 PM          SEE ATTENDING NOTE BELOW:         Pt feels much better.  Family at bedside.  2 BMs w enema, one this am was small per tech.      Afebrile.  Wbc improved.      HR controlled.  On 1 L NC.  IV lasix given again this am    Cont abx, cont aggressive bowel regimen.  Rate control meds per cards.  Anticipate home tomorrow.      Tetocaleb PopMcgillWalter P. Reuther [facility]iatric Hospitalist  Internal Medicine  Answering Service number: [phone]

## 2024-02-07 NOTE — PLAN OF CARE
Patient arrived from ED with 2L NC and Cardizem drip infusing at 2.5mg. A & Ox 1-2.  Per son is baseline. Skin assessment done with Tavo RN, redness on sacrum and left posterior leg wound with mepilex in place. This RN unable to reconcile home medications - MD aware. Called [facility] and per RN Titi will try to fax medication/ face sheet.

## 2024-02-07 NOTE — OCCUPATIONAL THERAPY NOTE
OT order received, chart reviewed, spoke to RN at [facility] [place], pt's home environment, pt with high back custom W/C at baseline and use of jj lift to t/f to W/C, pt has total A for all ADLs including self feeding, per RN pt only sometimes able to slightly lift arm off bed.  Pt presents with no skilled needs at this time and OT will sign off.  RN made aware.

## 2024-02-07 NOTE — CONSULTS
Cardiology Consultation Note      Nelly Potter Patient Status:  Inpatient    1935 MRN [id number]   Location [facility] 2NE-A Attending Castillo Mcgill MD   Hosp Day # 1 PCP Balta Wright MD     Reason for consultation:  AFIB    Impression:  AFIB RVR  UTI with leukocytosis   Constipation  HTN  HLD     Plan:  Continue diltiazem gtt. Add PO diltiazem 30 q6. Wean drip once heart rates are consistently <90  IV lasix 40 mg daily.  Echo done in December with normal LV function and no significant valvular disease. No need to repeat  Treatment of abdominal pain and UTI which likely exacerbated AFIB  Continue Xarelto  Tele  Daily labs      History of Present Illness:  Nelly Potter is a 88 year old female who presented to [facility] on 2024.      The patient has a history of A-fib, chronic UTI, chronic constipation who presents with chest pain.    Patient lives in a nursing home at [facility] and when the infectious disease doctor was rounding she was complaining of chest pain.  In the ER she stated that her chest pain had completely resolved.  She denies any currently as well.    She was however found to be in A-fib with RVR.  Started on diltiazem drip she was also found to have constipation and possible UTI.    Cardiology consultation was requested.    Medications:  Current Facility-Administered Medications   Medication Dose Route Frequency    metRONIDAZOLE in sodium chloride 0.79% (Flagyl) 5 mg/mL IVPB premix 500 mg  500 mg Intravenous Q8H    potassium chloride 40 mEq in 250mL sodium chloride 0.9% IVPB premix  40 mEq Intravenous Once    dilTIAZem (cardIZEM) 100 mg in sodium chloride 0.9% 100 mL IVPB-ADDV  2.5-20 mg/hr Intravenous Continuous    acetaminophen (Tylenol Extra Strength) tab 500 mg  500 mg Oral Q4H PRN    levoFLOXacin in dextrose 5% (Levaquin) 500 mg/100mL IVPB premix 500 mg  500 mg Intravenous Q24H    albuterol (Ventolin HFA) 108 (90 Base) MCG/ACT inhaler 2 puff  2  puff Inhalation Q4H PRN    atorvastatin (Lipitor) tab 10 mg  10 mg Oral Nightly    gabapentin (Neurontin) cap 300 mg  300 mg Oral BID    ipratropium (Atrovent) 0.03 % nasal solution 2 spray  2 spray Nasal Daily PRN    ipratropium-albuterol (Duoneb) 0.5-2.5 (3) MG/3ML inhalation solution 3 mL  3 mL Nebulization Q6H WA    pantoprazole (Protonix) DR tab 40 mg  40 mg Oral QAM AC    rivaroxaban (Xarelto) tab 20 mg  20 mg Oral Daily with food    sertraline (Zoloft) tab 25 mg  25 mg Oral Daily    tamsulosin (Flomax) cap 0.4 mg  0.4 mg Oral QPM    traMADol (Ultram) tab 25 mg  25 mg Oral Q8H PRN       Past Medical History:   Diagnosis Date    Allergic rhinitis     Anxiety     Chronic midline low back pain without sciatica 10/13/2021    Constipation     Depression     Esophageal reflux     History of bladder cancer 8/1/2012    Incontinence     Long QT interval     per pt. hx of long QT.    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    Osteoarthrosis, unspecified whether generalized or localized, unspecified site     Osteopenia of multiple sites 8/30/2018    Other and unspecified hyperlipidemia     Pleurisy 2012    Psychophysiological insomnia 9/20/2018    Unspecified essential hypertension     Urge incontinence     Vertigo        Past Surgical History:   Procedure Laterality Date    APPENDECTOMY      CATARACT      COLONOSCOPY  Nov 2012    normal    CYSTOSCOPY CHEMODENERVATION  10/17/2017    100 U    CYSTOURETHROSCOPY  6/27/12    Lucrecia Mathews    CYSTOURETHROSCOPY  10/24/12    S Jose Mathews     CYSTOURETHROSCOPY  4/29/13, 8/7/13, 11/13/13, 11/12/14, 5/6/15, 11/11/15    cysto- Dr. Mathews, MELLY    CYSTOURETHROSCOPY  10/11/2017    cystwillie mathews    CYSTOURETHROSCOPY,FULGUR .5-2CM LESN  7/23/12    cystourethroscopy, TURBT x 2- Dr. Mathews    FOOT SURGERY Left 10/25/11    lt foot    HIP REPLACEMENT SURGERY      left    HYSTERECTOMY      KNEE REPLACEMENT SURGERY Bilateral     OTHER SURGICAL HISTORY  1983     arthroscopic left knee     PART EXCIS 5TH METATARSAL HEAD Right 10/2/2014    Procedure: TAILOR'S BUNIONECTOMY;  Surgeon: Adryan Prajapati DPM;  Location: [facility]    PATIENT DOCUMENTED NOT TO HAVE EXPERIENCED ANY OF THE FOLLOWING EVENTS Right 10/2/2014    Procedure: TAILOR'S BUNIONECTOMY;  Surgeon: Adryan Prajapati DPM;  Location: [facility]    PATIENT WITH PREOPERATIVE ORDER FOR IV ANTIBIOTIC SURGICAL SITE INFECT Right 10/2/2014    Procedure: TAILOR'S BUNIONECTOMY;  Surgeon: Adryan Prajapati DPM;  Location: [facility]    REPAIR OF ALEJANDROERTOTIO,ONE Left 10/2/2014    Procedure: ARTHROPLASTY ONE (1) TOE;  Surgeon: Adryan Prajpaati DPM;  Location: [facility]    SINUS SURGERY    1983 & 1999    sinus    SLING OPER STRES INCONTINENCE  8/06    sling    SLING OPER STRES INCONTINENCE  10/2011    sling repeat. TVT Exact. Dr. Dang at [facility]    TONSILLECTOMY         Family History  There is no family history of sudden cardiac death.    Social History   reports that she has never smoked. She has never used smokeless tobacco. She reports current alcohol use. She reports that she does not use drugs.     Allergies  Allergies   Allergen Reactions    Myrbetriq [Mirabegron] NAUSEA ONLY and DIZZINESS    Codeine NAUSEA ONLY    Evista [Raloxifene Hydrochloride] RASH    Meclizine NAUSEA ONLY    Sulfamethoxazole-Trimethoprim DIZZINESS         Review of Systems:  Constitutional: negative for fevers  Eyes: negative for visual disturbance  Ears, nose, mouth, throat, and face: negative for epistaxis  Respiratory: negative for dyspnea on exertion  Cardiovascular: negative for chest pain  Gastrointestinal: negative for melena  Genitourinary:negative for hematuria  Hematologic/lymphatic: negative for bleeding  Musculoskeletal:negative for myalgias  Neurological: negative for dizziness and headaches  Endocrine: negative for temperature intolerance      Physical Exam:  Blood pressure 105/77, pulse 80, temperature 97.8 °F (36.6 °C), temperature source Oral, resp.  rate 17, height 5' 6\" (1.676 m), weight 152 lb 9.6 oz (69.2 kg), SpO2 95%.  Temp (24hrs), Av.7 °F (36.5 °C), Min:97.5 °F (36.4 °C), Max:97.9 °F (36.6 °C)    Wt Readings from Last 3 Encounters:   24 152 lb 9.6 oz (69.2 kg)   24 157 lb (71.2 kg)   24 155 lb 6.4 oz (70.5 kg)       General: Awake and alert; in no acute distress  HEENT: Extraocular movements are intact; sclerae are anicteric; scalp is atrauamatic; no thyromegaly  Neck: Supple; no JVD; no carotid bruits  Cardiac: Regular rate and regular rhythm; no murmurs/rubs/gallops are appreciated; PMI is non-displaced; there is no evidence of a sternal heave  Lungs: Clear to auscultation bilaterally; no accessory muscle use is noted  Abdomen: Soft, non-tender; bowel sounds are normoactive; no hepatosplenomegaly  Extremities: No clubbing or cyanosis; moves all 4 extremities normally  Psychiatric: Normal mood and affect; answers questions appropriately  Dermatologic: No rashes; normal skin turgor    Diagnostic testing:    EKG: AFIB     Labs:   Lab Results   Component Value Date    INR 2.00 (H) 2024        Lab Results   Component Value Date    WBC 9.2 2024    HGB 8.0 2024    HCT 24.9 2024    .0 2024    CREATSERUM 0.29 2024    BUN 17 2024     2024    K 3.6 2024    K 3.6 2024     2024    CO2 25.0 2024    GLU 80 2024    CA 7.4 2024    ALB 2.3 2024    ALKPHO 65 2024    BILT 0.7 2024    TP 5.3 2024    AST <3 2024    ALT 29 2024    PTT 35.6 2024    INR 2.00 2024    PTP 22.9 2024         Thank you for allowing our practice to participate in the care of your patient. Please do not hesitate to contact me if you have any questions.    Shannen Cabrera MD

## 2024-02-07 NOTE — PLAN OF CARE
Rec'd pt at 0730. A&O x 1-2, confused. Tele shows a fib. O2 sats adequate on 2L NC. Pt incontinent, briefed, purewick in place. No c/o pain or SOB. LLE wound assessed by wound care RN, ordered dressing changes daily. Bed locked and in low position, call light and personal items within reach. Will continue to monitor. POC - Wean off cardizem gtt, PO cardizem q6, IV abx, nebs q6.    Problem: Patient/Family Goals  Goal: Patient/Family Long Term Goal  Description: Patient's Long Term Goal: discharge back to facility    Interventions:  - Cards/ Hospitalist, labs, chest xray, CT abd, IV abx, 1x enema, cardizem gtt  - See additional Care Plan goals for specific interventions  2/7/2024 1058 by Tavo Arriaga, RN  Outcome: Progressing  2/7/2024 1057 by Tavo Arriaga RN  Outcome: Progressing  Goal: Patient/Family Short Term Goal  Description: Patient's Short Term Goal: relieve pain    Interventions:   - IV abx, 1x enema  - See additional Care Plan goals for specific interventions  2/7/2024 1058 by Tavo Arriaga RN  Outcome: Progressing  2/7/2024 1057 by Tavo Arriaga RN  Outcome: Progressing     Problem: CARDIOVASCULAR - ADULT  Goal: Maintains optimal cardiac output and hemodynamic stability  Description: INTERVENTIONS:  - Monitor vital signs, rhythm, and trends  - Monitor for bleeding, hypotension and signs of decreased cardiac output  - Evaluate effectiveness of vasoactive medications to optimize hemodynamic stability  - Monitor arterial and/or venous puncture sites for bleeding and/or hematoma  - Assess quality of pulses, skin color and temperature  - Assess for signs of decreased coronary artery perfusion - ex. Angina  - Evaluate fluid balance, assess for edema, trend weights  Outcome: Progressing  Goal: Absence of cardiac arrhythmias or at baseline  Description: INTERVENTIONS:  - Continuous cardiac monitoring, monitor vital signs, obtain 12 lead EKG if indicated  - Evaluate effectiveness of antiarrhythmic and  heart rate control medications as ordered  - Initiate emergency measures for life threatening arrhythmias  - Monitor electrolytes and administer replacement therapy as ordered  Outcome: Progressing

## 2024-02-07 NOTE — CONSULTS
[facility]  Report of Inpatient Wound Care Consultation    Nelly Potter Patient Status:  Inpatient    1935 MRN [id number]   Location [facility] 2NE-A Attending Castillo Mcgill MD   Hosp Day # 1 PCP Balta Wright MD     Reason for Consultation:  Left leg,anus/sacrum    History of Present Illness:  Nelly Potter is a a(n) 88 year old female. Patient presents with full thickness wound to left posterior leg. No open wound on the sacral/anal area at this time.Patient denies pain in  the wound.RN and caregiver in the room.    History:  Past Medical History:   Diagnosis Date    Allergic rhinitis     Anxiety     Chronic midline low back pain without sciatica 10/13/2021    Constipation     Depression     Esophageal reflux     History of bladder cancer 2012    Incontinence     Long QT interval     per pt. hx of long QT.    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    Osteoarthrosis, unspecified whether generalized or localized, unspecified site     Osteopenia of multiple sites 2018    Other and unspecified hyperlipidemia     Pleurisy     Psychophysiological insomnia 2018    Unspecified essential hypertension     Urge incontinence     Vertigo      Past Surgical History:   Procedure Laterality Date    APPENDECTOMY      CATARACT      COLONOSCOPY  2012    normal    CYSTOSCOPY CHEMODENERVATION  10/17/2017    100 U    CYSTOURETHROSCOPY  12    CystoCraig Mathews    CYSTOURETHROSCOPY  10/24/12    BTS Jose Mathews     CYSTOURETHROSCOPY  13, 13, 13, 14, 5/6/15, 11/11/15    cysto- Dr. Mathews, MELLY    CYSTOURETHROSCOPY  10/11/2017    cystwillie mathews    CYSTOURETHROSCOPY,FULGUR .5-2CM LESN  12    cystourethroscopy, TURBT x 2- Dr. Mathews    FOOT SURGERY Left 10/25/11    lt foot    HIP REPLACEMENT SURGERY      left    HYSTERECTOMY      KNEE REPLACEMENT SURGERY Bilateral     OTHER SURGICAL HISTORY       arthroscopic left knee    PART EXCIS 5TH METATARSAL  HEAD Right 10/2/2014    Procedure: TAILOR'S BUNIONECTOMY;  Surgeon: Adryan Prajapati DPM;  Location: [facility]    PATIENT DOCUMENTED NOT TO HAVE EXPERIENCED ANY OF THE FOLLOWING EVENTS Right 10/2/2014    Procedure: TAILOR'S BUNIONECTOMY;  Surgeon: Adryan Prajapati DPM;  Location: [facility]    PATIENT WITH PREOPERATIVE ORDER FOR IV ANTIBIOTIC SURGICAL SITE INFECT Right 10/2/2014    Procedure: TAILOR'S BUNIONECTOMY;  Surgeon: Adryan Prajapati DPM;  Location: [facility]    REPAIR OF HAMMERTOE,ONE Left 10/2/2014    Procedure: ARTHROPLASTY ONE (1) TOE;  Surgeon: Adryan Prajapati DPM;  Location: [facility]    SINUS SURGERY    1983 & 1999    sinus    SLING OPER STRES INCONTINENCE  8/06    sling    SLING OPER STRES INCONTINENCE  10/2011    sling repeat. TVT Exact. Dr. Dang at [facility]    TONSILLECTOMY        reports that she has never smoked. She has never used smokeless tobacco. She reports current alcohol use. She reports that she does not use drugs.    Allergies:  @ALLERGY    Laboratory Data:  Lab Results   Component Value Date    WBC 9.2 02/07/2024    HGB 8.0 02/07/2024    HCT 24.9 02/07/2024    .0 02/07/2024    CREATSERUM 0.29 02/07/2024    BUN 17 02/07/2024     02/07/2024    K 3.6 02/07/2024    K 3.6 02/07/2024     02/07/2024    CO2 25.0 02/07/2024    GLU 80 02/07/2024    CA 7.4 02/07/2024         Impression:  Wound 02/06/24 Anus (Active)   Date First Assessed/Time First Assessed: 02/06/24 1800   Present on Original Admission: Yes  Primary Wound Type: Incontinent skin breakdown  Location: Anus  Wound Description (Comments): sacro gluteal area      Assessments 2/7/2024  3:56 PM   Wound Image     Drainage Amount None   Wound Odor None   Shape No open wound at this time.Skin is pinkish in color       Wound 02/07/24 Leg Left;Posterior (Active)   Date First Assessed/Time First Assessed: 02/07/24 1522   Location: Leg  Wound Location Orientation: Left;Posterior  Wound Description (Comments):  Etiology unknown per caregiver      Assessments 2/7/2024  3:58 PM   Wound Image     Drainage Amount Scant   Drainage Description Serous;Yellow   Wound Length (cm) 0.9 cm   Wound Width (cm) 0.5 cm   Wound Surface Area (cm^2) 0.45 cm^2   Wound Depth (cm) 0.1 cm   Wound Volume (cm^3) 0.045 cm^3   Margins Well-defined edges   Non-staged Wound Description Full thickness   Hussein-wound Assessment Other (Comment);Edema (erythema)   Wound Granulation Tissue Pink;Firm   Wound Bed Granulation (%) 5 %   Wound Bed Slough (%) 95 %   Wound Odor None   Local pulse: palpable dorsalis pedis  Capillary refill < 3 seconds  Temperature: within normal limits     Wound Cleaning and Dressings:    1.Sacro gluteal /anal area  Wound cleansing:  Wash with mild soap and water  Wound product: Apply zinc oxide/moisture barrier cream daily and as needed particularly after each hussein care      2.Left lower leg  Showering directions: May shower with protection-may use cast cover or shower boot if discharged to home/facility  Wound cleansing:  Cleanse with saline  Wound product: Honey gel and Bordered foam  Dressing change frequency:  Change dressing daily and/or PRN      Compression Therapy:   Elevate leg(s) as much as possible      Miscellaneous/Additional Orders:  Offloading: Turn Q 2 hours and as needed    Miscellaneous orders: May need to increase protein intake.Dietitian/attending physician to evaluate amount of protein intake/supplements     Care Summary: Discussed Plan of Care at beside with patient. Patient verbally acknowledges understanding of all instructions and all questions were answered.    Recommendations:  May need home health care if discharged to home or assisted living facility    Thank you for allowing me to participate in the care of your patient.  Time Spent 30 minutes , Thank you.    Doris Watts RN, BSN [facility]   Wound Care pager [phone]  2/7/2024  4:01 PM

## 2024-02-07 NOTE — PHYSICAL THERAPY NOTE
Order received for PT eval and chart reviewed. OT called staff at [facility] to confirm PLOF. Pt is dependent for mobility and ADL at baseline. Staff use total lift to transfer Pt out of bed to high back custom W/C. Pt without skilled IP PT needs at this time. PT will sign off.

## 2024-02-07 NOTE — CM/SW NOTE
02/07/24 1100   CM/SW Referral Data   Referral Source Social Work (self-referral)   Reason for Referral Discharge planning   Informant Son   Patient Info   Patient's Home Environment Long Term Care Facility   Post Acute Care Provider Upon Admission [facility] at    Patient Status Prior to Admission   Independent with ADLs and Mobility No   Pt. requires assistance with Housework;Driving;Meals;Bathing;Ambulating;Dressing;Feeding;Medications;Toileting;Finances   Discharge Needs   Anticipated D/C needs Long term care facility       Sw met with pt and son to discuss eventual dc planning/  Pt is 89 yo female, admitted to [facility] from [facility].  Per OT notes, pt uses jj lift for transfers.  Updates sent to [facility].  Son expressed concern about pt's medication list not being sent with pt  RN at [facility] emailed  med list and sw placed on her chart.  RN updated.  Pt admitted due to chest pain/safib.     Updates sent to [facility].  Anticipate pt will need ambulance for transport at dc.    Karen Patel LCSW  /Discharge Planner

## 2024-02-08 PROBLEM — I48.91 ATRIAL FIBRILLATION WITH RVR (HCC): Status: RESOLVED | Noted: 2024-02-06 | Resolved: 2024-02-08

## 2024-02-08 PROBLEM — I48.91 ATRIAL FIBRILLATION WITH RVR (HCC): Status: RESOLVED | Noted: 2024-01-01 | Resolved: 2024-01-01

## 2024-02-08 LAB
ANION GAP SERPL CALC-SCNC: 4 MMOL/L (ref 0–18)
BASOPHILS # BLD AUTO: 0.01 X10(3) UL (ref 0–0.2)
BASOPHILS NFR BLD AUTO: 0.1 %
BUN BLD-MCNC: 14 MG/DL (ref 9–23)
CALCIUM BLD-MCNC: 7.9 MG/DL (ref 8.5–10.1)
CHLORIDE SERPL-SCNC: 102 MMOL/L (ref 98–112)
CO2 SERPL-SCNC: 27 MMOL/L (ref 21–32)
CREAT BLD-MCNC: 0.18 MG/DL
EGFRCR SERPLBLD CKD-EPI 2021: 116 ML/MIN/1.73M2 (ref 60–?)
EOSINOPHIL # BLD AUTO: 0 X10(3) UL (ref 0–0.7)
EOSINOPHIL NFR BLD AUTO: 0 %
ERYTHROCYTE [DISTWIDTH] IN BLOOD BY AUTOMATED COUNT: 24.5 %
GLUCOSE BLD-MCNC: 140 MG/DL (ref 70–99)
HCT VFR BLD AUTO: 27.8 %
HGB BLD-MCNC: 8.8 G/DL
IMM GRANULOCYTES # BLD AUTO: 0.15 X10(3) UL (ref 0–1)
IMM GRANULOCYTES NFR BLD: 2.1 %
LYMPHOCYTES # BLD AUTO: 0.27 X10(3) UL (ref 1–4)
LYMPHOCYTES NFR BLD AUTO: 3.8 %
MCH RBC QN AUTO: 26.2 PG (ref 26–34)
MCHC RBC AUTO-ENTMCNC: 31.7 G/DL (ref 31–37)
MCV RBC AUTO: 82.7 FL
MONOCYTES # BLD AUTO: 0.17 X10(3) UL (ref 0.1–1)
MONOCYTES NFR BLD AUTO: 2.4 %
NEUTROPHILS # BLD AUTO: 6.54 X10 (3) UL (ref 1.5–7.7)
NEUTROPHILS # BLD AUTO: 6.54 X10(3) UL (ref 1.5–7.7)
NEUTROPHILS NFR BLD AUTO: 91.6 %
OSMOLALITY SERPL CALC.SUM OF ELEC: 279 MOSM/KG (ref 275–295)
PLATELET # BLD AUTO: 204 10(3)UL (ref 150–450)
POTASSIUM SERPL-SCNC: 3.7 MMOL/L (ref 3.5–5.1)
POTASSIUM SERPL-SCNC: 3.7 MMOL/L (ref 3.5–5.1)
RBC # BLD AUTO: 3.36 X10(6)UL
SODIUM SERPL-SCNC: 133 MMOL/L (ref 136–145)
WBC # BLD AUTO: 7.1 X10(3) UL (ref 4–11)

## 2024-02-08 PROCEDURE — 85025 COMPLETE CBC W/AUTO DIFF WBC: CPT

## 2024-02-08 PROCEDURE — 94640 AIRWAY INHALATION TREATMENT: CPT

## 2024-02-08 PROCEDURE — 80048 BASIC METABOLIC PNL TOTAL CA: CPT

## 2024-02-08 PROCEDURE — 92610 EVALUATE SWALLOWING FUNCTION: CPT

## 2024-02-08 PROCEDURE — 84132 ASSAY OF SERUM POTASSIUM: CPT | Performed by: HOSPITALIST

## 2024-02-08 RX ORDER — POLYETHYLENE GLYCOL 3350 17 G/17G
17 POWDER, FOR SOLUTION ORAL DAILY
Status: SHIPPED | COMMUNITY
Start: 2024-02-08

## 2024-02-08 RX ORDER — LACTULOSE 10 G/15ML
20 SOLUTION ORAL ONCE
Status: COMPLETED | OUTPATIENT
Start: 2024-02-08 | End: 2024-02-08

## 2024-02-08 RX ORDER — BISACODYL 5 MG/1
10 TABLET, DELAYED RELEASE ORAL ONCE
Status: DISCONTINUED | OUTPATIENT
Start: 2024-02-08 | End: 2024-02-08

## 2024-02-08 RX ORDER — ENEMA 19; 7 G/133ML; G/133ML
1 ENEMA RECTAL
Status: SHIPPED | COMMUNITY
Start: 2024-02-08

## 2024-02-08 RX ORDER — POTASSIUM CHLORIDE 20 MEQ/1
40 TABLET, EXTENDED RELEASE ORAL ONCE
Status: COMPLETED | OUTPATIENT
Start: 2024-02-08 | End: 2024-02-08

## 2024-02-08 RX ORDER — ALBUTEROL SULFATE 90 UG/1
2 AEROSOL, METERED RESPIRATORY (INHALATION) EVERY 4 HOURS PRN
Qty: 1 EACH | Refills: 0 | Status: SHIPPED | OUTPATIENT
Start: 2024-02-08 | End: 2024-03-09

## 2024-02-08 RX ORDER — FUROSEMIDE 10 MG/ML
40 INJECTION INTRAMUSCULAR; INTRAVENOUS DAILY
Status: DISCONTINUED | OUTPATIENT
Start: 2024-02-08 | End: 2024-02-09

## 2024-02-08 NOTE — PLAN OF CARE
Assumed care for patient at 0700  Drowsy, oriented x1. Resting in bed, total assist.  NSR with frequent pac's on monitor.  Desaturates to 87-88% on RA, requiring 1 L O2. Lung sounds diminished. RR 24-26. Denies pain but says \"ow\" and moans with turning and repositioning. Incontinent of bowel and bladder. Had a smear of stool. Per son, he is concerned she hasn't had a complete BM. Abdomen rounded, soft, bowel sounds active. Prn milk of magnesia, prune juice given. Lactulose was ordered and also given. Pt declined breakfast and had a little less than half of lunch. Generalized swelling present. Repositioning provided.    Problem: Patient/Family Goals  Goal: Patient/Family Long Term Goal  Description: Patient's Long Term Goal: discharge back to facility    Interventions:  - PO cardizem, IV antibiotics, wean O2, bowel regimine  - See additional Care Plan goals for specific interventions  Outcome: Progressing    Problem: CARDIOVASCULAR - ADULT  Goal: Maintains optimal cardiac output and hemodynamic stability  Description: INTERVENTIONS:  - Monitor vital signs, rhythm, and trends  - Monitor for bleeding, hypotension and signs of decreased cardiac output  - Evaluate effectiveness of vasoactive medications to optimize hemodynamic stability  - Monitor arterial and/or venous puncture sites for bleeding and/or hematoma  - Assess quality of pulses, skin color and temperature  - Assess for signs of decreased coronary artery perfusion - ex. Angina  - Evaluate fluid balance, assess for edema, trend weights  Outcome: Progressing  Goal: Absence of cardiac arrhythmias or at baseline  Description: INTERVENTIONS:  - Continuous cardiac monitoring, monitor vital signs, obtain 12 lead EKG if indicated  - Evaluate effectiveness of antiarrhythmic and heart rate control medications as ordered  - Initiate emergency measures for life threatening arrhythmias  - Monitor electrolytes and administer replacement therapy as ordered  Outcome:  Progressing

## 2024-02-08 NOTE — PLAN OF CARE
Patient alert and oriented x1-2, drowsy over night. Total assist, turns with pillow support. Afib on tele. Maintaining o2 sats on 2L NC. Briefed/incontinent, purewick in place. BM over night. Denies pain. Reinforced mepilex to L ankle, dressing c/d/I. IV abx given. Safety precautions put in place, bed alarm on.     2200 Converted to NSR.     Problem: Patient/Family Goals  Goal: Patient/Family Long Term Goal  Description: Patient's Long Term Goal: discharge back to facility    Interventions:  - Cards/ Hospitalist, labs, chest xray, CT abd, IV abx, 1x enema, cardizem gtt  - See additional Care Plan goals for specific interventions  Outcome: Progressing  Goal: Patient/Family Short Term Goal  Description: Patient's Short Term Goal: relieve pain    Interventions:   - IV abx, 1x enema  - See additional Care Plan goals for specific interventions  Outcome: Progressing     Problem: CARDIOVASCULAR - ADULT  Goal: Maintains optimal cardiac output and hemodynamic stability  Description: INTERVENTIONS:  - Monitor vital signs, rhythm, and trends  - Monitor for bleeding, hypotension and signs of decreased cardiac output  - Evaluate effectiveness of vasoactive medications to optimize hemodynamic stability  - Monitor arterial and/or venous puncture sites for bleeding and/or hematoma  - Assess quality of pulses, skin color and temperature  - Assess for signs of decreased coronary artery perfusion - ex. Angina  - Evaluate fluid balance, assess for edema, trend weights  Outcome: Progressing  Goal: Absence of cardiac arrhythmias or at baseline  Description: INTERVENTIONS:  - Continuous cardiac monitoring, monitor vital signs, obtain 12 lead EKG if indicated  - Evaluate effectiveness of antiarrhythmic and heart rate control medications as ordered  - Initiate emergency measures for life threatening arrhythmias  - Monitor electrolytes and administer replacement therapy as ordered  Outcome: Progressing

## 2024-02-08 NOTE — DISCHARGE SUMMARY
[facility] Hospitalist Discharge Summary   Patient ID:  Nelly Potter  [id number]  88 year old  8/28/1935    Admit date: 2/6/2024  Discharge date: 2/9/2024    Primary Care Physician: Balta Wright MD   Attending Physician: Castillo Mcgill MD   Consults:   Consultants         Provider   Role Specialty     Joe Miller MD  Consulting Physician CARDIOLOGY     Geno Kaur MD  Consulting Physician Internal Medicine            Hospital Discharge Diagnoses:   Atrial fibrillation with RVR (HCC)  ----See D/C Summary for further Dx    Risk of Readmission Lace+ Score: 66  59-90 High Risk  29-58 Medium Risk  0-28   Low Risk.    TCM Follow-Up Recommendation:  LACE > 58: High Risk of readmission after discharge from the hospital.    Please note that only IHP DMG and EMG patients enrolled in the Medicare ACO, BCBS ACO and BCBS HMOs will be handled by the [facility] Care Management team.  For all other patients, please follow usual protocol for discharge care transition.    Reason for admission  Nelly Potter is an 88 year old female from [facility]note 2[place] with a PMHx significant for AFIB (on xarelto), COPD, asthma, neuropathy, CHF, anxiety/MDD, DVT, bladder CA, HLD, HTN, multiple myeloma, and vertigo presents to the hospital with chest pain.  She has recently transitioned to assisted living at [facility] [place][note 2].  The ID MD was rounding that morning and she endorsed chest pain so was brought to the Ed for evaluation.  Her chest pain has since resolved and not returned.  She arrived on 2L 02 per NC that was started 2 days ago for SP02 of 92. Troponin normal.  CXR w small left pleural effusion.  AFIB RVR identified by ED MD on telemetry, IV diltiazem initiated.  Cardiology consulted.  She endorsed abdominal pain, CT abdomen with constipation and possible thickened wall of colon.  Soap suds enema ordered in ED.   Hospital Course:     Nelly Potter is an 88 year old female from [facility] [place]note 2] with a PMHx significant  for AFIB (on xarelto), COPD, asthma, neuropathy, anxiety/MDD, DVT, bladder CA, HLD, HTN, multiple myeloma, and vertigo presents to the hospital with chest pain.  See below for details.     Chest pain  AFIB RVR  Hypoxia  H/O DVT  -endorsed chest pain to [facility] ID MD that started today, has since resolved  -ECG w ST and frequent PVCs, RBBB  -troponin normal  -CXR with small left pleural effusion vs scarring, mild atelectasis at bases  -AFIB RVR identified on telemetry per ED MD  -cardiology consulted  -IV diltiazem stopped, rate now controlled with PO dilt  -no need to repeat ECHO per cards which was done in December and had normal LV function and no significant valvular disease  -has been on 2L 02 x 2 days per the family, was initiated at [facility] for SP02 of 92, received IV lasix x 4-->now on 0.5L  -PO lasix increased to 40 daily per cards  -xarelto     Abdominal pain-->resolved  Constipation  H/O UTI  H/O urinary retention  -endorsed abdominal pain  -clear liquid diet was tolerated-->advance to low fiber, tolerated well  -CT abdomen/pelvis with constipation, possible thickened wall of colon which can indicate stercoral colitis, compression fx of T8 which may be chronic  -s/p fleet enema x 2, lactulose, and golytely-->4 bowel movements reported   -bowel regime   -UA ok  -WBC 16.3, lactic ok-->resolved  -short course of IV levaquin and IV flagyl while in hospital given leukocytosis, abdominal pain and CT abdomen results  -blood cxs ngtd  -daily miralax after discharge and enema Q3 days if no BM occurrence  -flomax     COPD  Asthma  -albuterol  -maintenance inhaler  -rapid SARS negative  -2/9 RN called to say the patient was having a wet cough with possible crackles upon auscultation-->went to assess the patient, identified rhonchi upon auscultation, was able to clear with a cough, no other adventitious sounds identified, patient on RA, 02 sat 95, instructed to cough and deep breath    H/O multiple  myeloma  Hypotension  -dexamethasone   -midodrine started 2/2 hypotension following abrupt steroid withdrawal per the patient's son     HTN  -amlodipine stopped per cards  -po diltiazem long-acting upon discharge per cards     HLD  -statin     MDD/anxiety  -zoloft     Neuropathy  -gabapentin     Hyponatremia  -NaCl tabs     GOC discussion with patient and son Murali, patient is a DNAR selective treatment     Discussed with patient, son Murali, and attending MADHU Wright      GOC: DNAR selective treatment    EXAM: NAD  GENERAL: no apparent distress  NEURO: A/A Ox3  RESP: non labored, CTA  CARDIO: AFIB, controlled rate, no murmur  ABD: soft, NT, ND, BS+  EXTREMITIES: no edema, no calf tenderness    Operative Procedures:   Radiology:   XR CHEST AP PORTABLE  (CPT=71045)    Addendum Date: 2/6/2024    ADDENDUM:  A left-sided PICC terminates in the region of the left axillary vein in the left axilla.  Dictated by (CST): Raffaele Maradiaga MD on 2/06/2024 at 11:38 PM     Finalized by (CST): Raffaele Maradiaga MD on 2/06/2024 at 11:38 PM             Result Date: 2/6/2024  CONCLUSION:  Small left pleural effusion versus pleural scarring.  Mild scarring/atelectasis in the lower lungs.    LOCATION:  [id number]      Dictated by (CST): Basim Todd MD on 2/06/2024 at 11:35 AM     Finalized by (CST): Basim Todd MD on 2/06/2024 at 11:36 AM       CT ABDOMEN+PELVIS(CONTRAST ONLY)(CPT=74177)    Result Date: 2/6/2024  CONCLUSION:  There is a large amount of fecal material centered on the distal sigmoid colon and rectum most likely representing constipation/fecal impaction.  There is some possible wall thickening of the colon in this region which could represent stercoral colitis.  Limited views of the chest demonstrate probable basilar atelectasis versus consolidation with trace bilateral pleural effusions.  There is a compression fracture of T8 which may be chronic although there is some sclerosis present in this region.  Correlation for symptoms is  suggested.  If there is persistent concern then consider MRI.   LOCATION:  [id number]   Dictated by (CST): Adryan Zazueta MD on 2/06/2024 at 1:03 PM     Finalized by (CST): Adryan Zazueta MD on 2/06/2024 at 1:07 PM        Discharge Instructions     Medication List        START taking these medications      fleet enema 7-19 GM/118ML Enem            CHANGE how you take these medications      polyethylene glycol (PEG 3350) 17 g Pack  Commonly known as: Miralax  What changed:   when to take this  reasons to take this            CONTINUE taking these medications      acetaminophen 325 MG Tabs  Commonly known as: Tylenol     acidophilus-pectin Caps  Commonly known as: Probiotic     albuterol 108 (90 Base) MCG/ACT Aers  Commonly known as: Ventolin HFA  Inhale 2 puffs into the lungs every 4 (four) hours as needed for Wheezing.     atorvastatin 10 MG Tabs  Commonly known as: Lipitor  Take 1 tablet (10 mg total) by mouth daily.     bisacodyl 10 MG Supp  Commonly known as: Dulcolax     dexamethasone 2 MG Tabs  Commonly known as: Decadron     docusate sodium 100 MG Caps  Commonly known as: Colace     Ferrous Sulfate Dried  (50 Fe) MG Tbcr     fexofenadine 180 MG Tabs  Commonly known as: Allegra  Take 1 tablet (180 mg total) by mouth daily as needed.     folic acid 1 MG Tabs  Commonly known as: Folvite     gabapentin 300 MG Caps  Commonly known as: Neurontin  Take 1 capsule (300 mg total) by mouth 3 (three) times daily.     ipratropium 0.03 % Soln  Commonly known as: Atrovent     ipratropium-albuterol 0.5-2.5 (3) MG/3ML Soln  Commonly known as: Duoneb     lansoprazole 30 MG Cpdr  Commonly known as: Prevacid  Take 1 capsule (30 mg total) by mouth daily. Before meal     midodrine 5 MG Tabs  Commonly known as: ProAmatine     multivitamin Chew     sennosides 8.6 MG Tabs  Commonly known as: Senokot     sertraline 25 MG Tabs  Commonly known as: Zoloft     sodium chloride 1 g Tabs     tamsulosin 0.4 MG Caps  Commonly known as: Flomax      traMADol 50 MG Tabs  Commonly known as: Ultram  Take 0.5 tablets (25 mg total) by mouth every 8 (eight) hours as needed for Pain (moderate to severe pain not relieved by tylenol).     Xarelto 20 MG Tabs  Generic drug: rivaroxaban            ASK your doctor about these medications      amLODIPine 2.5 MG Tabs  Commonly known as: Norvasc     furosemide 20 MG Tabs  Commonly known as: Lasix               Where to Get Your Medications        These medications were sent to [facility]/pharmacy #[id number] - [place], [place] - [address] [phone], [phone]  [address] [address]      Phone: [phone]   albuterol 108 (90 Base) MCG/ACT Aers           Activity: activity as tolerated  Diet: cardiac diet  Wound Care: none needed  Code Status: DNAR/Selective Treatment    Important follow up:    Disposition: SNF  Discharged Condition: good    =========================================================================================================================    I Reconciled current and discharge medications on day of discharge  Patient had opportunity to ask questions and state understand and agree with therapeutic plan as outlined    Total Time Coordinating Care: greater than 30 minutes  Is this a shared or split note between Advanced Practice Provider and Physician? Yes    Note: This chart was prepared using voice recognition software and may contain unintended word substitution errors.     Williams Liz CHRISTA  [facility] Hospitalist Team   2/8/2024      SEE ATTENDING NOTE BELOW        Pt improved.  Eating.  Again another BM.      Dw in detail w  and care giver plan for constipation:  daily miralax and q3 d enema.

## 2024-02-08 NOTE — PROGRESS NOTES
[facility] AND CARE   Progress Note  -  Nelly Potter Patient Status:  Inpatient    1935 MRN [id number]   Location [facility] 2NE-A Attending Castillo Mcgill MD   Hosp Day # 2 PCP Balta Wright MD     PCP: Balta Wright MD      SEE ATTENDING NOTE AT BOTTOM OF PAGE    Is this a shared or split note between Advanced Practice Provider and Physician? Yes    Assessment and Plan:    Nelly Potter is an 88 year old female from [facility] with a PMHx significant for AFIB (on xarelto), COPD, asthma, neuropathy, anxiety/MDD, DVT, bladder CA, HLD, HTN, multiple myeloma, and vertigo presents to the hospital with chest pain.  See below for details.     Chest pain  AFIB RVR  Hypoxia  H/O DVT  -endorsed chest pain to [facility] ID MD that started today, has since resolved  -ECG w ST and frequent PVCs, RBBB  -troponin normal  -CXR with small left pleural effusion vs scarring, mild atelectasis at bases  -AFIB RVR identified on telemetry per ED MD  -cardiology consulted  -IV diltiazem stopped, on po dilt now  -no need to repeat ECHO per cards which was done in December and had normal LV function and no significant valvular disease  -has been on 2L 02 x 2 days  PTA per the family  -IV lasix 40 daily  -PO lasix restarted  -xarelto     Abdominal pain-->resolved  Constipation  H/O UTI  H/O urinary retention  -endorsed abdominal pain  -clear liquid diet was tolerated-->advance to low fiber  -CT abdomen/pelvis with constipation, possible thickened wall of colon which can indicate colitis, compression fx of T8 which may be chronic  -s/p fleet enema-->3 bowel movements reported although 2 were small  -bowel regime   -lactulose 1 x   -UA ok  -WBC 16.3, lactic ok-->resolved  -will empirically start IV levaquin and IV flagyl given leukocytosis, abdominal pain and CT abdomen results  -blood cxs ngtd  -flomax     COPD  Asthma  -albuterol  -maintenance inhaler  -rapid SARS negative     H/O multiple  myeloma  Hypotension  -dexamethasone   -midodrine started 2/2 hypotension following abrupt steroid withdrawal per the patient's son     HTN  -amlodipine, held  -po diltiazem started     HLD  -statin     MDD/anxiety  -zoloft     Neuropathy  -gabapentin     Hyponatremia  -NaCl tabs     GOC discussion with patient and son Murali, patient is a DNAR selective treatment     Discussed with patient and son Murali        GOC: DNAR selective treatment     MA/ACO Reach  -Re- Entry: no  -Consults: cardiology  -Discharge Needs: TBD  -Appointments: TBD      FEN  -lytes in am  -diet-cardiac     Prophy  -SCD     Dispo  -pending clinical course    PCP: Balta Wright MD      Concerns regarding plan of care were discussed with patient. Patient agrees with plan as detailed above. Discussed plan of care with Dr. Mcgill    Note: This chart was prepared using voice recognition software and may contain unintended word substitution errors.        Williams Liz CHRISTA  [facility] Hospitalist Team  Contact via [employer] (Check Availability)  2/8/2024    SUBJECTIVE:   In bed, on 02.  States her abdominal pain is much improved.  Denies cp.      OBJECTIVE:   Blood pressure 107/64, pulse 80, temperature 98.7 °F (37.1 °C), temperature source Oral, resp. rate 20, height 5' 6\" (1.676 m), weight 152 lb 9.6 oz (69.2 kg), SpO2 98%.    GENERAL: no apparent distress  NEURO: A/A Ox3  RESP: non labored, CTA  CARDIO: Regular, no murmur  ABD: soft, NT, ND, BS+  EXTREMITIES: no clubbing/cyanosis    DIAGNOSTIC DATA:   Labs:     Recent Labs   Lab 02/06/24  0639 02/06/24  1016 02/06/24  1346 02/07/24  0621 02/08/24  0618   WBC 13.1* 16.3*  --  9.2 7.1   HGB 9.1* 9.6*  --  8.0* 8.8*   MCV 79.7* 79.8*  --  81.6 82.7   .0 225.0  --  176.0 204.0   INR  --   --  2.00*  --   --        Recent Labs   Lab 02/06/24  0639 02/06/24  1016 02/07/24  0621 02/08/24  0618   * 132* 133* 133*   K 3.6 3.8 3.6  3.6 3.7  3.7    99 103  102   CO2 29.0 28.0 25.0 27.0   BUN 18 16 17 14   CREATSERUM 0.49* 0.36* 0.29* 0.18*   CA 7.6* 8.1* 7.4* 7.9*   GLU 76 92 80 140*       Recent Labs   Lab 02/06/24  0639 02/06/24  1016   ALT 32 29   AST <3* <3*   ALB 2.2* 2.3*       No results for input(s): \"PGLU\" in the last 168 hours.    No results for input(s): \"TROP\" in the last 168 hours.        MEDICATIONS      Current Facility-Administered Medications   Medication Dose Route Frequency    furosemide (Lasix) 10 mg/mL injection 40 mg  40 mg Intravenous Daily    metRONIDAZOLE in sodium chloride 0.79% (Flagyl) 5 mg/mL IVPB premix 500 mg  500 mg Intravenous Q8H    dilTIAZem (cardIZEM) tab 30 mg  30 mg Oral 4 times per day    docusate sodium (Colace) cap 100 mg  100 mg Oral BID    magnesium hydroxide (Milk of Magnesia) 400 MG/5ML oral suspension 30 mL  30 mL Oral Daily PRN    bisacodyl (Dulcolax) 10 MG rectal suppository 10 mg  10 mg Rectal Daily PRN    fleet enema (Fleet) 7-19 GM/118ML rectal enema 133 mL  1 enema Rectal Once PRN    dexamethasone (Decadron) tab 3.75 mg  3.75 mg Oral Daily with breakfast    folic acid (Folvite) tab 1 mg  1 mg Oral Daily    sodium chloride tab 1 g  1 g Oral BID    furosemide (Lasix) tab 20 mg  20 mg Oral Daily    polyethylene glycol (PEG 3350) (Miralax) 17 g oral packet 17 g  17 g Oral Daily    magnesium citrate 1.745 GM/30ML oral solution 296 mL  296 mL Oral Once    dilTIAZem (cardIZEM) 100 mg in sodium chloride 0.9% 100 mL IVPB-ADDV  2.5-20 mg/hr Intravenous Continuous    acetaminophen (Tylenol Extra Strength) tab 500 mg  500 mg Oral Q4H PRN    levoFLOXacin in dextrose 5% (Levaquin) 500 mg/100mL IVPB premix 500 mg  500 mg Intravenous Q24H    albuterol (Ventolin HFA) 108 (90 Base) MCG/ACT inhaler 2 puff  2 puff Inhalation Q4H PRN    atorvastatin (Lipitor) tab 10 mg  10 mg Oral Nightly    gabapentin (Neurontin) cap 300 mg  300 mg Oral BID    ipratropium (Atrovent) 0.03 % nasal solution 2 spray  2 spray Nasal Daily PRN     ipratropium-albuterol (Duoneb) 0.5-2.5 (3) MG/3ML inhalation solution 3 mL  3 mL Nebulization Q6H WA    pantoprazole (Protonix) DR tab 40 mg  40 mg Oral QAM AC    rivaroxaban (Xarelto) tab 20 mg  20 mg Oral Daily with food    sertraline (Zoloft) tab 25 mg  25 mg Oral Daily    tamsulosin (Flomax) cap 0.4 mg  0.4 mg Oral QPM    traMADol (Ultram) tab 25 mg  25 mg Oral Q8H PRN         IMAGING     XR CHEST AP PORTABLE  (CPT=71045)    Addendum Date: 2/6/2024    ADDENDUM:  A left-sided PICC terminates in the region of the left axillary vein in the left axilla.  Dictated by (CST): Raffaele Maradiaga MD on 2/06/2024 at 11:38 PM     Finalized by (CST): Raffaele Maradiaga MD on 2/06/2024 at 11:38 PM             Result Date: 2/6/2024  CONCLUSION:  Small left pleural effusion versus pleural scarring.  Mild scarring/atelectasis in the lower lungs.    LOCATION:  [id number]      Dictated by (CST): Basim Todd MD on 2/06/2024 at 11:35 AM     Finalized by (CST): Basim Todd MD on 2/06/2024 at 11:36 AM       CT ABDOMEN+PELVIS(CONTRAST ONLY)(CPT=74177)    Result Date: 2/6/2024  CONCLUSION:  There is a large amount of fecal material centered on the distal sigmoid colon and rectum most likely representing constipation/fecal impaction.  There is some possible wall thickening of the colon in this region which could represent stercoral colitis.  Limited views of the chest demonstrate probable basilar atelectasis versus consolidation with trace bilateral pleural effusions.  There is a compression fracture of T8 which may be chronic although there is some sclerosis present in this region.  Correlation for symptoms is suggested.  If there is persistent concern then consider MRI.   LOCATION:  [id number]   Dictated by (CST): Adryan Zazueta MD on 2/06/2024 at 1:03 PM     Finalized by (CST): Adryan Zazueta MD on 2/06/2024 at 1:07 PM          SEE ATTENDING NOTE BELOW:       Doing well.  Family at bedside.      Atleast one medium stool per RN staff.  Pt denies pain  to me.      HR improved, converting to po cardizem.  IV lasix per cards      [facility] Center[facility]  [facility] Medicine  Answering Service number: [phone]

## 2024-02-08 NOTE — PROGRESS NOTES
Cardiology Progress Note    Nelly Potter Patient Status:  Inpatient    1935 MRN [id number]   Location [facility] 2NE-A Attending Castillo Mcgill MD   Hosp Day # 2 PCP Balta Wright MD     Reason for consultation:  AFIB     Impression:  AFIB RVR  UTI with leukocytosis   Constipation  HTN  HLD      Plan:  Off diltiazem drip. ContinuePO diltiazem 30 q6.   IV lasix 40 mg daily.. renal function stable. Low creatinine likely represents low muscle mass   Echo done in December with normal LV function and no significant valvular disease. No need to repeat  Treatment of abdominal pain and UTI which likely exacerbated AFIB  Continue Xarelto  Tele  Daily labs    Subjective:  The patient denies any chest pain or dyspnea at this time.    Objective:  /64 (BP Location: Right arm)   Pulse 80   Temp 98.7 °F (37.1 °C) (Oral)   Resp 20   Ht 5' 6\" (1.676 m)   Wt 152 lb 9.6 oz (69.2 kg)   SpO2 98%   BMI 24.63 kg/m²   Temp (24hrs), Av.9 °F (36.6 °C), Min:97.5 °F (36.4 °C), Max:98.7 °F (37.1 °C)      Intake/Output Summary (Last 24 hours) at 2024 1424  Last data filed at 2024 1259  Gross per 24 hour   Intake 60 ml   Output --   Net 60 ml     Wt Readings from Last 3 Encounters:   24 152 lb 9.6 oz (69.2 kg)   24 157 lb (71.2 kg)   24 155 lb 6.4 oz (70.5 kg)       General: Awake and alert; in no acute distress  Cardiac: Regular rate and regular rhythm; no murmurs/rubs/gallops are appreciated  Lungs: Clear to auscultation bilaterally; no accessory muscle use  Abdomen: Soft, non-tender; bowel sounds are normoactive  Extremities: No clubbing/cyanosis; moves all 4 extremities normally    Current Facility-Administered Medications   Medication Dose Route Frequency    metRONIDAZOLE in sodium chloride 0.79% (Flagyl) 5 mg/mL IVPB premix 500 mg  500 mg Intravenous Q8H    dilTIAZem (cardIZEM) tab 30 mg  30 mg Oral 4 times per day    docusate sodium (Colace) cap 100 mg  100 mg Oral BID     magnesium hydroxide (Milk of Magnesia) 400 MG/5ML oral suspension 30 mL  30 mL Oral Daily PRN    bisacodyl (Dulcolax) 10 MG rectal suppository 10 mg  10 mg Rectal Daily PRN    fleet enema (Fleet) 7-19 GM/118ML rectal enema 133 mL  1 enema Rectal Once PRN    dexamethasone (Decadron) tab 3.75 mg  3.75 mg Oral Daily with breakfast    folic acid (Folvite) tab 1 mg  1 mg Oral Daily    sodium chloride tab 1 g  1 g Oral BID    furosemide (Lasix) tab 20 mg  20 mg Oral Daily    polyethylene glycol (PEG 3350) (Miralax) 17 g oral packet 17 g  17 g Oral Daily    magnesium citrate 1.745 GM/30ML oral solution 296 mL  296 mL Oral Once    dilTIAZem (cardIZEM) 100 mg in sodium chloride 0.9% 100 mL IVPB-ADDV  2.5-20 mg/hr Intravenous Continuous    acetaminophen (Tylenol Extra Strength) tab 500 mg  500 mg Oral Q4H PRN    levoFLOXacin in dextrose 5% (Levaquin) 500 mg/100mL IVPB premix 500 mg  500 mg Intravenous Q24H    albuterol (Ventolin HFA) 108 (90 Base) MCG/ACT inhaler 2 puff  2 puff Inhalation Q4H PRN    atorvastatin (Lipitor) tab 10 mg  10 mg Oral Nightly    gabapentin (Neurontin) cap 300 mg  300 mg Oral BID    ipratropium (Atrovent) 0.03 % nasal solution 2 spray  2 spray Nasal Daily PRN    ipratropium-albuterol (Duoneb) 0.5-2.5 (3) MG/3ML inhalation solution 3 mL  3 mL Nebulization Q6H WA    pantoprazole (Protonix) DR tab 40 mg  40 mg Oral QAM AC    rivaroxaban (Xarelto) tab 20 mg  20 mg Oral Daily with food    sertraline (Zoloft) tab 25 mg  25 mg Oral Daily    tamsulosin (Flomax) cap 0.4 mg  0.4 mg Oral QPM    traMADol (Ultram) tab 25 mg  25 mg Oral Q8H PRN       Laboratory Data:  Lab Results   Component Value Date    WBC 7.1 02/08/2024    HGB 8.8 02/08/2024    HCT 27.8 02/08/2024    .0 02/08/2024     Lab Results   Component Value Date    INR 2.00 (H) 02/06/2024     Lab Results   Component Value Date     02/08/2024    K 3.7 02/08/2024    K 3.7 02/08/2024     02/08/2024    CO2 27.0 02/08/2024    BUN 14  02/08/2024    CREATSERUM 0.18 02/08/2024     02/08/2024    CA 7.9 02/08/2024       Telemetry: No malignant tachyarrhythmias or bradyarrhythmias      Thank you for allowing our practice to participate in the care of your patient. Please do not hesitate to contact me if you have any questions.    Shannen Cabrera MD

## 2024-02-08 NOTE — SLP NOTE
ADULT SWALLOWING EVALUATION    ASSESSMENT    ASSESSMENT/OVERALL IMPRESSION:  Patient is an 87 y/o female admitted with chest pain and PMHx significant for afib, COPD, asthma, bladder cancer, HTN, and multiple myeloma. SLP order received to evaluate oropharyngeal swallow d/t concern for aspiration. Patient received drowsy, but arousable, in bed. She denied history of dysphagia symptoms with PO intake and reported consuming a regular diet and thin liquids at baseline.    Patient presented with an intact oropharyngeal swallow. Bolus acceptance was adequate without evidence of anterior bolus loss. Mastication and AP bolus transit were thorough and efficient without evidence of oral residue. Pharyngeal swallow initiation appeared timely and hyolaryngeal excursion was adequate per palpation.  No overt s/s of aspiration observed and patient denied odynophagia and globus sensation across consistencies.     Recommend patient initiate a regular diet and thin liquids with general safe swallowing precautions. No further SLP services warranted at this time as no deficits identified which require skilled intervention. Education provided re: results and recommendations.         RECOMMENDATIONS   Diet Recommendations - Solids: Regular  Diet Recommendations - Liquids: Thin Liquids                        Compensatory Strategies Recommended: Small bites and sips  Aspiration Precautions: Upright position  Medication Administration Recommendations: One pill at a time  Treatment Plan/Recommendations: No further inpatient SLP service warranted    HISTORY   MEDICAL HISTORY  Reason for Referral: R/O aspiration    Problem List  Principal Problem:    Atrial fibrillation with RVR (HCC)  Active Problems:    Constipation, unspecified constipation type      Past Medical History  Past Medical History:   Diagnosis Date    Allergic rhinitis     Anxiety     Chronic midline low back pain without sciatica 10/13/2021    Constipation     Depression      Esophageal reflux     History of bladder cancer 8/1/2012    Incontinence     Long QT interval     per pt. hx of long QT.    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    Osteoarthrosis, unspecified whether generalized or localized, unspecified site     Osteopenia of multiple sites 8/30/2018    Other and unspecified hyperlipidemia     Pleurisy 2012    Psychophysiological insomnia 9/20/2018    Unspecified essential hypertension     Urge incontinence     Vertigo        Prior Living Situation: Skilled nursing facility  Diet Prior to Admission: Unknown       Patient/Family Goals: none stated    SWALLOWING HISTORY  Current Diet Consistency: Regular;Thin liquids  Dysphagia History: as above  Imaging Results:   CXR 2/6/24  CONCLUSION:  Small left pleural effusion versus pleural scarring.  Mild scarring/atelectasis in the lower lungs.          LOCATION:  [facility]                  Dictated by (CST): Basim Todd MD on 2/06/2024 at 11:35 AM       Finalized by (CST): Basim Todd MD on 2/06/2024 at 11:36 AM     SUBJECTIVE       OBJECTIVE   ORAL MOTOR EXAMINATION  Dentition: Functional  Symmetry: Within Functional Limits  Strength: Within Functional Limits     Range of Motion: Within Functional Limits       Voice Quality: Clear  Respiratory Status: Unlabored  Consistencies Trialed: Thin liquids;Puree;Soft solid;Hard solid  Method of Presentation: Staff/Clinician assistance  Patient Positioning: Upright;Midline    Oral Phase of Swallow: Within Functional Limits                      Pharyngeal Phase of Swallow: Within Functional Limits           (Please note: Silent aspiration cannot be evaluated clinically. Videofluoroscopic Swallow Study is required to rule-out silent aspiration.)    Esophageal Phase of Swallow: No complaints consistent with possible esophageal involvement  Comments: d/w RN                  FOLLOW UP  Treatment Plan/Recommendations: No further inpatient SLP service warranted     Follow Up Needed  (Documentation Required): No       Thank you for your referral.   If you have any questions, please contact JUANA Caceres

## 2024-02-09 VITALS
OXYGEN SATURATION: 94 % | SYSTOLIC BLOOD PRESSURE: 119 MMHG | DIASTOLIC BLOOD PRESSURE: 61 MMHG | WEIGHT: 152.63 LBS | HEIGHT: 66 IN | HEART RATE: 90 BPM | BODY MASS INDEX: 24.53 KG/M2 | RESPIRATION RATE: 20 BRPM | TEMPERATURE: 98 F

## 2024-02-09 PROBLEM — K59.00 CONSTIPATION, UNSPECIFIED CONSTIPATION TYPE: Status: RESOLVED | Noted: 2024-02-06 | Resolved: 2024-02-09

## 2024-02-09 PROBLEM — K59.00 CONSTIPATION, UNSPECIFIED CONSTIPATION TYPE: Status: RESOLVED | Noted: 2024-01-01 | Resolved: 2024-01-01

## 2024-02-09 LAB
ANION GAP SERPL CALC-SCNC: 5 MMOL/L (ref 0–18)
BASOPHILS # BLD AUTO: 0.02 X10(3) UL (ref 0–0.2)
BASOPHILS NFR BLD AUTO: 0.3 %
BUN BLD-MCNC: 13 MG/DL (ref 9–23)
CALCIUM BLD-MCNC: 7.9 MG/DL (ref 8.5–10.1)
CHLORIDE SERPL-SCNC: 103 MMOL/L (ref 98–112)
CO2 SERPL-SCNC: 26 MMOL/L (ref 21–32)
CREAT BLD-MCNC: 0.25 MG/DL
EGFRCR SERPLBLD CKD-EPI 2021: 107 ML/MIN/1.73M2 (ref 60–?)
EOSINOPHIL # BLD AUTO: 0 X10(3) UL (ref 0–0.7)
EOSINOPHIL NFR BLD AUTO: 0 %
ERYTHROCYTE [DISTWIDTH] IN BLOOD BY AUTOMATED COUNT: 23.9 %
GLUCOSE BLD-MCNC: 146 MG/DL (ref 70–99)
HCT VFR BLD AUTO: 25.2 %
HGB BLD-MCNC: 8.2 G/DL
IMM GRANULOCYTES # BLD AUTO: 0.2 X10(3) UL (ref 0–1)
IMM GRANULOCYTES NFR BLD: 2.8 %
LYMPHOCYTES # BLD AUTO: 0.43 X10(3) UL (ref 1–4)
LYMPHOCYTES NFR BLD AUTO: 6.1 %
MCH RBC QN AUTO: 26.2 PG (ref 26–34)
MCHC RBC AUTO-ENTMCNC: 32.5 G/DL (ref 31–37)
MCV RBC AUTO: 80.5 FL
MONOCYTES # BLD AUTO: 0.27 X10(3) UL (ref 0.1–1)
MONOCYTES NFR BLD AUTO: 3.8 %
NEUTROPHILS # BLD AUTO: 6.18 X10 (3) UL (ref 1.5–7.7)
NEUTROPHILS # BLD AUTO: 6.18 X10(3) UL (ref 1.5–7.7)
NEUTROPHILS NFR BLD AUTO: 87 %
OSMOLALITY SERPL CALC.SUM OF ELEC: 281 MOSM/KG (ref 275–295)
PLATELET # BLD AUTO: 209 10(3)UL (ref 150–450)
POTASSIUM SERPL-SCNC: 4.1 MMOL/L (ref 3.5–5.1)
POTASSIUM SERPL-SCNC: 4.1 MMOL/L (ref 3.5–5.1)
RBC # BLD AUTO: 3.13 X10(6)UL
SODIUM SERPL-SCNC: 134 MMOL/L (ref 136–145)
WBC # BLD AUTO: 7.1 X10(3) UL (ref 4–11)

## 2024-02-09 PROCEDURE — 85025 COMPLETE CBC W/AUTO DIFF WBC: CPT

## 2024-02-09 PROCEDURE — 94640 AIRWAY INHALATION TREATMENT: CPT

## 2024-02-09 PROCEDURE — 84132 ASSAY OF SERUM POTASSIUM: CPT | Performed by: INTERNAL MEDICINE

## 2024-02-09 PROCEDURE — 80048 BASIC METABOLIC PNL TOTAL CA: CPT

## 2024-02-09 RX ORDER — DILTIAZEM HYDROCHLORIDE 180 MG/1
180 CAPSULE, COATED, EXTENDED RELEASE ORAL DAILY
Qty: 30 CAPSULE | Refills: 0 | Status: SHIPPED | OUTPATIENT
Start: 2024-02-10 | End: 2024-03-11

## 2024-02-09 RX ORDER — GABAPENTIN 300 MG/1
300 CAPSULE ORAL 2 TIMES DAILY
COMMUNITY

## 2024-02-09 RX ORDER — FUROSEMIDE 40 MG/1
40 TABLET ORAL 2 TIMES DAILY
Qty: 180 TABLET | Refills: 3 | Status: SHIPPED | OUTPATIENT
Start: 2024-02-09 | End: 2025-02-03

## 2024-02-09 RX ORDER — DILTIAZEM HYDROCHLORIDE 180 MG/1
180 CAPSULE, EXTENDED RELEASE ORAL DAILY
Status: DISCONTINUED | OUTPATIENT
Start: 2024-02-09 | End: 2024-02-09

## 2024-02-09 NOTE — CM/SW NOTE
02/09/24 1159   Discharge disposition   Expected discharge disposition Long Term [facility]   Post Acute Care Provider [facility] at    Discharge transportation [facility]       Pt cleared to dc today. Updates sent to [facility].  [facility].  RN to call report to [phone].  Son updated.    Karen Patel LCSW  /Discharge Planner

## 2024-02-09 NOTE — PROGRESS NOTES
Cardiology Progress Note    Nelly Potter Patient Status:  Inpatient    1935 MRN [id number]   [facility] 2NE-A Attending Castillo Mcgill MD   Hosp Day # 3 PCP Balta Wright MD     Reason for consultation:  AFIB     Impression:  AFIB RVR  UTI with leukocytosis   Constipation  HTN  HLD      Plan:  Off diltiazem drip. ContinuePO diltiazem 30 q6. Switch to long acting on DC  IV lasix 40 mg daily. Switch to PO lasix 40 mg .Renal function stable. Low creatinine likely represents low muscle mass. Much of her edema is due to hypoalbumin which will not be corrected with lasix. She will need better nutrition long term  Echo done in December with normal LV function and no significant valvular disease. No need to repeat  Treatment of abdominal pain and UTI which likely exacerbated AFIB  Continue Xarelto  Tele  Daily labs  OK for DC from cardiac standpoint. We will follow peripherally. Please call us should any issues arise     Subjective:  The patient denies any chest pain or dyspnea at this time.    Objective:  /84 (BP Location: Right arm)   Pulse 71   Temp 97.5 °F (36.4 °C) (Axillary)   Resp 20   Ht 5' 6\" (1.676 m)   Wt 152 lb 9.6 oz (69.2 kg)   SpO2 97%   BMI 24.63 kg/m²   Temp (24hrs), Av.9 °F (36.6 °C), Min:97.3 °F (36.3 °C), Max:98.7 °F (37.1 °C)      Intake/Output Summary (Last 24 hours) at 2024 1050  Last data filed at 2024 0420  Gross per 24 hour   Intake 300 ml   Output 1250 ml   Net -950 ml     Wt Readings from Last 3 Encounters:   24 152 lb 9.6 oz (69.2 kg)   24 157 lb (71.2 kg)   24 155 lb 6.4 oz (70.5 kg)       General: Awake and alert; in no acute distress  Cardiac: Regular rate and regular rhythm; no murmurs/rubs/gallops are appreciated  Lungs: Clear to auscultation bilaterally; no accessory muscle use  Abdomen: Soft, non-tender; bowel sounds are normoactive  Extremities: No clubbing/cyanosis; moves all 4 extremities  normally          Laboratory Data:  Lab Results   Component Value Date    WBC 7.1 02/09/2024    HGB 8.2 02/09/2024    HCT 25.2 02/09/2024    .0 02/09/2024     Lab Results   Component Value Date    INR 2.00 (H) 02/06/2024     Lab Results   Component Value Date     02/09/2024    K 4.1 02/09/2024    K 4.1 02/09/2024     02/09/2024    CO2 26.0 02/09/2024    BUN 13 02/09/2024    CREATSERUM 0.25 02/09/2024     02/09/2024    CA 7.9 02/09/2024       Telemetry: No malignant tachyarrhythmias or bradyarrhythmias      Thank you for allowing our practice to participate in the care of your patient. Please do not hesitate to contact me if you have any questions.    Shannen Cabrera MD

## 2024-02-09 NOTE — PLAN OF CARE
Pt. Is alert and oriented to self. NSR to sinus tach on tele. Pt. Complained of some chest pain this AM. Reproducible. MD aware. Pt. Weaned to RA. Increasing wet breathing/cough late morning. Got better with nub treatment. MD to bedside.   Pt is incontinent of bowel and bladder.   Pt. And family updated on plan of care. Call light within reach.   Problem: Patient/Family Goals  Goal: Patient/Family Long Term Goal  Description: Patient's Long Term Goal: discharge back to facility    Interventions:  - Cards/ Hospitalist, labs, chest xray, CT abd, IV abx, 1x enema, cardizem gtt  - See additional Care Plan goals for specific interventions  Outcome: Progressing  Goal: Patient/Family Short Term Goal  Description: Patient's Short Term Goal: relieve pain    Interventions:   - IV abx, 1x enema  - See additional Care Plan goals for specific interventions  Outcome: Progressing     Problem: CARDIOVASCULAR - ADULT  Goal: Maintains optimal cardiac output and hemodynamic stability  Description: INTERVENTIONS:  - Monitor vital signs, rhythm, and trends  - Monitor for bleeding, hypotension and signs of decreased cardiac output  - Evaluate effectiveness of vasoactive medications to optimize hemodynamic stability  - Monitor arterial and/or venous puncture sites for bleeding and/or hematoma  - Assess quality of pulses, skin color and temperature  - Assess for signs of decreased coronary artery perfusion - ex. Angina  - Evaluate fluid balance, assess for edema, trend weights  Outcome: Progressing  Goal: Absence of cardiac arrhythmias or at baseline  Description: INTERVENTIONS:  - Continuous cardiac monitoring, monitor vital signs, obtain 12 lead EKG if indicated  - Evaluate effectiveness of antiarrhythmic and heart rate control medications as ordered  - Initiate emergency measures for life threatening arrhythmias  - Monitor electrolytes and administer replacement therapy as ordered  Outcome: Progressing

## 2024-02-09 NOTE — PROGRESS NOTES
NURSING DISCHARGE NOTE    Discharged to [facility] via Ambulance.  Accompanied by Support staff  Belongings Taken by patient/family.    Report called. IV removed. Discharge education completed. All questions answered.

## 2024-02-09 NOTE — PLAN OF CARE
Patient alert and oriented x1-2, drowsy. Total assist with pillow support. NSR on tele. Maintaining o2 sats on 0.5L NC. Briefed/incontinent, purewick in place. Denies pain. IV abx given. Heels reddened, beatrice bunny boots on. Patient consumed 1.5 cups of golytely, refused to drink any more. Small bm overnight. Safety precautions put in place, bed alarm on.       Problem: Patient/Family Goals  Goal: Patient/Family Long Term Goal  Description: Patient's Long Term Goal: discharge back to facility    Interventions:  - Cards/ Hospitalist, labs, chest xray, CT abd, IV abx, 1x enema, cardizem gtt  - See additional Care Plan goals for specific interventions  Outcome: Progressing  Goal: Patient/Family Short Term Goal  Description: Patient's Short Term Goal: relieve pain    Interventions:   - IV abx, 1x enema  - See additional Care Plan goals for specific interventions  Outcome: Progressing

## 2024-02-12 ENCOUNTER — INITIAL APN SNF VISIT (OUTPATIENT)
Dept: INTERNAL MEDICINE CLINIC | Age: 89
End: 2024-02-12

## 2024-02-12 VITALS
RESPIRATION RATE: 18 BRPM | TEMPERATURE: 98 F | SYSTOLIC BLOOD PRESSURE: 127 MMHG | HEART RATE: 84 BPM | OXYGEN SATURATION: 95 % | DIASTOLIC BLOOD PRESSURE: 76 MMHG

## 2024-02-12 DIAGNOSIS — E78.00 HYPERCHOLESTEROLEMIA: Chronic | ICD-10-CM

## 2024-02-12 DIAGNOSIS — C90.00 MULTIPLE MYELOMA, REMISSION STATUS UNSPECIFIED (HCC): ICD-10-CM

## 2024-02-12 DIAGNOSIS — K52.89 STERCORAL COLITIS: ICD-10-CM

## 2024-02-12 DIAGNOSIS — F32.A DEPRESSION, UNSPECIFIED DEPRESSION TYPE: ICD-10-CM

## 2024-02-12 DIAGNOSIS — I10 ESSENTIAL HYPERTENSION: Chronic | ICD-10-CM

## 2024-02-12 DIAGNOSIS — D50.0 IRON DEFICIENCY ANEMIA DUE TO CHRONIC BLOOD LOSS: ICD-10-CM

## 2024-02-12 DIAGNOSIS — I26.99 OTHER PULMONARY EMBOLISM WITHOUT ACUTE COR PULMONALE, UNSPECIFIED CHRONICITY (HCC): ICD-10-CM

## 2024-02-12 DIAGNOSIS — K59.04 CHRONIC IDIOPATHIC CONSTIPATION: ICD-10-CM

## 2024-02-12 DIAGNOSIS — I48.91 ATRIAL FIBRILLATION, UNSPECIFIED TYPE (HCC): Primary | ICD-10-CM

## 2024-02-12 DIAGNOSIS — Z86.718 HISTORY OF DVT (DEEP VEIN THROMBOSIS): ICD-10-CM

## 2024-02-12 NOTE — PROGRESS NOTES
Skilled Nursing Facility  [facility] [place]    Nelly Potter Author: Jessie Villagran, APRN     1935 MRN [id number]   Last Hospital  Admission 24      Last Hospital Discharge 24 PCP Balta Wright MD     Returning to long term care       HPI:      Nelly Potter is a 88 year old female with previous medical history including COPD, Afib, CHF, HTN, HLD, hx bladder cancer, MM. She was admitted to the hospital for chest pain, shortness of breath, hypoxia, edema. She was diagnosed with Afib RVR, started on diltiazem amlodipine stopped. Troponins normal, chest xray with small left pleural effusion vs scarring. She had lasix IV x1, oxygen then weaned down from 2 to 0.5 L?NC. Lasix increased to 40 mg daily PO> She remains on Xarelto. She had CT abdomen pelvis with constipation and possible thickened wall of the colon ? Stercoral colitis. She had fleets X2 with lactulose and Golytely had 4 BM. Treated with levaquin and flagyl IV inpt with leukocytosis and concern for colitis. Plan for daily miralax and enema q3 days if no BM. Remains on Flomax for urinary retention.Comp fx of T8 likely chronic per notes.   She is now returned to Long term care at the [facility].     Hospital Discharge Diagnoses:  Afib RVR  Stercoral colitis  Constipation  T8 compression fx  Urinary retention  HTN  HLD  MM    Chief Complaint at visit:   Chief Complaint   Patient presents with    Follow - Up     Colitis, constipation, weakness  Recent Hx Afib RVR        ALLERGIES    ALLERGIES:  Allergies   Allergen Reactions    Myrbetriq [Mirabegron] NAUSEA ONLY and DIZZINESS    Codeine NAUSEA ONLY    Evista [Raloxifene Hydrochloride] RASH    Meclizine NAUSEA ONLY    Sulfamethoxazole-Trimethoprim DIZZINESS       CURRENT MEDS:      CURRENT MEDICATIONS   Current Outpatient Medications   Medication Sig Dispense Refill    furosemide 40 MG Oral Tab Take 1 tablet (40 mg total) by mouth 2 (two) times daily. 180 tablet 3    gabapentin 300  MG Oral Cap Take 1 capsule (300 mg total) by mouth in the morning and 1 capsule (300 mg total) before bedtime.      dilTIAZem  MG Oral Capsule SR 24 Hr Take 1 capsule (180 mg total) by mouth daily. 30 capsule 0    albuterol 108 (90 Base) MCG/ACT Inhalation Aero Soln Inhale 2 puffs into the lungs every 4 (four) hours as needed for Wheezing. 1 each 0    fleet enema 7-19 GM/118ML Rectal Enema Place 133 mL rectally every third day as needed (if no BM in 3 days).      polyethylene glycol, PEG 3350, 17 g Oral Powd Pack Take 17 g by mouth daily.      bisacodyl 10 MG Rectal Suppos Place 1 suppository (10 mg total) rectally as needed (If no BM in 3 days).      folic acid 1 MG Oral Tab Take 1 tablet (1 mg total) by mouth daily.      Ferrous Sulfate Dried  (50 Fe) MG Oral Tab CR Take 160 mg by mouth daily.      sodium chloride 1 g Oral Tab Take 1 tablet (1 g total) by mouth 2 (two) times daily. For hyponatremia      traMADol 50 MG Oral Tab Take 0.5 tablets (25 mg total) by mouth every 8 (eight) hours as needed for Pain (moderate to severe pain not relieved by tylenol). 30 tablet 0    tamsulosin 0.4 MG Oral Cap Take 1 capsule (0.4 mg total) by mouth every evening.      ipratropium-albuterol 0.5-2.5 (3) MG/3ML Inhalation Solution Take 3 mL by nebulization every 6 (six) hours while awake.      ipratropium 0.03 % Nasal Solution 2 sprays by Nasal route daily as needed for Rhinitis.      multivitamin Oral Chew Tab Chew 1 tablet by mouth daily.      dexamethasone 2 MG Oral Tab Take 3.75 mg by mouth daily with breakfast. Slow taper now to 3.75 starting 1/19/24      acidophilus-pectin Oral Cap Take 1 capsule by mouth daily.      midodrine 5 MG Oral Tab Take 1 tablet (5 mg total) by mouth every 8 (eight) hours as needed (SBP less than 95 and DBP less than 50). If SBP <95 or DBP <50      sennosides 8.6 MG Oral Tab Take 2 tablets (17.2 mg total) by mouth 2 (two) times daily.      acetaminophen 325 MG Oral Tab Take 2 tablets  (650 mg total) by mouth every 6 (six) hours as needed for Pain.      rivaroxaban (XARELTO) 20 MG Oral Tab Take 1 tablet (20 mg total) by mouth daily with food.      sertraline 25 MG Oral Tab Take 1 tablet (25 mg total) by mouth daily.      fexofenadine 180 MG Oral Tab Take 1 tablet (180 mg total) by mouth daily as needed. 90 tablet 3    lansoprazole 30 MG Oral Capsule Delayed Release Take 1 capsule (30 mg total) by mouth daily. Before meal 90 capsule 3    atorvastatin 10 MG Oral Tab Take 1 tablet (10 mg total) by mouth daily. 90 tablet 3       HISTORY:    Past Medical History:   Diagnosis Date    Allergic rhinitis     Anxiety     Chronic midline low back pain without sciatica 10/13/2021    Constipation     Depression     Esophageal reflux     History of bladder cancer 8/1/2012    Incontinence     Long QT interval     per pt. hx of long QT.    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 Mathews    CYSTOURETHROSCOPY  10/24/12    [facility] Cysto Dr. Mathews     CYSTOURETHROSCOPY  4/29/13, 8/7/13, 11/13/13, 11/12/14, 5/6/15, 11/11/15    cysto- Dr. Mathews, [facility]    CYSTOURETHROSCOPY  10/11/2017    cysto Dr. mathews    CYSTOURETHROSCOPY,FULGUR .5-2CM LESN  7/23/12    cystourethroscopy, TURBT x 2- Dr. Mathews    FOOT SURGERY Left 10/25/11    lt foot    HIP REPLACEMENT SURGERY      left    HYSTERECTOMY      KNEE REPLACEMENT SURGERY Bilateral     OTHER SURGICAL HISTORY  1983     arthroscopic left knee    PART EXCIS 5TH METATARSAL HEAD Right  10/2/2014    Procedure: TAILOR'S BUNIONECTOMY;  Surgeon: Adryan Prajapati DPM;  Location: [facility]    PATIENT DOCUMENTED NOT TO HAVE EXPERIENCED ANY OF THE FOLLOWING EVENTS Right 10/2/2014    Procedure: TAILOR'S BUNIONECTOMY;  Surgeon: Adryan Prajapati DPM;  Location: [facility]    PATIENT WITH PREOPERATIVE ORDER FOR IV ANTIBIOTIC SURGICAL SITE INFECT Right 10/2/2014    Procedure: TAILOR'S BUNIONECTOMY;  Surgeon: Adryan Prajapati DPM;  Location: [facility]    REPAIR OF HAMMERTOE,ONE Left 10/2/2014    Procedure: ARTHROPLASTY ONE (1) TOE;  Surgeon: Adryan Prajapati DPM;  Location: [facility]    SINUS SURGERY    1983 & 1999    sinus    SLING OPER STRES INCONTINENCE  8/06    sling    SLING OPER STRES INCONTINENCE  10/2011    sling repeat. TVT Exact. Dr. Dang at [facility]    TONSILLECTOMY       Family History   Problem Relation Age of Onset    Other (Other) Son         CF    Other (Other) Son         CF     Social History     Socioeconomic History    Marital status:    Tobacco Use    Smoking status: Never    Smokeless tobacco: Never   Vaping Use    Vaping Use: Never used   Substance and Sexual Activity    Alcohol use: Yes     Comment: 1 per day    Drug use: No     Social Determinants of Health     Food Insecurity: No Food Insecurity (2/6/2024)    Food Insecurity     Food Insecurity: Never true   Transportation Needs: No Transportation Needs (2/6/2024)    Transportation Needs     Lack of Transportation: No   Housing Stability: Low Risk  (2/6/2024)    Housing Stability     Housing Instability: No         PODENIZ Potter, her son    CODE STATUS:  DNR    ADVANCED CARE PLANNING TEAM: None    SUBJECTIVE/ ROS:       REVIEW OF SYSTEMS:  GENERAL HEALTH:fatigues easily  SKIN: denies any unusual skin lesions or rashes  WOUNDS: left calf   EYES:no visual complaints or deficits  HENT: denies nasal congestion, sinus pain or sore throat; and hearing loss negative  RESPIRATORY: denies shortness of breath, wheezing, +  cough, no hypoxia now  CARDIOVASCULAR:denies chest pain, no palpitations , denies orthopnea  GI: constipation  Gu: No urinary frequency, urgency or dysuria  MUSCULOSKELETAL:no joint complaints upper or lower extremities  NEURO:no sensory or motor complaint  PSYCHE: no symptoms of depression or anxiety  HEMATOLOGY:denies bruising, denies excessive bleeding  ENDOCRINE: denies excessive thirst or urination; denies unexpected wt gain or wt loss  ALLERGY/IMM.: hx of allergies      OBJECTIVE:     ASSESSMENT/ PHYSICAL EXAM:     VITALS:  /76   Pulse 84   Temp 97.5 °F (36.4 °C)   Resp 18   SpO2 95%      PHYSICAL EXAM:  GENERAL HEALTH: frail elderly female, no acute distress, well developed  LINES, TUBES, DRAINS: none  SKIN: no rashes, no suspicious lesions  WOUND: see wound assessment left calf  EYES: PERRLA, conjunctiva normal; no drainage from eyes  HENT: normocephalic; normal nose, no nasal drainage, mucous membranes pink, moist, rounded face  RESPIRATORY: fine crackles, dry cough, no rhonchi, no accessory muscle use; on room air now  CARDIOVASCULAR: S1, S2 normal, RRR; BLE minimal pretibial and pedal edema  ABDOMEN: soft,  soft bowel sounds, not tender, no guarding  : no bladder distention noted  LYMPHATIC: no lymphedema  MUSCULOSKELETAL: no acute synovitis upper or lower extremity.    EXTREMITIES/VASCULAR: no cyanosis, clubbing, pulses intact BLE, BUE with dependent  edema, no redness or drainage, BLE trace pretibial and pedal edema  NEUROLOGIC:  alert and oriented x 2-3, intact; no sensorimotor deficit  PSYCHIATRIC:; affect appropriate, intermittently confused, calm    Hospital and rehab lab results and imaging reviewed as available.    DIAGNOSTICS REVIEWED AT THIS VISIT:    Lab Results   Component Value Date    WBC 7.1 02/09/2024    RBC 3.13 (L) 02/09/2024    HGB 8.2 (L) 02/09/2024    HCT 25.2 (L) 02/09/2024    MCV 80.5 02/09/2024    MCH 26.2 02/09/2024    MCHC 32.5 02/09/2024    RDW 23.9 02/09/2024    PLT  209.0 02/09/2024     Lab Results   Component Value Date     (H) 02/09/2024    BUN 13 02/09/2024    BUNCREA 17.0 10/08/2021    CREATSERUM 0.25 (L) 02/09/2024    ANIONGAP 5 02/09/2024    CA 7.9 (L) 02/09/2024    OSMOCALC 281 02/09/2024    ALKPHO 65 02/06/2024    AST <3 (L) 02/06/2024    ALT 29 02/06/2024    BILT 0.7 02/06/2024    TP 5.3 (L) 02/06/2024    ALB 2.3 (L) 02/06/2024    GLOBULIN 3.0 02/06/2024    AGRATIO 2.1 09/11/2013     (L) 02/09/2024    K 4.1 02/09/2024    K 4.1 02/09/2024     02/09/2024    CO2 26.0 02/09/2024     She declined labs today, will attempt tomorrow.     [facility] medical records reviewed.  Medication reconciliation completed.        MEDICAL DECISION MAKING and PLAN OF CARE:       SEE PLAN BELOW    Stercoral collitis, constipation  Treated with IV levaquin and flagyl as inpt, completed now  No abdominal pain or bloating today.  Bowel management Miralax in am Senna 2 tabs at Bedtime  Fleets or dulcolx prn if no BM in 3 days  Monitoring CBC and bowel output    Urinary retention, frequent UTIs  Flomax daily  Monitoring breifs for urination, incontinent  No fevers, CBC monitoring now    Shortness of breath, hypoxia  2/2 fluid overload, COPD  Lasix to 40 mg BID now  Weaning off oxygen  Albuterol HFA prn    Weakness, deconditioning   likely causes of weakness could include multiple recent infections, dementia, MM - in remission,  and steroid myopathy.   Multiple recent infections  Hx MM in remission  Dementia - functional decline?  Depression - sertraline to 50 mg daily mood appears improved  Not on any narcotics  Has anemia on iron and lansoprazole, was to have EGD but got clot off AC so now on hold, monitoring CBC 10.3-9.8, 9.3  ? 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, been on dexamethasone for months,  3.75 mg daily now and monitor  BUN, creat wnl stable, lytes OK, appetite is OK, weights are stable 152-156  Repeat Thyroid function tests, TSH, T4 wnl  B12, folate,  vitamin D for any deficiencies that could cause fatigue/weakness - Vit D and folate low, supplements added, rest wnl  Monitoring - consideration for palliative care, son is POA     HX PE  Hx DVT resolved  Vital signs stable  Xarelto 20 mg daily  US + DVT BLE on 12/2/23- Repeat doppler - negative may use compression socks     HTN/HL/CAD, Afib   BRENNA on 12/2 EF 55% normal RV size function   BP and pulse controlled  Stopped amlodipine, started on diltiazem for rate control as inpt  Atorvastatin 10 mg nightly   Lasix 40 mg BID   BP low at times on Midodrine Q8 prn   Cardiology follow up     Dementia   Monitor   Emotional support offered  Monitoring for safety  ? Functional decline  If not improved with steroid wean consider neuro eval but this could be difficult to obtain with large stress on patient to get to an appointment, consider palliative care     Multiple Myeloma   Monitor labs   Outpatient follow up labs drawn for Heme/Onc follow up - faxed and they received, she is stable per PA, no new orders  Remains on steroids now at slow taper     GERD/Known Duodenal ulcer / GI prophylaxis  EGD scheduled and patient off AC for procedure but then developed PE. EGD on hold for now.   Monitor symptoms   Monitor labs  Lansoprazole 30 mg daily   Follow up with GI outpatient   CBC  noted Hgbs 10.3, 9.8, 9.3  Iron daily slow FE  Folate as well level was low     Hx depression   Monitor symptoms   Sertraline 50 mg daily now and monitoring  Appears improved mood      Neuropathy  Gabapentin 300 mg bid      Allergic rhinitis  Monitor symptoms   Fexofenadine 180 mg daily prn   Ipratropium 0.03% 2 spray daily prn      DVT Prophylaxis   Xarelto      Pain Management  Acetaminophen 650 mg every 4 hrs prn     Vitamins/supplements as r/t deficiencies  DOLLY RD to follow while in rehab; supplementation/diet as per DOLLY RD  May continue home supplements  Folate  Vitamin D    Labs  CBC, CMP weekly and prn    Follow Ups:  PCP within 7 days of DC  from rehab  Cardiology in 2 weeks Dr Cabrera or in house cardiology     spent w/ patient and reviewing medical records, labs, completing medication reconciliation and entering orders to establish plan of care in [facility].      Note to patient: The 21st Century Cures Act makes medical notes like these available to patients in the interest of transparency. However, this is a medical document intended as peer to peer communication. It is written in medical language and may contain abbreviations or verbiage that are unfamiliar. It may appear blunt or direct. Medical documents are intended to carry relevant information, facts as evident, and the clinical opinion of the practitioner who signs the document.       Jessie Villagran, APRN  02/12/24

## 2024-02-15 ENCOUNTER — SNF VISIT (OUTPATIENT)
Dept: INTERNAL MEDICINE CLINIC | Age: 89
End: 2024-02-15

## 2024-02-15 VITALS
BODY MASS INDEX: 25 KG/M2 | SYSTOLIC BLOOD PRESSURE: 117 MMHG | DIASTOLIC BLOOD PRESSURE: 80 MMHG | TEMPERATURE: 98 F | RESPIRATION RATE: 17 BRPM | WEIGHT: 155.63 LBS | HEART RATE: 93 BPM | OXYGEN SATURATION: 90 %

## 2024-02-15 DIAGNOSIS — I48.91 ATRIAL FIBRILLATION, UNSPECIFIED TYPE (HCC): ICD-10-CM

## 2024-02-15 DIAGNOSIS — K59.04 CHRONIC IDIOPATHIC CONSTIPATION: ICD-10-CM

## 2024-02-15 DIAGNOSIS — R33.9 URINARY RETENTION WITH INCOMPLETE BLADDER EMPTYING: ICD-10-CM

## 2024-02-15 DIAGNOSIS — E87.70 LOCALIZED EDEMA DUE TO FLUID OVERLOAD: Primary | ICD-10-CM

## 2024-02-15 DIAGNOSIS — R53.1 WEAKNESS: ICD-10-CM

## 2024-02-15 DIAGNOSIS — Z78.9 DECREASED ACTIVITIES OF DAILY LIVING (ADL): ICD-10-CM

## 2024-02-15 DIAGNOSIS — D50.0 IRON DEFICIENCY ANEMIA DUE TO CHRONIC BLOOD LOSS: ICD-10-CM

## 2024-02-15 DIAGNOSIS — I10 ESSENTIAL HYPERTENSION: ICD-10-CM

## 2024-02-15 PROCEDURE — 99309 SBSQ NF CARE MODERATE MDM 30: CPT | Performed by: NURSE PRACTITIONER

## 2024-02-15 NOTE — PROGRESS NOTES
Nelly Potter, 8/28/1935, 88 year old, female    Chief Complaint:    Chief Complaint   Patient presents with    Follow - Up     Cough, shortness of breath, hypoxia        Subjective:   TODAY:  Blanca is seen in bed today. She is having wet cough, non productive. She complains of shortness of breath, no chest pain, no palpitations. She feels tired.   DW caregiver and nurse, having increased edema to dependent limbs and hypoxia/shortness of breath. NO choking while eating, no fevers, no chills. BM yesterday per nurse. Blanca denies any nausea.       Denies insomnia, +fatigue, fever/chills, +cough, +SOB, dyspnea, angina, palpitations, n/v, diarrhea, constipation, and urinary sxs.      Objective:  /80   Pulse 93   Temp 97.7 °F (36.5 °C)   Resp 17   Wt 155 lb 9.6 oz (70.6 kg)   SpO2 90%   BMI 25.11 kg/m²     PHYSICAL EXAM:  GENERAL HEALTH: frail elderly female, no acute distress, well developed, appears fatigued.  LINES, TUBES, DRAINS: none  SKIN: no rashes, no suspicious lesions  WOUND: see wound assessment left calf and sacrum  EYES: PERRLA, conjunctiva normal; no drainage from eyes  HENT: normocephalic; normal nose, no nasal drainage, mucous membranes pink, moist, rounded face  RESPIRATORY: + crackles, + cough, no rhonchi, no accessory muscle use; on O2 at 3L/NC now  CARDIOVASCULAR: S1, S2 normal, RRR; BLE minimal pretibial and pedal edema, dependent posterior arm edema  ABDOMEN: soft, no distention, normal bowel sounds, not tender, no guarding  : no bladder distention noted  LYMPHATIC: no lymphedema  MUSCULOSKELETAL: no acute synovitis upper or lower extremity.    EXTREMITIES/VASCULAR: no cyanosis, clubbing, pulses intact BLE, BUE with dependent  edema, no redness or drainage, BLE trace pretibial and pedal edema  NEUROLOGIC:  alert and oriented x 2-3, intact; no sensorimotor deficit  PSYCHIATRIC:; affect appropriate, intermittently confused, calm    Medications reviewed: Yes      Current Outpatient  Medications:     furosemide 40 MG Oral Tab, Take 1 tablet (40 mg total) by mouth 2 (two) times daily., Disp: 180 tablet, Rfl: 3    gabapentin 300 MG Oral Cap, Take 1 capsule (300 mg total) by mouth in the morning and 1 capsule (300 mg total) before bedtime., Disp: , Rfl:     dilTIAZem  MG Oral Capsule SR 24 Hr, Take 1 capsule (180 mg total) by mouth daily., Disp: 30 capsule, Rfl: 0    albuterol 108 (90 Base) MCG/ACT Inhalation Aero Soln, Inhale 2 puffs into the lungs every 4 (four) hours as needed for Wheezing., Disp: 1 each, Rfl: 0    fleet enema 7-19 GM/118ML Rectal Enema, Place 133 mL rectally every third day as needed (if no BM in 3 days)., Disp: , Rfl:     polyethylene glycol, PEG 3350, 17 g Oral Powd Pack, Take 17 g by mouth daily., Disp: , Rfl:     bisacodyl 10 MG Rectal Suppos, Place 1 suppository (10 mg total) rectally as needed (If no BM in 3 days)., Disp: , Rfl:     folic acid 1 MG Oral Tab, Take 1 tablet (1 mg total) by mouth daily., Disp: , Rfl:     Ferrous Sulfate Dried  (50 Fe) MG Oral Tab CR, Take 160 mg by mouth daily., Disp: , Rfl:     sodium chloride 1 g Oral Tab, Take 1 tablet (1 g total) by mouth 2 (two) times daily. For hyponatremia, Disp: , Rfl:     traMADol 50 MG Oral Tab, Take 0.5 tablets (25 mg total) by mouth every 8 (eight) hours as needed for Pain (moderate to severe pain not relieved by tylenol)., Disp: 30 tablet, Rfl: 0    tamsulosin 0.4 MG Oral Cap, Take 1 capsule (0.4 mg total) by mouth every evening., Disp: , Rfl:     ipratropium-albuterol 0.5-2.5 (3) MG/3ML Inhalation Solution, Take 3 mL by nebulization every 6 (six) hours while awake., Disp: , Rfl:     ipratropium 0.03 % Nasal Solution, 2 sprays by Nasal route daily as needed for Rhinitis., Disp: , Rfl:     multivitamin Oral Chew Tab, Chew 1 tablet by mouth daily., Disp: , Rfl:     dexamethasone 2 MG Oral Tab, Take 3.75 mg by mouth daily with breakfast. Slow taper now to 3.75 starting 1/19/24, Disp: , Rfl:      acidophilus-pectin Oral Cap, Take 1 capsule by mouth daily., Disp: , Rfl:     midodrine 5 MG Oral Tab, Take 1 tablet (5 mg total) by mouth every 8 (eight) hours as needed (SBP less than 95 and DBP less than 50). If SBP <95 or DBP <50, Disp: , Rfl:     sennosides 8.6 MG Oral Tab, Take 2 tablets (17.2 mg total) by mouth 2 (two) times daily., Disp: , Rfl:     acetaminophen 325 MG Oral Tab, Take 2 tablets (650 mg total) by mouth every 6 (six) hours as needed for Pain., Disp: , Rfl:     rivaroxaban (XARELTO) 20 MG Oral Tab, Take 1 tablet (20 mg total) by mouth daily with food., Disp: , Rfl:     sertraline 25 MG Oral Tab, Take 1 tablet (25 mg total) by mouth daily., Disp: , Rfl:     fexofenadine 180 MG Oral Tab, Take 1 tablet (180 mg total) by mouth daily as needed., Disp: 90 tablet, Rfl: 3    lansoprazole 30 MG Oral Capsule Delayed Release, Take 1 capsule (30 mg total) by mouth daily. Before meal, Disp: 90 capsule, Rfl: 3    atorvastatin 10 MG Oral Tab, Take 1 tablet (10 mg total) by mouth daily., Disp: 90 tablet, Rfl: 3      Diagnostics reviewed:    Lab Results   Component Value Date    WBC 7.1 02/09/2024    RBC 3.13 (L) 02/09/2024    HGB 8.2 (L) 02/09/2024    HCT 25.2 (L) 02/09/2024    MCV 80.5 02/09/2024    MCH 26.2 02/09/2024    MCHC 32.5 02/09/2024    RDW 23.9 02/09/2024    .0 02/09/2024     Lab Results   Component Value Date     (H) 02/09/2024    BUN 13 02/09/2024    BUNCREA 17.0 10/08/2021    CREATSERUM 0.25 (L) 02/09/2024    ANIONGAP 5 02/09/2024    CA 7.9 (L) 02/09/2024    OSMOCALC 281 02/09/2024    ALKPHO 65 02/06/2024    AST <3 (L) 02/06/2024    ALT 29 02/06/2024    BILT 0.7 02/06/2024    TP 5.3 (L) 02/06/2024    ALB 2.3 (L) 02/06/2024    GLOBULIN 3.0 02/06/2024    AGRATIO 2.1 09/11/2013     (L) 02/09/2024    K 4.1 02/09/2024    K 4.1 02/09/2024     02/09/2024    CO2 26.0 02/09/2024     Labs ordered for tomorrow    Appears fluid overload.  Oxygen prn  Add metolazone 5 mg Po X1  tomorrow  Give KCL 40 MEQ tomorrow as well  Labs tomorrow and plan for labs Monday  If not improved or any infection symptoms check chest xray as well      Assessment and plan:  Stercoral collitis, constipation  Treated with IV levaquin and flagyl as inpt, completed now  No abdominal pain or bloating today.  Bowel management Miralax in am, Senna 2 tabs at Bedtime  Fleets or dulcolx prn if no BM in 3 days  BM yesterday per nursing  Monitoring CBC and bowel output     Urinary retention, frequent UTIs  Flomax daily  Monitoring breifs for urination, incontinent  No fevers, CBC monitoring now     Shortness of breath, hypoxia  2/2 fluid overload, COPD  Lasix to 40 mg BID now  Add metolazone 5 mg po x1 tomorrow  Back on oxygen, with crackles  Weights stable  Albuterol HFA prn     Weakness, deconditioning   likely causes of weakness could include multiple recent infections, dementia, MM - in remission,  and steroid myopathy.   Multiple recent infections  Hx MM in remission  Dementia - functional decline?  Depression - sertraline to 50 mg daily mood appears improved  Not on any narcotics  Has anemia on iron and lansoprazole, was to have EGD but got clot off AC so now on hold, monitoring CBC 10.3-9.8, 9.3  ? 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, been on dexamethasone for months,  3.75 mg daily now and monitor  BUN, creat wnl stable, lytes OK, appetite is OK, weights are stable 152-156  Repeat Thyroid function tests, TSH, T4 wnl  B12, folate, vitamin D for any deficiencies that could cause fatigue/weakness - Vit D and folate low, supplements added, rest wnl  Monitoring - consideration for palliative care, son is POA     HX PE  Hx DVT resolved  Vital signs stable  Xarelto 20 mg daily  US + DVT BLE on 12/2/23- Repeat doppler - negative may use compression socks     HTN/HL/CAD, Afib   BRENNA on 12/2 EF 55% normal RV size function   BP and pulse controlled  Stopped amlodipine, started on diltiazem for rate control as  inpt  Diltiazem 180 mg daily  Atorvastatin 10 mg nightly   Lasix 40 mg BID   BP low at times on Midodrine Q8 prn   Cardiology follow up     Dementia   Monitor   Emotional support offered  Monitoring for safety  ? Functional decline  If not improved with steroid wean consider neuro eval but this could be difficult to obtain with large stress on patient to get to an appointment, consider palliative care     Multiple Myeloma   Monitor labs   Outpatient follow up labs drawn for Heme/Onc follow up - faxed and they received, she is stable per PA, no new orders  Remains on steroids now at slow taper     GERD/Known Duodenal ulcer / GI prophylaxis  EGD scheduled and patient off AC for procedure but then developed PE. EGD on hold for now.   Monitor symptoms   Monitor labs  Lansoprazole 30 mg daily   Follow up with GI outpatient   CBC  noted Hgbs 10.3, 9.8, 9.3  Iron daily slow FE  Folate as well level was low     Hx depression   Monitor symptoms   Sertraline 50 mg daily now and monitoring  Appears improved mood      Neuropathy  Gabapentin 300 mg bid      Allergic rhinitis  Monitor symptoms   Fexofenadine 180 mg daily prn   Ipratropium 0.03% 2 spray daily prn      DVT Prophylaxis   Xarelto      Pain Management  Acetaminophen 650 mg every 4 hrs prn      Vitamins/supplements as r/t deficiencies  [facility] to follow while in rehab; supplementation/diet as per [facility]  May continue home supplements  Folate  Vitamin D     Labs  CBC, CMP weekly and prn     Follow Ups:  PCP within 7 days of DC from rehab  Cardiology in 2 weeks Dr Cabrera or in house cardiology    *Established patient; follow-up moderately complex visit/ greater than 30       34 minutes spent w/ patient and staff, including but not limited to/ reviewing present status, needs, abilities with disciplines, reviewing medical records, vital signs, labs, completing medication reconciliation and entering orders for continued care in [facility].    Note to patient: The 21st Century  Cures Act makes medical notes like these available to patients in the interest of transparency. However, this is a medical document intended as peer to peer communication. It is written in medical language and may contain abbreviations or verbiage that are unfamiliar. It may appear blunt or direct. Medical documents are intended to carry relevant information, facts as evident, and the clinical opinion of the practitioner who signs the document.    Jessie Villagran, APRN  2/15/2024

## 2024-02-16 ENCOUNTER — SNF VISIT (OUTPATIENT)
Dept: INTERNAL MEDICINE CLINIC | Age: 89
End: 2024-02-16

## 2024-02-16 VITALS
HEART RATE: 80 BPM | DIASTOLIC BLOOD PRESSURE: 67 MMHG | WEIGHT: 156.63 LBS | OXYGEN SATURATION: 95 % | SYSTOLIC BLOOD PRESSURE: 115 MMHG | TEMPERATURE: 98 F | BODY MASS INDEX: 25 KG/M2 | RESPIRATION RATE: 20 BRPM

## 2024-02-16 DIAGNOSIS — Z78.9 DECREASED ACTIVITIES OF DAILY LIVING (ADL): ICD-10-CM

## 2024-02-16 DIAGNOSIS — K59.04 CHRONIC IDIOPATHIC CONSTIPATION: ICD-10-CM

## 2024-02-16 DIAGNOSIS — C90.00 MULTIPLE MYELOMA, REMISSION STATUS UNSPECIFIED (HCC): ICD-10-CM

## 2024-02-16 DIAGNOSIS — D50.0 IRON DEFICIENCY ANEMIA DUE TO CHRONIC BLOOD LOSS: ICD-10-CM

## 2024-02-16 DIAGNOSIS — I10 ESSENTIAL HYPERTENSION: ICD-10-CM

## 2024-02-16 DIAGNOSIS — I48.91 ATRIAL FIBRILLATION, UNSPECIFIED TYPE (HCC): ICD-10-CM

## 2024-02-16 DIAGNOSIS — E87.70 LOCALIZED EDEMA DUE TO FLUID OVERLOAD: Primary | ICD-10-CM

## 2024-02-16 DIAGNOSIS — R53.1 WEAKNESS: ICD-10-CM

## 2024-02-16 DIAGNOSIS — R33.9 URINARY RETENTION WITH INCOMPLETE BLADDER EMPTYING: ICD-10-CM

## 2024-02-16 PROCEDURE — 99309 SBSQ NF CARE MODERATE MDM 30: CPT | Performed by: NURSE PRACTITIONER

## 2024-02-16 NOTE — PROGRESS NOTES
Nelly Potter, 8/28/1935, 88 year old, female    Chief Complaint:    Chief Complaint   Patient presents with    Follow - Up     Fluid overload, hypoxia, shortness of breath, edema        Subjective:   TODAY:  Blanca is seen in bed today. She is a little bit improved. Cough is less today. Not feeling short of breath, no chest pain, no palpitations. Edema to legs is improved. Edema now to trunk, and posterior arms. DW caregiver and nurse, having increased edema to dependent areas, continue to change positions to avoid on area getting worse.  No choking while eating, no fevers, no chills. BM OK per nurse. Blanca denies any nausea. She has no abdominal pain. Labs today CBC WBC wnl, Hgb stable. K low will increase supplements. Repeat Labs on Monday.     Denies insomnia, +fatigue, fever/chills, +cough, +SOB, dyspnea, angina, palpitations, n/v, diarrhea, constipation, and urinary sxs.      Objective:  /67   Pulse 80   Temp 97.7 °F (36.5 °C)   Resp 20   Wt 156 lb 9.6 oz (71 kg)   SpO2 95%   BMI 25.28 kg/m²     PHYSICAL EXAM:  GENERAL HEALTH: frail elderly female, no acute distress, well developed, appears fatigued.  LINES, TUBES, DRAINS: none  SKIN: no rashes, no suspicious lesions, pink, warm  WOUND: see wound assessment left calf and sacrum  EYES: PERRLA, conjunctiva normal; no drainage from eyes  HENT: normocephalic; normal nose, no nasal drainage, mucous membranes pink, moist, rounded face  RESPIRATORY: + fine crackles, no cough, no rhonchi, no accessory muscle use; on O2 at 3L/NC now  CARDIOVASCULAR: S1, S2 normal, RRR; BLE minimal pretibial and pedal edema, dependent posterior arm edema and trunk edema noted today  ABDOMEN: Firm, no distention, normal bowel sounds, not tender, no guarding, fluid noted Left flank and back, no erythema, no open areas  : no bladder distention noted  LYMPHATIC: no lymphedema  MUSCULOSKELETAL: no acute synovitis upper or lower extremity.    EXTREMITIES/VASCULAR: no  cyanosis, clubbing, pulses intact BLE, BUE with dependent edema, no redness or drainage, BLE trace pretibial and pedal edema  NEUROLOGIC:  alert and oriented x 2-3, intact; no sensorimotor deficit  PSYCHIATRIC:; affect appropriate, intermittently confused, calm    Medications reviewed: Yes      Current Outpatient Medications:     furosemide 40 MG Oral Tab, Take 1 tablet (40 mg total) by mouth 2 (two) times daily., Disp: 180 tablet, Rfl: 3    gabapentin 300 MG Oral Cap, Take 1 capsule (300 mg total) by mouth in the morning and 1 capsule (300 mg total) before bedtime., Disp: , Rfl:     dilTIAZem  MG Oral Capsule SR 24 Hr, Take 1 capsule (180 mg total) by mouth daily., Disp: 30 capsule, Rfl: 0    albuterol 108 (90 Base) MCG/ACT Inhalation Aero Soln, Inhale 2 puffs into the lungs every 4 (four) hours as needed for Wheezing., Disp: 1 each, Rfl: 0    fleet enema 7-19 GM/118ML Rectal Enema, Place 133 mL rectally every third day as needed (if no BM in 3 days)., Disp: , Rfl:     polyethylene glycol, PEG 3350, 17 g Oral Powd Pack, Take 17 g by mouth daily., Disp: , Rfl:     bisacodyl 10 MG Rectal Suppos, Place 1 suppository (10 mg total) rectally as needed (If no BM in 3 days)., Disp: , Rfl:     folic acid 1 MG Oral Tab, Take 1 tablet (1 mg total) by mouth daily., Disp: , Rfl:     Ferrous Sulfate Dried  (50 Fe) MG Oral Tab CR, Take 160 mg by mouth daily., Disp: , Rfl:     sodium chloride 1 g Oral Tab, Take 1 tablet (1 g total) by mouth 2 (two) times daily. For hyponatremia, Disp: , Rfl:     traMADol 50 MG Oral Tab, Take 0.5 tablets (25 mg total) by mouth every 8 (eight) hours as needed for Pain (moderate to severe pain not relieved by tylenol)., Disp: 30 tablet, Rfl: 0    tamsulosin 0.4 MG Oral Cap, Take 1 capsule (0.4 mg total) by mouth every evening., Disp: , Rfl:     ipratropium-albuterol 0.5-2.5 (3) MG/3ML Inhalation Solution, Take 3 mL by nebulization every 6 (six) hours while awake., Disp: , Rfl:      ipratropium 0.03 % Nasal Solution, 2 sprays by Nasal route daily as needed for Rhinitis., Disp: , Rfl:     multivitamin Oral Chew Tab, Chew 1 tablet by mouth daily., Disp: , Rfl:     dexamethasone 2 MG Oral Tab, Take 3.75 mg by mouth daily with breakfast. Slow taper now to 3.75 starting 1/19/24, Disp: , Rfl:     acidophilus-pectin Oral Cap, Take 1 capsule by mouth daily., Disp: , Rfl:     midodrine 5 MG Oral Tab, Take 1 tablet (5 mg total) by mouth every 8 (eight) hours as needed (SBP less than 95 and DBP less than 50). If SBP <95 or DBP <50, Disp: , Rfl:     sennosides 8.6 MG Oral Tab, Take 2 tablets (17.2 mg total) by mouth 2 (two) times daily., Disp: , Rfl:     acetaminophen 325 MG Oral Tab, Take 2 tablets (650 mg total) by mouth every 6 (six) hours as needed for Pain., Disp: , Rfl:     rivaroxaban (XARELTO) 20 MG Oral Tab, Take 1 tablet (20 mg total) by mouth daily with food., Disp: , Rfl:     sertraline 25 MG Oral Tab, Take 1 tablet (25 mg total) by mouth daily., Disp: , Rfl:     fexofenadine 180 MG Oral Tab, Take 1 tablet (180 mg total) by mouth daily as needed., Disp: 90 tablet, Rfl: 3    lansoprazole 30 MG Oral Capsule Delayed Release, Take 1 capsule (30 mg total) by mouth daily. Before meal, Disp: 90 capsule, Rfl: 3    atorvastatin 10 MG Oral Tab, Take 1 tablet (10 mg total) by mouth daily., Disp: 90 tablet, Rfl: 3      Diagnostics reviewed:    Lab Results   Component Value Date    WBC 8.3 02/16/2024    RBC 3.17 (L) 02/16/2024    HGB 8.3 (L) 02/16/2024    HCT 25.7 (L) 02/16/2024    MCV 81.1 02/16/2024    MCH 26.2 02/16/2024    MCHC 32.3 02/16/2024    RDW 23.1 02/16/2024    .0 02/16/2024     Lab Results   Component Value Date     (H) 02/16/2024    BUN 15 02/16/2024    BUNCREA 17.0 10/08/2021    CREATSERUM 0.34 (L) 02/16/2024    ANIONGAP 7 02/16/2024    CA 7.4 (L) 02/16/2024    OSMOCALC 281 02/16/2024    ALKPHO 65 02/06/2024    AST <3 (L) 02/06/2024    ALT 29 02/06/2024    BILT 0.7 02/06/2024     TP 5.3 (L) 02/06/2024    ALB 2.3 (L) 02/06/2024    GLOBULIN 3.0 02/06/2024    AGRATIO 2.1 09/11/2013     (L) 02/16/2024    K 3.0 (L) 02/16/2024    CL 97 (L) 02/16/2024    CO2 30.0 02/16/2024     Fluid overload.  Oxygen prn  metolazone 5 mg Po X1 today  Give KCL 40 MEQ tomorrow as well  Already appears improved lungs more clear less shortness of breath  Continue to change positions to avoid truck edema  No fevers, WBC wnl, cough improved with increase in diuretics  Labs Monday  If not improved or any infection symptoms check chest xray as well      Assessment and plan:    Stercoral collitis, constipation  Treated with IV levaquin and flagyl as inpt, completed now  No abdominal pain or bloating today.  Bowel management Miralax in am, Senna 2 tabs at Bedtime  Fleets or dulcolx prn if no BM in 3 days  BM normal per nursing  Monitoring CBC and bowel output     Urinary retention, frequent UTIs  Flomax daily  Monitoring breifs for urination, incontinent  No fevers, CBC monitoring now     Shortness of breath, hypoxia  2/2 fluid overload, COPD  Lasix to 40 mg BID now  metolazone 5 mg po x1 today  KCL supplementation  Back on oxygen, with crackles improving  Weights 155.6, 156.6 lbs  Albuterol HFA prn     Weakness, deconditioning   likely causes of weakness could include multiple recent infections, dementia, MM - in remission,  and steroid myopathy.   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, been on dexamethasone for months,  3.75 mg daily now and monitor consider another reduction on 2/19/24  BUN, creat wnl stable, lytes OK, appetite is OK, weights are stable 152-156  Thyroid function tests, TSH, T4 wnl  Vit deficiencies that could cause fatigue/weakness -  Vit D and folate low, supplements added, rest wnl  Monitoring - consideration for palliative care, son is POA     HX PE  Hx DVT resolved  Vital signs stable  Xarelto 20 mg daily  US + DVT BLE on 12/2/23- Repeat doppler - negative may use compression socks     HTN/HL/CAD, Afib   BRENNA on 12/2 EF 55% normal RV size function   BP and pulse controlled  Stopped amlodipine, started on diltiazem for rate control as inpt  Diltiazem 180 mg daily  Atorvastatin 10 mg nightly   Lasix 40 mg BID   BP low at times on Midodrine Q8 prn   Cardiology follow up     Dementia   Monitor   Emotional support offered  Monitoring for safety  ? Functional decline  If not improved with steroid wean consider neuro eval but this could be difficult to obtain with large stress on patient to get to an appointment, consider palliative care     Multiple Myeloma   Monitor labs   Outpatient follow up labs drawn for Heme/Onc follow up - faxed and they received, she is stable per PA, no new orders  Remains on steroids now at slow taper     GERD/Known Duodenal ulcer / GI prophylaxis  EGD scheduled and patient off AC for procedure but then developed PE. EGD on hold for now.   Monitor symptoms   Monitor labs  Lansoprazole 30 mg daily   Follow up with GI outpatient   CBC  noted Hgbs 10.3, 9.8, 9.3, 8.3   Iron daily slow FE  Folate as well level was low     Hx depression   Monitor symptoms   Sertraline 50 mg daily  Appears improved mood      Neuropathy  Gabapentin 300 mg bid      Allergic rhinitis  Monitor symptoms   Fexofenadine 180 mg daily prn   Ipratropium 0.03% 2 spray daily prn      DVT Prophylaxis   Xarelto      Pain Management  Acetaminophen 650 mg every 4 hrs prn      Vitamins/supplements as r/t deficiencies  [facility] to follow while in rehab; supplementation/diet as per [facility]  May continue home supplements  Folate  Vitamin D     Labs  CBC, CMP weekly and prn  Next labs Monday      Follow Ups:  PCP within 7 days of DC from rehab  Cardiology in 2 weeks   Rick or in house cardiology    *Established patient; follow-up moderately complex visit/ greater than 30     32 minutes spent w/ patient and staff, including but not limited to/ reviewing present status, needs, abilities with disciplines, reviewing medical records, vital signs, labs, completing medication reconciliation and entering orders for continued care in [facility].    Note to patient: The 21st Century Cures Act makes medical notes like these available to patients in the interest of transparency. However, this is a medical document intended as peer to peer communication. It is written in medical language and may contain abbreviations or verbiage that are unfamiliar. It may appear blunt or direct. Medical documents are intended to carry relevant information, facts as evident, and the clinical opinion of the practitioner who signs the document.    Jessie Villagran, APRN  2/16/2024

## 2024-02-19 ENCOUNTER — SNF VISIT (OUTPATIENT)
Dept: INTERNAL MEDICINE CLINIC | Age: 89
End: 2024-02-19

## 2024-02-19 VITALS
TEMPERATURE: 97 F | DIASTOLIC BLOOD PRESSURE: 65 MMHG | HEART RATE: 80 BPM | SYSTOLIC BLOOD PRESSURE: 106 MMHG | OXYGEN SATURATION: 90 % | RESPIRATION RATE: 18 BRPM | BODY MASS INDEX: 24 KG/M2 | WEIGHT: 145.63 LBS

## 2024-02-19 DIAGNOSIS — C90.00 MULTIPLE MYELOMA, REMISSION STATUS UNSPECIFIED (HCC): ICD-10-CM

## 2024-02-19 DIAGNOSIS — Z78.9 DECREASED ACTIVITIES OF DAILY LIVING (ADL): ICD-10-CM

## 2024-02-19 DIAGNOSIS — K59.04 CHRONIC IDIOPATHIC CONSTIPATION: ICD-10-CM

## 2024-02-19 DIAGNOSIS — D50.0 IRON DEFICIENCY ANEMIA DUE TO CHRONIC BLOOD LOSS: ICD-10-CM

## 2024-02-19 DIAGNOSIS — E44.0 MODERATE PROTEIN-CALORIE MALNUTRITION (HCC): ICD-10-CM

## 2024-02-19 DIAGNOSIS — R53.1 WEAKNESS: ICD-10-CM

## 2024-02-19 DIAGNOSIS — I48.91 ATRIAL FIBRILLATION, UNSPECIFIED TYPE (HCC): ICD-10-CM

## 2024-02-19 DIAGNOSIS — E87.70 LOCALIZED EDEMA DUE TO FLUID OVERLOAD: Primary | ICD-10-CM

## 2024-02-19 NOTE — PROGRESS NOTES
Nelly Potter, 8/28/1935, 88 year old, female    Chief Complaint:    Chief Complaint   Patient presents with    Follow - Up     Fluid overload, weakness, MM, hypoxia        Subjective:   TODAY:  Blanca is seen in bed today. She is a little bit improved. Cough is resolving per staff and patient. Not feeling short of breath, no chest pain, no palpitations. Edema to legs is resolved. Edema now to trunk, and posterior arms. DW caregiver and nurse, continue to change positions to avoid on area getting worse.    No choking while eating, no fevers, no chills. WBC wnl today.   BM OK per nurse. Blanca denies any nausea. She has no abdominal pain. Labs today CBC WBC wnl, Hgb stable. K low will increase supplements. Repeat Labs on Wednesday. AIDE Amor today. DW son today.     Denies insomnia, +fatigue, fever/chills, +cough, +SOB, dyspnea, angina, palpitations, n/v, diarrhea, constipation, and urinary sxs.      Objective:  /65   Pulse 80   Temp 97.3 °F (36.3 °C)   Resp 18   Wt 145 lb 9.6 oz (66 kg)   SpO2 90%   BMI 23.50 kg/m²     PHYSICAL EXAM:  GENERAL HEALTH: frail elderly female, no acute distress, well developed, appears fatigued.  LINES, TUBES, DRAINS: none  SKIN: no rashes, no suspicious lesions, pink, warm  WOUND: see wound assessment left calf and sacrum  EYES: PERRLA, conjunctiva normal; no drainage from eyes  HENT: normocephalic; normal nose, no nasal drainage, mucous membranes pink, moist, rounded face  RESPIRATORY: clear anterior, + fine posterior crackles, no cough, no rhonchi, no accessory muscle use; on O2 at 3L/NC now  CARDIOVASCULAR: S1, S2 normal, RRR; BLE no pretibial and pedal edema, dependent posterior arm edema and trunk edema noted worse today  ABDOMEN:  soft, mild distention, normal bowel sounds, not tender, no guarding, fluid noted Left flank and back, no erythema, no open areas  : no bladder distention noted  LYMPHATIC: no lymphedema  MUSCULOSKELETAL: no acute synovitis upper or  lower extremity.    EXTREMITIES/VASCULAR: no cyanosis, clubbing, pulses intact BLE, BUE with dependent edema, no redness or drainage, BLE trace pretibial and pedal edema  NEUROLOGIC:  alert and oriented x 2-3, intact; no sensorimotor deficit  PSYCHIATRIC:; affect appropriate, intermittently confused, calm    Medications reviewed: Yes      Current Outpatient Medications:     furosemide 40 MG Oral Tab, Take 1 tablet (40 mg total) by mouth 2 (two) times daily., Disp: 180 tablet, Rfl: 3    gabapentin 300 MG Oral Cap, Take 1 capsule (300 mg total) by mouth in the morning and 1 capsule (300 mg total) before bedtime., Disp: , Rfl:     dilTIAZem  MG Oral Capsule SR 24 Hr, Take 1 capsule (180 mg total) by mouth daily., Disp: 30 capsule, Rfl: 0    albuterol 108 (90 Base) MCG/ACT Inhalation Aero Soln, Inhale 2 puffs into the lungs every 4 (four) hours as needed for Wheezing., Disp: 1 each, Rfl: 0    fleet enema 7-19 GM/118ML Rectal Enema, Place 133 mL rectally every third day as needed (if no BM in 3 days)., Disp: , Rfl:     polyethylene glycol, PEG 3350, 17 g Oral Powd Pack, Take 17 g by mouth daily., Disp: , Rfl:     bisacodyl 10 MG Rectal Suppos, Place 1 suppository (10 mg total) rectally as needed (If no BM in 3 days)., Disp: , Rfl:     folic acid 1 MG Oral Tab, Take 1 tablet (1 mg total) by mouth daily., Disp: , Rfl:     Ferrous Sulfate Dried  (50 Fe) MG Oral Tab CR, Take 160 mg by mouth daily., Disp: , Rfl:     sodium chloride 1 g Oral Tab, Take 1 tablet (1 g total) by mouth 2 (two) times daily. For hyponatremia, Disp: , Rfl:     traMADol 50 MG Oral Tab, Take 0.5 tablets (25 mg total) by mouth every 8 (eight) hours as needed for Pain (moderate to severe pain not relieved by tylenol)., Disp: 30 tablet, Rfl: 0    tamsulosin 0.4 MG Oral Cap, Take 1 capsule (0.4 mg total) by mouth every evening., Disp: , Rfl:     ipratropium-albuterol 0.5-2.5 (3) MG/3ML Inhalation Solution, Take 3 mL by nebulization every 6 (six)  hours while awake., Disp: , Rfl:     ipratropium 0.03 % Nasal Solution, 2 sprays by Nasal route daily as needed for Rhinitis., Disp: , Rfl:     multivitamin Oral Chew Tab, Chew 1 tablet by mouth daily., Disp: , Rfl:     dexamethasone 2 MG Oral Tab, Take 3.75 mg by mouth daily with breakfast. Slow taper now to 3.75 starting 1/19/24, Disp: , Rfl:     acidophilus-pectin Oral Cap, Take 1 capsule by mouth daily., Disp: , Rfl:     midodrine 5 MG Oral Tab, Take 1 tablet (5 mg total) by mouth every 8 (eight) hours as needed (SBP less than 95 and DBP less than 50). If SBP <95 or DBP <50, Disp: , Rfl:     sennosides 8.6 MG Oral Tab, Take 2 tablets (17.2 mg total) by mouth 2 (two) times daily., Disp: , Rfl:     acetaminophen 325 MG Oral Tab, Take 2 tablets (650 mg total) by mouth every 6 (six) hours as needed for Pain., Disp: , Rfl:     rivaroxaban (XARELTO) 20 MG Oral Tab, Take 1 tablet (20 mg total) by mouth daily with food., Disp: , Rfl:     sertraline 25 MG Oral Tab, Take 1 tablet (25 mg total) by mouth daily., Disp: , Rfl:     fexofenadine 180 MG Oral Tab, Take 1 tablet (180 mg total) by mouth daily as needed., Disp: 90 tablet, Rfl: 3    lansoprazole 30 MG Oral Capsule Delayed Release, Take 1 capsule (30 mg total) by mouth daily. Before meal, Disp: 90 capsule, Rfl: 3    atorvastatin 10 MG Oral Tab, Take 1 tablet (10 mg total) by mouth daily., Disp: 90 tablet, Rfl: 3      Diagnostics reviewed:    Lab Results   Component Value Date    WBC 4.9 02/19/2024    RBC 3.34 (L) 02/19/2024    HGB 8.9 (L) 02/19/2024    HCT 27.1 (L) 02/19/2024    MCV 81.1 02/19/2024    MCH 26.6 02/19/2024    MCHC 32.8 02/19/2024    RDW 22.3 02/19/2024    .0 02/19/2024     Lab Results   Component Value Date     (H) 02/19/2024    BUN 28 (H) 02/19/2024    BUNCREA 17.0 10/08/2021    CREATSERUM 0.69 02/19/2024    ANIONGAP 10 02/19/2024    CA 9.2 02/19/2024    OSMOCALC 278 02/19/2024    ALKPHO 91 02/19/2024    AST 6 (L) 02/19/2024    ALT 25  02/19/2024    BILT 0.5 02/19/2024    TP 5.5 (L) 02/19/2024    ALB 1.9 (L) 02/19/2024    GLOBULIN 3.6 02/19/2024    AGRATIO 2.1 09/11/2013     (L) 02/19/2024    K 2.3 (LL) 02/19/2024    CL 87 (L) 02/19/2024    CO2 34.0 (H) 02/19/2024     Fluid overload.  Oxygen prn  metolazone 5 mg Po X1 Friday with KCL 40 MEQ X1'  Then to 20 MEQ daily, Low K today increase to TID and repeat BMP and Mag on Wednesday   lungs more clear, less shortness of breath  Continue to change positions to avoid trunk edema  Likely protein calorie malnutrition is impacting as well   No fevers, WBC wnl, cough improved with increase in diuretics  Optimize nutrition for protein stores.  Palliative Consult, DW Dr Amor as well.       Assessment and plan:    Stercoral collitis, constipation  Treated with IV levaquin and flagyl as inpt, completed now  No abdominal pain or bloating today.  Bowel management Miralax in am, Senna 2 tabs at Bedtime  Fleets or dulcolx prn if no BM in 3 days  BM normal per nursing  Monitoring CBC and bowel output     Urinary retention, frequent UTIs  Flomax daily  Monitoring breifs for urination, incontinent  No fevers, CBC monitoring now     Shortness of breath, hypoxia  2/2 fluid overload, COPD  Lasix to 40 mg BID now  metolazone 5 mg po x1   KCL supplementation increase  Back on oxygen, with crackles improving  Weights 155.6, 156.6 lbs now 145.7 lbs today and improved. CPM and Monitor  Albuterol HFA prn     Weakness, deconditioning   likely causes of weakness could include multiple recent infections, dementia, MM - in remission,  and steroid myopathy.   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, been on dexamethasone for months,  3.75 mg daily now and  monitor consider another reduction on 2/19/24  BUN, creat wnl stable, lytes OK, appetite is OK, weights are stable 152-156  Thyroid function tests, TSH, T4 wnl  Vit deficiencies that could cause fatigue/weakness - Vit D and folate low, supplements added, rest wnl  Monitoring - consideration for palliative care, son is POA     HX PE  Hx DVT resolved  Vital signs stable  Xarelto 20 mg daily  US + DVT BLE on 12/2/23- Repeat doppler - negative may use compression socks     HTN/HL/CAD, Afib   BRENNA on 12/2 EF 55% normal RV size function   BP and pulse controlled  Stopped amlodipine, started on diltiazem for rate control as inpt  Diltiazem 180 mg daily  Atorvastatin 10 mg nightly   Lasix 40 mg BID   BP low at times on Midodrine Q8 prn   Cardiology follow up    Protein calorie Malnutrition  Dietician following  Weights 155-*156-145 post diuretics  Encourage PO intake  Increase protein as able  Prostat daily, regular diet, soft foods easiest to manage  Palliative Consult      Dementia   Monitor   Emotional support offered  Monitoring for safety  ? Functional decline  If not improved with steroid wean consider neuro eval but this could be difficult to obtain with large stress on patient to get to an appointment, consider palliative care     Multiple Myeloma   Monitor labs   Outpatient follow up labs drawn for Heme/Onc follow up - faxed and they received, she is stable per PA, no new orders  Remains on steroids now at slow taper     GERD/Known Duodenal ulcer / GI prophylaxis  EGD scheduled and patient off AC for procedure but then developed PE. EGD on hold for now.   Monitor symptoms   Monitor labs  Lansoprazole 30 mg daily   Follow up with GI outpatient   CBC  noted Hgbs 10.3, 9.8, 9.3, 8.3   Iron daily slow FE  Folate as well level was low     Hx depression   Monitor symptoms   Sertraline 50 mg daily  Appears improved mood      Neuropathy  Gabapentin 300 mg bid      Allergic rhinitis  Monitor symptoms   Fexofenadine 180 mg  daily prn   Ipratropium 0.03% 2 spray daily prn      DVT Prophylaxis   Xarelto      Pain Management  Acetaminophen 650 mg every 4 hrs prn      Vitamins/supplements as r/t deficiencies  [facility] to follow while in rehab; supplementation/diet as per [facility]  May continue home supplements  Folate  Vitamin D     Labs  CBC, CMP weekly and prn  BMP and mag on Wed     Follow Ups:  PCP within 7 days of DC from rehab  Cardiology in 2 weeks Dr Cabrera or in house cardiology    *Established patient; follow-up moderately complex visit/ greater than 30     38 minutes spent w/ patient and staff, including but not limited to/ reviewing present status, needs, abilities with disciplines, reviewing medical records, vital signs, labs, completing medication reconciliation and entering orders for continued care in [facility].    Note to patient: The 21st Century Cures Act makes medical notes like these available to patients in the interest of transparency. However, this is a medical document intended as peer to peer communication. It is written in medical language and may contain abbreviations or verbiage that are unfamiliar. It may appear blunt or direct. Medical documents are intended to carry relevant information, facts as evident, and the clinical opinion of the practitioner who signs the document.    Jessie Villagran, APRN  2/19/2024

## 2024-02-20 PROBLEM — A41.9 SEPSIS, DUE TO UNSPECIFIED ORGANISM, UNSPECIFIED WHETHER ACUTE ORGAN DYSFUNCTION PRESENT (HCC): Status: ACTIVE | Noted: 2024-01-01

## 2024-02-20 PROBLEM — B33.8 RSV INFECTION: Status: ACTIVE | Noted: 2024-01-01

## 2024-02-20 PROBLEM — R79.89 ELEVATED TROPONIN: Status: ACTIVE | Noted: 2024-01-01

## 2024-02-20 PROBLEM — E87.20 LACTIC ACIDOSIS: Status: ACTIVE | Noted: 2024-01-01

## 2024-02-20 PROBLEM — E87.6 HYPOKALEMIA: Status: ACTIVE | Noted: 2024-01-01

## 2024-02-20 PROBLEM — J18.9 MULTIFOCAL PNEUMONIA: Status: ACTIVE | Noted: 2024-01-01

## 2024-02-20 PROBLEM — J96.01 ACUTE RESPIRATORY FAILURE WITH HYPOXIA (HCC): Status: ACTIVE | Noted: 2024-01-01

## 2024-02-20 NOTE — PROGRESS NOTES
[facility] Pharmacy Note: Antimicrobial Weight Based Dose Adjustment for: piperacillin/tazobactam (ZOSYN)    Nelly Potter is a 88 year old patient who has been prescribed piperacillin/tazobactam (ZOSYN) 4.5 g every 8 hours.    Estimated Creatinine Clearance: 52.2 mL/min (based on SCr of 0.6 mg/dL).  Wt Readings from Last 6 Encounters:   02/20/24 70.7 kg (155 lb 13.8 oz)   02/19/24 66 kg (145 lb 9.6 oz)   02/16/24 71 kg (156 lb 9.6 oz)   02/15/24 70.6 kg (155 lb 9.6 oz)   02/06/24 69.2 kg (152 lb 9.6 oz)   02/05/24 71.2 kg (157 lb)     Body mass index is 28.51 kg/m².    Based on this criteria and renal function, dose will be adjusted to piperacillin/tazobactam (ZOSYN) 3.375 g every 8 hours.    Thank you,    Lizette Cevallos, PharmD  2/20/2024  5:39 PM

## 2024-02-20 NOTE — PROGRESS NOTES
02/20/24 1314   Oxygen Utilization   $ RT Standby Charge (per 15 min) 1   $ HH HF Adult Cannula Initial Yes   O2 Device High flow/High humidity   O2 Flow Rate (L/min) 40 L/min   FiO2 (%) 100 %   SpO2 97 %   Humidity High   [place] Filled     RT placed pt on above settings on vapo. Pt bilaterally diminished.

## 2024-02-20 NOTE — ED INITIAL ASSESSMENT (HPI)
From [place] found to be SOB and low SpO2 this am.A/OX1 per norm. Covid neg this am at snf

## 2024-02-20 NOTE — CONSULTS
ICU  Critical Care APRN Consult Note    NAME: Nelly Potter - ROOM: [facility] - MRN: [id number] - Age: 88 year old - :1935    History Of Present Illness:  Nelly Potter is a 88 year old female with PMHx significant for HTN, CAD, PE/DVT, afib on xarelto, COPD, dementia/functional decline, GERD, urinary retention with frequent UTIs who presented to the ED from SNF with complaints of shortness of breath and hypoxia. Patient has had fatigue, cough and shortness of breath and was noted to have low O2 sats. In the ED, patient placed on vapotherm 40L/100% with improvement in O2. Labs notable for troponin 75,, otherwise unremarkable. RVP positive for RSV and CXR showing PNA. Patient is being admitted to ICU for further management.      On admit to ICU, patient awake, cooperative following commands. Alert and oriented to place and name. Generalized pain and states breathing is improved.    Past Medical History:  Past Medical History:   Diagnosis Date    Allergic rhinitis     Anxiety     Chronic midline low back pain without sciatica 10/13/2021    Constipation     Depression     Esophageal reflux     History of bladder cancer 2012    Incontinence     Long QT interval     per pt. hx of long QT.    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 Cheng    CYSTOURETHROSCOPY  10/24/12    BTS Cysto Dr. Cheng     CYSTOURETHROSCOPY  13, 13, 13, 14, 5/6/15,  11/11/15    cysto- Dr. Mathews, [place]    CYSTOURETHROSCOPY  10/11/2017    cysto Dr. mathews    CYSTOURETHROSCOPY,FULGUR .5-2CM LESN  7/23/12    cystourethroscopy, TURBT x 2- Dr. Mathews    FOOT SURGERY Left 10/25/11    lt foot    HIP REPLACEMENT SURGERY      left    HYSTERECTOMY      KNEE REPLACEMENT SURGERY Bilateral     OTHER SURGICAL HISTORY  1983     arthroscopic left knee    PART EXCIS 5TH METATARSAL HEAD Right 10/2/2014    Procedure: TAILOR'S BUNIONECTOMY;  Surgeon: Adryan Prajapati DPM;  Location: [facility]    PATIENT DOCUMENTED NOT TO HAVE EXPERIENCED ANY OF THE FOLLOWING EVENTS Right 10/2/2014    Procedure: TAILOR'S BUNIONECTOMY;  Surgeon: Adryan Prajapati DPM;  Location: [facility]    PATIENT WITH PREOPERATIVE ORDER FOR IV ANTIBIOTIC SURGICAL SITE INFECT Right 10/2/2014    Procedure: TAILOR'S BUNIONECTOMY;  Surgeon: Adryan Prajapati DPM;  Location: [facility]    REPAIR OF HAMMERTOE,ONE Left 10/2/2014    Procedure: ARTHROPLASTY ONE (1) TOE;  Surgeon: Adryan Prajapati DPM;  Location: [facility]    SINUS SURGERY    1983 & 1999    sinus    SLING OPER STRES INCONTINENCE  8/06    sling    SLING OPER STRES INCONTINENCE  10/2011    sling repeat. TVT [facility]act. Dr[facility] Dang [facility]    TONSILLECTOMY        Family History:   Family History   Problem Relation Age of Onset    Other (Other) Son         CF    Other (Other) Son         CF     Social History:    reports that she has never smoked. She has never used smokeless tobacco. She reports current alcohol use. She reports that she does not use drugs.     Review of Systems:   A comprehensive 10 point review of systems was completed.  Pertinent positives and negatives noted in the HPI.    Current Facility-Administered Medications   Medication Dose Route Frequency    potassium chloride 40 mEq in 250mL sodium chloride 0.9% IVPB premix  40 mEq Intravenous Once    sodium chloride 0.9 % IV bolus 1,503 mL  30 mL/kg (Ideal) Intravenous Once     OBJECTIVE  Vitals:  /63   Pulse  77   Temp 98.9 °F (37.2 °C) (Axillary)   Resp 26   Ht 157.5 cm (5' 2\")   Wt 150 lb (68 kg)   SpO2 98%   BMI 27.44 kg/m²             O2: Vapotherm 40L /100%    Physical Exam:    General Appearance: Alert, cooperative, no acute distress on vapotherm  Neck: No JVD, neck supple, no adenopathy, trachea midline, no carotid bruits  Lungs: diminished to auscultation on right, rhonchi on left, respirations unlabored  Heart: Regular rate and rhythm, S1 and S2 normal, no murmur, rub or gallop  Abdomen: Soft, round, non-tender, bowel sounds active all four quadrants, no masses, no organomegaly  Extremities: Atraumatic, 1+ pitting edema to upper extremities and bilateral LEs above the knees, capillary refill <3 sec.    Pulses: 2+ and symmetric all extremities  Skin: Skin color, texture, turgor normal for ethnicity, no rashes or lesions, warm and dry  Neurologic: CNII-XII intact, normal strength    Data this admission:  XR CHEST AP PORTABLE  (CPT=71045)    Result Date: 2/20/2024  CONCLUSION:  1. Multifocal consolidations are noted in the lungs most pronounced on the left. 2. Small left pleural effusion.   LOCATION:  [facility]      Dictated by (CST): Thierry More MD on 2/20/2024 at 12:59 PM     Finalized by (CST): Thierry More MD on 2/20/2024 at 1:00 PM       XR CHEST AP PORTABLE  (CPT=71045)    Addendum Date: 2/6/2024    ADDENDUM:  A left-sided PICC terminates in the region of the left axillary vein in the left axilla.  Dictated by (CST): Raffaele Maradiaga MD on 2/06/2024 at 11:38 PM     Finalized by (CST): Raffaele Maradiaga MD on 2/06/2024 at 11:38 PM             Result Date: 2/6/2024  CONCLUSION:  Small left pleural effusion versus pleural scarring.  Mild scarring/atelectasis in the lower lungs.    LOCATION:  [id number]      Dictated by (CST): Basim Todd MD on 2/06/2024 at 11:35 AM     Finalized by (CST): Basim Todd MD on 2/06/2024 at 11:36 AM       CT ABDOMEN+PELVIS(CONTRAST ONLY)(CPT=74177)    Result Date:  2/6/2024  CONCLUSION:  There is a large amount of fecal material centered on the distal sigmoid colon and rectum most likely representing constipation/fecal impaction.  There is some possible wall thickening of the colon in this region which could represent stercoral colitis.  Limited views of the chest demonstrate probable basilar atelectasis versus consolidation with trace bilateral pleural effusions.  There is a compression fracture of T8 which may be chronic although there is some sclerosis present in this region.  Correlation for symptoms is suggested.  If there is persistent concern then consider MRI.   LOCATION:  [id number]   Dictated by (CST): Adryan Zazueta MD on 2/06/2024 at 1:03 PM     Finalized by (CST): Adryan Zazueta MD on 2/06/2024 at 1:07 PM       Labs:  Lab Results   Component Value Date    WBC 6.7 02/20/2024    HGB 9.1 02/20/2024    HCT 28.2 02/20/2024    .0 02/20/2024    CREATSERUM 0.60 02/20/2024    BUN 36 02/20/2024     02/20/2024    K 2.8 02/20/2024    CL 90 02/20/2024    CO2 35.0 02/20/2024     02/20/2024    CA 10.0 02/20/2024    ALB 1.9 02/20/2024    ALKPHO 85 02/20/2024    BILT 0.6 02/20/2024    TP 5.6 02/20/2024    AST 6 02/20/2024    ALT 24 02/20/2024     Assessment/Plan:    Acute hypoxic respiratory failure: 2/2 multifocal PNA, +/- RSV. Underlying hx of COPD  -Wean O2 as able-currently on vapotherm 40L/100%  -RSV positive  -CXR with multifocal PNA, L> R  -Antbx as below  -ABG without hypercapnea  -Nebs PRN    Shock: Likely septic 2/2 PNA  -Received IVF bolus in ED- 1.5L  -LA 3.1, continue to trend  -Blood cultures pending  -Vasopressors PRN for MAP > 65, SBP > 90  -UA pending  -PCT pending  -Consider stress dose steroids. On decadron with dose being weaned at SNF  -Home midodrine 5mg TID  -Continue zosyn (2/20-)  -TTE pending    PNA and RSV  -O2 as needed, goal O2 sat > 92%  -CXR reviewed  -Antbx as above  -Droplet isolation    Elevated troponin: Likely demand ischemia  -Troponin  75 on admit, trend  -EKG non-ischemic  -pBNP 578  -QTc prolonged on EKG, monitor (Chronic)  -TTE pending    Multiple Myeloma  -In remission  -follows with onc as outpatient    Hx PE and DVT  -A/c was on hold at SNF for EGD and then developed PE  -On xarelto    Duodenal ulcer, GERD  -ppi  -Trend hgb    HTN  -monitor   -Hold cardiac meds  -On lasix BID at home, hold     CAD  -hold home BB, statin    COPD  -Nebs PRN  -Duonebs  -O2 as needed    Afib  -rate controlled  -Continue xarelto  -Cardizem for rate control  -monitor on tele  -TTE on 12/2 with EF 55%    Dementia with functional decline, ? Steroid myopathy  -Palliative was to see as outpatient  -Steroids were being weaned. Was on decadron for months    HLD  -atorvastatin    Urinary retention/frequent UTIs  -flomax  -UA pending  -monitor I/Os      F/E/N:    -Follow lytes and replete prn  -speech eval    Proph:  -Xarelto  -SCD  -PT/OT eval    Dispo:     Code Status: DNAR/Selective Treatment  -ICU for close monitoring    Plan of care discussed with intensivist, Dr. Mario Alberto Frost, [facility]  ICU  Phone  [phone]   Pager [phone]      Pulmonary Critical Care Physician ADDENDUM     I have personally seen and examined the patient.  I have reviewed and agree with Sharon RUVALCABA's documentation with the following highlights/changes.       Pt presented with SOB and hypoxia from NH.  In the ER pt was hypoxic requiring initiation of vapotherm.  CXR with multifocal infiltrates, RVP was notable for +RSV.  Pt also with some hypotension requiring initiation of vasopressor support.  Will treat broadly with zosyn as well as stress dose steroids for severe PNA with shock.  Check sputum sample in addition to urinary strep/legionella.  Troponin mildly elevated, suspect demand ischemia.  Will check echo.  DNAR/select.    Critical care time: 40 minutes    Yeimy BARAHONA

## 2024-02-20 NOTE — H&P
[facility]  History and Physical     Nelly Potter Patient Status:  Emergency    1935 MRN [id number]   [facility] EMERGENCY DEPARTMENT Attending Effie Charles MD   Hosp Day # 0 PCP Balta Wright MD     Chief Complaint:   Sob, hypoxic    History of Present Illness: Nelly Potter is a 88 year old female with PMH significant for/anxiety, GERD, A-fib on Xarelto, HTN, DL, COPD, asthma, bladder CA, multiple myeloma, vertigo, dementia with short-term memory issues who presents from "[employer] with reports of shortness of breath and a wet cough.  According to the son who is at bedside the patient developed worsening shortness of breath and a wet cough, was started on oxygen.  In the ED O2 sats were noted to be 70%, desaturating on a nonrebreather.  X-ray revealed multifocal consolidations consistent with pneumonia.  RSV positive.  Lactic acid 3, , troponin 75, hemoglobin 9.1.  Patient currently on high flow nasal cannula.  Complaining of some chest pain.  IV antibiotics were given in the ED.  Patient will be transferred to ICU for further care and management.  Son is at bedside.  Of note patient was admitted from 2024 to 2024 at which time she was found to have chest pain and diagnosed with A-fib/RVR.      Past Medical History:  Past Medical History:   Diagnosis Date    Allergic rhinitis     Anxiety     Chronic midline low back pain without sciatica 10/13/2021    Constipation     Depression     Esophageal reflux     History of bladder cancer 2012    Incontinence     Long QT interval     per pt. hx of long QT.    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 Mathews    CYSTOURETHROSCOPY  10/24/12    BTS Cysto Dr. Mathews     CYSTOURETHROSCOPY  4/29/13, 8/7/13, 11/13/13, 11/12/14, 5/6/15, 11/11/15    cysto- Dr. Mathews, [place]    CYSTOURETHROSCOPY  10/11/2017    cysto Dr. mathews    CYSTOURETHROSCOPY,FULGUR .5-2CM LESN  7/23/12    cystourethroscopy, TURBT x 2- Dr. Mathews    FOOT SURGERY Left 10/25/11    lt foot    HIP REPLACEMENT SURGERY      left    HYSTERECTOMY      KNEE REPLACEMENT SURGERY Bilateral     OTHER SURGICAL HISTORY  1983     arthroscopic left knee    PART EXCIS 5TH METATARSAL HEAD Right 10/2/2014    Procedure: TAILOR'S BUNIONECTOMY;  Surgeon: Adryan Prajapati DPM;  [facility]: [facility]    PATIENT DOCUMENTED NOT TO HAVE EXPERIENCED ANY OF THE FOLLOWING EVENTS Right 10/2/2014    Procedure: TAILOR'S BUNIONECTOMY;  Surgeon: Adryan Prajapati DPM;  [facility]    PATIENT WITH PREOPERATIVE ORDER FOR IV ANTIBIOTIC SURGICAL SITE INFECT Right 10/2/2014    Procedure: TAILOR'S BUNIONECTOMY;  Surgeon: Adryan Prajapati DPM;  [facility]    REPAIR OF HAMMERTOE,ONE Left 10/2/2014    Procedure: ARTHROPLASTY ONE (1) TOE;  Surgeon: Adryan Prajapati DPM;  Location: [facility]    SINUS SURGERY    1983 & 1999    sinus    SLING OPER STRES INCONTINENCE  8/06    sling    SLING OPER STRES INCONTINENCE  10/2011    sling repeat. TVT Exact. Dr. Dang at [facility]    TONSILLECTOMY         Social History:  reports that she has never smoked. She has never used smokeless tobacco. She reports current alcohol use. She reports that she does not use drugs.    Family History:   Family History   Problem Relation Age of Onset    Other (Other) Son         CF    Other (Other) Son         CF        Allergies:   Allergies   Allergen Reactions    Myrbetriq  [Mirabegron] NAUSEA ONLY and DIZZINESS    Codeine NAUSEA ONLY    Evista [Raloxifene Hydrochloride] RASH    Meclizine NAUSEA ONLY    Sulfamethoxazole-Trimethoprim DIZZINESS       Medications:    Current Facility-Administered Medications on File Prior to Encounter   Medication Dose Route Frequency Provider Last Rate Last Admin    [COMPLETED] potassium chloride (K-Dur) tab 40 mEq  40 mEq Oral Once Castillo Mcgill MD   40 mEq at 24 1008    [COMPLETED] lactulose (CHRONULAC) 10 GM/15ML solution 20 g  20 g Oral Once Williams Liz APRN   20 g at 24 1123    [COMPLETED] polyethylene glycol-electrolyte (Golytely) 236 g oral solution 4,000 mL  4,000 mL Oral Once Castillo Mcgill MD   1,300 mL at 24 1810    [COMPLETED] potassium chloride 40 mEq in 250mL sodium chloride 0.9% IVPB premix  40 mEq Intravenous Once Arina Sethi MD 62.5 mL/hr at 24 0924 40 mEq at 24 0924    [COMPLETED] furosemide (Lasix) 10 mg/mL injection 40 mg  40 mg Intravenous Once Shannen Cabrera MD   40 mg at 24 1218    [COMPLETED] fleet enema (Fleet) 7-19 GM/118ML rectal enema 133 mL  1 enema Rectal Once Castillo Mcgill MD   133 mL at 24 1809    [COMPLETED] iopamidol 76% (ISOVUE-370) injection for power injector  90 mL Intravenous ONCE PRN Saul Fernandes MD   90 mL at 24 1256    [] dilTIAZem 10 mg BOLUS FROM BAG infusion  10 mg Intravenous Q1H PRN Saul Fernandes MD        [] ondansetron (Zofran) 4 MG/2ML injection 4 mg  4 mg Intravenous Q4H PRN Saul Fernandes MD        [COMPLETED] furosemide (Lasix) 10 mg/mL injection 20 mg  20 mg Intravenous Once Williams Liz APRN   20 mg at 24 1858    [COMPLETED] FLEET ENEMA (FLEET) 7-19 GM/118ML enema 118 mL  1 enema Rectal Once Williams Liz, APRN   118 mL at 24 185    [COMPLETED] potassium chloride (Klor-Con) 20 MEQ oral powder 40 mEq  40 mEq Oral Once Nano  Castillo ESPARZA MD   40 mEq at 02/06/24 1147    [COMPLETED] ipratropium-albuterol (Duoneb) 0.5-2.5 (3) MG/3ML inhalation solution 3 mL  3 mL Nebulization Once Elijah Ordonez MD   3 mL at 12/18/23 0582     Current Outpatient Medications on File Prior to Encounter   Medication Sig Dispense Refill    furosemide 40 MG Oral Tab Take 1 tablet (40 mg total) by mouth 2 (two) times daily. 180 tablet 3    gabapentin 300 MG Oral Cap Take 1 capsule (300 mg total) by mouth in the morning and 1 capsule (300 mg total) before bedtime.      dilTIAZem  MG Oral Capsule SR 24 Hr Take 1 capsule (180 mg total) by mouth daily. 30 capsule 0    albuterol 108 (90 Base) MCG/ACT Inhalation Aero Soln Inhale 2 puffs into the lungs every 4 (four) hours as needed for Wheezing. 1 each 0    fleet enema 7-19 GM/118ML Rectal Enema Place 133 mL rectally every third day as needed (if no BM in 3 days).      polyethylene glycol, PEG 3350, 17 g Oral Powd Pack Take 17 g by mouth daily.      bisacodyl 10 MG Rectal Suppos Place 1 suppository (10 mg total) rectally as needed (If no BM in 3 days).      folic acid 1 MG Oral Tab Take 1 tablet (1 mg total) by mouth daily.      Ferrous Sulfate Dried  (50 Fe) MG Oral Tab CR Take 160 mg by mouth daily.      sodium chloride 1 g Oral Tab Take 1 tablet (1 g total) by mouth 2 (two) times daily. For hyponatremia      traMADol 50 MG Oral Tab Take 0.5 tablets (25 mg total) by mouth every 8 (eight) hours as needed for Pain (moderate to severe pain not relieved by tylenol). 30 tablet 0    tamsulosin 0.4 MG Oral Cap Take 1 capsule (0.4 mg total) by mouth every evening.      ipratropium-albuterol 0.5-2.5 (3) MG/3ML Inhalation Solution Take 3 mL by nebulization every 6 (six) hours while awake.      ipratropium 0.03 % Nasal Solution 2 sprays by Nasal route daily as needed for Rhinitis.      multivitamin Oral Chew Tab Chew 1 tablet by mouth daily.      dexamethasone 2 MG Oral Tab Take 3.75 mg by mouth daily with breakfast.  Slow taper now to 3.75 starting 1/19/24      acidophilus-pectin Oral Cap Take 1 capsule by mouth daily.      midodrine 5 MG Oral Tab Take 1 tablet (5 mg total) by mouth every 8 (eight) hours as needed (SBP less than 95 and DBP less than 50). If SBP <95 or DBP <50      sennosides 8.6 MG Oral Tab Take 2 tablets (17.2 mg total) by mouth 2 (two) times daily.      acetaminophen 325 MG Oral Tab Take 2 tablets (650 mg total) by mouth every 6 (six) hours as needed for Pain.      rivaroxaban (XARELTO) 20 MG Oral Tab Take 1 tablet (20 mg total) by mouth daily with food.      sertraline 25 MG Oral Tab Take 1 tablet (25 mg total) by mouth daily.      fexofenadine 180 MG Oral Tab Take 1 tablet (180 mg total) by mouth daily as needed. 90 tablet 3    lansoprazole 30 MG Oral Capsule Delayed Release Take 1 capsule (30 mg total) by mouth daily. Before meal 90 capsule 3    atorvastatin 10 MG Oral Tab Take 1 tablet (10 mg total) by mouth daily. 90 tablet 3       Review of Systems:   A comprehensive 14 point review of systems was completed.    Pertinent positives and negatives noted in the HPI.    Physical Exam:    /63   Pulse 77   Temp 98.9 °F (37.2 °C) (Axillary)   Resp 26   Ht 5' 2\" (1.575 m)   Wt 150 lb (68 kg)   SpO2 98%   BMI 27.44 kg/m²   General: lethargic and weak, oriented to person only, unable to answer most questions in ED  HEENT: Normocephalic atraumatic. Moist mucous membranes. EOM-I. PERRLA. Anicteric.  Neck: No lymphadenopathy. No JVD. No carotid bruits.  Respiratory: dec BS, faint wheezing, pt coughing during exam  Cardiovascular: S1, S2. Regular rate and rhythm. No murmurs, rubs or gallops. Equal pulses.   Chest and Back: No tenderness or deformity.  Abdomen: Soft, nontender, nondistended.  Positive bowel sounds. No rebound, guarding or organomegaly.  Neurologic: No focal neurological deficits. CNII-XII grossly intact.  Musculoskeletal: Moves all extremities.  Extremities: No edema or  cyanosis.  Integument: No rashes or lesions.   Psychiatric: Appropriate mood and affect.      Diagnostic Data:      Labs:  Recent Labs   Lab 02/16/24  0705 02/19/24  0603 02/20/24  1218   WBC 8.3 4.9 6.7   HGB 8.3* 8.9* 9.1*   MCV 81.1 81.1 82.0   .0 235.0 249.0       Recent Labs   Lab 02/16/24  0705 02/19/24  0603 02/20/24  1218   * 112* 180*   BUN 15 28* 36*   CREATSERUM 0.34* 0.69 0.60   CA 7.4* 9.2 10.0   ALB  --  1.9* 1.9*   * 131* 133*   K 3.0* 2.3* 2.8*   CL 97* 87* 90*   CO2 30.0 34.0* 35.0*   ALKPHO  --  91 85   AST  --  6* 6*   ALT  --  25 24   BILT  --  0.5 0.6   TP  --  5.5* 5.6*       Estimated Creatinine Clearance: 52.2 mL/min (based on SCr of 0.6 mg/dL).    No results for input(s): \"PTP\", \"INR\" in the last 168 hours.    COVID-19 Lab Results    COVID-19  Lab Results   Component Value Date    COVID19 Not Detected 02/20/2024    COVID19 Not Detected 02/06/2024    COVID19 Not Detected 12/17/2023       Pro-Calcitonin  No results for input(s): \"PCT\" in the last 168 hours.    Cardiac  Recent Labs   Lab 02/20/24  1218   PBNP 578*       Creatinine Kinase  No results for input(s): \"CK\" in the last 168 hours.    Inflammatory Markers  No results for input(s): \"CRP\", \"KARUNA\", \"LDH\", \"DDIMER\" in the last 168 hours.    Recent Labs   Lab 02/20/24  1218   TROPHS 75*       Imaging: Imaging data reviewed in Epic.      ASSESSMENT / PLAN:   Nelly Potter is a 88 year old female with PMH significant for/anxiety, GERD, A-fib on Xarelto, HTN, DL, COPD, asthma, bladder CA, multiple myeloma, vertigo, dementia with short-term memory issues who presents from [place] with reports of shortness of breath and a wet cough.     Cough, sob - 2/2 RSV infection +/- multifocal PNA  Acute hypoxic resp failure 2/2 above  Asthma/COPD exacerbation   -RSV + in ED  -cxr reviewed >> multifocal pneumonia  -supplemental O2 via HF currently  - wean as able  -check ABG  -nebs prn  -may benefit from IV steroids  -cont iv  abx  -blood cx x 2  -ST, PT, OT    Lactic acidosis  -mild  -repeat numbers to assess trend    Troponin elevated - possibly related to demand ischemia  -will trend  -may need cardiology involvement     GERD  -PPI    HTN   -resume home meds if able  -monitor BP closely, pt had hx of hypotension on previous admission    DL  -statin    Afib on xarelto  -resume xarelto and po diltiazem    Depression, anxiety  -cont zoloft    Bladder ca  Multiple myeloma  Dementia  Vertigo  -stable chronic conditions      Quality:  DVT Prophylaxis: xarelto, scds  CODE status: DNR, select  [place]: no  If COVID testing is negative, may discontinue isolation: yes     Plan of care discussed with patient and son at bedside in ED, as well as ED attending Dr Chou.    [facility] minutes spent in discussion with son regarding code status  Patient's son states he would like patient to be DNR active treatment which includes okay for intubation and vasopressors as well as antiarrhythmics.  Does not want chest compressions or cardioversion performed.  It was explained to the patient son that once she is intubated there may be difficulty extubating her, he states he still wants like her to be intubated if necessary.          Geno Kaur MD  [facility]  Pager [phone]  Answering Service number: [phone]

## 2024-02-20 NOTE — PROGRESS NOTES
[facility] ICU Monitoring (Midodrine)      Is patient taking midodrine at home?: Yes  Midodrine has been adjusted from 5 mg TID to Q8H while on concomitant vasopressor therapy. Once vasopressor therapy has been stopped, modify the frequency to TID based on standard administration times or discuss weaning midodrine, if appropriate.

## 2024-02-20 NOTE — ED PROVIDER NOTES
Patient Seen in: [facility] Emergency Department      History     Chief Complaint   Patient presents with    Breathing Problem     From [place] found to be SOB and low SpO2 this am.A/OX1 per norm.     Stated Complaint: From [place] found to be SOB and low SpO2 this am.A/OX1 per norm.    Subjective:   HPI     Nelly Potter is an 88 year old female from [place] with a PMHx significant for AFIB (on xarelto), COPD, asthma, neuropathy, CHF, anxiety/MDD, DVT, bladder CA, HLD, HTN, multiple myeloma, and vertigo presents to the hospital with SOB and low o2 saturations    Oxygen but son states that the patient has required oxygen for the past 4 days.  She has had a cough which sounds very wet.  Today O2 sats in the 70s requiring increased supplemental oxygen.  Brought to the ED by ambulance for evaluation.    Objective:   Past Medical History:   Diagnosis Date    Allergic rhinitis     Anxiety     Chronic midline low back pain without sciatica 10/13/2021    Constipation     Depression     Esophageal reflux     History of bladder cancer 8/1/2012    Incontinence     Long QT interval     per pt. hx of long QT.    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    Osteoarthrosis, unspecified whether generalized or localized, unspecified site     Osteopenia of multiple sites 8/30/2018    Other and unspecified hyperlipidemia     Pleurisy 2012    Psychophysiological insomnia 9/20/2018    Unspecified essential hypertension     Urge incontinence     Vertigo               Past Surgical History:   Procedure Laterality Date    APPENDECTOMY      CATARACT      COLONOSCOPY  Nov 2012    normal    CYSTOSCOPY CHEMODENERVATION  10/17/2017    100 U    CYSTOURETHROSCOPY  6/27/12    Lucrecia Mathews    CYSTOURETHROSCOPY  10/24/12    [facility] Jose Mathews     CYSTOURETHROSCOPY  4/29/13, 8/7/13, 11/13/13, 11/12/14, 5/6/15, 11/11/15    lucrecia Mathews, MELLY    CYSTOURETHROSCOPY  10/11/2017    jose mathews     CYSTOURETHROSCOPY,FULGUR .5-2CM LESN  7/23/12    cystourethroscopy, TURBT x 2- Dr. Cheng    FOOT SURGERY Left 10/25/11    lt foot    HIP REPLACEMENT SURGERY      left    HYSTERECTOMY      KNEE REPLACEMENT SURGERY Bilateral     OTHER SURGICAL HISTORY  1983     arthroscopic left knee    PART EXCIS 5TH METATARSAL HEAD Right 10/2/2014    Procedure: TAILOR'S BUNIONECTOMY;  Surgeon: Adryan Prajapati DPM;  Location: [facility]    PATIENT DOCUMENTED NOT TO HAVE EXPERIENCED ANY OF THE FOLLOWING EVENTS Right 10/2/2014    Procedure: TAILOR'S BUNIONECTOMY;  Surgeon: Adryan Prajapati DPM;  Location: [facility]    PATIENT WITH PREOPERATIVE ORDER FOR IV ANTIBIOTIC SURGICAL SITE INFECT Right 10/2/2014    Procedure: TAILOR'S BUNIONECTOMY;  Surgeon: Adryan Prajapati DPM;  Location: [facility]    REPAIR OF HAMMERTOE,ONE Left 10/2/2014    Procedure: ARTHROPLASTY ONE (1) TOE;  Surgeon: Adryan Prajapati DPM;  Location: [facility]    SINUS SURGERY    1983 & 1999    sinus    SLING OPER STRES INCONTINENCE  8/06    sling    SLING OPER STRES INCONTINENCE  10/2011    sling repeat. TVT Exact. Dr[facility] D[facility] at [facility]    TONSILLECTOMY                  Social History     Socioeconomic History    Marital status:    Tobacco Use    Smoking status: Never    Smokeless tobacco: Never   Vaping Use    Vaping Use: Never used   Substance and Sexual Activity    Alcohol use: Yes     Comment: 1 per day    Drug use: No     Social Determinants of Health     Food Insecurity: No Food Insecurity (2/6/2024)    Food Insecurity     Food Insecurity: Never true   Transportation Needs: No Transportation Needs (2/6/2024)    Transportation Needs     Lack of Transportation: No   Housing Stability: Low Risk  (2/6/2024)    Housing Stability     Housing Instability: No              Review of Systems    Positive for stated complaint: From [place] found to be SOB and low SpO2 this am.A/OX1 per norm.  Other systems are as noted in HPI.  Constitutional and vital  signs reviewed.      All other systems reviewed and negative except as noted above.    Physical Exam     ED Triage Vitals   BP 02/20/24 1206 99/68   Pulse 02/20/24 1205 98   Resp 02/20/24 1205 20   Temp 02/20/24 1204 98.9 °F (37.2 °C)   Temp src 02/20/24 1204 Axillary   SpO2 02/20/24 1204 100 %   O2 Device 02/20/24 1204 Non-rebreather mask       Current:/63   Pulse 77   Temp 98.9 °F (37.2 °C) (Axillary)   Resp 26   Ht 157.5 cm (5' 2\")   Wt 68 kg   SpO2 (!) 78%   BMI 27.44 kg/m²         Physical Exam  Vitals and nursing note reviewed.   Constitutional:       General: She is not in acute distress.     Appearance: She is well-developed. She is ill-appearing. She is not toxic-appearing.   HENT:      Head: Normocephalic and atraumatic.   Eyes:      General: No scleral icterus.     Conjunctiva/sclera: Conjunctivae normal.   Cardiovascular:      Rate and Rhythm: Normal rate.      Pulses: Normal pulses.   Pulmonary:      Effort: Pulmonary effort is normal. No respiratory distress.      Breath sounds: Rhonchi present.      Comments: Coarse breath sounds bilaterally  Abdominal:      General: There is no distension.   Musculoskeletal:         General: No tenderness. Normal range of motion.      Cervical back: Normal range of motion and neck supple.      Comments: Generalized edema   Skin:     General: Skin is warm and dry.      Findings: No rash.   Neurological:      Mental Status: She is alert.      Motor: No abnormal muscle tone.   Psychiatric:         Behavior: Behavior normal.              ED Course     Labs Reviewed   COMP METABOLIC PANEL (14) - Abnormal; Notable for the following components:       Result Value    Glucose 180 (*)     Sodium 133 (*)     Potassium 2.8 (*)     Chloride 90 (*)     CO2 35.0 (*)     BUN 36 (*)     AST 6 (*)     Total Protein 5.6 (*)     Albumin 1.9 (*)     A/G Ratio 0.5 (*)     All other components within normal limits   LACTIC ACID, PLASMA - Abnormal; Notable for the following  components:    Lactic Acid 3.1 (*)     All other components within normal limits   TROPONIN I HIGH SENSITIVITY - Abnormal; Notable for the following components:    Troponin I (High Sensitivity) 75 (*)     All other components within normal limits   PRO BETA NATRIURETIC PEPTIDE - Abnormal; Notable for the following components:    Pro-Beta Natriuretic Peptide 578 (*)     All other components within normal limits   ABG PANEL W ELECT AND LACTATE - Abnormal; Notable for the following components:    ABG pH 7.58 (*)     ABG HCO3 37.4 (*)     ABG Base Excess 16.0 (*)     Total Hemoglobin 8.8 (*)     Methemoglobin 0.0 (*)     Potassium Blood Gas 2.7 (*)     Sodium Blood Gas 133 (*)     All other components within normal limits   LIPID PANEL - Abnormal; Notable for the following components:    HDL Cholesterol 72 (*)     All other components within normal limits   RBC MORPHOLOGY SCAN - Abnormal; Notable for the following components:    RBC Morphology See morphology below (*)     All other components within normal limits   SARS-COV-2/FLU A AND B/RSV BY PCR ([employer]PERT) - Abnormal; Notable for the following components:    RSV by PCR Positive (*)     All other components within normal limits    Narrative:     This test is intended for the qualitative detection and differentiation of SARS-CoV-2, influenza A, influenza B, and respiratory syncytial virus (RSV) viral RNA in nasopharyngeal or nares swabs from individuals suspected of respiratory viral infection consistent with COVID-19 by their healthcare provider. Signs and symptoms of respiratory viral infection due to SARS-CoV-2, influenza, and RSV can be similar.    Test performed using the Xpert Xpress SARS-CoV-2/FLU/RSV (real time RT-PCR)  assay on the [employer] instrument, [employer], [place] [address].   This test is being used under the Food and Drug Administration's Emergency Use Authorization.    The authorized Fact Sheet for Healthcare Providers for this assay is available  upon request from the laboratory.   CBC W/ DIFFERENTIAL - Abnormal; Notable for the following components:    RBC 3.44 (*)     HGB 9.1 (*)     HCT 28.2 (*)     Lymphocyte Absolute 0.18 (*)     All other components within normal limits   CBC WITH DIFFERENTIAL WITH PLATELET    Narrative:     The following orders were created for panel order CBC With Differential With Platelet.  Procedure                               Abnormality         Status                     ---------                               -----------         ------                     CBC W/ DIFFERENTIAL[[id number]]          Abnormal            Final result                 Please view results for these tests on the individual orders.   SCAN SLIDE   LACTIC ACID REFLEX POST POSTIVE   BLOOD CULTURE   BLOOD CULTURE     EKG    Rate, intervals and axes as noted on EKG Report.  Rate:  120  Rhythm: Undetermined rhythm  Reading: Undetermined rhythm with significant baseline artifact, right bundle cameron block no ST elevations                           MDM         -Pulse oximetry was interpreted by me and was abnormal.  Pulse oximeter was ordered to monitor patient for hypoxia.        -History source other than patient - son, EMS        -Comorbidities did add complexity to the management are mentioned in the HPI above        -I personally reviewed the prior external notes and the medical record to obtain additional history reviewed last discharge summary in February 2024 patient was admitted to the hospital for chest pain A-fib RVR        -DDX: Includes but not limited to -CHF, pneumonia, respiratory failure        -I personally reviewed the radiographs findings and they show focal opacities  Please refer to radiology report for official interpretation    Labs Reviewed   COMP METABOLIC PANEL (14) - Abnormal; Notable for the following components:       Result Value    Glucose 180 (*)     Sodium 133 (*)     Potassium 2.8 (*)     Chloride 90 (*)     CO2 35.0 (*)      BUN 36 (*)     AST 6 (*)     Total Protein 5.6 (*)     Albumin 1.9 (*)     A/G Ratio 0.5 (*)     All other components within normal limits   LACTIC ACID, PLASMA - Abnormal; Notable for the following components:    Lactic Acid 3.1 (*)     All other components within normal limits   TROPONIN I HIGH SENSITIVITY - Abnormal; Notable for the following components:    Troponin I (High Sensitivity) 75 (*)     All other components within normal limits   PRO BETA NATRIURETIC PEPTIDE - Abnormal; Notable for the following components:    Pro-Beta Natriuretic Peptide 578 (*)     All other components within normal limits   ABG PANEL W ELECT AND LACTATE - Abnormal; Notable for the following components:    ABG pH 7.58 (*)     ABG HCO3 37.4 (*)     ABG Base Excess 16.0 (*)     Total Hemoglobin 8.8 (*)     Methemoglobin 0.0 (*)     Potassium Blood Gas 2.7 (*)     Sodium Blood Gas 133 (*)     All other components within normal limits   LIPID PANEL - Abnormal; Notable for the following components:    HDL Cholesterol 72 (*)     All other components within normal limits   RBC MORPHOLOGY SCAN - Abnormal; Notable for the following components:    RBC Morphology See morphology below (*)     All other components within normal limits   SARS-COV-2/FLU A AND B/RSV BY PCR ([employer]ERT) - Abnormal; Notable for the following components:    RSV by PCR Positive (*)     All other components within normal limits    Narrative:     This test is intended for the qualitative detection and differentiation of SARS-CoV-2, influenza A, influenza B, and respiratory syncytial virus (RSV) viral RNA in nasopharyngeal or nares swabs from individuals suspected of respiratory viral infection consistent with COVID-19 by their healthcare provider. Signs and symptoms of respiratory viral infection due to SARS-CoV-2, influenza, and RSV can be similar.    Test performed using the Xpert Xpress SARS-CoV-2/FLU/RSV (real time RT-PCR)  assay on the [employer] instrument, [employer],  [place] [address].   This test is being used under the Food and Drug Administration's Emergency Use Authorization.    The authorized Fact Sheet for Healthcare Providers for this assay is available upon request from the laboratory.   CBC W/ DIFFERENTIAL - Abnormal; Notable for the following components:    RBC 3.44 (*)     HGB 9.1 (*)     HCT 28.2 (*)     Lymphocyte Absolute 0.18 (*)     All other components within normal limits   CBC WITH DIFFERENTIAL WITH PLATELET    Narrative:     The following orders were created for panel order CBC With Differential With Platelet.  Procedure                               Abnormality         Status                     ---------                               -----------         ------                     CBC W/ DIFFERENTIAL[[id number]]          Abnormal            Final result                 Please view results for these tests on the individual orders.   SCAN SLIDE   LACTIC ACID REFLEX POST POSTIVE   BLOOD CULTURE   BLOOD CULTURE     Labs reviewed.  It blood gas shows alkalosis, she does have toxemia hypokalemia CBC shows hemoglobin 9.1.  She is RSV positive.  Troponin is also elevated 75 lactic acid 2.1.  Potassium 2.8    Medications   piperacillin-tazobactam (Zosyn) 4.5 g in dextrose 5% 100 mL IVPB-ADDV (4.5 g Intravenous New Bag 2/20/24 1332)   potassium chloride 40 mEq in 250mL sodium chloride 0.9% IVPB premix (has no administration in time range)   sodium chloride 0.9 % IV bolus 1,503 mL (1,503 mL Intravenous New Bag 2/20/24 1335)       Patient was placed on high flow at 40 L satting well.  Sepsis fluid bolus was started IV antibiotics were also provided.  She will be admitted to ICU for further care.      [facility]      Sepsis Reassessment Note    /63   Pulse 77   Temp 98.9 °F (37.2 °C) (Axillary)   Resp 26   Ht 157.5 cm (5' 2\")   Wt 68 kg   SpO2 (!) 78%   BMI 27.44 kg/m²      I completed the sepsis reassessment at 1355    Cardiac:  Regularity:  Regular  Rate: tachycardia  Heart Sounds: S1,S2    Lungs:   Right: Rhonchi  Left: Rhonchi    Peripheral Pulses:  Radial: Right 1+ or Left 1+      Capillary Refill:  <3 Secs    Skin:  Temp/Moisture: Warm and Dry  Color: Normal      Effie Charles MD  2/20/2024  1:54 PM      A total of 35 minutes of critical care time (exclusive of billable procedures) was administered to manage the patient's critical lab values, critical imaging findings, unstable vital signs, and respiratory instability due to her hypoxemic respiratory failure, pneumonia, hypokalemia, lactic acidosis, sepsis.  This involved direct patient intervention, complex decision making, and/or extensive discussions with the patient, family, and clinical staff.        Admission disposition: 2/20/2024  1:56 PM                     Medical Decision Making      Disposition and Plan     Clinical Impression:  1. Multifocal pneumonia    2. Acute respiratory failure with hypoxia (HCC)    3. RSV infection    4. Hypokalemia    5. Elevated troponin    6. Lactic acidosis    7. Sepsis, due to unspecified organism, unspecified whether acute organ dysfunction present (HCC)         Disposition:  Admit  2/20/2024  1:56 pm    Follow-up:  No follow-up provider specified.        Medications Prescribed:  Current Discharge Medication List                            Hospital Problems       Present on Admission  Date Reviewed: 3/4/2022            ICD-10-CM Noted POA    * (Principal) Multifocal pneumonia J18.9 2/20/2024 Unknown

## 2024-02-21 NOTE — TREATMENT PLAN
A/ox1, lethergic,   Rhonchi, nebs  Weaned levo at able.   Weaned vapo therm as tolerated.   Family updated.   Code status confirmed. Son states NO intubation.    Speech therapy assessed and gave recommendations to maintain NPO status.       11:20 attempted to place NG tube. Resistance, unable to advanced and ultimately pulled out. Attempted to replaced unable to place. Dr. Llanes and Emmie RUVALCABA aware and will re-attempt later today.       Emmie RUVALCABA notified of no urine output at 1700.  Bladder scan not significant (see charting) tube feeding and flushes to start tonight. No further orders given.

## 2024-02-21 NOTE — OCCUPATIONAL THERAPY NOTE
OT order received, chart reviewed, last admission OT spoke to RN at [facility] [place], pt's home environment, pt with high back custom W/C at baseline and use of jj lift to t/f to W/C, pt has total A for all ADLs including self feeding, per RN pt only sometimes able to slightly lift arm off bed. Pt presents with no skilled needs at this time and OT will sign off.     Thank you for allowing me to care for this patient,   Amira CASAS, OTR/L   Occupational Therapist   [facility]

## 2024-02-21 NOTE — PHYSICAL THERAPY NOTE
PT order received, chart reviewed.  Pt presents from LTC, is dependent for all mobility including use of jj lift to w/c.  Will dc as no skilled intervention is required.

## 2024-02-21 NOTE — SLP NOTE
ADULT SWALLOWING EVALUATION    ASSESSMENT    ASSESSMENT/OVERALL IMPRESSION:  Patient is an 87 y/o female admitted with SOB and hypoxia. PMHx significant for afib, GERD, HTN, COPD, multiple myeloma, and dementia. Patient known to this service with recent clinical swallow evaluation completed 2/8/24 which recommended a regular diet and thin liquids. SLP order received to evaluate oropharyngeal swallow. Patient received drowsy, but arousable, in bed with son present at bedside. Patient requiring Vapotherm support (40L, 100%) at time of my visit. Pt's son reported increased coughing with PO intake that started since recent discharge from the hospital. She continued to consume a regular diet and thin liquids at baseline.    Evaluation limited d/t patient status. PO trials of ice chips and thin liquids attempted. Mild anterior bolus loss noted. Hyolaryngeal excursion appeared reduced per palpation. Patient exhibited immediate cough x3 and exhibited increased SOB. Therefore, PO trials were discontinued.     Recommend patient remain NPO with alternate source for nutrition, hydration, and medication. SLP will continue to monitor patient status and re-evaluate as medically appropriate. Education provided to patient and son re: results and recommendations.          RECOMMENDATIONS   Diet Recommendations - Solids: NPO  Diet Recommendations - Liquids: NPO                              Medication Administration Recommendations: Non-oral  Treatment Plan/Recommendations: SLP to reassess    HISTORY   MEDICAL HISTORY  Reason for Referral: R/O aspiration    Problem List  Principal Problem:    Multifocal pneumonia  Active Problems:    Acute respiratory failure with hypoxia (HCC)    RSV infection    Hypokalemia    Elevated troponin    Lactic acidosis    Sepsis, due to unspecified organism, unspecified whether acute organ dysfunction present (HCC)      Past Medical History  Past Medical History:   Diagnosis Date    Allergic rhinitis      Anxiety     Chronic midline low back pain without sciatica 10/13/2021    Constipation     Depression     Esophageal reflux     History of bladder cancer 8/1/2012    Incontinence     Long QT interval     per pt. hx of long QT.    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 Multifocal consolidations are noted in the lungs most pronounced on the left.   2. Small left pleural effusion.         LOCATION:  [facility]                  Dictated by (CST): Thierry More MD on 2/20/2024 at 12:59 PM       Finalized by (CST): Thierry More MD on 2/20/2024 at 1:00 PM     SUBJECTIVE       OBJECTIVE   ORAL MOTOR EXAMINATION  Dentition: Functional  Symmetry: Within Functional Limits  Strength:  (generalized weakness)     Range of Motion:  (reduced overall)       Voice Quality: Weak  Respiratory Status: Vapotherm (40L, 100%)  Consistencies Trialed: Thin liquids  Method of Presentation: Staff/Clinician assistance  Patient Positioning: Upright;Midline    Oral Phase of Swallow: Impaired     Bilabial Seal: Impaired                Pharyngeal Phase of Swallow: Impaired     Laryngeal Elevation Strength: Appears impaired  Laryngeal Elevation Coordination: Appears impaired  (Please note: Silent aspiration cannot be evaluated clinically. Videofluoroscopic Swallow Study is required to rule-out silent aspiration.)       Comments: d/w RN              GOALS  Goal #1 Slp to re-assess as  medically appropriate.  In Progress   Goal #2     Goal #3     Goal #4     Goal #5     Goal #6     Goal #7     Goal #8       FOLLOW UP  Treatment Plan/Recommendations: SLP to reassess     Follow Up Needed (Documentation Required): Yes  SLP Follow-up Date: 02/22/24    Thank you for your referral.   If you have any questions, please contact JUANA Ccaeres

## 2024-02-21 NOTE — PROGRESS NOTES
[facility] Pharmacy Dosing Service      Initial Pharmacokinetic Consult for Vancomycin Dosing     Nelly Potter is a 88 year old female who is being initiated on vancomycin therapy for pneumonia and sepsis.  Pharmacy has been asked to dose vancomycin by PEG Coon.  The initial treatment and monitoring approach will be steady state AUC strategy.        Weight and Temperature:    Wt Readings from Last 1 Encounters:   24 70.7 kg (155 lb 13.8 oz)        Temp Readings from Last 1 Encounters:   24 98.3 °F (36.8 °C)      Labs:   Recent Labs   Lab 24  0705 24  0603 24  1218   CREATSERUM 0.34* 0.69 0.60      Estimated Creatinine Clearance: 52.2 mL/min (based on SCr of 0.6 mg/dL).     Recent Labs   Lab 24  0724  0603 24  1218   WBC 8.3 4.9 6.7          The Pharmacokinetic Target is:     to 600 mg-h/L and trough <=15 mg/L    Renal Dosing Considerations:    None     Assessment/Plan:   Initial/Loading dose: Will receive 1750 mg IV (25 mg/kg, capped at 2250 mg) x 1 loading dose.      Maintenance dose: Pharmacy will dose vancomycin at 1000 mg IV every 24 hours    Monitorin) Plan for vancomycin peak and trough to be obtained at steady state    2) Pharmacy will order SCr as clinically indicated to assess renal function.    3) Pharmacy will monitor for toxicity and efficacy, adjust vancomycin dose and/or frequency, and order vancomycin levels as appropriate per the Pharmacy and Therapeutics Committee approved protocol until discontinuation of the medication.       We appreciate the opportunity to assist in the care of this patient.     Lizette Cevallos, PharmD  2024  9:23 PM  [facility]  Pharmacy Extension: [phone]

## 2024-02-21 NOTE — PROGRESS NOTES
[facility] Pharmacy Note:  Initiation of Stress Ulcer Prophylaxis     Nelly Potter is a 88 year old patient and meets criteria for the initiation of stress ulcer prophylaxis based on the presence of: Medication(s) on home medication list and 2 or more minor risk factor.    lansoprazole (PREVACID) has been initiated per pharmacy protocol.    Thank you,  Kristie Lancaster, PharmD  2/21/2024 12:45 PM

## 2024-02-21 NOTE — PROGRESS NOTES
[facility]    Nelly Potter Patient Status:  Inpatient    1935 MRN [id number]   [facility] 4SW-A Attending Castillo Mcgill MD   Hosp Day # 1 PCP Balta Wright MD     Critical Care Progress Note     Date of Admission: 2024 11:59 AM  Admission Diagnosis: Hypokalemia [E87.6]  Lactic acidosis [E87.20]  Elevated troponin [R79.89]  Acute respiratory failure with hypoxia (HCC) [J96.01]  RSV infection [B33.8]  Multifocal pneumonia [J18.9]  Sepsis, due to unspecified organism, unspecified whether acute organ dysfunction present (HCC) [A41.9]     S:  Blood cultures are positive for MRSA.  She remains on levophed.  On vapotherm 40L 100%, desaturates with minimal activity.        Current Medications:    Current Facility-Administered Medications:     [COMPLETED] potassium chloride 40 mEq in 250mL sodium chloride 0.9% IVPB premix, 40 mEq, Intravenous, Once **FOLLOWED BY** potassium chloride 20 mEq/100mL IVPB premix 20 mEq, 20 mEq, Intravenous, Once    potassium chloride 20 mEq in sodium chloride 0.9% 1000mL infusion premix, , Intravenous, Continuous    acetaminophen (Tylenol Extra Strength) tab 500 mg, 500 mg, Oral, Q4H PRN    acetaminophen (Tylenol) tab 650 mg, 650 mg, Oral, Q4H PRN **OR** HYDROcodone-acetaminophen (Norco) 5-325 MG per tab 1 tablet, 1 tablet, Oral, Q4H PRN **OR** HYDROcodone-acetaminophen (Norco) 5-325 MG per tab 2 tablet, 2 tablet, Oral, Q4H PRN    melatonin tab 3 mg, 3 mg, Oral, Nightly PRN    benzonatate (Tessalon) cap 200 mg, 200 mg, Oral, TID PRN    guaiFENesin (Robitussin) 100 MG/5 ML oral liquid 200 mg, 200 mg, Oral, Q4H PRN    ondansetron (Zofran) 4 MG/2ML injection 4 mg, 4 mg, Intravenous, Q6H PRN    metoclopramide (Reglan) 5 mg/mL injection 10 mg, 10 mg, Intravenous, Q8H PRN    polyethylene glycol (PEG 3350) (Miralax) 17 g oral packet 17 g, 17 g, Oral, Daily PRN    sennosides (Senokot) tab 17.2 mg, 17.2 mg, Oral, Nightly PRN    bisacodyl (Dulcolax) 10 MG  rectal suppository 10 mg, 10 mg, Rectal, Daily PRN    fleet enema (Fleet) 7-19 GM/118ML rectal enema 133 mL, 1 enema, Rectal, Once PRN    norepinephrine (Levophed) 4 mg/250mL infusion premix, 0.5-30 mcg/min, Intravenous, Continuous PRN    vasopressin (Vasostrict) 20 Units in sodium chloride 0.9% 100 mL infusion for septic shock, 0.01-0.03 Units/min, Intravenous, Continuous PRN    rivaroxaban (Xarelto) tab 20 mg, 20 mg, Oral, Daily with food    midodrine (ProAmatine) tab 5 mg, 5 mg, Oral, Q8H    hydrocortisone Na succinate PF (Solu-CORTEF) injection 100 mg, 100 mg, Intravenous, Q8H CHLOE    piperacillin-tazobactam (Zosyn) 3.375 g in dextrose 5% 100 mL IVPB-ADDV, 3.375 g, Intravenous, Q8H    ketorolac (Toradol) 15 MG/ML injection 15 mg, 15 mg, Intravenous, Q6H PRN    [COMPLETED] vancomycin (Vancocin) 1.75 g in sodium chloride 0.9% 500mL IVPB premix, 25 mg/kg, Intravenous, Once **FOLLOWED BY** vancomycin (Vancocin) 1,000 mg in sodium chloride 0.9% 250 mL IVPB-ADDV, 15 mg/kg, Intravenous, Q24H     OBJECTIVE:  /63   Pulse 75   Temp 98 °F (36.7 °C) (Temporal)   Resp (!) 27   Ht 5' 2\" (1.575 m)   Wt 147 lb 11.3 oz (67 kg)   SpO2 (!) 88%   BMI 27.02 kg/m²      Ventilator Settings: vapotherm 40L 100%      Wt Readings from Last 3 Encounters:   02/21/24 147 lb 11.3 oz (67 kg)   02/19/24 145 lb 9.6 oz (66 kg)   02/16/24 156 lb 9.6 oz (71 kg)        I/O last 3 completed shifts:  In: 3077.6 [I.V.:2027.6; IV PIGGYBACK:1050]  Out: 1050 [Urine:1050]  No intake/output data recorded.      Physical Exam:                          General: alert, cooperative, in NAD                          HEENT: oropharynx clear without erythema or exudates, moist mucous membranes                          Lungs: rhonchi                          Chest wall: No tenderness or deformity.                          Heart: Regular rate and rhythm, normal S1S2                          Abdomen: soft, non-tender, non-distended, positive BS.                           Extremity: No clubbing or cyanosis. no edema                          Skin: No rashes or lesions.       Lab Results   Component Value Date    WBC 6.7 02/20/2024    RBC 3.44 02/20/2024    HGB 9.1 02/20/2024    HCT 28.2 02/20/2024    MCV 82.0 02/20/2024    MCH 26.5 02/20/2024    MCHC 32.3 02/20/2024    RDW 22.2 02/20/2024    .0 02/20/2024     Lab Results   Component Value Date     02/20/2024    K 2.7 02/20/2024    CL 98 02/20/2024    CO2 34.0 02/20/2024    BUN 29 02/20/2024    CREATSERUM 0.51 02/20/2024     02/20/2024    CA 9.2 02/20/2024    ALKPHO 76 02/20/2024    ALT 21 02/20/2024    AST 6 02/20/2024    BILT 0.6 02/20/2024    ALB 1.8 02/20/2024    TP 5.3 02/20/2024     Lab Results   Component Value Date    INR 2.00 (H) 02/06/2024           Imaging: I have independently visualized all relevant chest imaging in PACS.  I agree with the radiology interpretation except where noted.     ASSESSMENT/PLAN:  Acute hypoxic respiratory failure: 2/2 RSV with suspected superimposed bacterial multifocal PNA.  Underlying hx of COPD  -Wean O2 as able-currently on vapotherm 40L/100%  -RSV positive  -CXR with multifocal PNA, L> R  -Antbx as below  -BD protocol    Septic Shock: 2/2 PNA with MRSA bacteremia.  PCT mildly elevated   -IVF  -Vasopressors PRN for MAP > 65, SBP > 90  -stress dose steroids  -Home midodrine 5mg TID  -vanc/zosyn  -TTE with preserved EF, PASP: 30-35mmHg, no obvious valvular vegetations   -ID consulted     Multifocal PNA:   -RSV, +PCT  -zosyn/vanc     Elevated troponin: Likely demand ischemia  -TTE pending     Multiple Myeloma  -In remission  -follows with onc as outpatient     Hx PE and DVT  -A/c was on hold at SNF for EGD and then developed PE  -On xarelto     Duodenal ulcer, GERD  -ppi  -bowel regimen      HTN/HLD/CAD  -Holding antihypertensives in setting of shock    COPD  -Duonebs  -O2 as needed    Afib  -Continue xarelto  -Cardizem for rate control  -monitor on tele  -TTE on  12/2 with EF 55%    Dementia with functional decline, ? Steroid myopathy  -Palliative was to see as outpatient  -Steroids were being weaned. Was on decadron for months     Urinary retention/frequent UTIs  -flomax    FEN:  -failed swallow, recommend initiating TF     Proph:  -hep sub Q    Dispo:  -ICU    Critical Care Time: 35 min    Penny Llanes MD  2/21/2024  7:12 AM

## 2024-02-21 NOTE — PROGRESS NOTES
[facility] Hospitalist Progress Note     Nelly Potter Patient Status:  Inpatient    1935 MRN [id number]   Location [facility] 4SW-A Attending Castillo Mcgill MD   Hosp Day # 1 PCP Balta Wright MD     Chief Complaint:   Fu Sob, hypoxic    Subjective:     Patient seen and examined.   Required pressors overnight and vapotherm 100%  Son and daughter at bedside  Afebrile  2/2 blood cx +  Nares +MRSA    Objective:    Review of Systems:   10 point ROS completed and was negative, except for pertinent positive and negatives stated in subjective.    Vital signs:  Temp:  [97.9 °F (36.6 °C)-98.9 °F (37.2 °C)] 98 °F (36.7 °C)  Pulse:  [] 74  Resp:  [10-34] 27  BP: ()/(27-96) 112/78  SpO2:  [75 %-100 %] 85 %  FiO2 (%):  [70 %-100 %] 100 %    Physical Exam:    General: No acute distress.   HEENT:  EOMI, PERRLA, OP clear  Respiratory: Clear to auscultation bilaterally. No wheezes. No rhonchi.  Cardiovascular: S1, S2. Regular rate and rhythm. No murmurs.  Abdomen: Soft, nontender, nondistended.  Positive bowel sounds. No rebound or guarding.  Extremities: No edema.  Neuro:  Grossly non focal, no motor deficits.        Diagnostic Data:    Labs:  Recent Labs   Lab 24  0705 24  0603 24  1218   WBC 8.3 4.9 6.7   HGB 8.3* 8.9* 9.1*   MCV 81.1 81.1 82.0   .0 235.0 249.0       Recent Labs   Lab 24  0603 24  1218 24  2336   * 180* 193*   BUN 28* 36* 29*   CREATSERUM 0.69 0.60 0.51*   CA 9.2 10.0 9.2   ALB 1.9* 1.9* 1.8*   * 133* 138   K 2.3* 2.8* 2.7*   CL 87* 90* 98   CO2 34.0* 35.0* 34.0*   ALKPHO 91 85 76   AST 6* 6* 6*   ALT 25 24 21   BILT 0.5 0.6 0.6   TP 5.5* 5.6* 5.3*       Estimated Creatinine Clearance: 61.5 mL/min (A) (based on SCr of 0.51 mg/dL (L)).    No results for input(s): \"PTP\", \"INR\" in the last 168 hours.         COVID-19 Lab Results    COVID-19  Lab Results   Component Value Date    COVID19 Not  Detected 02/20/2024    COVID19 Not Detected 02/06/2024    COVID19 Not Detected 12/17/2023       Pro-Calcitonin  Recent Labs   Lab 02/20/24  1218   PCT 0.24*       Cardiac  Recent Labs   Lab 02/20/24  1218   PBNP 578*       Creatinine Kinase  No results for input(s): \"CK\" in the last 168 hours.    Inflammatory Markers  No results for input(s): \"CRP\", \"KARUNA\", \"LDH\", \"DDIMER\" in the last 168 hours.    Imaging: Imaging data reviewed in Epic.    Medications:    rivaroxaban  20 mg Oral Daily with food    midodrine  5 mg Oral Q8H    hydrocortisone Na succinate PF  100 mg Intravenous Q8H CHLOE    piperacillin-tazobactam  3.375 g Intravenous Q8H    vancomycin  15 mg/kg Intravenous Q24H       Assessment & Plan:    Nelly Potter is a 88 year old female with PMH significant for/anxiety, GERD, A-fib on Xarelto, HTN, DL, COPD, asthma, bladder CA, multiple myeloma, vertigo, dementia with short-term memory issues who presents from [facility] with reports of shortness of breath and a wet cough.      Cough, sob - 2/2 RSV infection +/- multifocal PNA  Acute hypoxic resp failure 2/2 above  Asthma/COPD exacerbation   -RSV + in ED  -cxr reviewed >> multifocal pneumonia  -supplemental O2 via vapotherm currently at 100% >> weaned down to 80% >> cont to wean as able  -check ABG  -nebs prn  -may benefit from IV steroids  -cont iv abx  -blood cx x 2  -ST, PT, OT    Shock - presumably related to sepsis: 2/2 bacteremia, pneumonia  Lactic acidosis - resolved  -mild  -repeat numbers to assess trend  -Was on norepi but currently off and BP stable  -2/2 blood cx growing  -MRSA nares     Troponin elevated - possibly related to demand ischemia  -will trend  -may need cardiology involvement      GERD  -PPI     HTN   -resume home meds if able  -monitor BP closely, pt had hx of hypotension on previous admission     DL  -statin     Afib on xarelto  -resume xarelto and po diltiazem     Depression, anxiety  -cont zoloft     Bladder ca  Multiple  myeloma  Dementia  Vertigo  -stable chronic conditions        Quality:  DVT Prophylaxis: xarelleidy ortegas  CODE status: DNR, select  Lentz: no  If COVID testing is negative, may discontinue isolation: yes     DISPO:  Cont icu care  Spoke with son and daughter  All questions answered  Prognosis guarded at this point    DNR/DNI - ok for selective treatment >> confirmed this with son and daughter           Geno Morrison Hospitalist  Pager [phone]  Answering Service number: [phone]          **Certification      PHYSICIAN Certification of Need for Inpatient Hospitalization - Initial Certification    Patient will require inpatient services that will reasonably be expected to span two midnight's based on the clinical documentation in H+P.   Based on patients current state of illness, I anticipate that, after discharge, patient will require TBD.

## 2024-02-21 NOTE — DIETARY NOTE
[facility]    NUTRITION ASSESSMENT    Pt does not meet malnutrition criteria at this time.      NUTRITION INTERVENTION:    Meal and Snacks - ADAT per SLP. Monitor and encourage adequate PO intake.   Medical Food Supplements - Will evaluate need when diet is advanced. Rationale/use for oral supplements discussed.  Enteral Nutrition - once Dobbhoff placed and confirmed in correct position, Recommend starting Jevity 1.5 at 20 mL/hr advancing 20 mL/hr q 4 hours to goal rate of 60 mL/hr.   This will provide 1980 kcal, 84 grams protein, 1003 mL total free water, and 100% of RDI's.   Recommend 160 mL water flush q 4 hours, TF+FWF provides 1963 mL total fluids.   Coordination of Nutrition Care - SLP consult prior to diet advancement. and Palliative care consult for goal of care      PATIENT STATUS: 02/21/24 88 year female admitted w/ multifocal pneumonia. PMH includes anxiety, GERD, A-fib on Xarelto, HTN, DL, COPD, asthma, bladder CA, multiple myeloma, vertigo, dementia with short-term memory issues. Pt seen for tube feed consult.  Pt positive for RSV. patient on Vapotherm 40L,100%. SLP following, recommended NPO diet. Pt sleeping at time of visit, talked to daughter in law at bedside. Daughter in law reports pt w/ poor appetite prior to admit. Stated that pt started coughing when attempted to eat. Reports this has been going of for a week. No n/v/d. Last BM 2/20. Reported UBW of 130 lbs. However per chart review, pts wt has been ~ 145-155 lb over the past 2 months. And mentioned that wt has been fluctuating d/t steroids. Provided tube feed recs. Will continue to monitor.       ANTHROPOMETRICS:  Ht: 157.5 cm (5' 2\")  Wt: 67 kg (147 lb 11.3 oz).   BMI: Body mass index is 27.02 kg/m².  IBW: 50 kg      WEIGHT HISTORY:   Weight loss: No    Wt Readings from Last 10 Encounters:   02/21/24 67 kg (147 lb 11.3 oz)   02/19/24 66 kg (145 lb 9.6 oz)   02/16/24 71 kg (156 lb 9.6 oz)   02/15/24 70.6 kg (155 lb 9.6 oz)   02/06/24  69.2 kg (152 lb 9.6 oz)   02/05/24 71.2 kg (157 lb)   01/31/24 70.5 kg (155 lb 6.4 oz)   01/29/24 70.5 kg (155 lb 6.4 oz)   01/25/24 70.5 kg (155 lb 6.4 oz)   01/22/24 69.6 kg (153 lb 6.4 oz)        NUTRITION:  Diet:       Procedures    NPO      Food Allergies: No  Cultural/Ethnic/Moravian Preferences Addressed: Yes        GI system review: WNL Last BM: 2/20  Skin and wounds: wounds on anus, left leg, and right upper arm    NUTRITION RELATED PHYSICAL FINDINGS:     1. Body Fat/Muscle Mass:  no wasting noted per visual     2. Fluid Accumulation:  RUE +2 Edema, LUE +1 edema, RLE/LLE +1 edema      NUTRITION PRESCRIPTION: 67 kg  Calories: 0801-1058 calories/day (25-30 kcal/kg)  Protein:  grams protein/day (1.0-1.5 grams protein per kg)  Fluid: ~1 ml/kcal or per MD discretion    NUTRITION DIAGNOSIS/PROBLEM:  Inadequate oral intake related to inability to take or tolerate as evidenced by need for NPO status      MONITOR AND EVALUATE/NUTRITION GOALS:  Weight stable within 1 to 2 lbs during admission - New  Start alternative nutrition in 24-48 hrs if diet is not able to advance- New      MEDICATIONS:  Abx, steroids, Colace, lansoprazole, Kcl 60 mEq  NS+Kcl 20 mEq /L @100 ml/hr  LABS:  Glucose 167    Pt is at High nutrition risk    Melania Sánchez  Dietetic intern

## 2024-02-21 NOTE — PLAN OF CARE
problem: Patient/Family Goals  Goal: Patient/Family Long Term Goal  Description: Patient's Long Term Goal:     Interventions:  -   - See additional Care Plan goals for specific interventions  Outcome: Progressing  Goal: Patient/Family Short Term Goal  Description: Patient's Short Term Goal:     Interventions:   -   - See additional Care Plan goals for specific interventions  Outcome: Progressing     Problem: CARDIOVASCULAR - ADULT  Goal: Maintains optimal cardiac output and hemodynamic stability  Description: INTERVENTIONS:  - Monitor vital signs, rhythm, and trends  - Monitor for bleeding, hypotension and signs of decreased cardiac output  - Evaluate effectiveness of vasoactive medications to optimize hemodynamic stability  - Monitor arterial and/or venous puncture sites for bleeding and/or hematoma  - Assess quality of pulses, skin color and temperature  - Assess for signs of decreased coronary artery perfusion - ex. Angina  - Evaluate fluid balance, assess for edema, trend weights  Outcome: Progressing     Problem: RESPIRATORY - ADULT  Goal: Achieves optimal ventilation and oxygenation  Description: INTERVENTIONS:  - Assess for changes in respiratory status  - Assess for changes in mentation and behavior  - Position to facilitate oxygenation and minimize respiratory effort  - Oxygen supplementation based on oxygen saturation or ABGs  - Provide Smoking Cessation handout, if applicable  - Encourage broncho-pulmonary hygiene including cough, deep breathe, Incentive Spirometry  - Assess the need for suctioning and perform as needed  - Assess and instruct to report SOB or any respiratory difficulty  - Respiratory Therapy support as indicated  - Manage/alleviate anxiety  - Monitor for signs/symptoms of CO2 retention  Outcome: Progressing     Problem: GASTROINTESTINAL - ADULT  Goal: Achieves appropriate nutritional intake (bariatric)  Description: INTERVENTIONS:  - Monitor for over-consumption  - Identify factors  contributing to increased intake, treat as appropriate  - Monitor I&O, WT and lab values  - Obtain nutritional consult as needed  - Evaluate psychosocial factors contributing to over-consumption  Outcome: Progressing     Problem: GENITOURINARY - ADULT  Goal: Absence of urinary retention  Description: INTERVENTIONS:  - Assess patient’s ability to void and empty bladder  - Monitor intake/output and perform bladder scan as needed  - Follow urinary retention protocol/standard of care  - Consider collaborating with pharmacy to review patient's medication profile  - Implement strategies to promote bladder emptying  Outcome: Progressing     Problem: METABOLIC/FLUID AND ELECTROLYTES - ADULT  Goal: Glucose maintained within prescribed range  Description: INTERVENTIONS:  - Monitor Blood Glucose as ordered  - Assess for signs and symptoms of hyperglycemia and hypoglycemia  - Administer ordered medications to maintain glucose within target range  - Assess barriers to adequate nutritional intake and initiate nutrition consult as needed  - Instruct patient on self management of diabetes  Outcome: Progressing  Goal: Electrolytes maintained within normal limits  Description: INTERVENTIONS:  - Monitor labs and rhythm and assess patient for signs and symptoms of electrolyte imbalances  - Administer electrolyte replacement as ordered  - Monitor response to electrolyte replacements, including rhythm and repeat lab results as appropriate  - Fluid restriction as ordered  - Instruct patient on fluid and nutrition restrictions as appropriate  Outcome: Progressing  Goal: Hemodynamic stability and optimal renal function maintained  Description: INTERVENTIONS:  - Monitor labs and assess for signs and symptoms of volume excess or deficit  - Monitor intake, output and patient weight  - Monitor urine specific gravity, serum osmolarity and serum sodium as indicated or ordered  - Monitor response to interventions for patient's volume status,  including labs, urine output, blood pressure (other measures as available)  - Encourage oral intake as appropriate  - Instruct patient on fluid and nutrition restrictions as appropriate  Outcome: Progressing     Problem: SKIN/TISSUE INTEGRITY - ADULT  Goal: Skin integrity remains intact  Description: INTERVENTIONS  - Assess and document risk factors for pressure ulcer development  - Assess and document skin integrity  - Monitor for areas of redness and/or skin breakdown  - Initiate interventions, skin care algorithm/standards of care as needed  Outcome: Progressing     Problem: Delirium  Goal: Minimize duration of delirium  Description: Interventions:  - Encourage use of hearing aids, eye glasses  - Promote highest level of mobility daily  - Provide frequent reorientation  - Promote wakefulness i.e. lights on, blinds open  - Promote sleep, encourage patient's normal rest cycle i.e. lights off, TV off, minimize noise and interruptions  - Encourage family to assist in orientation and promotion of home routines  Outcome: Progressing

## 2024-02-21 NOTE — CONSULTS
[facility]     [facility] Consult    Nelly Potter Patient Status:  Inpatient    1935 MRN [id number]   [address]ending Geno Kaur MD   Hosp Day # 1 PCP Balta Wright MD     Reason for Consultation:  To help with infection and treatment, requested by Dr. Kaur,     History of Present Illness:  Nelly Potter is a 88 year old female admitted to [facility] on 24 with cough and SOB,   Significant hx of   - COPD,  - Multiple Myeloma,   - Dementia with short term memory loss,   - Bladder Ca,   - Recent hx of RSV- diagnosed two weeks ago,   Patient resides at a NH, where she developed progressive SOB and cough,   In ED she was found to be hypoxic with O2 saturations ~ 70%,  CXR with multifocal Pneumonia,   RSV +,   Blood cul are + too now,   She was admitted to ICU and ID is asked to help with infection and treatment,   Patient is not able to give much history, most of history taken from the RN and also EPIC,       History:  Past Medical History:   Diagnosis Date    Allergic rhinitis     Anxiety     Chronic midline low back pain without sciatica 10/13/2021    Constipation     Depression     Esophageal reflux     History of bladder cancer 2012    Incontinence     Long QT interval     per pt. hx of long QT.    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 Cheng    CYSTOURETHROSCOPY  10/24/12    BTS Cysto Dr. Cheng     CYSTOURETHROSCOPY  13,  8/7/13, 11/13/13, 11/12/14, 5/6/15, 11/11/15    cysto- Dr. Mathews, [place]    CYSTOURETHROSCOPY  10/11/2017    cysto Dr. mathews    CYSTOURETHROSCOPY,CHAVO .5-2CM LESCHRISTA  7/23/12    cystourethroscopy, TURBT x 2- Dr. Mathews    FOOT SURGERY Left 10/25/11    lt foot    HIP REPLACEMENT SURGERY      left    HYSTERECTOMY      KNEE REPLACEMENT SURGERY Bilateral     OTHER SURGICAL HISTORY  1983     arthroscopic left knee    PART EXCIS 5TH METATARSAL HEAD Right 10/2/2014    Procedure: TAILOR'S BUNIONECTOMY;  Surgeon: Adryan Prajapati DPM;  Location: [facility]    PATIENT DOCUMENTED NOT TO HAVE EXPERIENCED ANY OF THE FOLLOWING EVENTS Right 10/2/2014    Procedure: TAILOR'S BUNIONECTOMY;  Surgeon: Adryan Prajapati DPM;  Location: [facility]    PATIENT WITH PREOPERATIVE ORDER FOR IV ANTIBIOTIC SURGICAL SITE INFECT Right 10/2/2014    Procedure: TAILOR'S BUNIONECTOMY;  Surgeon: Adryan Prajapati DPM;  Location: [facility]    REPAIR OF HAMMERTOE,ONE Left 10/2/2014    Procedure: ARTHROPLASTY ONE (1) TOE;  Surgeon: Adryan Prajapati DPM;  [facility]    SINUS SURGERY    1983 & 1999    sinus    SLING OPER STRES INCONTINENCE  8/06    sling    SLING OPER STRES INCONTINENCE  10/2011    sling repeat. TVT Exact. Dr[facility] Dang at [facility]    TONSILLECTOMY       Family History   Problem Relation Age of Onset    Other (Other) Son         CF    Other (Other) Son         CF      reports that she has never smoked. She has never used smokeless tobacco. She reports current alcohol use. She reports that she does not use drugs.    Allergies:  Allergies   Allergen Reactions    Myrbetriq [Mirabegron] NAUSEA ONLY and DIZZINESS    Codeine NAUSEA ONLY    Evista [Raloxifene Hydrochloride] RASH    Meclizine NAUSEA ONLY    Sulfamethoxazole-Trimethoprim DIZZINESS       Medications:    Current Facility-Administered Medications:     potassium chloride 20 mEq in sodium chloride 0.9% 1000mL infusion premix, , Intravenous, Continuous    docusate (Colace) 50 MG/5ML  oral solution 100 mg, 100 mg, Per NG Tube, BID    [START ON 2/22/2024] lansoprazole (Prevacid Solutab) disintegrating tab 30 mg, 30 mg, Oral, QAM AC    ipratropium-albuterol (Duoneb) 0.5-2.5 (3) MG/3ML inhalation solution 3 mL, 3 mL, Nebulization, QID    dextrose 10% infusion (TPN no rate), , Intravenous, Continuous PRN    pancrelipase (Lip-Prot-Amyl) (Zenpep) DR particles cap 10,000 Units, 10,000 Units, Per G Tube, PRN **AND** sodium bicarbonate tab 325 mg, 325 mg, Per G Tube, PRN    acetaminophen (Tylenol Extra Strength) tab 500 mg, 500 mg, Oral, Q4H PRN    acetaminophen (Tylenol) tab 650 mg, 650 mg, Oral, Q4H PRN **OR** HYDROcodone-acetaminophen (Norco) 5-325 MG per tab 1 tablet, 1 tablet, Oral, Q4H PRN **OR** HYDROcodone-acetaminophen (Norco) 5-325 MG per tab 2 tablet, 2 tablet, Oral, Q4H PRN    melatonin tab 3 mg, 3 mg, Oral, Nightly PRN    benzonatate (Tessalon) cap 200 mg, 200 mg, Oral, TID PRN    guaiFENesin (Robitussin) 100 MG/5 ML oral liquid 200 mg, 200 mg, Oral, Q4H PRN    ondansetron (Zofran) 4 MG/2ML injection 4 mg, 4 mg, Intravenous, Q6H PRN    metoclopramide (Reglan) 5 mg/mL injection 10 mg, 10 mg, Intravenous, Q8H PRN    polyethylene glycol (PEG 3350) (Miralax) 17 g oral packet 17 g, 17 g, Oral, Daily PRN    sennosides (Senokot) tab 17.2 mg, 17.2 mg, Oral, Nightly PRN    bisacodyl (Dulcolax) 10 MG rectal suppository 10 mg, 10 mg, Rectal, Daily PRN    fleet enema (Fleet) 7-19 GM/118ML rectal enema 133 mL, 1 enema, Rectal, Once PRN    norepinephrine (Levophed) 4 mg/250mL infusion premix, 0.5-30 mcg/min, Intravenous, Continuous PRN    vasopressin (Vasostrict) 20 Units in sodium chloride 0.9% 100 mL infusion for septic shock, 0.01-0.03 Units/min, Intravenous, Continuous PRN    rivaroxaban (Xarelto) tab 20 mg, 20 mg, Oral, Daily with food    midodrine (ProAmatine) tab 5 mg, 5 mg, Oral, Q8H    hydrocortisone Na succinate PF (Solu-CORTEF) injection 100 mg, 100 mg, Intravenous, Q8H CHLOE     piperacillin-tazobactam (Zosyn) 3.375 g in dextrose 5% 100 mL IVPB-ADDV, 3.375 g, Intravenous, Q8H    ketorolac (Toradol) 15 MG/ML injection 15 mg, 15 mg, Intravenous, Q6H PRN    [COMPLETED] vancomycin (Vancocin) 1.75 g in sodium chloride 0.9% 500mL IVPB premix, 25 mg/kg, Intravenous, Once **FOLLOWED BY** vancomycin (Vancocin) 1,000 mg in sodium chloride 0.9% 250 mL IVPB-ADDV, 15 mg/kg, Intravenous, Q24H    Review of Systems:   Constitutional: Negative for anorexia, chills, fatigue, fevers, malaise, night sweats and weight loss.  Eyes: Negative for visual disturbance, irritation and redness.  Ears, nose, mouth, throat, and face: Negative for hearing loss, tinnitus, nasal congestion, snoring, sore throat, hoarseness and voice change.  Respiratory: Negative for cough, sputum, hemoptysis, chest pain, wheezing, dyspnea on exertion, or stridor.  Cardiovascular: Negative for chest pain, palpitations, irregular heart beats, syncope, fatigue, orthopnea, paroxysmal nocturnal dyspnea, lower extremity edema.  Gastrointestinal: Negative for dysphagia, odynophagia, reflux symptoms, nausea, vomiting, change in bowel habits, diarrhea, constipation and abdominal pain.  Integument/breast: Negative for rash, skin lesions, and pruritus.  Hematologic/lymphatic: Negative for easy bruising, bleeding, and lymphadenopathy.  Musculoskeletal: Negative for myalgias, arthralgias, muscle weakness.  Neurological: Negative for headaches, dizziness, seizures, memory problems, trouble swallowing, speech problems, gait problems and weakness.  Behavioral/Psych: Negative for active tobacco use.  Endocrine: No history of of diabetes, thyroid disorder.  Deferred,     Vital signs in last 24 hours:  Patient Vitals for the past 24 hrs:   BP Temp Temp src Pulse Resp SpO2 Weight   02/21/24 1500 118/72 -- -- 84 15 100 % --   02/21/24 1400 119/66 -- -- 84 16 100 % --   02/21/24 1300 106/90 -- -- 84 20 100 % --   02/21/24 1218 -- -- -- -- -- 100 % --   02/21/24  1200 114/59 98 °F (36.7 °C) Temporal 82 20 100 % --   02/21/24 1100 123/76 -- -- 79 17 100 % --   02/21/24 1000 121/66 -- -- 77 16 100 % --   02/21/24 0942 115/63 -- -- 75 15 100 % --   02/21/24 0900 116/80 -- -- 79 (!) 29 98 % --   02/21/24 0833 117/63 97.5 °F (36.4 °C) Temporal 88 (!) 32 91 % --   02/21/24 0814 -- -- -- -- -- (!) 85 % --   02/21/24 0805 -- -- -- 74 (!) 27 (!) 87 % --   02/21/24 0800 -- -- -- 77 (!) 31 (!) 80 % --   02/21/24 0755 -- -- -- 77 (!) 29 (!) 75 % --   02/21/24 0715 112/78 -- -- 69 20 100 % --   02/21/24 0700 97/58 -- -- 74 21 100 % --   02/21/24 0645 111/62 -- -- 72 25 98 % --   02/21/24 0630 111/62 -- -- 77 19 96 % --   02/21/24 0615 115/63 -- -- 75 (!) 27 (!) 88 % --   02/21/24 0600 112/59 98 °F (36.7 °C) Temporal 76 (!) 34 95 % --   02/21/24 0545 129/62 -- -- 74 20 93 % --   02/21/24 0530 121/89 -- -- 86 (!) 27 97 % --   02/21/24 0515 119/73 -- -- 73 25 97 % --   02/21/24 0500 119/62 -- -- 73 (!) 34 93 % --   02/21/24 0445 102/66 -- -- 76 (!) 32 98 % --   02/21/24 0430 121/72 -- -- 73 22 98 % --   02/21/24 0415 99/82 -- -- 78 25 98 % --   02/21/24 0410 99/82 -- -- 70 14 100 % --   02/21/24 0400 99/63 98 °F (36.7 °C) -- 70 18 93 % 147 lb 11.3 oz (67 kg)   02/21/24 0345 (!) 110/96 -- -- 85 17 98 % --   02/21/24 0330 112/64 -- -- 70 16 100 % --   02/21/24 0315 107/63 -- -- 68 10 100 % --   02/21/24 0300 102/66 -- -- 67 11 100 % --   02/21/24 0245 106/63 -- -- 74 13 100 % --   02/21/24 0230 101/61 -- -- 70 12 100 % --   02/21/24 0215 108/61 -- -- 89 14 100 % --   02/21/24 0200 109/60 -- -- 82 17 100 % --   02/21/24 0147 -- -- -- 96 17 100 % --   02/21/24 0145 119/71 -- -- 91 14 100 % --   02/21/24 0130 -- -- -- 79 19 100 % --   02/21/24 0115 105/65 -- -- 76 12 100 % --   02/21/24 0100 (!) 81/59 -- -- 90 15 100 % --   02/21/24 0045 90/51 -- -- 88 22 100 % --   02/21/24 0030 90/68 -- -- 87 17 95 % --   02/21/24 0015 92/54 -- -- 92 13 99 % --   02/21/24 0000 92/45 98.2 °F (36.8 °C) Temporal  95 13 99 % --   02/20/24 2356 92/45 -- -- 95 15 100 % --   02/20/24 2345 94/47 -- -- 97 16 100 % --   02/20/24 2330 (!) 83/56 -- -- 85 20 99 % --   02/20/24 2315 99/52 -- -- 95 18 99 % --   02/20/24 2300 100/71 -- -- 93 18 98 % --   02/20/24 2245 93/64 -- -- 82 11 99 % --   02/20/24 2230 92/67 -- -- 87 13 98 % --   02/20/24 2215 106/50 -- -- 85 13 98 % --   02/20/24 2200 111/48 -- -- 93 18 92 % --   02/20/24 2147 105/50 -- -- 86 15 95 % --   02/20/24 2145 -- -- -- 88 16 95 % --   02/20/24 2130 100/67 -- -- 86 18 (!) 88 % --   02/20/24 2115 (!) 88/27 -- -- 103 25 94 % --   02/20/24 2100 114/64 -- -- 103 17 98 % --   02/20/24 2045 104/68 -- -- 95 15 98 % --   02/20/24 2030 106/55 -- -- 105 14 95 % --   02/20/24 2000 113/66 98.3 °F (36.8 °C) -- 91 22 96 % --   02/20/24 1900 104/65 -- -- 82 21 100 % --   02/20/24 1845 103/65 -- -- 78 15 97 % --   02/20/24 1830 (!) 89/66 -- -- 89 16 98 % --   02/20/24 1820 100/71 -- -- 90 17 98 % --   02/20/24 1815 (!) 86/68 -- -- 86 16 (!) 88 % --   02/20/24 1801 -- -- -- -- -- 94 % --   02/20/24 1800 105/72 -- -- 95 26 96 % --   02/20/24 1757 -- -- -- 93 21 93 % --   02/20/24 1746 -- -- -- -- -- 90 % --   02/20/24 1745 109/70 -- -- 100 21 (!) 81 % --   02/20/24 1730 102/54 -- -- 103 21 (!) 88 % --   02/20/24 1715 108/61 -- -- 96 22 92 % --   02/20/24 1700 108/52 -- -- 84 13 96 % --   02/20/24 1645 102/62 -- -- 88 15 98 % --   02/20/24 1642 -- -- -- 84 13 97 % --   02/20/24 1630 108/67 -- -- 90 14 100 % --   02/20/24 1615 108/72 -- -- 92 20 (!) 88 % --   02/20/24 1600 114/67 98.4 °F (36.9 °C) Temporal 97 21 92 % --       Physical Exam:   General: alert, cooperative, oriented.  No respiratory distress.   Head: Normocephalic, without obvious abnormality, atraumatic.   Eyes: Conjunctivae/corneas clear.  No scleral icterus.  No conjunctival     hemorrhage.   Nose: Nares normal.   Throat: Lips, mucosa, and tongue normal.  No thrush noted.   Neck: Soft, supple neck; trachea midline, no  adenopathy, no thyromegaly.   Lungs: CTAB, normal and equal bilateral chest rise   Chest wall: No tenderness or deformity.   Heart: Regular rate and rhythm, normal S1S2, no murmur.   Abdomen: soft, + tender, non-distended, no masses, no guarding, no     rebound, positive BS.   Extremity: no edema, no cyanosis   Skin: No rashes or lesions.   Neurological: Alert, interactive, no focal deficits    Labs:  Lab Results   Component Value Date    WBC 6.1 02/21/2024    HGB 7.7 02/21/2024    HCT 23.7 02/21/2024    .0 02/21/2024    CREATSERUM 0.53 02/21/2024    BUN 30 02/21/2024     02/21/2024    K 3.9 02/21/2024    K 3.9 02/21/2024     02/21/2024    CO2 34.0 02/21/2024     02/21/2024    CA 8.5 02/21/2024    ALB 1.7 02/21/2024    ALKPHO 75 02/21/2024    BILT 0.6 02/21/2024    TP 5.2 02/21/2024    AST 4 02/21/2024    ALT 20 02/21/2024    LIP 12 02/20/2024    MG 2.1 02/20/2024    PHOS 2.7 02/20/2024       Radiology:  CT 2/06 CONCLUSION:  There is a large amount of fecal material centered on the distal sigmoid colon and rectum most likely representing constipation/fecal impaction.  There is some possible wall thickening of the colon in this region which could represent   stercoral colitis.     CXR 2/21 Stable cardiac and mediastinal contours.  No significant interval change in mixed interstitial/alveolar opacities in a perihilar distribution throughout the lungs.  Persistent confluent opacification of the left lung base, likely a combination of mild   pleural effusion and passive atelectasis, similar to prior.  No pneumothorax.         Cultures:  Reviewed     Assessment and Plan:    1.  Sepsis with MRSA in blood cul on admission with Acute Resp failure:  - Blood cul with MRSA In 2/2 bottles on admission, repeats are NGTD  - CXR with multifocal Pneumonia,   - RVP with RSV + likely previous infection,   - Possible Secondary bacterial Pneumonia following RSV, Rule out Aspiration,   - On IV Vanc, Zosyn,  pharm to dose,   - Steroids per Pulm service,     2.    Cystitis: UA is mildly abn, Follow Cul,   - Historically Urine cul with E coli ( ESBL) and PSAR,     3.     Abd tenderness: Can be sec to Cystitis vs impacted stool seen on CT .  - Will get stool for C diff if any diarrhea,     4.     Disposition: in house, an elderly female with MRSA Sepsis, possible secondary bacterial Pneumonia following RSV, now on supplemental O2, in ICU  - Follow pending cul,   - TTE  - Continue IV Vanc and Zosyn,   - Supportive care,     Discussed with RN, all questions answered, further recommendations to follow, Thanks,      Thank you for consulting [facility] for Nelly Potter.  If you have any questions or concerns please call [facility] Infectious Disease at [phone].     Vinay Stewart MD  2/21/2024  3:47 PM

## 2024-02-21 NOTE — DISCHARGE INSTRUCTIONS
[facility] APN to follow at discharge. Please call [facility]idential Pall[facility] care with any questions, they can be reached by phone at [phone].

## 2024-02-21 NOTE — PLAN OF CARE
Report received from previous RN>  Pt oriented to self and gives  at times.  Equal hand grasp. + 3 Pupils + and rx. Follows some simple commands at times. Lungs DM with vapo therm at 100% 40 L pt desat easily while turing and takes a while to recovered. Cardiac monitor Afib on Levophed gtt for parameters of SBP>90 and MAP > 65. Pt with lots of ectopies.  Rechecked K and 2.7 replaced per protocol. ABd soft and slight tender pt c/o paon.  Spoke with CCAMAU Palomo and orders for Torodol IV. STAT KUB + stools. Suppository given per orders  Midine in placed for levo gtt and labs. Arms bruised and painful and slight edematous. Updates given to family.

## 2024-02-21 NOTE — CDS QUERY
CLINICAL DOCUMENTATION CLARIFICATION FORM    Dear Dr. VANE Kaur,  Clinical information in the patient's medical record (provided below) includes documentation of atrial fibrillation.   PLEASE FURTHER SPECIFY THE ATRIAL FIBRILLATION:     [  ] Persistent Atrial Fibrillation  [X  ] Paroxysmal Atrial Fibrillation  [  ] Permanent Atrial fibrillation  [  ] Other(please specify)____________       Documentation from the Medical Record:   Risk; HX afib RVR  Clinical indicators; H&P HPI PMHX -A-fib on Xarelto .  Of note patient was admitted from 2/6/2024 to 2/9/2024 at which time she was found to have chest pain and diagnosed with A-fib/RVR. A/p- Afib on xarelto  -resume xarelto and po diltiazem    NN Cardiac monitor -afib     Treatment; Xarelto, diltiazem, telemetry            For questions regarding this query, please contact Clinical Documentation : Dieudonne AdornoRN, BSN, CCDS Ext. [phone]                                                                                 THIS FORM IS A PERMANENT PART OF THE MEDICAL RECORD

## 2024-02-21 NOTE — PROGRESS NOTES
02/21/24 0410   Oxygen Utilization   O2 Device High flow nasal cannula   O2 Flow Rate (L/min) 40 L/min   FiO2 (%) 80 %   SpO2 100 %   Humidity High   [place] 3/4 Full     Received patient on Vapotherm 40L,100%. FIO2 weaned to 80%. Will continue to wean as tolerated.

## 2024-02-21 NOTE — PROGRESS NOTES
Pt is current with [facility]. Esme RODRIGUEZN will continue to see patient in community upon discharge.

## 2024-02-21 NOTE — PROGRESS NOTES
02/20/24 2147   Oxygen Utilization   O2 Device High flow/High humidity   O2 Flow Rate (L/min) 40 L/min   FiO2 (%) 100 %   SpO2 95 %   Humidity High   [place] 3/4 Full     Received patient on Vapotherm 40 L and FiO2: 80%. Patient remains on those settings.     Patient breath sounds are diminished bilaterally. Patient has strong, non-productive cough.     Patient initially cyanotic around her lips and in her mouth upon arrival to ICU and kept patient on 100% FiO2. Checked patient again at 2147 and patient color has improved to more pink.     When patient is moved during any type of care, patient desaturates to to low 80s for SpO2. When patient recovers, SpO2 returns to 92-96%.     Aliyah Wilson, Jae, RRT

## 2024-02-21 NOTE — CM/SW NOTE
02/21/24 1100   CM/SW Referral Data   Referral Source Social Work (self-referral)   Reason for Referral Discharge planning   Informant EMR;Son;Daughter   Readmission Assessment   Factors that patient feels contributed to this readmission Acute/Chronic Clinical Presentation   Pt's living situation prior to admission? Long term care facility   Pt's level of independence at discharge? Total assist (max)   Did any new symptoms or issues develop after you were discharged? Yes   Were medications taken as indicated on discharge instructions? Yes   Was full assessment completed by SW/CM on prior admission? Yes   Was the recommended discharge plan achieved? Yes   Medical Hx   Does patient have an established PCP? Yes  (Balta Fam MD)   Significant Past Medical/Mental Health Hx anxiety, GERD, A-fib on Xarelto, HTN, DL, COPD, asthma, bladder CA, multiple myeloma, vertigo, dementia   Patient Info   Advanced directives? Yes  (HCPOA Murali Potter)   Patient's Current Mental Status at Time of Assessment Confused or unable to complete assessment   Patient's Home Environment Long Term Care Facility     Assessment completed w/ pt's son and HCPMESFIN Murali. Pt  on vapotherm, drowsy. Pt has been LTC at [facility] for several weeks. Per Murali, pt was previously at an retirement in [place] but she needed more assistance than facility could provide. PT/ family have been happy with the care at [facility] and would like to return, if pt stabilizes.     Pt's Dtr Verónica also at bedside. Both children encouraged to reach out with any questions or concerns. Both verbalize they are concerned pt will not be able to leave the hospital. Reinforced that CM/SW will follow pt's case to provide updates to [facility] and provide additional support.    CM/SW will remain available for DC planning and/or support.     HARRY Billy, CMSRN    [id number]

## 2024-02-22 NOTE — PROGRESS NOTES
[facility]    Nelly Potter Patient Status:  Inpatient    1935 MRN [id number]   [facility] 4SW-A Attending Castillo Mcgill MD   Hosp Day # 2 PCP Balta Wright MD     Critical Care Progress Note     Date of Admission: 2024 11:59 AM  Admission Diagnosis: Hypokalemia [E87.6]  Lactic acidosis [E87.20]  Elevated troponin [R79.89]  Acute respiratory failure with hypoxia (HCC) [J96.01]  RSV infection [B33.8]  Multifocal pneumonia [J18.9]  Sepsis, due to unspecified organism, unspecified whether acute organ dysfunction present (HCC) [A41.9]     S:  Pt weaned off levophed overnight.  Vapotherm needs have decreased, now on 30L 60%.  She denies complaints this am.        Current Medications:    Current Facility-Administered Medications:     sodium chloride 0.9% infusion, , Intravenous, Once    potassium chloride 40 mEq in 250mL sodium chloride 0.9% IVPB premix, 40 mEq, Intravenous, Once **FOLLOWED BY** potassium chloride 20 mEq/100mL IVPB premix 20 mEq, 20 mEq, Intravenous, Once    potassium chloride 20 mEq in sodium chloride 0.9% 1000mL infusion premix, , Intravenous, Continuous    docusate (Colace) 50 MG/5ML oral solution 100 mg, 100 mg, Per NG Tube, BID    lansoprazole (Prevacid Solutab) disintegrating tab 30 mg, 30 mg, Oral, QAM AC    ipratropium-albuterol (Duoneb) 0.5-2.5 (3) MG/3ML inhalation solution 3 mL, 3 mL, Nebulization, QID    dextrose 10% infusion (TPN no rate), , Intravenous, Continuous PRN    pancrelipase (Lip-Prot-Amyl) (Zenpep) DR particles cap 10,000 Units, 10,000 Units, Per G Tube, PRN **AND** sodium bicarbonate tab 325 mg, 325 mg, Per G Tube, PRN    acetaminophen (Tylenol Extra Strength) tab 500 mg, 500 mg, Oral, Q4H PRN    acetaminophen (Tylenol) tab 650 mg, 650 mg, Oral, Q4H PRN **OR** HYDROcodone-acetaminophen (Norco) 5-325 MG per tab 1 tablet, 1 tablet, Oral, Q4H PRN **OR** HYDROcodone-acetaminophen (Norco) 5-325 MG per tab 2 tablet, 2 tablet, Oral, Q4H PRN     melatonin tab 3 mg, 3 mg, Oral, Nightly PRN    benzonatate (Tessalon) cap 200 mg, 200 mg, Oral, TID PRN    guaiFENesin (Robitussin) 100 MG/5 ML oral liquid 200 mg, 200 mg, Oral, Q4H PRN    ondansetron (Zofran) 4 MG/2ML injection 4 mg, 4 mg, Intravenous, Q6H PRN    metoclopramide (Reglan) 5 mg/mL injection 10 mg, 10 mg, Intravenous, Q8H PRN    polyethylene glycol (PEG 3350) (Miralax) 17 g oral packet 17 g, 17 g, Oral, Daily PRN    sennosides (Senokot) tab 17.2 mg, 17.2 mg, Oral, Nightly PRN    bisacodyl (Dulcolax) 10 MG rectal suppository 10 mg, 10 mg, Rectal, Daily PRN    fleet enema (Fleet) 7-19 GM/118ML rectal enema 133 mL, 1 enema, Rectal, Once PRN    norepinephrine (Levophed) 4 mg/250mL infusion premix, 0.5-30 mcg/min, Intravenous, Continuous PRN    vasopressin (Vasostrict) 20 Units in sodium chloride 0.9% 100 mL infusion for septic shock, 0.01-0.03 Units/min, Intravenous, Continuous PRN    rivaroxaban (Xarelto) tab 20 mg, 20 mg, Oral, Daily with food    midodrine (ProAmatine) tab 5 mg, 5 mg, Oral, Q8H    hydrocortisone Na succinate PF (Solu-CORTEF) injection 100 mg, 100 mg, Intravenous, Q8H CHLOE    piperacillin-tazobactam (Zosyn) 3.375 g in dextrose 5% 100 mL IVPB-ADDV, 3.375 g, Intravenous, Q8H    ketorolac (Toradol) 15 MG/ML injection 15 mg, 15 mg, Intravenous, Q6H PRN    [COMPLETED] vancomycin (Vancocin) 1.75 g in sodium chloride 0.9% 500mL IVPB premix, 25 mg/kg, Intravenous, Once **FOLLOWED BY** vancomycin (Vancocin) 1,000 mg in sodium chloride 0.9% 250 mL IVPB-ADDV, 15 mg/kg, Intravenous, Q24H     OBJECTIVE:  /55   Pulse 96   Temp 98 °F (36.7 °C) (Temporal)   Resp (!) 39   Ht 5' 2\" (1.575 m)   Wt 153 lb 4.9 oz (69.5 kg)   SpO2 98%   BMI 28.04 kg/m²      Ventilator Settings: vapotherm 30L 60%      Wt Readings from Last 3 Encounters:   02/22/24 153 lb 4.9 oz (69.5 kg)   02/19/24 145 lb 9.6 oz (66 kg)   02/16/24 156 lb 9.6 oz (71 kg)        I/O last 3 completed shifts:  In: 4765.7 [I.V.:2785.7;  NG/GT:480; IV PIGGYBACK:1500]  Out: 1768 [Urine:1300; Emesis/NG output:468]  No intake/output data recorded.      Physical Exam:                          General: alert, cooperative, in NAD                          HEENT: oropharynx clear without erythema or exudates, moist mucous membranes                          Lungs: rhonchi                          Chest wall: No tenderness or deformity.                          Heart: Regular rate and rhythm, normal S1S2                          Abdomen: soft, non-tender, non-distended, positive BS.                          Extremity: No clubbing or cyanosis. no edema                          Skin: No rashes or lesions.       Lab Results   Component Value Date    WBC 4.7 02/22/2024    RBC 2.56 02/22/2024    HGB 6.7 02/22/2024    HCT 22.0 02/22/2024    MCV 85.9 02/22/2024    MCH 26.2 02/22/2024    MCHC 30.5 02/22/2024    RDW 22.4 02/22/2024    .0 02/22/2024     Lab Results   Component Value Date     02/22/2024    K 3.1 02/22/2024     02/22/2024    CO2 30.0 02/22/2024    BUN 26 02/22/2024    CREATSERUM 0.49 02/22/2024     02/22/2024    CA 8.0 02/22/2024    ALKPHO 75 02/21/2024    ALT 20 02/21/2024    AST 4 02/21/2024    BILT 0.6 02/21/2024    ALB 1.7 02/21/2024    TP 5.2 02/21/2024     Lab Results   Component Value Date    INR 2.00 (H) 02/06/2024           Imaging: I have independently visualized all relevant chest imaging in PACS.  I agree with the radiology interpretation except where noted.     ASSESSMENT/PLAN:  Acute hypoxic respiratory failure: 2/2 RSV with suspected superimposed bacterial multifocal PNA.  Underlying hx of COPD  -Wean O2 as able-currently on vapotherm 30L/60%  -RSV positive  -CXR with multifocal PNA, L> R  -Antbx as below  -BD protocol    Septic Shock: 2/2 PNA with MRSA bacteremia.  PCT mildly elevated   -IVF  -Vasopressors PRN for MAP > 65, SBP > 90, currently off   -stress dose steroids, weaned to q 12, will keep at this given  severe PNA  -Home midodrine 5mg TID  -vanc/zosyn  -TTE with preserved EF, PASP: 30-35mmHg, no obvious valvular vegetations   -ID following     Multifocal PNA:   -RSV, +PCT  -zosyn/vanc  -on solumedrol 100mg Q 12 - will continue for severe PNA     Elevated troponin: Likely demand ischemia  -TTE with preserved EF, PASP: 30-35mmHg, no obvious valvular vegetations      Multiple Myeloma  -In remission  -follows with onc as outpatient  -will transfuse 1 unit PRBC     Hx PE and DVT  -A/c was on hold at [facility] for EGD and then developed PE  -On xarelto     Duodenal ulcer, GERD  -ppi  -bowel regimen      HTN/HLD/CAD  -Holding antihypertensives in setting of shock    COPD  -Duonebs  -O2 as needed    Afib  -Continue xarelto  -Cardizem for rate control  -monitor on tele    Dementia with functional decline, ? Steroid myopathy  -Palliative was to see as outpatient  -Steroids were being weaned. Was on decadron for months, now on solumedrol     Urinary retention/frequent UTIs  -flomax    FEN:  -TF     Proph:  -hep sub Q    Dispo:  -ICU  - discussed with family at bedside     Critical Care Time: 35 min    Penny Llanes MD

## 2024-02-22 NOTE — PLAN OF CARE
Received pt on 30L and 80% vapotherm. Pt was drowsy and oriented to self only. Pt moaning but able to move all extremities. Pt given prn meds per MAR. VSS, aebrile until 1700. Pt had AFIB W/ RVR, started on cardizem drip, cardiology notified. 2BMs, pt tolerating tube feeds. Q2hr turns and safety maintained throughout shift. Daughter and Murali at bedside updated on pt status. I unit of blood given.  Problem: CARDIOVASCULAR - ADULT  Goal: Maintains optimal cardiac output and hemodynamic stability  Description: INTERVENTIONS:  - Monitor vital signs, rhythm, and trends  - Monitor for bleeding, hypotension and signs of decreased cardiac output  - Evaluate effectiveness of vasoactive medications to optimize hemodynamic stability  - Monitor arterial and/or venous puncture sites for bleeding and/or hematoma  - Assess quality of pulses, skin color and temperature  - Assess for signs of decreased coronary artery perfusion - ex. Angina  - Evaluate fluid balance, assess for edema, trend weights  Outcome: Progressing     Problem: RESPIRATORY - ADULT  Goal: Achieves optimal ventilation and oxygenation  Description: INTERVENTIONS:  - Assess for changes in respiratory status  - Assess for changes in mentation and behavior  - Position to facilitate oxygenation and minimize respiratory effort  - Oxygen supplementation based on oxygen saturation or ABGs  - Provide Smoking Cessation handout, if applicable  - Encourage broncho-pulmonary hygiene including cough, deep breathe, Incentive Spirometry  - Assess the need for suctioning and perform as needed  - Assess and instruct to report SOB or any respiratory difficulty  - Respiratory Therapy support as indicated  - Manage/alleviate anxiety  - Monitor for signs/symptoms of CO2 retention  Outcome: Progressing     Problem: GASTROINTESTINAL - ADULT  Goal: Achieves appropriate nutritional intake (bariatric)  Description: INTERVENTIONS:  - Monitor for over-consumption  - Identify factors  contributing to increased intake, treat as appropriate  - Monitor I&O, WT and lab values  - Obtain nutritional consult as needed  - Evaluate psychosocial factors contributing to over-consumption  Outcome: Progressing     Problem: GENITOURINARY - ADULT  Goal: Absence of urinary retention  Description: INTERVENTIONS:  - Assess patient’s ability to void and empty bladder  - Monitor intake/output and perform bladder scan as needed  - Follow urinary retention protocol/standard of care  - Consider collaborating with pharmacy to review patient's medication profile  - Implement strategies to promote bladder emptying  Outcome: Progressing     Problem: METABOLIC/FLUID AND ELECTROLYTES - ADULT  Goal: Glucose maintained within prescribed range  Description: INTERVENTIONS:  - Monitor Blood Glucose as ordered  - Assess for signs and symptoms of hyperglycemia and hypoglycemia  - Administer ordered medications to maintain glucose within target range  - Assess barriers to adequate nutritional intake and initiate nutrition consult as needed  - Instruct patient on self management of diabetes  Outcome: Progressing  Goal: Electrolytes maintained within normal limits  Description: INTERVENTIONS:  - Monitor labs and rhythm and assess patient for signs and symptoms of electrolyte imbalances  - Administer electrolyte replacement as ordered  - Monitor response to electrolyte replacements, including rhythm and repeat lab results as appropriate  - Fluid restriction as ordered  - Instruct patient on fluid and nutrition restrictions as appropriate  Outcome: Progressing  Goal: Hemodynamic stability and optimal renal function maintained  Description: INTERVENTIONS:  - Monitor labs and assess for signs and symptoms of volume excess or deficit  - Monitor intake, output and patient weight  - Monitor urine specific gravity, serum osmolarity and serum sodium as indicated or ordered  - Monitor response to interventions for patient's volume status,  including labs, urine output, blood pressure (other measures as available)  - Encourage oral intake as appropriate  - Instruct patient on fluid and nutrition restrictions as appropriate  Outcome: Progressing     Problem: SKIN/TISSUE INTEGRITY - ADULT  Goal: Skin integrity remains intact  Description: INTERVENTIONS  - Assess and document risk factors for pressure ulcer development  - Assess and document skin integrity  - Monitor for areas of redness and/or skin breakdown  - Initiate interventions, skin care algorithm/standards of care as needed  Outcome: Progressing     Problem: Delirium  Goal: Minimize duration of delirium  Description: Interventions:  - Encourage use of hearing aids, eye glasses  - Promote highest level of mobility daily  - Provide frequent reorientation  - Promote wakefulness i.e. lights on, blinds open  - Promote sleep, encourage patient's normal rest cycle i.e. lights off, TV off, minimize noise and interruptions  - Encourage family to assist in orientation and promotion of home routines  Outcome: Progressing     Problem: Diabetes/Glucose Control  Goal: Glucose maintained within prescribed range  Description: INTERVENTIONS:  - Monitor Blood Glucose as ordered  - Assess for signs and symptoms of hyperglycemia and hypoglycemia  - Administer ordered medications to maintain glucose within target range  - Assess barriers to adequate nutritional intake and initiate nutrition consult as needed  - Instruct patient on self management of diabetes  Outcome: Progressing

## 2024-02-22 NOTE — PLAN OF CARE
Report received from previous RN. Pt on Vapo therm at 70% 40 L and ws able to wean down to 30% and 40 L. Keeping SATs > 90%.  Lungs rhonchi and DM. Pt only moaning cries and get very upset to touch her. More when turning and cleaning her. Just by lifting an arm or a leg. Cardiac monitor Afib control. Off levo since yesterday morning. Abdomen soft and distended  Dobbhoff TF with 160 water flushes  q 4. BM soft and brown small.  Medicated with colace and senna. Alberto arm swollen. Generalized edema. Pure wick and bladder scan with small amount cristal color urine. Alberto arms bruise and very fragile skin. Afebrile.  Updates given to family..  0600 critical values  H&H 6.7 and K 3.1 spoke with CCAPN and orders placed

## 2024-02-22 NOTE — CDS QUERY
CLINICAL DOCUMENTATION CLARIFICATION FORM  Dear Dr. Coronel,  Clinical information (provided below) includes a diagnosis of heart failure(CHF) in the ED HPI. Please clarify.  PLEASE “X” THE DIAGNOSIS THAT APPLIES:    (   X ) Chronic Diastolic Heart Failure      (    ) Other - please specify:       Documentation from the Medical Record:   Risk; sepsis with fluid bolus given in ED   Clinical indicators; ED HPI includes a history of CHF documented   Probnp-578. Troponin 75  ECHO -Pulm note; TTE with preserved EF, PASP: 30-35mmHg, no obvious valvular vegetations    Left ventricle: The cavity size was normal. Wall thickness was normal.      Systolic function was normal. The estimated ejection fraction was 60-65%,      by visual assessment. Technical limitations of the study preclude      regional wall motion analysis. Left ventricular diastolic function is      indeterminate.   CXR; Cardiac silhouette is prominent, stable.  Multifocal consolidations are noted in the lungs most pronounced in the left lung.  Small left effusion is noted.  There is no pneumothorax.    Treatment; Home Lasix .not given per MAR, ECHO, probnp.CXR in ED        For questions regarding this query, please contact Clinical : Dieudonne Adorno RN, BSN, CCDS. ext. [phone]                                                                                 THIS FORM IS A PERMANENT PART OF THE MEDICAL RECORD

## 2024-02-22 NOTE — PROGRESS NOTES
[facility]     [facility] Progress Note    Nelly Potter Patient Status:  Inpatient    1935 MRN [id number]   Location [facility] 4SW-A Attending Geno Kaur MD   Hosp Day # 2 PCP Balta Wright MD     Subjective:  Chart reviewed, no acute events.  Pt seen bedside with son present.  Pt with eyes closed, does say \"good morning\" in response and denies pain, however continues to moan intermittently.    O2 demands improved although remains on Vapotherm on 30L at 60% FiO2.  No fevers.      Objective:    Allergies:  Allergies   Allergen Reactions    Myrbetriq [Mirabegron] NAUSEA ONLY and DIZZINESS    Codeine NAUSEA ONLY    Evista [Raloxifene Hydrochloride] RASH    Meclizine NAUSEA ONLY    Sulfamethoxazole-Trimethoprim DIZZINESS       Medications:    Current Facility-Administered Medications:     [COMPLETED] potassium chloride 40 mEq in 250mL sodium chloride 0.9% IVPB premix, 40 mEq, Intravenous, Once **FOLLOWED BY** potassium chloride 20 mEq/100mL IVPB premix 20 mEq, 20 mEq, Intravenous, Once    thiamine (Vitamin B1) tab 100 mg, 100 mg, Per NG Tube, Daily    midodrine (ProAmatine) tab 5 mg, 5 mg, Oral, TID    docusate (Colace) 50 MG/5ML oral solution 100 mg, 100 mg, Per NG Tube, BID PRN    lansoprazole (Prevacid Solutab) disintegrating tab 30 mg, 30 mg, Oral, QAM AC    ipratropium-albuterol (Duoneb) 0.5-2.5 (3) MG/3ML inhalation solution 3 mL, 3 mL, Nebulization, QID    dextrose 10% infusion (TPN no rate), , Intravenous, Continuous PRN    pancrelipase (Lip-Prot-Amyl) (Zenpep) DR particles cap 10,000 Units, 10,000 Units, Per G Tube, PRN **AND** sodium bicarbonate tab 325 mg, 325 mg, Per G Tube, PRN    acetaminophen (Tylenol Extra Strength) tab 500 mg, 500 mg, Oral, Q4H PRN    acetaminophen (Tylenol) tab 650 mg, 650 mg, Oral, Q4H PRN **OR** HYDROcodone-acetaminophen (Norco) 5-325 MG per tab 1 tablet, 1 tablet, Oral, Q4H PRN **OR** HYDROcodone-acetaminophen (Norco) 5-325 MG per tab 2  tablet, 2 tablet, Oral, Q4H PRN    melatonin tab 3 mg, 3 mg, Oral, Nightly PRN    benzonatate (Tessalon) cap 200 mg, 200 mg, Oral, TID PRN    guaiFENesin (Robitussin) 100 MG/5 ML oral liquid 200 mg, 200 mg, Oral, Q4H PRN    ondansetron (Zofran) 4 MG/2ML injection 4 mg, 4 mg, Intravenous, Q6H PRN    metoclopramide (Reglan) 5 mg/mL injection 10 mg, 10 mg, Intravenous, Q8H PRN    polyethylene glycol (PEG 3350) (Miralax) 17 g oral packet 17 g, 17 g, Oral, Daily PRN    sennosides (Senokot) tab 17.2 mg, 17.2 mg, Oral, Nightly PRN    bisacodyl (Dulcolax) 10 MG rectal suppository 10 mg, 10 mg, Rectal, Daily PRN    fleet enema (Fleet) 7-19 GM/118ML rectal enema 133 mL, 1 enema, Rectal, Once PRN    norepinephrine (Levophed) 4 mg/250mL infusion premix, 0.5-30 mcg/min, Intravenous, Continuous PRN    vasopressin (Vasostrict) 20 Units in sodium chloride 0.9% 100 mL infusion for septic shock, 0.01-0.03 Units/min, Intravenous, Continuous PRN    rivaroxaban (Xarelto) tab 20 mg, 20 mg, Oral, Daily with food    hydrocortisone Na succinate PF (Solu-CORTEF) injection 100 mg, 100 mg, Intravenous, Q8H CHLOE    piperacillin-tazobactam (Zosyn) 3.375 g in dextrose 5% 100 mL IVPB-ADDV, 3.375 g, Intravenous, Q8H    ketorolac (Toradol) 15 MG/ML injection 15 mg, 15 mg, Intravenous, Q6H PRN    [COMPLETED] vancomycin (Vancocin) 1.75 g in sodium chloride 0.9% 500mL IVPB premix, 25 mg/kg, Intravenous, Once **FOLLOWED BY** vancomycin (Vancocin) 1,000 mg in sodium chloride 0.9% 250 mL IVPB-ADDV, 15 mg/kg, Intravenous, Q24H    Physical Exam:  General: drowsy,  Cooperative.  No apparent distress.  Vital Signs:  Blood pressure 132/73, pulse 95, temperature 97.8 °F (36.6 °C), temperature source Temporal, resp. rate (!) 27, height 5' 2\" (1.575 m), weight 153 lb 4.9 oz (69.5 kg), SpO2 97%.   Temp (24hrs), Av.1 °F (36.7 °C), Min:97.8 °F (36.6 °C), Max:99 °F (37.2 °C)      HEENT: Exam is unremarkable.  Without scleral icterus.  Mucous membranes are moist.  PERRLA.  Oropharynx is clear.  Lungs: non labored  Cardiac: Regular rate   Abdomen:  Soft, non-distended, non-tender, with no rebound or guarding.    Extremities:  BUE edema  Skin: skin tears noted on L forearm, R forearm   Neurologic: Cranial nerves are grossly intact.     Labs:  Lab Results   Component Value Date    WBC 4.7 02/22/2024    HGB 6.7 02/22/2024    HCT 22.0 02/22/2024    .0 02/22/2024    CREATSERUM 0.49 02/22/2024    BUN 26 02/22/2024     02/22/2024    K 3.1 02/22/2024     02/22/2024    CO2 30.0 02/22/2024     02/22/2024    CA 8.0 02/22/2024       Radiology:  CXR yesterday:  CONCLUSION:       Stable cardiac and mediastinal contours.  No significant interval change in mixed interstitial/alveolar opacities in a perihilar distribution throughout the lungs.  Persistent confluent opacification of the left lung base, likely a combination of mild   pleural effusion and passive atelectasis, similar to prior.  No pneumothorax.      Enteric catheter is looped near the GE junction, the distal portion of the catheter directed retrograde, terminating near the thoracic inlet.      Recommend catheter retraction and repositioning with follow-up radiograph to confirm intra-abdominal placement.     Assessment/Plan:    1.  MRSA bacteremia  -pt admitted with cough and SOB yesterday with recent hx of RSV  -2/2 blood cultures 2/20 growing MRSA  -repeat BC 2/21 NGTD  -source possible skin tears/wounds vs other (per son she did have a central line recently removed at [facility]- possible source?) vs pulmonary source (post viral bacterial pneumonia)   -MRSA nares positive   -TTE negative, will need BRENNA  -on IV Vancomycin and Zosyn     2. Acute hypoxic respiratory failure  -recent hx of RSV  -CXR with multifocal pneumonia  -admitted with hypoxia  -on Vapotherm,  on 30L currently at 60% FiO2  -strep pneumo antigen negative  -no fevers  -on IV Vancomycin and Zosyn     3. Cystitis  -UA abnormal, follow urine  cultures  -hx of ESBL E Coli and PSAR in past    DISPO:  d/w with Dr Stewart,   continue IV Vancomycin and Zosyn.  Follow final VITA of MRSA and repeat blood cultures.  Follow urine cultures.  Will need BRENNA when stable enough.  Weaning O2 as able.      If you have any questions or concerns please call [facility] at [phone].     SARAI Morgan  2/22/2024  11:12 AM

## 2024-02-22 NOTE — PROGRESS NOTES
02/22/24 0509   Oxygen Utilization   O2 Device High flow/High humidity   O2 Flow Rate (L/min) (S)  20 L/min   FiO2 (%) 50 %   SpO2 99 %   Humidity High   Olga 1/4 Full     Received patient on continuous Vapotherm 40L,60%. Weaned to 20L, 50% overnight. Will continue to wean as tolerated.

## 2024-02-22 NOTE — CONSULTS
Cardiology Consultation Note      Nelly Potter Patient Status:  Inpatient    1935 MRN [id number]   Location [facility] 4SW-A Attending Geno Kaur MD   Hosp Day # 2 PCP Balta Wright MD     Reason for consultation:  MRSA bacteremia    Impression:  MRSA bacteremia  Septic shock 2/ PNA  Minimal troponin elevatiojn up to 75 in the setting of the above  AFIB  History of PE/DVT  HTN  HLD       Plan:  I had long discussion about risks and benefits of BRENNA with daughter and co-POA, Verónica. Given the risk of further respiratory compromise, higher risk of esophageal injury given tortuous esophagus, baseline dementia and wishes of patient to avoid any invasive procedures, family would like to hold off on BRENNA at this time. Furthermore, the patient is not a surgical candidate so the BRENNA would not change clinical management at this time time other than offering risk stratification  TTE with normal LV function and no  significant valvular disease  Continue Xarelto  Please call cardiology with further questions       History of Present Illness:  Nelly Potter is a 88 year old female who presented to [facility] on 2024.      The patient is an 89 y/o with complex medical history including AFIB, MM, COPD, dementia, bladder cancer who lives in [facility] who presented with MOCTEZUMA.    Patient found to have septic shock, MRSA bacteremia, RSV, and hypoxic respiratory failure on vapotherm    We have been asked to see the patient for possible BRENNA        Cardiology consultation was requested.    Medications:  Current Facility-Administered Medications   Medication Dose Route Frequency    thiamine (Vitamin B1) tab 100 mg  100 mg Per NG Tube Daily    midodrine (ProAmatine) tab 5 mg  5 mg Oral TID    docusate (Colace) 50 MG/5ML oral solution 100 mg  100 mg Per NG Tube BID PRN    vancomycin (Vancocin) 1,000 mg in sodium chloride 0.9% 250 mL IVPB-ADDV  1,000 mg Intravenous Q24H    [START ON 2024]  hydrocortisone Na succinate PF (Solu-CORTEF) injection 100 mg  100 mg Intravenous Q12H    lansoprazole (Prevacid Solutab) disintegrating tab 30 mg  30 mg Oral QAM AC    ipratropium-albuterol (Duoneb) 0.5-2.5 (3) MG/3ML inhalation solution 3 mL  3 mL Nebulization QID    dextrose 10% infusion (TPN no rate)   Intravenous Continuous PRN    pancrelipase (Lip-Prot-Amyl) (Zenpep) DR particles cap 10,000 Units  10,000 Units Per G Tube PRN    And    sodium bicarbonate tab 325 mg  325 mg Per G Tube PRN    acetaminophen (Tylenol Extra Strength) tab 500 mg  500 mg Oral Q4H PRN    acetaminophen (Tylenol) tab 650 mg  650 mg Oral Q4H PRN    Or    HYDROcodone-acetaminophen (Norco) 5-325 MG per tab 1 tablet  1 tablet Oral Q4H PRN    Or    HYDROcodone-acetaminophen (Norco) 5-325 MG per tab 2 tablet  2 tablet Oral Q4H PRN    melatonin tab 3 mg  3 mg Oral Nightly PRN    benzonatate (Tessalon) cap 200 mg  200 mg Oral TID PRN    guaiFENesin (Robitussin) 100 MG/5 ML oral liquid 200 mg  200 mg Oral Q4H PRN    ondansetron (Zofran) 4 MG/2ML injection 4 mg  4 mg Intravenous Q6H PRN    metoclopramide (Reglan) 5 mg/mL injection 10 mg  10 mg Intravenous Q8H PRN    polyethylene glycol (PEG 3350) (Miralax) 17 g oral packet 17 g  17 g Oral Daily PRN    sennosides (Senokot) tab 17.2 mg  17.2 mg Oral Nightly PRN    bisacodyl (Dulcolax) 10 MG rectal suppository 10 mg  10 mg Rectal Daily PRN    fleet enema (Fleet) 7-19 GM/118ML rectal enema 133 mL  1 enema Rectal Once PRN    norepinephrine (Levophed) 4 mg/250mL infusion premix  0.5-30 mcg/min Intravenous Continuous PRN    vasopressin (Vasostrict) 20 Units in sodium chloride 0.9% 100 mL infusion for septic shock  0.01-0.03 Units/min Intravenous Continuous PRN    rivaroxaban (Xarelto) tab 20 mg  20 mg Oral Daily with food    piperacillin-tazobactam (Zosyn) 3.375 g in dextrose 5% 100 mL IVPB-ADDV  3.375 g Intravenous Q8H    ketorolac (Toradol) 15 MG/ML injection 15 mg  15 mg Intravenous Q6H PRN       Past  Medical History:   Diagnosis Date    Allergic rhinitis     Anxiety     Chronic midline low back pain without sciatica 10/13/2021    Constipation     Depression     Esophageal reflux     History of bladder cancer 8/1/2012    Incontinence     Long QT interval     per pt. hx of long QT.    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 Mathews    CYSTOURETHROSCOPY  10/24/12    BTS Cysto Dr. Mathews     CYSTOURETHROSCOPY  4/29/13, 8/7/13, 11/13/13, 11/12/14, 5/6/15, 11/11/15    cysto- Dr. Mathews, MELLY    CYSTOURETHROSCOPY  10/11/2017    cysto Dr. mathews    CYSTOURETHROSCOPY,FULGUR .5-2CM LESN  7/23/12    cystourethroscopy, TURBT x 2- Dr. Mathews    FOOT SURGERY Left 10/25/11    lt foot    HIP REPLACEMENT SURGERY      left    HYSTERECTOMY      KNEE REPLACEMENT SURGERY Bilateral     OTHER SURGICAL HISTORY  1983     arthroscopic left knee    PART EXCIS 5TH METATARSAL HEAD Right 10/2/2014    Procedure: TAILOR'S BUNIONECTOMY;  Surgeon: Adryan Prajapati DPM;  Location: [facility]    PATIENT DOCUMENTED NOT TO HAVE EXPERIENCED ANY OF THE FOLLOWING EVENTS Right 10/2/2014    Procedure: MADELINE'S BUNIONECTOMY;  Surgeon: Adryan Prajapati DPM;  Location: [facility]    PATIENT WITH PREOPERATIVE ORDER FOR IV ANTIBIOTIC SURGICAL SITE INFECT Right 10/2/2014    Procedure: MADELINE'S BUNIONECTOMY;  Surgeon: Adryan Prajapati DPM;  Location: [facility]    REPAIR OF HAMMERTOE,ONE Left 10/2/2014    Procedure: ARTHROPLASTY ONE (1) TOE;  Surgeon:  Adryan Prajapati, RAH;  [facility]: [facility]    SINUS SURGERY     &     sinus    SLING OPER STRES INCONTINENCE      sling    SLING OPER STRES INCONTINENCE  10/2011    sling repeat. TVT Exact. Dr. Dang at [facility]    TONSILLECTOMY         Family History  There is no family history of sudden cardiac death.    Social History   reports that she has never smoked. She has never used smokeless tobacco. She reports current alcohol use. She reports that she does not use drugs.     Allergies  Allergies   Allergen Reactions    Myrbetriq [Mirabegron] NAUSEA ONLY and DIZZINESS    Codeine NAUSEA ONLY    Evista [Raloxifene Hydrochloride] RASH    Meclizine NAUSEA ONLY    Sulfamethoxazole-Trimethoprim DIZZINESS         Review of Systems:  Constitutional: negative for fevers  Eyes: negative for visual disturbance  Ears, nose, mouth, throat, and face: negative for epistaxis  Respiratory: negative for dyspnea on exertion  Cardiovascular: negative for chest pain  Gastrointestinal: negative for melena  Genitourinary:negative for hematuria  Hematologic/lymphatic: negative for bleeding  Musculoskeletal:negative for myalgias  Neurological: negative for dizziness and headaches  Endocrine: negative for temperature intolerance      Physical Exam:  Blood pressure 117/62, pulse 95, temperature 98.2 °F (36.8 °C), temperature source Temporal, resp. rate 26, height 5' 2\" (1.575 m), weight 153 lb 4.9 oz (69.5 kg), SpO2 96%.  Temp (24hrs), Av.1 °F (36.7 °C), Min:97.8 °F (36.6 °C), Max:99 °F (37.2 °C)    Wt Readings from Last 3 Encounters:   24 153 lb 4.9 oz (69.5 kg)   24 145 lb 9.6 oz (66 kg)   24 156 lb 9.6 oz (71 kg)       General: resting. Only oriented to self   HEENT: Extraocular movements are intact; sclerae are anicteric; scalp is atrauamatic; no thyromegaly  Neck: Supple; no JVD; no carotid bruits  Cardiac: Regular rate and regular rhythm; no murmurs/rubs/gallops are appreciated; PMI is non-displaced; there is  no evidence of a sternal heave  Lungs: decreased lung sounds   Abdomen: Soft, non-tender; bowel sounds are normoactive; no hepatosplenomegaly  Extremities: No clubbing or cyanosis; moves all 4 extremities normally  Psychiatric: Normal mood and affect; answers questions appropriately  Dermatologic: No rashes; normal skin turgor    Diagnostic testing:    EKG: AFIB with RBBB    Labs:   Lab Results   Component Value Date    INR 2.00 (H) 02/06/2024        Lab Results   Component Value Date    WBC 4.7 02/22/2024    HGB 9.5 02/22/2024    HCT 29.0 02/22/2024    .0 02/22/2024    CREATSERUM 0.49 02/22/2024    BUN 26 02/22/2024     02/22/2024    K 3.1 02/22/2024     02/22/2024    CO2 30.0 02/22/2024     02/22/2024    CA 8.0 02/22/2024    CK 11 02/22/2024    PGLU 247 02/22/2024         Thank you for allowing our practice to participate in the care of your patient. Please do not hesitate to contact me if you have any questions.    Shannen Cabrera MD

## 2024-02-22 NOTE — PROGRESS NOTES
[facility]    Nelly Potter Patient Status:  Inpatient    1935 MRN [id number]   [facility] 4SW-A Attending Castillo Mcgill MD   Hosp Day # 2 PCP Balta Wright MD     Critical Care Progress Note     Date of Admission: 2024 11:59 AM  Admission Diagnosis: Hypokalemia [E87.6]  Lactic acidosis [E87.20]  Elevated troponin [R79.89]  Acute respiratory failure with hypoxia (HCC) [J96.01]  RSV infection [B33.8]  Multifocal pneumonia [J18.9]  Sepsis, due to unspecified organism, unspecified whether acute organ dysfunction present (HCC) [A41.9]     S:  Blood cultures are positive for MRSA.  She remains on levophed.  On vapotherm 40L 100%, desaturates with minimal activity.        Current Medications:    Current Facility-Administered Medications:     sodium chloride 0.9% infusion, , Intravenous, Once    potassium chloride 40 mEq in 250mL sodium chloride 0.9% IVPB premix, 40 mEq, Intravenous, Once **FOLLOWED BY** potassium chloride 20 mEq/100mL IVPB premix 20 mEq, 20 mEq, Intravenous, Once    potassium chloride 20 mEq in sodium chloride 0.9% 1000mL infusion premix, , Intravenous, Continuous    docusate (Colace) 50 MG/5ML oral solution 100 mg, 100 mg, Per NG Tube, BID    lansoprazole (Prevacid Solutab) disintegrating tab 30 mg, 30 mg, Oral, QAM AC    ipratropium-albuterol (Duoneb) 0.5-2.5 (3) MG/3ML inhalation solution 3 mL, 3 mL, Nebulization, QID    dextrose 10% infusion (TPN no rate), , Intravenous, Continuous PRN    pancrelipase (Lip-Prot-Amyl) (Zenpep) DR particles cap 10,000 Units, 10,000 Units, Per G Tube, PRN **AND** sodium bicarbonate tab 325 mg, 325 mg, Per G Tube, PRN    acetaminophen (Tylenol Extra Strength) tab 500 mg, 500 mg, Oral, Q4H PRN    acetaminophen (Tylenol) tab 650 mg, 650 mg, Oral, Q4H PRN **OR** HYDROcodone-acetaminophen (Norco) 5-325 MG per tab 1 tablet, 1 tablet, Oral, Q4H PRN **OR** HYDROcodone-acetaminophen (Norco) 5-325 MG per tab 2 tablet, 2 tablet, Oral,  Q4H PRN    melatonin tab 3 mg, 3 mg, Oral, Nightly PRN    benzonatate (Tessalon) cap 200 mg, 200 mg, Oral, TID PRN    guaiFENesin (Robitussin) 100 MG/5 ML oral liquid 200 mg, 200 mg, Oral, Q4H PRN    ondansetron (Zofran) 4 MG/2ML injection 4 mg, 4 mg, Intravenous, Q6H PRN    metoclopramide (Reglan) 5 mg/mL injection 10 mg, 10 mg, Intravenous, Q8H PRN    polyethylene glycol (PEG 3350) (Miralax) 17 g oral packet 17 g, 17 g, Oral, Daily PRN    sennosides (Senokot) tab 17.2 mg, 17.2 mg, Oral, Nightly PRN    bisacodyl (Dulcolax) 10 MG rectal suppository 10 mg, 10 mg, Rectal, Daily PRN    fleet enema (Fleet) 7-19 GM/118ML rectal enema 133 mL, 1 enema, Rectal, Once PRN    norepinephrine (Levophed) 4 mg/250mL infusion premix, 0.5-30 mcg/min, Intravenous, Continuous PRN    vasopressin (Vasostrict) 20 Units in sodium chloride 0.9% 100 mL infusion for septic shock, 0.01-0.03 Units/min, Intravenous, Continuous PRN    rivaroxaban (Xarelto) tab 20 mg, 20 mg, Oral, Daily with food    midodrine (ProAmatine) tab 5 mg, 5 mg, Oral, Q8H    hydrocortisone Na succinate PF (Solu-CORTEF) injection 100 mg, 100 mg, Intravenous, Q8H CHLOE    piperacillin-tazobactam (Zosyn) 3.375 g in dextrose 5% 100 mL IVPB-ADDV, 3.375 g, Intravenous, Q8H    ketorolac (Toradol) 15 MG/ML injection 15 mg, 15 mg, Intravenous, Q6H PRN    [COMPLETED] vancomycin (Vancocin) 1.75 g in sodium chloride 0.9% 500mL IVPB premix, 25 mg/kg, Intravenous, Once **FOLLOWED BY** vancomycin (Vancocin) 1,000 mg in sodium chloride 0.9% 250 mL IVPB-ADDV, 15 mg/kg, Intravenous, Q24H     OBJECTIVE:  /55   Pulse 96   Temp 98 °F (36.7 °C) (Temporal)   Resp (!) 39   Ht 157.5 cm (5' 2\")   Wt 153 lb 4.9 oz (69.5 kg)   SpO2 98%   BMI 28.04 kg/m²      Ventilator Settings: vapotherm 40L 100%      Wt Readings from Last 3 Encounters:   02/22/24 153 lb 4.9 oz (69.5 kg)   02/19/24 145 lb 9.6 oz (66 kg)   02/16/24 156 lb 9.6 oz (71 kg)        I/O last 3 completed shifts:  In: 4765.7  [I.V.:2785.7; NG/GT:480; IV PIGGYBACK:1500]  Out: 1768 [Urine:1300; Emesis/NG output:468]  No intake/output data recorded.      Physical Exam:                          General: alert, cooperative, in NAD                          HEENT: oropharynx clear without erythema or exudates, moist mucous membranes                          Lungs: rhonchi                          Chest wall: No tenderness or deformity.                          Heart: Regular rate and rhythm, normal S1S2                          Abdomen: soft, non-tender, non-distended, positive BS.                          Extremity: No clubbing or cyanosis. no edema                          Skin: No rashes or lesions.       Lab Results   Component Value Date    WBC 4.7 02/22/2024    RBC 2.56 02/22/2024    HGB 6.7 02/22/2024    HCT 22.0 02/22/2024    MCV 85.9 02/22/2024    MCH 26.2 02/22/2024    MCHC 30.5 02/22/2024    RDW 22.4 02/22/2024    .0 02/22/2024     Lab Results   Component Value Date     02/22/2024    K 3.1 02/22/2024     02/22/2024    CO2 30.0 02/22/2024    BUN 26 02/22/2024    CREATSERUM 0.49 02/22/2024     02/22/2024    CA 8.0 02/22/2024    ALKPHO 75 02/21/2024    ALT 20 02/21/2024    AST 4 02/21/2024    BILT 0.6 02/21/2024    ALB 1.7 02/21/2024    TP 5.2 02/21/2024     Lab Results   Component Value Date    INR 2.00 (H) 02/06/2024           Imaging    XR CHEST AP PORTABLE  (CPT=71045)    Result Date: 2/21/2024  CONCLUSION:  1. Tip of NG tube within the stomach as detailed above.   LOCATION:  [facility]      Dictated by (CST): Juany Deleon MD on 2/21/2024 at 5:31 PM     Finalized by (CST): Juany Deleon MD on 2/21/2024 at 5:32 PM       XR CHEST AP PORTABLE  (CPT=71045)    Result Date: 2/21/2024  CONCLUSION:   Stable cardiac and mediastinal contours.  No significant interval change in mixed interstitial/alveolar opacities in a perihilar distribution throughout the lungs.  Persistent confluent opacification of the left lung base,  likely a combination of mild pleural effusion and passive atelectasis, similar to prior.  No pneumothorax.  Enteric catheter is looped near the GE junction, the distal portion of the catheter directed retrograde, terminating near the thoracic inlet.  Recommend catheter retraction and repositioning with follow-up radiograph to confirm intra-abdominal placement.  Findings discussed with RN at 12:10 p.m. Central standard time on 02/21/2024.     LOCATION:  [facility]      Dictated by (CST): Ramos Novoa MD on 2/21/2024 at 12:06 PM     Finalized by (CST): Ramos Novoa MD on 2/21/2024 at 12:10 PM        ASSESSMENT/PLAN:  Acute hypoxic respiratory failure: 2/2 RSV with suspected superimposed bacterial multifocal PNA.  Underlying hx of COPD  -Wean O2 as able-currently on vapotherm 40L/100%  -RSV positive  -CXR with multifocal PNA, L> R  -Antbx as below  -BD protocol    Septic Shock: 2/2 PNA with MRSA bacteremia.  PCT mildly elevated   -IVF  -Vasopressors PRN for MAP > 65, SBP > 90-currently off  -stress dose steroids  -Home midodrine 5mg TID  -vanco (2/21-)  for + MRSA nares and Zosyn (2/20-)  -TTE with preserved EF, PASP: 30-35mmHg, no obvious valvular vegetations   -ID consulted     Multifocal PNA:   -RSV  -PCT elevated  -zosyn/vanc     Elevated troponin: Likely demand ischemia  -TTE pending     Multiple Myeloma  -In remission  -follows with onc as outpatient     Hx PE and DVT  -A/c was on hold at SNF for EGD and then developed PE  -On xarelto     Duodenal ulcer, GERD  -ppi  -bowel regimen      HTN/HLD/CAD  -Holding antihypertensives in setting of shock    COPD  -Duonebs  -O2 as needed    Afib  -Continue xarelto  -Cardizem for rate control  -monitor on tele  -TTE on 12/2 with EF 55%    Dementia with functional decline, ? Steroid myopathy  -Palliative was to see as outpatient  -Steroids were being weaned. Was on decadron for months     Urinary retention/frequent UTIs  -flomax    FEN:  -failed swallow, recommend initiating TF      Proph:  -hep sub Q    Dispo:  -ICU    Critical Care Time: 35 min    Penny Llanes MD  2/21/2024  7:12 AM

## 2024-02-22 NOTE — PROGRESS NOTES
[facility] Hospitalist Progress Note     Nelly Potter Patient Status:  Inpatient    1935 MRN [id number]   Location [facility] 4SW-A Attending Castillo Mcgill MD   Hosp Day # 2 PCP Balta Wright MD     Chief Complaint:   Fu Sob, hypoxic    Subjective:     Patient seen and examined.   On vapotherm 80%  Son and daughter at bedside  Afebrile  2/2 blood cx +  Nares +MRSA    Objective:    Review of Systems:   10 point ROS completed and was negative, except for pertinent positive and negatives stated in subjective.    Vital signs:  Temp:  [97.5 °F (36.4 °C)-99 °F (37.2 °C)] 98 °F (36.7 °C)  Pulse:  [] 101  Resp:  [13-39] 29  BP: (106-131)/(53-90) 115/53  SpO2:  [80 %-100 %] 100 %  FiO2 (%):  [50 %-100 %] 80 %    Physical Exam:    General: No acute distress.   HEENT:  EOMI, PERRLA, OP clear  Respiratory: Clear to auscultation bilaterally. No wheezes. No rhonchi.  Cardiovascular: S1, S2. Regular rate and rhythm. No murmurs.  Abdomen: Soft, nontender, nondistended.  Positive bowel sounds. No rebound or guarding.  Extremities: No edema.  Neuro:  Grossly non focal, no motor deficits.        Diagnostic Data:    Labs:  Recent Labs   Lab 24  0705 24  0603 24  1218 24  0957 24  0452   WBC 8.3 4.9 6.7 6.1 4.7   HGB 8.3* 8.9* 9.1* 7.7* 6.7*   MCV 81.1 81.1 82.0 83.2 85.9   .0 235.0 249.0 202.0 172.0   BAND  --   --   --  8  --        Recent Labs   Lab 24  1218 24  2336 24  0957 24  0452   * 193* 167* 239*   BUN 36* 29* 30* 26*   CREATSERUM 0.60 0.51* 0.53* 0.49*   CA 10.0 9.2 8.5 8.0*   ALB 1.9* 1.8* 1.7*  --    * 138 142 143   K 2.8* 2.7* 3.9  3.9 3.1*   CL 90* 98 106 111   CO2 35.0* 34.0* 34.0* 30.0   ALKPHO 85 76 75  --    AST 6* 6* 4*  --    ALT 24 21 20  --    BILT 0.6 0.6 0.6  --    TP 5.6* 5.3* 5.2*  --        Estimated Creatinine Clearance: 64 mL/min (A) (based on SCr of 0.49 mg/dL  (L)).    No results for input(s): \"PTP\", \"INR\" in the last 168 hours.         COVID-19 Lab Results    COVID-19  Lab Results   Component Value Date    COVID19 Not Detected 02/21/2024    COVID19 Not Detected 02/20/2024    COVID19 Not Detected 02/06/2024       Pro-Calcitonin  Recent Labs   Lab 02/20/24  1218   PCT 0.24*       Cardiac  Recent Labs   Lab 02/20/24  1218   PBNP 578*       Creatinine Kinase  No results for input(s): \"CK\" in the last 168 hours.    Inflammatory Markers  No results for input(s): \"CRP\", \"KARUNA\", \"LDH\", \"DDIMER\" in the last 168 hours.    Imaging: Imaging data reviewed in Epic.    Medications:    sodium chloride   Intravenous Once    potassium chloride  40 mEq Intravenous Once    Followed by    potassium chloride  20 mEq Intravenous Once    docusate  100 mg Per NG Tube BID    lansoprazole  30 mg Oral QAM AC    ipratropium-albuterol  3 mL Nebulization QID    rivaroxaban  20 mg Oral Daily with food    midodrine  5 mg Oral Q8H    hydrocortisone Na succinate PF  100 mg Intravenous Q8H CHLOE    piperacillin-tazobactam  3.375 g Intravenous Q8H    vancomycin  15 mg/kg Intravenous Q24H       Assessment & Plan:    Nelly Potter is a 88 year old female with PMH significant for/anxiety, GERD, A-fib on Xarelto, HTN, DL, COPD, asthma, bladder CA, multiple myeloma, vertigo, dementia with short-term memory issues who presents from [facility] with reports of shortness of breath and a wet cough.      Cough, sob - 2/2 RSV infection +/- multifocal PNA  Acute hypoxic resp failure 2/2 above  Asthma/COPD exacerbation   -RSV + in ED  -cxr reviewed >> multifocal pneumonia  -supplemental O2 via vapotherm  >> weaned down to 80% >> cont to wean as able  -check ABG  -nebs prn  -may benefit from IV steroids  -cont iv abx  -blood cx x 2  -ST, PT, OT    Shock - presumably related to sepsis: 2/2 bacteremia, pneumonia  Lactic acidosis - resolved  -mild  -repeat numbers to assess trend  -Was on norepi but currently off and  BP stable  -2/2 blood cx growing  -MRSA nares    Anemia  -hgb dropped to 6.7 this  -tx prbc  -maintain hgb >7     Troponin elevated - possibly related to demand ischemia  -will trend  -may need cardiology involvement      GERD  -PPI     HTN   -resume home meds if able  -monitor BP closely, pt had hx of hypotension on previous admission     DL  -statin     Afib on xarelto  -resume xarelto and po diltiazem     Depression, anxiety  -cont zoloft     Bladder ca  Multiple myeloma  Dementia  Vertigo  -stable chronic conditions        Quality:  DVT Prophylaxis: xarelto, scds  CODE status: DNR, select  Lentz: no  If COVID testing is negative, may discontinue isolation: yes     DISPO:  Cont icu care  Spoke with son and daughter  All questions answered  Prognosis guarded at this point    DNR/DNI - ok for selective treatment >> confirmed this with son and daughter           Geno Morrison Hospitalist  Pager [phone]  Answering Service number: [phone]          **Certification      PHYSICIAN Certification of Need for Inpatient Hospitalization - Initial Certification    Patient will require inpatient services that will reasonably be expected to span two midnight's based on the clinical documentation in H+P.   Based on patients current state of illness, I anticipate that, after discharge, patient will require TBD.

## 2024-02-23 NOTE — CM/SW NOTE
Noted pt weaned to 10L/HFNC. Plans to transfer out of ICU. Clinical updates sent to [facility] [place] [facility]. Facility cannot manage Vapotherm. Will need to confirm pt's oxygen needs can be met at facility prior to DC.     CM/SW will remain available for DC planning and/or support.     BONITA BillyN, CMSRN    [id number]

## 2024-02-23 NOTE — PLAN OF CARE
Assumed patient care this morning.   Lethargic, opens eyes to commands, not able to keep open. Able to answer with yes/no intermittently. Moaning intermittently. Weaned to HFNC, tolerated ok for now. NT suction prn to help with secretions. Cardizem gtt stopped and oral cardizem started. Tele afib with frequent pvcs with rates 90-low 100s. Dobhoff with tube feeds at goal and tolerating. Family at bedside, updated.

## 2024-02-23 NOTE — SLP NOTE
Attempted to see for follow up however patient currently occupied with wound care. Per chart, patient transitioned to HFNC at 12 however continues to be lethargic. Discussed pt's status with daughter present in room and RN. Agreed patient too lethargic for safe po feedings today. Will continue to follow and re-attempt when more alert & participatory.     Jessie Nina MA, CCC-SLP  Pager [phone]

## 2024-02-23 NOTE — PLAN OF CARE
Report received from previous RN. Cardiac monitor Afib in the 130's on Cardizem gtt at 10 mg/hr followed the protocol. One time order of Valium IV for the restlessness  with good results.  Continue to have BM soft and yvette TF and flushes with diminish urine production. Pt edematous and weeping from arms. Updated family

## 2024-02-23 NOTE — PROGRESS NOTES
Nelly Potter Patient Status:  Inpatient    1935 MRN [id number]   Location [facility] 4SW-A Attending Geno Kaur MD   Hosp Day # 3 PCP Balta Wright MD     Critical Care Progress Note      Assessment/Plan:  Acute hypoxic respiratory failure: 2/2 RSV with suspected superimposed bacterial multifocal PNA.  Underlying hx of COPD  -Wean oxygen as able; presently 8L HF  - Antibiotics as below  Septic Shock: 2/2 PNA with MRSA bacteremia.  PCT mildly elevated. Now s/p vasopressors with improvement in blood pressure  -IVF  -Continue stress dose steroids q12 given severe pna  -Home midodrine 5mg TID  -vanc/zosyn  -TTE with preserved EF, PASP: 30-35mmHg, no obvious valvular vegetations   -ID following  Multifocal PNA:   -RSV, +PCT suggesting superimposed bacterial pneumonia ?staph?  -zosyn/vanc  Multiple Myeloma - in acute remission  - per outpatient hematology/oncology  COPD - no acute exacerbation  -Duonebs  -O2 as needed  - Steroid as above  Afib  -Continue xarelto  -Restart PO cardizem  -monitor on tele  FEN:  - Speech following; diet per them  Proph:  -hep sub Q  Dispo:  -Transfer to floor today; duly pulm will follow along with you      Critical Care Time: 36 minutes    Ace Garcia DO  [facility]  Pulmonary & Critical Care Medicine      Subjective:  Improved today with decreased oxygen needs.  Heart rate better controlled    Objective:    Medications:   insulin aspart  1-5 Units Subcutaneous 4 times per day    thiamine  100 mg Per NG Tube Daily    midodrine  5 mg Oral TID    vancomycin  1,000 mg Intravenous Q24H    hydrocortisone Na succinate PF  100 mg Intravenous Q12H    lansoprazole  30 mg Oral QAM AC    ipratropium-albuterol  3 mL Nebulization QID    rivaroxaban  20 mg Oral Daily with food    piperacillin-tazobactam  3.375 g Intravenous Q8H       FiO2 (%):  [40 %-80 %] 40 %    Intake/Output Summary (Last 24 hours) at 2024 0717  Last data filed at 2024 0700  Gross per 24  hour   Intake 3803.25 ml   Output 250 ml   Net 3553.25 ml       BP 95/63   Pulse 104   Temp 97.2 °F (36.2 °C) (Temporal)   Resp 26   Ht 5' 2\" (1.575 m)   Wt 158 lb 4.6 oz (71.8 kg)   SpO2 99%   BMI 28.95 kg/m²   Physical Exam:   General: calm no distress   HEENT: lips, mucosa, tongue normal   Lungs: clear   Chest wall: No tenderness or deformity.   Heart: Regular rate and rhythm, normal S1S2, no murmur.   Abdomen: soft, non-tender, non-distended, positive BS.   Extremity: No clubbing or cyanosis no edema.   Skin: No rashes or lesions.    Recent Labs   Lab 02/23/24  0534   RBC 3.49*   HGB 9.6*   HCT 29.9*   MCV 85.7   MCH 27.5   MCHC 32.1   RDW 20.0   NEPRELIM 6.93   WBC 8.2   .0     Recent Labs   Lab 02/20/24  1218 02/20/24  2336 02/21/24  0957 02/22/24  0452 02/22/24  1640 02/23/24  0533   * 193* 167* 239*  --  245*   BUN 36* 29* 30* 26*  --  26*   CREATSERUM 0.60 0.51* 0.53* 0.49*  --  0.53*   CA 10.0 9.2 8.5 8.0*  --  8.3*   ALB 1.9* 1.8* 1.7*  --   --   --    * 138 142 143  --  141   K 2.8* 2.7* 3.9  3.9 3.1* 4.0 3.8   CL 90* 98 106 111  --  110   CO2 35.0* 34.0* 34.0* 30.0  --  28.0   ALKPHO 85 76 75  --   --   --    AST 6* 6* 4*  --   --   --    ALT 24 21 20  --   --   --    BILT 0.6 0.6 0.6  --   --   --    TP 5.6* 5.3* 5.2*  --   --   --      Recent Labs   Lab 02/22/24  1121   ABGPHT 7.52*   KKRNCY2S 32*   VNMVL9Q 83   ABGHCO3 27.6*   ABGBE 3.4*   TEMP 98.6   FÉLIX Positive   SITE Right Radial   DEV High Humid Nasal Cannula   THGB 10.5*     No results for input(s): \"BNP\" in the last 168 hours.  Recent Labs   Lab 02/22/24  0452   CK 11*       Imaging: I independently visualized all relevant chest imaging in PACS, agree with radiology interpretation except where noted.

## 2024-02-23 NOTE — DIETARY NOTE
[facility]  NUTRITION ASSESSMENT    Pt does not meet malnutrition criteria at this time.    NUTRITION INTERVENTION:    Meal and Snacks - ADAT per SLP. Monitor and encourage adequate PO intake.   Medical Food Supplements - Will evaluate need when diet is advanced. Rationale/use for oral supplements discussed.  Enteral Nutrition - Via DHT, continue TF at GOAL: Jevity 1.5 at 60 mL/hr x 22hrs.   This will provide 1980 kcal, 84 grams protein, 1003 mL total free water, and 100% of RDI's.   Recommend 160 mL water flush q 4 hours, TF+FWF provides 1963 mL total fluids.   Hold TF 1 hour before and after administering lansoprazole.  Coordination of Nutrition Care - SLP consult prior to diet advancement. and Palliative care consult for goal of care    PATIENT STATUS: 2/23: Pt continues on vapotherm but planning to wean to HFNC today. Tolerating TF at goal via DHT. SLP to re-eval today for diet advancement. No GI symptoms noted. Pt with elevated BG 2/2 steroids - started on SSI aspart this AM. No A1c level but no hx of DM. Will continue to monitor and follow up as appropriate.    2/21: 88 year female admitted on 2/20 w/ multifocal pneumonia. Pt seen for tube feed consult.  Pt positive for RSV. patient on Vapotherm 40L,100%. SLP following, recommended NPO diet. Pt sleeping at time of visit, talked to daughter in law at bedside. Daughter in law reports pt w/ poor appetite prior to admit. Stated that pt started coughing when attempted to eat. Reports this has been going of for a week. No n/v/d. Last BM 2/20. Reported UBW of 130 lbs. However per chart review, pts wt has been ~ 145-155 lb over the past 2 months. And mentioned that wt has been fluctuating d/t steroids. Provided tube feed recs. Will continue to monitor.     PMH: anxiety, GERD, A-fib on Xarelto, HTN, DL, COPD, asthma, bladder CA, multiple myeloma, vertigo, dementia with short-term memory issues.    ANTHROPOMETRICS:  Ht: 157.5 cm (5' 2\")  Wt: 71.8 kg (158 lb 4.6  oz).   BMI: Body mass index is 28.95 kg/m².  IBW: 50 kg    WEIGHT HISTORY:   Weight loss: No  Wt Readings from Last 10 Encounters:   02/23/24 71.8 kg (158 lb 4.6 oz)   02/19/24 66 kg (145 lb 9.6 oz)   02/16/24 71 kg (156 lb 9.6 oz)   02/15/24 70.6 kg (155 lb 9.6 oz)   02/06/24 69.2 kg (152 lb 9.6 oz)   02/05/24 71.2 kg (157 lb)   01/31/24 70.5 kg (155 lb 6.4 oz)   01/29/24 70.5 kg (155 lb 6.4 oz)   01/25/24 70.5 kg (155 lb 6.4 oz)   01/22/24 69.6 kg (153 lb 6.4 oz)        NUTRITION:  Diet:       Procedures    NPO      Food Allergies: No  Cultural/Ethnic/Jainism Preferences Addressed: Yes    GI system review: WNL; Last BM: 2/22  Skin and wounds: wounds on anus, left leg, and right upper arm    NUTRITION RELATED PHYSICAL FINDINGS:     1. Body Fat/Muscle Mass:  no wasting noted per visual     2. Fluid Accumulation: upper extremity edema and lower extremity edema     NUTRITION PRESCRIPTION: 67 kg  Calories: 5342-5959 calories/day (25-30 kcal/kg)  Protein:  grams protein/day (1.0-1.5 grams protein per kg)  Fluid: ~1 ml/kcal or per MD discretion    NUTRITION DIAGNOSIS/PROBLEM:  Inadequate oral intake related to inability to take or tolerate as evidenced by need for NPO status and TF to meet nutrition needs.    MONITOR AND EVALUATE/NUTRITION GOALS:  Weight stable within 1 to 2 lbs during admission - Ongoing  Start alternative nutrition in 24-48 hrs if diet is not able to advance- Resolved  Tolerate alternative nutrition at 100% of goal - New    MEDICATIONS:  Solucortef, SSI aspart, lansoprazole, abx, thiamine 100 mg  Gtt: cardizem    LABS:  , POC glucose x 24hrs: 247-263 mg/dl    Pt is at High nutrition risk    Cathleen Perla RD, LDN, [facility]  Clinical Dietitian  Spectra: [phone]

## 2024-02-23 NOTE — PROGRESS NOTES
02/23/24 0520   Oxygen Utilization   O2 Device High flow/High humidity   O2 Flow Rate (L/min) 30 L/min   FiO2 (%) 50 %   SpO2 98 %   Humidity High   Hammond Filled     Patient on continuous Vapotherm with the above settings. Weaning as able. No changes overnight.

## 2024-02-23 NOTE — PROGRESS NOTES
[facility] Hospitalist Progress Note     Nelly Potter Patient Status:  Inpatient    1935 MRN [id number]   Location [facility] 4SW-A Attending Castillo Mcgill MD   Hosp Day # 3 PCP Balta Wright MD     Chief Complaint:   Fu Sob, hypoxic    Subjective:     Patient seen and examined.   On highflow O2  Son and daughter at bedside  Afebrile  2/2 blood cx +MRSA    Objective:    Review of Systems:   10 point ROS completed and was negative, except for pertinent positive and negatives stated in subjective.    Vital signs:  Temp:  [97.2 °F (36.2 °C)-98.8 °F (37.1 °C)] 98.8 °F (37.1 °C)  Pulse:  [] 105  Resp:  [14-33] 21  BP: ()/(48-92) 115/73  SpO2:  [91 %-100 %] 92 %  FiO2 (%):  [40 %-70 %] 40 %    Physical Exam:    General: No acute distress.   HEENT:  EOMI, PERRLA, OP clear  Respiratory: Clear to auscultation bilaterally. No wheezes. No rhonchi.  Cardiovascular: S1, S2. Regular rate and rhythm. No murmurs.  Abdomen: Soft, nontender, nondistended.  Positive bowel sounds. No rebound or guarding.  Extremities: No edema.  Neuro:  Grossly non focal, no motor deficits.        Diagnostic Data:    Labs:  Recent Labs   Lab 24  0603 24  1218 24  0957 24  0452 24  1243 24  0534   WBC 4.9 6.7 6.1 4.7  --  8.2   HGB 8.9* 9.1* 7.7* 6.7* 9.5* 9.6*   MCV 81.1 82.0 83.2 85.9  --  85.7   .0 249.0 202.0 172.0  --  166.0   BAND  --   --  8  --   --  4       Recent Labs   Lab 24  1218 24  2336 24  0957 24  0452 24  1640 24  0533   * 193* 167* 239*  --  245*   BUN 36* 29* 30* 26*  --  26*   CREATSERUM 0.60 0.51* 0.53* 0.49*  --  0.53*   CA 10.0 9.2 8.5 8.0*  --  8.3*   ALB 1.9* 1.8* 1.7*  --   --   --    * 138 142 143  --  141   K 2.8* 2.7* 3.9  3.9 3.1* 4.0 3.8   CL 90* 98 106 111  --  110   CO2 35.0* 34.0* 34.0* 30.0  --  28.0   ALKPHO 85 76 75  --   --   --    AST 6* 6* 4*  --   --    --    ALT 24 21 20  --   --   --    BILT 0.6 0.6 0.6  --   --   --    TP 5.6* 5.3* 5.2*  --   --   --        Estimated Creatinine Clearance: 59.1 mL/min (A) (based on SCr of 0.53 mg/dL (L)).    No results for input(s): \"PTP\", \"INR\" in the last 168 hours.         COVID-19 Lab Results    COVID-19  Lab Results   Component Value Date    COVID19 Not Detected 02/21/2024    COVID19 Not Detected 02/20/2024    COVID19 Not Detected 02/06/2024       Pro-Calcitonin  Recent Labs   Lab 02/20/24  1218   PCT 0.24*       Cardiac  Recent Labs   Lab 02/20/24  1218   PBNP 578*       Creatinine Kinase  Recent Labs   Lab 02/22/24  0452   CK 11*       Inflammatory Markers  No results for input(s): \"CRP\", \"KARUNA\", \"LDH\", \"DDIMER\" in the last 168 hours.    Imaging: Imaging data reviewed in Epic.    Medications:    insulin aspart  1-5 Units Subcutaneous 4 times per day    thiamine  100 mg Per NG Tube Daily    midodrine  5 mg Oral TID    vancomycin  1,000 mg Intravenous Q24H    hydrocortisone Na succinate PF  100 mg Intravenous Q12H    lansoprazole  30 mg Oral QAM AC    ipratropium-albuterol  3 mL Nebulization QID    rivaroxaban  20 mg Oral Daily with food    piperacillin-tazobactam  3.375 g Intravenous Q8H       Assessment & Plan:    Nelly Potter is a 88 year old female with PMH significant for/anxiety, GERD, A-fib on Xarelto, HTN, DL, COPD, asthma, bladder CA, multiple myeloma, vertigo, dementia with short-term memory issues who presents from [place] with reports of shortness of breath and a wet cough.      Cough, sob - 2/2 RSV infection +/- multifocal PNA  Acute hypoxic resp failure 2/2 above  Asthma/COPD exacerbation   -RSV + in ED  -cxr reviewed >> multifocal pneumonia  -supplemental O2 via vapotherm  >> weaned down to 80% >> cont to wean as able  -check ABG  -nebs prn  -may benefit from IV steroids  -cont iv abx  -blood cx x 2  -ST, PT, OT    Shock - presumably related to sepsis: 2/2 bacteremia, pneumonia  Lactic acidosis -  resolved  -Was on norepi but currently off and BP stable  -stress steroids  -2/2 blood cx growing MRSA  -MRSA bacteremia >> ID consulted >> rec BRENNA  -cardiology consulted >> apprec recs >> family opting to not go for BRENNA    Anemia  -hgb dropped to 6.7 >> 9.6 this am  -tx prbc  -maintain hgb >7     Troponin elevated - possibly related to demand ischemia  -will trend  -may need cardiology involvement      GERD  -PPI     HTN   -resume home meds if able  -monitor BP closely     DL  -statin     Afib on xarelto  -resume xarelto and po diltiazem     Depression, anxiety  -cont zoloft     Bladder ca  Multiple myeloma  Dementia  Vertigo  -stable chronic conditions        Quality:  DVT Prophylaxis: xarelto, scds  CODE status: DNR, select  Lentz: no  If COVID testing is negative, may discontinue isolation: yes     DISPO:  Cont icu care  Spoke with son and daughter  All questions answered  Prognosis guarded at this point    DNR/DNI - ok for selective treatment >> confirmed this with son and daughter           Geno Kaur MD  [facility] Hospitalist  Pager [phone]  Answering Service number: [phone]          **Certification      PHYSICIAN Certification of Need for Inpatient Hospitalization - Initial Certification    Patient will require inpatient services that will reasonably be expected to span two midnight's based on the clinical documentation in H+P.   Based on patients current state of illness, I anticipate that, after discharge, patient will require TBD.    (4) no impairment

## 2024-02-23 NOTE — CONSULTS
[facility]  Report of Inpatient Wound Care Consultation    Nelly Potter Patient Status:  Inpatient    1935 MRN [id number]   Location [facility] 4SW-A Attending Geno Kaur MD   Hosp Day # 3 PCP Balta Wright MD     Reason for Consultation:  Paulette anal area    History of Present Illness:  Nelly Potter is a a(n) 88 year old female. Patient presents with a paulette anal wound, of unknown etiology.However,the loose stool worsens the condition of the wound.Patient complains of pain with wound assessment /paulette anal care.Daughter in the room.    History:  Past Medical History:   Diagnosis Date    Allergic rhinitis     Anxiety     Chronic midline low back pain without sciatica 10/13/2021    Constipation     Depression     Esophageal reflux     History of bladder cancer 2012    Incontinence     Long QT interval     per pt. hx of long QT.    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    Osteoarthrosis, unspecified whether generalized or localized, unspecified site     Osteopenia of multiple sites 2018    Other and unspecified hyperlipidemia     Pleurisy     Psychophysiological insomnia 2018    Unspecified essential hypertension     Urge incontinence     Vertigo      Past Surgical History:   Procedure Laterality Date    APPENDECTOMY      CATARACT      COLONOSCOPY  2012    normal    CYSTOSCOPY CHEMODENERVATION  10/17/2017    100 U    CYSTOURETHROSCOPY  12    CystoCraig Mathews    CYSTOURETHROSCOPY  10/24/12    BTS Jose Mathews     CYSTOURETHROSCOPY  13, 13, 13, 14, 5/6/15, 11/11/15    cysto- Dr. Mathews, MELLY    CYSTOURETHROSCOPY  10/11/2017    cystwillie mathews    CYSTOURETHROSCOPY,FULGUR .5-2CM LESN  12    cystourethroscopy, TURBT x 2- Dr. Mathews    FOOT SURGERY Left 10/25/11    lt foot    HIP REPLACEMENT SURGERY      left    HYSTERECTOMY      KNEE REPLACEMENT SURGERY Bilateral     OTHER SURGICAL HISTORY       arthroscopic left knee    PART EXCIS 5TH  METATARSAL HEAD Right 10/2/2014    Procedure: TAILOR'S BUNIONECTOMY;  Surgeon: Adryan Prajapati DPM;  Location: [facility]    PATIENT DOCUMENTED NOT TO HAVE EXPERIENCED ANY OF THE FOLLOWING EVENTS Right 10/2/2014    Procedure: TAILOR'S BUNIONECTOMY;  Surgeon: Adryan Prajapati DPM;  Location: [facility]    PATIENT WITH PREOPERATIVE ORDER FOR IV ANTIBIOTIC SURGICAL SITE INFECT Right 10/2/2014    Procedure: TAILOR'S BUNIONECTOMY;  Surgeon: Adryan Prajapati DPM;  Location: [facility]    REPAIR OF HAMMERTOE,ONE Left 10/2/2014    Procedure: ARTHROPLASTY ONE (1) TOE;  Surgeon: Adryan Prajapati DPM;  Location: [facility]    SINUS SURGERY    1983 & 1999    sinus    SLING OPER STRES INCONTINENCE  8/06    sling    SLING OPER STRES INCONTINENCE  10/2011    sling repeat. TVT Exact. Dr[facility] Dang at [facility]    TONSILLECTOMY        reports that she has never smoked. She has never used smokeless tobacco. She reports current alcohol use. She reports that she does not use drugs.    Allergies:  @ALLERGY    Laboratory Data:  Lab Results   Component Value Date    WBC 8.2 02/23/2024    HGB 9.6 02/23/2024    HCT 29.9 02/23/2024    .0 02/23/2024    CREATSERUM 0.53 02/23/2024    BUN 26 02/23/2024     02/23/2024    K 3.8 02/23/2024     02/23/2024    CO2 28.0 02/23/2024     02/23/2024    CA 8.3 02/23/2024    PGLU 206 02/23/2024         Impression:  Wound 02/20/24 Anus (Active)   Date First Assessed/Time First Assessed: 02/20/24 1530   Present on Original Admission: Yes  Primary Wound Type: (c) Incontinent skin breakdown  Location: Anus  Wound Description (Comments): hussein anal area      Assessments 2/23/2024  2:19 PM   Wound Image     Drainage Amount Small   Drainage Description Serosanguineous   Wound Length (cm) 2.5 cm   Wound Width (cm) 3 cm   Wound Surface Area (cm^2) 7.5 cm^2   Wound Depth (cm) 0.1 cm   Wound Volume (cm^3) 0.75 cm^3   Margins Well-defined edges   Non-staged Wound Description Full thickness    Hussein-wound Assessment Blanchable erythema;Painful   Wound Granulation Tissue Pink;Pale Paulson   Wound Bed Granulation (%) 95 %   Wound Bed Slough (%) 5 %   Wound Odor None        Wound Cleaning and Dressings:  Wound cleansing:  Wash with mild soap and water  Wound product: Zinc oxide/ moisture barrier cream daily and as needed particularly after  each hussein care    Miscellaneous/Additional Orders:  Offloading: Turn Q 2 hours and as needed - as tolerated    Additional notes : Unable to keep any dressing on the site due to loose stool which may cause  further skin breakdown leading to moisture associated skin damage.RN aware.    Thank you for allowing me to participate in the care of your patient.  Time Spent  20 minute s minutes  minutes  minutes, Thank you.    Doris Watts RN, BSN [facility]   Wound Care pager [phone]  2/23/2024  2:35 PM

## 2024-02-24 NOTE — PROGRESS NOTES
Nelly Potter Patient Status:  Inpatient    1935 MRN [id number]   Location [facility] 4SW-A Attending Geno Kaur MD   Hosp Day # 4 PCP Balta Wright MD     Critical Care Progress Note      Assessment/Plan:  Acute hypoxic respiratory failure: 2/2 RSV with suspected superimposed bacterial multifocal PNA.  Underlying hx of COPD  -Wean oxygen as able; presently 1L  - Antibiotics as below  Septic Shock: 2/2 PNA with MRSA bacteremia.  PCT mildly elevated. Now s/p vasopressors with improvement in blood pressure  -IVF  -Pna improved; stop stress dose steroids  -Home midodrine 5mg TID  -vanc/zosyn  -TTE with preserved EF, PASP: 30-35mmHg, no obvious valvular vegetations   -ID following  Multifocal PNA:   -RSV, +PCT suggesting superimposed bacterial pneumonia ?staph?  -vanc; abx per id  Multiple Myeloma - in acute remission  - per outpatient hematology/oncology  COPD - no acute exacerbation  -Duonebs  -O2 as needed  - Steroid as above  Afib  -Continue xarelto  -Restart PO cardizem  FEN:  - Speech following; diet per them  Proph:  -hep sub Q  Dispo:  -Transfer to floor today; duly pulm will follow along with you      Ace Garcia,   [facility]  Pulmonary & Critical Care Medicine      Subjective:  No overnight issues    Objective:    Medications:   insulin aspart  2-10 Units Subcutaneous TID AC and HS    dilTIAZem  60 mg Per NG Tube Q8H    thiamine  100 mg Per NG Tube Daily    midodrine  5 mg Oral TID    vancomycin  1,000 mg Intravenous Q24H    lansoprazole  30 mg Oral QAM AC    ipratropium-albuterol  3 mL Nebulization QID    rivaroxaban  20 mg Oral Daily with food       FiO2 (%):  [40 %] 40 %    Intake/Output Summary (Last 24 hours) at 2024 0710  Last data filed at 2024 0400  Gross per 24 hour   Intake 1912.5 ml   Output 1000 ml   Net 912.5 ml       /84   Pulse 100   Temp 98 °F (36.7 °C) (Temporal)   Resp (!) 27   Ht 5' 2\" (1.575 m)   Wt 158 lb 4.6 oz (71.8 kg)   SpO2  92%   BMI 28.95 kg/m²   Physical Exam:   General: calm no distress   HEENT: lips, mucosa, tongue normal   Lungs: clear   Chest wall: No tenderness or deformity.   Heart: Regular rate and rhythm, normal S1S2, no murmur.   Abdomen: soft, non-tender, non-distended, positive BS.   Extremity: No clubbing or cyanosis no edema.   Skin: No rashes or lesions.    Recent Labs   Lab 02/24/24  0515   RBC 3.16*   HGB 8.8*   HCT 27.2*   MCV 86.1   MCH 27.8   MCHC 32.4   RDW 20.1   NEPRELIM 9.21*   WBC 10.5   .0     Recent Labs   Lab 02/20/24  1218 02/20/24  2336 02/21/24  0957 02/22/24  0452 02/22/24  1640 02/23/24  0533 02/24/24  0515   * 193* 167* 239*  --  245* 218*   BUN 36* 29* 30* 26*  --  26* 29*   CREATSERUM 0.60 0.51* 0.53* 0.49*  --  0.53* 0.32*   CA 10.0 9.2 8.5 8.0*  --  8.3* 8.2*   ALB 1.9* 1.8* 1.7*  --   --   --   --    * 138 142 143  --  141 140   K 2.8* 2.7* 3.9  3.9 3.1* 4.0 3.8 3.9  3.9   CL 90* 98 106 111  --  110 109   CO2 35.0* 34.0* 34.0* 30.0  --  28.0 31.0   ALKPHO 85 76 75  --   --   --   --    AST 6* 6* 4*  --   --   --   --    ALT 24 21 20  --   --   --   --    BILT 0.6 0.6 0.6  --   --   --   --    TP 5.6* 5.3* 5.2*  --   --   --   --      Recent Labs   Lab 02/22/24  1121   ABGPHT 7.52*   OJNTEP0N 32*   NENDF9S 83   ABGHCO3 27.6*   ABGBE 3.4*   TEMP 98.6   FÉLIX Positive   SITE Right Radial   DEV High Humid Nasal Cannula   THGB 10.5*     No results for input(s): \"BNP\" in the last 168 hours.  Recent Labs   Lab 02/22/24  0452   CK 11*       Imaging: I independently visualized all relevant chest imaging in PACS, agree with radiology interpretation except where noted.

## 2024-02-24 NOTE — PROGRESS NOTES
[facility] Hospitalist Progress Note     Nelly Potter Patient Status:  Inpatient    1935 MRN [id number]   Location [facility] 4SW-A Attending Castillo Mcgill MD   Hosp Day # 4 PCP Balta Wright MD     Chief Complaint:   Fu Sob, hypoxic    Subjective:     Patient seen and examined.   On highflow O2  More awake this morning  Responding to questions nodding head yes or no  Son and daughter at bedside  Afebrile  2/2 blood cx +MRSA    Objective:    Review of Systems:   10 point ROS completed and was negative, except for pertinent positive and negatives stated in subjective.    Vital signs:  Temp:  [97.1 °F (36.2 °C)-98 °F (36.7 °C)] 98 °F (36.7 °C)  Pulse:  [] 100  Resp:  [9-37] 27  BP: ()/(55-96) 126/84  SpO2:  [91 %-100 %] 92 %  FiO2 (%):  [40 %] 40 %    Physical Exam:    General: No acute distress.   HEENT:  EOMI, PERRLA, OP clear  Respiratory: Clear to auscultation bilaterally. No wheezes. No rhonchi.  Cardiovascular: S1, S2. Regular rate and rhythm. No murmurs.  Abdomen: Soft, nontender, nondistended.  Positive bowel sounds. No rebound or guarding.  Extremities: No edema.  Neuro:  Grossly non focal, no motor deficits.        Diagnostic Data:    Labs:  Recent Labs   Lab 24  1218 24  0957 24  0452 24  1243 24  0534 24  0515   WBC 6.7 6.1 4.7  --  8.2 10.5   HGB 9.1* 7.7* 6.7* 9.5* 9.6* 8.8*   MCV 82.0 83.2 85.9  --  85.7 86.1   .0 202.0 172.0  --  166.0 159.0   BAND  --  8  --   --  4  --        Recent Labs   Lab 24  1218 24  2336 24  0957 24  0452 24  1640 24  0533 24  0515   * 193* 167* 239*  --  245* 218*   BUN 36* 29* 30* 26*  --  26* 29*   CREATSERUM 0.60 0.51* 0.53* 0.49*  --  0.53* 0.32*   CA 10.0 9.2 8.5 8.0*  --  8.3* 8.2*   ALB 1.9* 1.8* 1.7*  --   --   --   --    * 138 142 143  --  141 140   K 2.8* 2.7* 3.9  3.9 3.1* 4.0 3.8 3.9  3.9   CL 90*  98 106 111  --  110 109   CO2 35.0* 34.0* 34.0* 30.0  --  28.0 31.0   ALKPHO 85 76 75  --   --   --   --    AST 6* 6* 4*  --   --   --   --    ALT 24 21 20  --   --   --   --    BILT 0.6 0.6 0.6  --   --   --   --    TP 5.6* 5.3* 5.2*  --   --   --   --        Estimated Creatinine Clearance: 98 mL/min (A) (based on SCr of 0.32 mg/dL (L)).    No results for input(s): \"PTP\", \"INR\" in the last 168 hours.         COVID-19 Lab Results    COVID-19  Lab Results   Component Value Date    COVID19 Not Detected 02/21/2024    COVID19 Not Detected 02/20/2024    COVID19 Not Detected 02/06/2024       Pro-Calcitonin  Recent Labs   Lab 02/20/24  1218   PCT 0.24*       Cardiac  Recent Labs   Lab 02/20/24  1218   PBNP 578*       Creatinine Kinase  Recent Labs   Lab 02/22/24  0452   CK 11*       Inflammatory Markers  No results for input(s): \"CRP\", \"KARUNA\", \"LDH\", \"DDIMER\" in the last 168 hours.    Imaging: Imaging data reviewed in Epic.    Medications:    insulin aspart  2-10 Units Subcutaneous TID AC and HS    ipratropium-albuterol  3 mL Nebulization TID @ 0700, 1400, 2100    dilTIAZem  60 mg Per NG Tube Q8H    thiamine  100 mg Per NG Tube Daily    midodrine  5 mg Oral TID    vancomycin  1,000 mg Intravenous Q24H    lansoprazole  30 mg Oral QAM AC    rivaroxaban  20 mg Oral Daily with food       Assessment & Plan:    Nelly Potter is a 88 year old female with PMH significant for/anxiety, GERD, A-fib on Xarelto, HTN, DL, COPD, asthma, bladder CA, multiple myeloma, vertigo, dementia with short-term memory issues who presents from [facility] with reports of shortness of breath and a wet cough.      Cough, sob - 2/2 RSV infection +/- multifocal PNA  Acute hypoxic resp failure 2/2 above  Asthma/COPD exacerbation   -RSV + in ED  -cxr reviewed >> multifocal pneumonia  -supplemental O2 via vapotherm  >> weaned down to 80% >> cont to wean as able  -check ABG  -nebs prn  -may benefit from IV steroids  -cont iv abx  -blood cx x 2  -ST,  PT, OT    Shock - presumably related to sepsis: 2/2 bacteremia, pneumonia  Lactic acidosis - resolved  -Was on norepi but currently off and BP stable  -stress steroids  -2/2 blood cx growing MRSA  -MRSA bacteremia >> ID consulted >> rec BRENNA   -cardiology consulted >> apprec recs >> family opting to not go for BRENNA  -TTE w.o obv vegetations  -surveillance cx NTD    Anemia  -hgb dropped to 6.7 >> 9.6 >> 8.8  -tx prbc  -maintain hgb >7     Troponin elevated - possibly related to demand ischemia  -will trend  -may need cardiology involvement      GERD  -PPI     HTN   -resume home meds if able  -monitor BP closely     DL  -statin     Afib on xarelto  -resume xarelto and po diltiazem     Depression, anxiety  -cont zoloft     Bladder ca  Multiple myeloma  Dementia  Vertigo  -stable chronic conditions        Quality:  DVT Prophylaxis: xarelto, scds  CODE status: DNR, select  Lentz: no  If COVID testing is negative, may discontinue isolation: yes     DISPO:  Out of icu  Spoke with son and daughter  All questions answered  Clinically improving slowly    DNR/DNI - ok for selective treatment >> confirmed this with son and daughter           Geno Kaur MD  [facility]  Pager [phone]  Answering Service number: [phone]          **Certification      PHYSICIAN Certification of Need for Inpatient Hospitalization - Initial Certification    Patient will require inpatient services that will reasonably be expected to span two midnight's based on the clinical documentation in H+P.   Based on patients current state of illness, I anticipate that, after discharge, patient will require TBD.

## 2024-02-24 NOTE — SLP NOTE
Discussed with RN and family who reported that pt continues to be too lethargic to participate in swallow eval at this time.  Will reassess as able.

## 2024-02-24 NOTE — PROGRESS NOTES
[facility]     [facility]ious [facility] Progress Note    Nelly Potter Patient Status:  Inpatient    1935 MRN [id number]   Location [facility] 4SW-A Attending Geno Kaur MD   Hosp Day # 4 PCP Balta Wright MD     Subjective:  Chart reviewed, no acute events.  Pt seen bedside with daughter present.  Pt more agitated this afternoon.   Moaning intermittently and UE reaching out/grabbing.  Will open eyes and look around while moaning but not verbalizing distress.  She will respond to verbal stimuli and does not head \"yes\" when asked if having pain but can not localize where.   No fevers and remains stable on 1L HFNC    Objective:    Allergies:  Allergies   Allergen Reactions    Myrbetriq [Mirabegron] NAUSEA ONLY and DIZZINESS    Codeine NAUSEA ONLY    Evista [Raloxifene Hydrochloride] RASH    Meclizine NAUSEA ONLY    Sulfamethoxazole-Trimethoprim DIZZINESS       Medications:    Current Facility-Administered Medications:     insulin aspart (NovoLOG) 100 Units/mL FlexPen 2-10 Units, 2-10 Units, Subcutaneous, TID AC and HS    ipratropium-albuterol (Duoneb) 0.5-2.5 (3) MG/3ML inhalation solution 3 mL, 3 mL, Nebulization, TID @ 0700, 1400, 2100    melatonin tab 3 mg, 3 mg, Oral, Nightly    glucose (Dex4) 15 GM/59ML oral liquid 15 g, 15 g, Oral, Q15 Min PRN **OR** glucose (Glutose) 40% oral gel 15 g, 15 g, Oral, Q15 Min PRN **OR** glucose-vitamin C (Dex-4) chewable tab 4 tablet, 4 tablet, Oral, Q15 Min PRN **OR** dextrose 50% injection 50 mL, 50 mL, Intravenous, Q15 Min PRN **OR** glucose (Dex4) 15 GM/59ML oral liquid 30 g, 30 g, Oral, Q15 Min PRN **OR** glucose (Glutose) 40% oral gel 30 g, 30 g, Oral, Q15 Min PRN **OR** glucose-vitamin C (Dex-4) chewable tab 8 tablet, 8 tablet, Oral, Q15 Min PRN    dilTIAZem (cardIZEM) tab 60 mg, 60 mg, Per NG Tube, Q8H    thiamine (Vitamin B1) tab 100 mg, 100 mg, Per NG Tube, Daily    midodrine (ProAmatine) tab 5 mg, 5 mg, Oral, TID    docusate (Colace) 50 MG/5ML  oral solution 100 mg, 100 mg, Per NG Tube, BID PRN    vancomycin (Vancocin) 1,000 mg in sodium chloride 0.9% 250 mL IVPB-ADDV, 1,000 mg, Intravenous, Q24H    lansoprazole (Prevacid Solutab) disintegrating tab 30 mg, 30 mg, Oral, QAM AC    dextrose 10% infusion (TPN no rate), , Intravenous, Continuous PRN    pancrelipase (Lip-Prot-Amyl) (Zenpep) DR particles cap 10,000 Units, 10,000 Units, Per G Tube, PRN **AND** sodium bicarbonate tab 325 mg, 325 mg, Per G Tube, PRN    acetaminophen (Tylenol Extra Strength) tab 500 mg, 500 mg, Oral, Q4H PRN    acetaminophen (Tylenol) tab 650 mg, 650 mg, Oral, Q4H PRN **OR** HYDROcodone-acetaminophen (Norco) 5-325 MG per tab 1 tablet, 1 tablet, Oral, Q4H PRN **OR** HYDROcodone-acetaminophen (Norco) 5-325 MG per tab 2 tablet, 2 tablet, Oral, Q4H PRN    benzonatate (Tessalon) cap 200 mg, 200 mg, Oral, TID PRN    guaiFENesin (Robitussin) 100 MG/5 ML oral liquid 200 mg, 200 mg, Oral, Q4H PRN    ondansetron (Zofran) 4 MG/2ML injection 4 mg, 4 mg, Intravenous, Q6H PRN    metoclopramide (Reglan) 5 mg/mL injection 10 mg, 10 mg, Intravenous, Q8H PRN    polyethylene glycol (PEG 3350) (Miralax) 17 g oral packet 17 g, 17 g, Oral, Daily PRN    sennosides (Senokot) tab 17.2 mg, 17.2 mg, Oral, Nightly PRN    bisacodyl (Dulcolax) 10 MG rectal suppository 10 mg, 10 mg, Rectal, Daily PRN    fleet enema (Fleet) 7-19 GM/118ML rectal enema 133 mL, 1 enema, Rectal, Once PRN    rivaroxaban (Xarelto) tab 20 mg, 20 mg, Oral, Daily with food    Physical Exam:  General: agitated,  awake   Vital Signs:  Blood pressure 111/61, pulse 120, temperature 98.6 °F (37 °C), temperature source Temporal, resp. rate (!) 34, height 5' 2\" (1.575 m), weight 158 lb 4.6 oz (71.8 kg), SpO2 98%.   Temp (24hrs), Av.7 °F (36.5 °C), Min:97.1 °F (36.2 °C), Max:98.6 °F (37 °C)      HEENT: Exam is unremarkable.  Without scleral icterus.  Mucous membranes are moist. PERRLA.  Oropharynx is clear.  Lungs: Coarse upper airway sounds,   non labored, +cough  Cardiac:  intermittently tachycardic   Abdomen:  Soft, non-distended, non-tender, with no rebound or guarding.    Extremities:  UE edema   Skin: Normal texture and turgor.  Neurologic: Cranial nerves are grossly intact.    Labs:  Lab Results   Component Value Date    WBC 10.5 02/24/2024    HGB 8.8 02/24/2024    HCT 27.2 02/24/2024    .0 02/24/2024    CREATSERUM 0.32 02/24/2024    BUN 29 02/24/2024     02/24/2024    K 3.9 02/24/2024    K 3.9 02/24/2024     02/24/2024    CO2 31.0 02/24/2024     02/24/2024    CA 8.2 02/24/2024       Radiology:      Assessment/Plan:    1.  MRSA bacteremia  -pt admitted with cough and SOB yesterday with recent hx of RSV  -2/2 blood cultures 2/20 growing MRSA  -repeat BC 2/21 NGTD  -source possible skin tears/wounds vs  pulmonary source (post viral bacterial pneumonia)   -MRSA nares positive   -TTE negative  -on IV Vancomycin and s/p IV Zosyn      2. Acute hypoxic respiratory failure  -recent hx of RSV  -CXR with multifocal pneumonia  -admitted with hypoxia  -down to 1L HFNC   -strep pneumo antigen negative  -no fevers  -steroids per pulm  -on IV Vancomycin and s/p IV Zosyn      3. Cystitis  -UA abnormal, follow urine cultures  -hx of ESBL E Coli and PSAR in past     DISPO:  Continue IV Vancomycin,  follow temps and WBC.  Follow repeat blood cultures to document clearance.  Will follow with further recs    If you have any questions or concerns please call [facility] at [phone].     SARAI Morgan  2/24/2024  2:17 PM

## 2024-02-24 NOTE — PLAN OF CARE
Oriented to self. Intermittently drowsy. Opens eyes to voice, follows commands. 1L NC; desats easily with activity. Home midodrine continues. Converted to NSR around 1400. TFs at goal; banatrol. 1 soft BM. Norco given in AM for elevated CPOT scores. Daughter Verónica felt norco made patient's agitation worse. Dr. Garcia discussed with daughter - sarah dc'd. PRN tramadol - no major changes in mentation/pain level; still very agitated. Will try seroquel noc for delirium management.    1830- Pt SpO2 suddenly decreased into low 80s. HFNC titrated with minimal improvement. Non-rebreather placed. RT at bedside to deep suction. Very thick, tan secretions. Switched to Vapotherm 30L 50%.   1900- ABG, CXR, nebs, and lasix ordered by pulm.

## 2024-02-25 NOTE — PROGRESS NOTES
[facility]     [facility] Infectious Disease Progress Note    Nelly Potter Patient Status:  Inpatient    1935 MRN [id number]   Location [facility] 4SW-A Attending Geno Kaur MD   Hosp Day # 5 PCP Balta Wright MD     Subjective:  Chart reviewed, no acute events.  Pt seen bedside with daughter present.  Pt less agitated this morning, however now on increased O2 support.  Currently 25L vapotherm.  Afebrile.  CXR last night stable.  Pt following most verbal commands, still intermittently confused. No moaning while bedside    Objective:    Allergies:  Allergies   Allergen Reactions    Myrbetriq [Mirabegron] NAUSEA ONLY and DIZZINESS    Codeine NAUSEA ONLY    Evista [Raloxifene Hydrochloride] RASH    Meclizine NAUSEA ONLY    Sulfamethoxazole-Trimethoprim DIZZINESS       Medications:    Current Facility-Administered Medications:     insulin aspart (NovoLOG) 100 Units/mL FlexPen 2-10 Units, 2-10 Units, Subcutaneous, 4 times per day    vancomycin (Vancocin) 1.25 g in sodium chloride 0.9% 250mL IVPB premix, 1,250 mg, Intravenous, Q24H    sodium chloride (hypertonic) 3 % nebulizer solution 3 mL, 3 mL, Nebulization, TID    ipratropium-albuterol (Duoneb) 0.5-2.5 (3) MG/3ML inhalation solution 3 mL, 3 mL, Nebulization, TID @ 0700, 1400, 2100    traMADol (Ultram) tab 50 mg, 50 mg, Oral, Q6H PRN    QUEtiapine (SEROquel) tab 25 mg, 25 mg, Oral, Nightly    melatonin tab 3 mg, 3 mg, Oral, Nightly    guaiFENesin (Mucinex) tab 400 mg, 400 mg, Oral, 6 times per day    glucose (Dex4) 15 GM/59ML oral liquid 15 g, 15 g, Oral, Q15 Min PRN **OR** glucose (Glutose) 40% oral gel 15 g, 15 g, Oral, Q15 Min PRN **OR** glucose-vitamin C (Dex-4) chewable tab 4 tablet, 4 tablet, Oral, Q15 Min PRN **OR** dextrose 50% injection 50 mL, 50 mL, Intravenous, Q15 Min PRN **OR** glucose (Dex4) 15 GM/59ML oral liquid 30 g, 30 g, Oral, Q15 Min PRN **OR** glucose (Glutose) 40% oral gel 30 g, 30 g, Oral, Q15 Min PRN **OR** glucose-vitamin  C (Dex-4) chewable tab 8 tablet, 8 tablet, Oral, Q15 Min PRN    dilTIAZem (cardIZEM) tab 60 mg, 60 mg, Per NG Tube, Q8H    thiamine (Vitamin B1) tab 100 mg, 100 mg, Per NG Tube, Daily    midodrine (ProAmatine) tab 5 mg, 5 mg, Oral, TID    docusate (Colace) 50 MG/5ML oral solution 100 mg, 100 mg, Per NG Tube, BID PRN    lansoprazole (Prevacid Solutab) disintegrating tab 30 mg, 30 mg, Oral, QAM AC    dextrose 10% infusion (TPN no rate), , Intravenous, Continuous PRN    pancrelipase (Lip-Prot-Amyl) (Zenpep) DR particles cap 10,000 Units, 10,000 Units, Per G Tube, PRN **AND** sodium bicarbonate tab 325 mg, 325 mg, Per G Tube, PRN    acetaminophen (Tylenol Extra Strength) tab 500 mg, 500 mg, Oral, Q4H PRN    acetaminophen (Tylenol) tab 650 mg, 650 mg, Oral, Q4H PRN **OR** [DISCONTINUED] HYDROcodone-acetaminophen (Norco) 5-325 MG per tab 1 tablet, 1 tablet, Oral, Q4H PRN **OR** [DISCONTINUED] HYDROcodone-acetaminophen (Norco) 5-325 MG per tab 2 tablet, 2 tablet, Oral, Q4H PRN    benzonatate (Tessalon) cap 200 mg, 200 mg, Oral, TID PRN    ondansetron (Zofran) 4 MG/2ML injection 4 mg, 4 mg, Intravenous, Q6H PRN    polyethylene glycol (PEG 3350) (Miralax) 17 g oral packet 17 g, 17 g, Oral, Daily PRN    sennosides (Senokot) tab 17.2 mg, 17.2 mg, Oral, Nightly PRN    bisacodyl (Dulcolax) 10 MG rectal suppository 10 mg, 10 mg, Rectal, Daily PRN    fleet enema (Fleet) 7-19 GM/118ML rectal enema 133 mL, 1 enema, Rectal, Once PRN    rivaroxaban (Xarelto) tab 20 mg, 20 mg, Oral, Daily with food    Physical Exam:  General:  awake,  following verbal commands more reliably    Vital Signs:  Blood pressure 100/59, pulse 101, temperature 97.9 °F (36.6 °C), temperature source Temporal, resp. rate 18, height 5' 2\" (1.575 m), weight 161 lb 6 oz (73.2 kg), SpO2 94%.   Temp (24hrs), Av.4 °F (36.9 °C), Min:97.9 °F (36.6 °C), Max:98.9 °F (37.2 °C)      HEENT: Exam is unremarkable.  Without scleral icterus.  Mucous membranes are moist.  PERRLA.  Oropharynx is clear.  Lungs: Coarse upper airway sounds,  non labored, +cough  Cardiac:  intermittently tachycardic   Abdomen:  Soft, non-distended, non-tender, with no rebound or guarding.    Extremities:  UE edema   Skin: Normal texture and turgor.  Neurologic: Cranial nerves are grossly intact.    Labs:  Lab Results   Component Value Date    WBC 9.7 02/25/2024    HGB 7.5 02/25/2024    HCT 23.4 02/25/2024    .0 02/25/2024    CREATSERUM 0.34 02/25/2024    BUN 32 02/25/2024     02/25/2024    K 3.9 02/25/2024     02/25/2024    CO2 31.0 02/25/2024     02/25/2024    CA 8.0 02/25/2024    MG 2.3 02/25/2024    PHOS 1.6 02/25/2024       Radiology:  CXR last night:  CONCLUSION:       Stable cardiac and mediastinal contours.  Mixed interstitial/alveolar opacities of the mid/lower lungs bilaterally may reflect edema versus infectious/inflammatory process, similar to prior.  Small left pleural effusion is decreased in volume compared to    02/21/2024.  No appreciable pneumothorax.      Enteric catheter extends into the upper abdomen beyond the field of view.     Assessment/Plan:    1.  MRSA bacteremia  -pt admitted with cough and SOB yesterday with recent hx of RSV  -2/2 blood cultures 2/20 growing MRSA  -repeat BC 2/21 NGTD  -source possible skin tears/wounds vs  pulmonary source (post viral bacterial pneumonia)   -MRSA nares positive   -TTE negative  -on IV Vancomycin and s/p IV Zosyn      2. Acute hypoxic respiratory failure  -recent hx of RSV  -CXR with multifocal pneumonia  -admitted with hypoxia  -increased O2 demands overnight, CXR stable. Possible mucus plugging.  Respiratory care ongoing with suctioning   -strep pneumo antigen negative  -no fevers  -steroids per pulm  -on IV Vancomycin and s/p IV Zosyn      3. Cystitis  -UA abnormal, no cultures  -if any fevers or rise in WBC will repeat UA and culture   -hx of ESBL E Coli and PSAR in past     DISPO:  Continue IV Vancomycin,  follow  temps and WBC.  Follow repeat blood cultures. Will follow with further recs    If you have any questions or concerns please call [facility] at [phone].     SARAI Morgan  2/24/2024  2:17 PM

## 2024-02-25 NOTE — PLAN OF CARE
Received pt drowsy, arousable to speech, on vapotherm. Pt only responding yes/no. Pupils equal and reactive. Able to follow commands. NT suctioning as needed. Tylenol and motrin given for pain. Dobhoff intact. TF at goal, tolerating. Purewick intact, monitoring output. Afebrile. VSS. Family at bedside and updated on plan of care. See flowsheets. See MAR.

## 2024-02-25 NOTE — PROGRESS NOTES
Nelly Potter Patient Status:  Inpatient    1935 MRN [id number]   Location [facility] 4SW-A Attending Geno Kaur MD   Hosp Day # 5 PCP Balta Wright MD     Critical Care Progress Note      Assessment/Plan:  Acute hypoxic respiratory failure: 2/2 RSV with suspected superimposed bacterial multifocal PNA.  Underlying hx of COPD  -Wean oxygen as able  - Antibiotics as below  Septic Shock: 2/2 PNA with MRSA bacteremia.  PCT mildly elevated. Now s/p vasopressors with improvement in blood pressure  -IVF  -Pna improved; stop stress dose steroids  -Home midodrine 5mg TID  -vanc/zosyn  -TTE with preserved EF, PASP: 30-35mmHg, no obvious valvular vegetations   -ID following  Multifocal PNA:   -RSV, +PCT suggesting superimposed bacterial pneumonia ?staph?  -vanc; abx per id  Multiple Myeloma - in acute remission  - per outpatient hematology/oncology  Encephalopathy - suspect delirium given waxing & waning mental status  - Decrease opioid pain medications  - Melatonin scheduled nightly  - Seroquel nightly  COPD - no acute exacerbation  -Duonebs  -O2 as needed  - Steroid as above  Afib  -Continue xarelto  -Restart PO cardizem  FEN:  - Speech following; diet per them  Proph:  -hep sub Q  Dispo:  -ICU; we will follow      DO [facility]  Pulmonary & Critical Care Medicine      Subjective:  Increased oxygen requirements overnight, less confused this morning and able to converse with me.    Objective:    Medications:   insulin aspart  2-10 Units Subcutaneous 4 times per day    sodium phosphate  15 mmol Intravenous Once    vancomycin  1,250 mg Intravenous Q24H    ipratropium-albuterol  3 mL Nebulization TID @ 0700, 1400, 2100    QUEtiapine  25 mg Oral Nightly    melatonin  3 mg Oral Nightly    sodium chloride (hypertonic)  3 mL Nebulization QID    guaiFENesin  400 mg Oral 6 times per day    dilTIAZem  60 mg Per NG Tube Q8H    thiamine  100 mg Per NG Tube Daily    midodrine  5 mg Oral  TID    lansoprazole  30 mg Oral QAM AC    rivaroxaban  20 mg Oral Daily with food       FiO2 (%):  [50 %] 50 %    Intake/Output Summary (Last 24 hours) at 2/25/2024 0924  Last data filed at 2/25/2024 0605  Gross per 24 hour   Intake 2599.9 ml   Output 900 ml   Net 1699.9 ml       /59   Pulse 101   Temp 97.9 °F (36.6 °C) (Temporal)   Resp 18   Ht 5' 2\" (1.575 m)   Wt 161 lb 6 oz (73.2 kg)   SpO2 95%   BMI 29.52 kg/m²   Physical Exam:   General: calm no distress   HEENT: lips, mucosa, tongue normal   Lungs: clear   Chest wall: No tenderness or deformity.   Heart: Regular rate and rhythm, normal S1S2, no murmur.   Abdomen: soft, non-tender, non-distended, positive BS.   Extremity: No clubbing or cyanosis no edema.   Skin: No rashes or lesions.    Recent Labs   Lab 02/25/24  0415   RBC 2.68*   HGB 7.5*   HCT 23.4*   MCV 87.3   MCH 28.0   MCHC 32.1   RDW 20.3   NEPRELIM 8.36*   WBC 9.7   .0*     Recent Labs   Lab 02/20/24  1218 02/20/24  2336 02/21/24  0957 02/22/24  0452 02/23/24  0533 02/24/24  0515 02/25/24  0415   * 193* 167*   < > 245* 218* 188*   BUN 36* 29* 30*   < > 26* 29* 32*   CREATSERUM 0.60 0.51* 0.53*   < > 0.53* 0.32* 0.34*   CA 10.0 9.2 8.5   < > 8.3* 8.2* 8.0*   ALB 1.9* 1.8* 1.7*  --   --   --   --    * 138 142   < > 141 140 142   K 2.8* 2.7* 3.9  3.9   < > 3.8 3.9  3.9 3.9   CL 90* 98 106   < > 110 109 109   CO2 35.0* 34.0* 34.0*   < > 28.0 31.0 31.0   ALKPHO 85 76 75  --   --   --   --    AST 6* 6* 4*  --   --   --   --    ALT 24 21 20  --   --   --   --    BILT 0.6 0.6 0.6  --   --   --   --    TP 5.6* 5.3* 5.2*  --   --   --   --     < > = values in this interval not displayed.     Recent Labs   Lab 02/24/24  1914   ABGPHT 7.53*   [id number] 38   [id number] 84   ABGHCO3 31.6*   ABGBE 8.5*   TEMP 98.6   FÉLIX Positive   SITE Right Radial   DEV HH HF NC   THGB 8.9*     No results for input(s): \"BNP\" in the last 168 hours.  Recent Labs   Lab 02/22/24  0452   CK 11*        Imaging: I independently visualized all relevant chest imaging in PACS, agree with radiology interpretation except where noted.

## 2024-02-25 NOTE — SLP NOTE
Ongoing attempt for dysphagia evaluation however not able to actively participate at this time per clinical d/w RN.  SLP will con't to follow and re-attempt when patient more appropriate for PO intake. Con't NPO and use alternate means for nutrition/hydration/medication.   Trice Bellamy, MS CCC-SLP/L, pager [phone]  Speech-Language Pathologist

## 2024-02-25 NOTE — PROGRESS NOTES
[facility] Hospitalist Progress Note     Nelly Potter Patient Status:  Inpatient    1935 MRN [id number]   Location [facility] 4SW-A Attending Castillo Mcgill MD   Hosp Day # 5 PCP Balta Wright MD     Chief Complaint:   Fu Sob, hypoxic    Subjective:     Patient seen and examined.   On highflow O2  More awake this morning  Responding to questions nodding head yes or no  daughter at bedside  Afebrile  2/2 blood cx +MRSA    Objective:    Review of Systems:   10 point ROS completed and was negative, except for pertinent positive and negatives stated in subjective.    Vital signs:  Temp:  [97.9 °F (36.6 °C)-98.9 °F (37.2 °C)] 98.9 °F (37.2 °C)  Pulse:  [] 103  Resp:  [10-34] 20  BP: ()/(61-83) 108/64  SpO2:  [81 %-99 %] 92 %  FiO2 (%):  [50 %] 50 %    Physical Exam:    General: No acute distress.   HEENT:  EOMI, PERRLA, OP clear  Respiratory: Clear to auscultation bilaterally. No wheezes. No rhonchi.  Cardiovascular: S1, S2. Regular rate and rhythm. No murmurs.  Abdomen: Soft, nontender, nondistended.  Positive bowel sounds. No rebound or guarding.  Extremities: No edema.  Neuro:  Grossly non focal, no motor deficits.        Diagnostic Data:    Labs:  Recent Labs   Lab 24  0957 24  0452 24  1243 24  0534 24  0515 24  0415   WBC 6.1 4.7  --  8.2 10.5 9.7   HGB 7.7* 6.7* 9.5* 9.6* 8.8* 7.5*   MCV 83.2 85.9  --  85.7 86.1 87.3   .0 172.0  --  166.0 159.0 131.0*   BAND 8  --   --  4 6 7       Recent Labs   Lab 24  1218 24  2336 24  0957 24  0452 24  0533 24  0515 24  0415   * 193* 167*   < > 245* 218* 188*   BUN 36* 29* 30*   < > 26* 29* 32*   CREATSERUM 0.60 0.51* 0.53*   < > 0.53* 0.32* 0.34*   CA 10.0 9.2 8.5   < > 8.3* 8.2* 8.0*   ALB 1.9* 1.8* 1.7*  --   --   --   --    * 138 142   < > 141 140 142   K 2.8* 2.7* 3.9  3.9   < > 3.8 3.9  3.9 3.9   CL 90* 98  106   < > 110 109 109   CO2 35.0* 34.0* 34.0*   < > 28.0 31.0 31.0   ALKPHO 85 76 75  --   --   --   --    AST 6* 6* 4*  --   --   --   --    ALT 24 21 20  --   --   --   --    BILT 0.6 0.6 0.6  --   --   --   --    TP 5.6* 5.3* 5.2*  --   --   --   --     < > = values in this interval not displayed.       Estimated Creatinine Clearance: 92.2 mL/min (A) (based on SCr of 0.34 mg/dL (L)).    No results for input(s): \"PTP\", \"INR\" in the last 168 hours.         COVID-19 Lab Results    COVID-19  Lab Results   Component Value Date    COVID19 Not Detected 02/21/2024    COVID19 Not Detected 02/20/2024    COVID19 Not Detected 02/06/2024       Pro-Calcitonin  Recent Labs   Lab 02/20/24  1218   PCT 0.24*       Cardiac  Recent Labs   Lab 02/20/24  1218   PBNP 578*       Creatinine Kinase  Recent Labs   Lab 02/22/24  0452   CK 11*       Inflammatory Markers  No results for input(s): \"CRP\", \"KARUNA\", \"LDH\", \"DDIMER\" in the last 168 hours.    Imaging: Imaging data reviewed in Epic.    Medications:    insulin aspart  2-10 Units Subcutaneous 4 times per day    sodium phosphate  15 mmol Intravenous Once    ipratropium-albuterol  3 mL Nebulization TID @ 0700, 1400, 2100    QUEtiapine  25 mg Oral Nightly    melatonin  3 mg Oral Nightly    sodium chloride (hypertonic)  3 mL Nebulization QID    guaiFENesin  400 mg Oral 6 times per day    dilTIAZem  60 mg Per NG Tube Q8H    thiamine  100 mg Per NG Tube Daily    midodrine  5 mg Oral TID    vancomycin  1,000 mg Intravenous Q24H    lansoprazole  30 mg Oral QAM AC    rivaroxaban  20 mg Oral Daily with food       Assessment & Plan:    Nelly Potter is a 88 year old female with PMH significant for/anxiety, GERD, A-fib on Xarelto, HTN, DL, COPD, asthma, bladder CA, multiple myeloma, vertigo, dementia with short-term memory issues who presents from [facility] with reports of shortness of breath and a wet cough.      Cough, sob - 2/2 RSV infection +/- multifocal PNA  Acute hypoxic resp  failure 2/2 above  Asthma/COPD exacerbation   -RSV + in ED  -cxr reviewed >> multifocal pneumonia  -supplemental O2 via vapotherm  >> weaned down to 80% >> cont to wean as able  -check ABG  -nebs prn  -may benefit from IV steroids  -cont iv abx  -blood cx x 2  -ST, PT, OT    Shock - presumably related to sepsis: 2/2 bacteremia, pneumonia  Lactic acidosis - resolved  -Was on norepi but currently off and BP stable  -stress steroids  -2/2 blood cx growing MRSA  -MRSA bacteremia >> ID consulted >> rec BRENNA   -cardiology consulted >> apprec recs >> family opting to not go for BRENNA  -TTE w.o obv vegetations  -surveillance cx NTD    AMS 2/2 ICU delirium most likely  -helpful to keep awake during day and asleep at  night  -minimal sedation during the day if possible  -pain control with tylenol    Anemia  -hgb dropped to 6.7 >> 9.6 >> 8.8  -tx prbc  -maintain hgb >7     Troponin elevated - possibly related to demand ischemia  -will trend  -may need cardiology involvement      GERD  -PPI     HTN   -resume home meds if able  -monitor BP closely     DL  -statin     Afib on xarelto  -resume xarelto and po diltiazem     Depression, anxiety  -cont zoloft     Bladder ca  Multiple myeloma  Dementia  Vertigo  -stable chronic conditions        Quality:  DVT Prophylaxis: xarelto, scds  CODE status: DNR, select  Lentz: no  If COVID testing is negative, may discontinue isolation: yes     DISPO:  Spoke with son and daughter  All questions answered  Clinically improving slowly    DNR/DNI - ok for selective treatment >> confirmed this with son and daughter           Geno Nichole Hospitalist  Pager [phone]  Answering Service number: [phone]          **Certification      PHYSICIAN Certification of Need for Inpatient Hospitalization - Initial Certification    Patient will require inpatient services that will reasonably be expected to span two midnight's based on the clinical documentation in H+P.   Based on patients current  state of illness, I anticipate that, after discharge, patient will require TBD.

## 2024-02-25 NOTE — PLAN OF CARE
Received patient with closed eyes, open eyes to her name, nodded head no to pain, when repositioned moans, nonverbal, moves left arm more than right, when restraint off, patient is pulling on vapotherm, soft wrist restraint reapplied only to left arm.       Problem: RESPIRATORY - ADULT  Goal: Achieves optimal ventilation and oxygenation  Description: INTERVENTIONS:  - Assess for changes in respiratory status  - Assess for changes in mentation and behavior  - Position to facilitate oxygenation and minimize respiratory effort  - Oxygen supplementation based on oxygen saturation or ABGs  - Provide Smoking Cessation handout, if applicable  - Encourage broncho-pulmonary hygiene including cough, deep breathe, Incentive Spirometry  - Assess the need for suctioning and perform as needed  - Assess and instruct to report SOB or any respiratory difficulty  - Respiratory Therapy support as indicated  - Manage/alleviate anxiety  - Monitor for signs/symptoms of CO2 retention  Outcome: Progressing     Problem: CARDIOVASCULAR - ADULT  Goal: Maintains optimal cardiac output and hemodynamic stability  Description: INTERVENTIONS:  - Monitor vital signs, rhythm, and trends  - Monitor for bleeding, hypotension and signs of decreased cardiac output  - Evaluate effectiveness of vasoactive medications to optimize hemodynamic stability  - Monitor arterial and/or venous puncture sites for bleeding and/or hematoma  - Assess quality of pulses, skin color and temperature  - Assess for signs of decreased coronary artery perfusion - ex. Angina  - Evaluate fluid balance, assess for edema, trend weights  Outcome: Progressing     Problem: GENITOURINARY - ADULT  Goal: Absence of urinary retention  Description: INTERVENTIONS:  - Assess patient’s ability to void and empty bladder  - Monitor intake/output and perform bladder scan as needed  - Follow urinary retention protocol/standard of care  - Consider collaborating with pharmacy to review patient's  medication profile  - Implement strategies to promote bladder emptying  Outcome: Progressing     Problem: METABOLIC/FLUID AND ELECTROLYTES - ADULT  Goal: Glucose maintained within prescribed range  Description: INTERVENTIONS:  - Monitor Blood Glucose as ordered  - Assess for signs and symptoms of hyperglycemia and hypoglycemia  - Administer ordered medications to maintain glucose within target range  - Assess barriers to adequate nutritional intake and initiate nutrition consult as needed  - Instruct patient on self management of diabetes  Outcome: Progressing     Problem: Safety Risk - Non-Violent Restraints  Goal: Patient will remain free from self-harm  Description: INTERVENTIONS:  - Apply the least restrictive restraint to prevent harm  - Notify patient and family of reasons restraints applied  - Assess for any contributing factors to confusion (electrolyte disturbances, delirium, medications)  - Discontinue any unnecessary medical devices as soon as possible  - Assess the patient's physical comfort, circulation, skin condition, hydration, nutrition and elimination needs   - Reorient and redirection as needed  - Assess for the need to continue restraints  Outcome: Progressing

## 2024-02-25 NOTE — PROGRESS NOTES
Pharmacy dosing service    Follow-up Pharmacokinetic Consult for Vancomycin Dosing     Nelly Potter is a 88 year old female who is being treated for pneumonia.  Patient is on day 5 of Vancomycin 1000 mg IV Q 24 hours.  Goal trough is ?15 ug/mL.  Lab Results   Component Value Date/Time    VANCT 10.5 02/24/2024 07:59 PM     She is allergic to myrbetriq [mirabegron], codeine, evista [raloxifene hydrochloride], meclizine, and sulfamethoxazole-trimethoprim.    Other current Anti-infective Medications: N/A     Actual weight:  71.8 kg     Vitals: /72   Pulse 88   Temp 98.3 °F (36.8 °C) (Temporal)   Resp 17   Ht 1.575 m (5' 2\")   Wt 71.8 kg (158 lb 4.6 oz)   SpO2 97%   BMI 28.95 kg/m²     Labs:   Lab Results   Component Value Date    CREATSERUM 0.32 02/24/2024    WBC 10.5 02/24/2024      CrCl:  Estimated Creatinine Clearance: 98 mL/min (A) (based on SCr of 0.32 mg/dL (L)).    Intake/Output Summary (Last 24 hours) at 2/24/2024 2153  Last data filed at 2/24/2024 2000  Gross per 24 hour   Intake 1753.9 ml   Output 500 ml   Net 1253.9 ml     Cultures:  - 2/21 blood ngtd   - 2/20 blood MRSA   - 2/20 RSV+     Date/Time last dose was given: 2/23 @2020    Based on the above:    1.  Continue Vancomycin to 1000 mg IVPB Q 24 hours (based on Trough of 10.5 ug/mL and Estimated Creatinine Clearance: 98 mL/min (A) (based on SCr of 0.32 mg/dL (L)).    2.  Pharmacy will re-check Vancomycin trough level(s) prior to 2/29 dose.  Goal trough level 10-15 ug/mL.    3.  Pharmacy will need BUN/Scr daily while on Vancomycin to assess renal function.    4.  Pharmacy will follow and monitor renal function changes, toxicity and efficacy.    Pharmacy will continue to follow her.  We appreciate the opportunity to assist in her care.    Dwight Rizzo, PharmD  2/24/2024  9:53 PM  Pharmacy Extension: [phone]

## 2024-02-25 NOTE — PROGRESS NOTES
ADDENDED NOTE  RE: AUC DOSING         Formerly [facility], later [facility] Pharmacy Dosing Service      Follow Up Pharmacokinetic Consult for Vancomycin Dosing     Nelly Potter is a 88 year old female who is receiving vancomycin therapy for sepsis, pneumonia and MRSA bacteremia. Patient is on day 5 of vancomycin and is currently receiving 1000 mg IV every 24 hours. The current treatment and monitoring approach is steady state AUC strategy.        Weight and Temperature:    Wt Readings from Last 1 Encounters:   24 73.2 kg (161 lb 6 oz)         Temp Readings from Last 1 Encounters:   24 98.9 °F (37.2 °C) (Temporal)      Labs:   Recent Labs   Lab 24  0533 24  0515 24  0415   CREATSERUM 0.53* 0.32* 0.34*      Estimated Creatinine Clearance: 92.2 mL/min (A) (based on SCr of 0.34 mg/dL (L)).     Recent Labs   Lab 24  0534 24  0515 24  0415   WBC 8.2 10.5 9.7        Vancomycin Levels:  Lab Results   Component Value Date/Time    VANCT 10.5 2024 07:59 PM    VANCP 25.0 (L) 2024 10:57 PM       Corresponding 24 h-AUC:  416 mg-h/L     The Pharmacokinetic Target is:     to 600 mg-h/L and trough <=15 mg/L    Renal Dosing Considerations:    None     Assessment/Plan:   Maintenance Regimen: Increase dose to vancomycin 1250 mg IV every 24 hours.    Monitorin) Plan for vancomycin trough to be obtained at steady state.    2) Pharmacy will order SCr as clinically indicated to assess renal function.    3) Pharmacy will monitor for toxicity and efficacy, adjust vancomycin dose and/or frequency, and order vancomycin levels as appropriate per the Pharmacy and Therapeutics Committee approved protocol until discontinuation of the medication.       We appreciate the opportunity to assist in the care of this patient.     Keren Leon, PharmD  2024  8:30 AM  [facility] Extension: [phone]

## 2024-02-26 PROBLEM — Z51.5 PALLIATIVE CARE ENCOUNTER: Status: ACTIVE | Noted: 2024-01-01

## 2024-02-26 PROBLEM — Z71.89 COUNSELING REGARDING ADVANCE CARE PLANNING AND GOALS OF CARE: Status: ACTIVE | Noted: 2024-01-01

## 2024-02-26 NOTE — PROGRESS NOTES
[facility]     [facility] Consult    Nelly Potter Patient Status:  Inpatient    1935 MRN [id number]   Location [facility] 4SW-A Attending Geno Kaur MD   Hosp Day # 6 PCP MD Nelly Lewis seen and examined,   Afebrile,   Previous entries noted,   On high Flow O2  Not interactive,       History:  Past Medical History:   Diagnosis Date    Allergic rhinitis     Anxiety     Chronic midline low back pain without sciatica 10/13/2021    Constipation     Depression     Esophageal reflux     History of bladder cancer 2012    Incontinence     Long QT interval     per pt. hx of long QT.    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 Mathews    CYSTOURETHROSCOPY  10/24/12    [facility] Cysto Dr. Mathews     CYSTOURETHROSCOPY  13, 13, 13, 14, 5/6/15, 11/11/15    cysto- Dr. Mathews, [place]    CYSTOURETHROSCOPY  10/11/2017    cysto Dr. mathews    CYSTOURETHROSCOPY,FULGUR .5-2CM LESN  12    cystourethroscopy, TURBT x 2- Dr. Mathews    FOOT SURGERY Left 10/25/11    lt foot    HIP REPLACEMENT SURGERY      left    HYSTERECTOMY      KNEE REPLACEMENT SURGERY Bilateral     OTHER SURGICAL HISTORY       arthroscopic left knee    PART EXCIS 5TH METATARSAL HEAD Right 10/2/2014    Procedure: TAILOR'S BUNIONECTOMY;  Surgeon: Adryan Prajapati DPM;  Location: [facility]    PATIENT DOCUMENTED NOT TO HAVE EXPERIENCED ANY OF THE FOLLOWING EVENTS Right 10/2/2014     Procedure: TAILOR'S BUNIONECTOMY;  Surgeon: Adryan Prajapati DPM;  Location: [facility]    PATIENT WITH PREOPERATIVE ORDER FOR IV ANTIBIOTIC SURGICAL SITE INFECT Right 10/2/2014    Procedure: TAILOR'S BUNIONECTOMY;  Surgeon: Adryan Prajapati DPM;  Location: [facility]    REPAIR OF ALETA,ONE Left 10/2/2014    Procedure: ARTHROPLASTY ONE (1) TOE;  Surgeon: Adryan Prajapati DPM;  Location: [facility]    SINUS SURGERY    1983 & 1999    sinus    SLING OPER STRES INCONTINENCE  8/06    sling    SLING OPER STRES INCONTINENCE  10/2011    sling repeat. TVT Exact. Dr. Dang at [facility]    TONSILLECTOMY       Family History   Problem Relation Age of Onset    Other (Other) Son         CF    Other (Other) Son         CF      reports that she has never smoked. She has never used smokeless tobacco. She reports current alcohol use. She reports that she does not use drugs.    Allergies:  Allergies   Allergen Reactions    Myrbetriq [Mirabegron] NAUSEA ONLY and DIZZINESS    Codeine NAUSEA ONLY    Evista [Raloxifene Hydrochloride] RASH    Meclizine NAUSEA ONLY    Sulfamethoxazole-Trimethoprim DIZZINESS       Medications:    Current Facility-Administered Medications:     potassium chloride (Klor-Con) 20 MEQ oral powder 40 mEq, 40 mEq, Oral, Q4H    piperacillin-tazobactam (Zosyn) 3.375 g in dextrose 5% 100 mL IVPB-ADDV, 3.375 g, Intravenous, Q8H    QUEtiapine (SEROquel) partial tab 12.5 mg, 12.5 mg, Oral, Nightly    [START ON 2/27/2024] dexamethasone (Decadron) tab 3.75 mg, 3.75 mg, Per NG Tube, Daily with breakfast    insulin aspart (NovoLOG) 100 Units/mL FlexPen 2-10 Units, 2-10 Units, Subcutaneous, 4 times per day    vancomycin (Vancocin) 1.25 g in sodium chloride 0.9% 250mL IVPB premix, 1,250 mg, Intravenous, Q24H    sodium chloride (hypertonic) 3 % nebulizer solution 3 mL, 3 mL, Nebulization, TID    ibuprofen (Motrin) 100 MG/5ML oral suspension 400 mg, 400 mg, Per NG Tube, Q4H PRN    ipratropium-albuterol (Duoneb) 0.5-2.5 (3)  MG/3ML inhalation solution 3 mL, 3 mL, Nebulization, TID @ 0700, 1400, 2100    traMADol (Ultram) tab 50 mg, 50 mg, Oral, Q6H PRN    guaiFENesin (Mucinex) tab 400 mg, 400 mg, Oral, 6 times per day    glucose (Dex4) 15 GM/59ML oral liquid 15 g, 15 g, Oral, Q15 Min PRN **OR** glucose (Glutose) 40% oral gel 15 g, 15 g, Oral, Q15 Min PRN **OR** glucose-vitamin C (Dex-4) chewable tab 4 tablet, 4 tablet, Oral, Q15 Min PRN **OR** dextrose 50% injection 50 mL, 50 mL, Intravenous, Q15 Min PRN **OR** glucose (Dex4) 15 GM/59ML oral liquid 30 g, 30 g, Oral, Q15 Min PRN **OR** glucose (Glutose) 40% oral gel 30 g, 30 g, Oral, Q15 Min PRN **OR** glucose-vitamin C (Dex-4) chewable tab 8 tablet, 8 tablet, Oral, Q15 Min PRN    dilTIAZem (cardIZEM) tab 60 mg, 60 mg, Per NG Tube, Q8H    midodrine (ProAmatine) tab 5 mg, 5 mg, Oral, TID    docusate (Colace) 50 MG/5ML oral solution 100 mg, 100 mg, Per NG Tube, BID PRN    lansoprazole (Prevacid Solutab) disintegrating tab 30 mg, 30 mg, Oral, QAM AC    dextrose 10% infusion (TPN no rate), , Intravenous, Continuous PRN    pancrelipase (Lip-Prot-Amyl) (Zenpep) DR particles cap 10,000 Units, 10,000 Units, Per G Tube, PRN **AND** sodium bicarbonate tab 325 mg, 325 mg, Per G Tube, PRN    acetaminophen (Tylenol Extra Strength) tab 500 mg, 500 mg, Oral, Q4H PRN    acetaminophen (Tylenol) tab 650 mg, 650 mg, Oral, Q4H PRN **OR** [DISCONTINUED] HYDROcodone-acetaminophen (Norco) 5-325 MG per tab 1 tablet, 1 tablet, Oral, Q4H PRN **OR** [DISCONTINUED] HYDROcodone-acetaminophen (Norco) 5-325 MG per tab 2 tablet, 2 tablet, Oral, Q4H PRN    benzonatate (Tessalon) cap 200 mg, 200 mg, Oral, TID PRN    ondansetron (Zofran) 4 MG/2ML injection 4 mg, 4 mg, Intravenous, Q6H PRN    polyethylene glycol (PEG 3350) (Miralax) 17 g oral packet 17 g, 17 g, Oral, Daily PRN    sennosides (Senokot) tab 17.2 mg, 17.2 mg, Oral, Nightly PRN    bisacodyl (Dulcolax) 10 MG rectal suppository 10 mg, 10 mg, Rectal, Daily PRN     fleet enema (Fleet) 7-19 GM/118ML rectal enema 133 mL, 1 enema, Rectal, Once PRN    [Held by provider] rivaroxaban (Xarelto) tab 20 mg, 20 mg, Oral, Daily with food    Review of Systems:   Constitutional: Negative for anorexia, chills, fatigue, fevers, malaise, night sweats and weight loss.  Eyes: Negative for visual disturbance, irritation and redness.  Ears, nose, mouth, throat, and face: Negative for hearing loss, tinnitus, nasal congestion, snoring, sore throat, hoarseness and voice change.  Respiratory: Negative for cough, sputum, hemoptysis, chest pain, wheezing, dyspnea on exertion, or stridor.  Cardiovascular: Negative for chest pain, palpitations, irregular heart beats, syncope, fatigue, orthopnea, paroxysmal nocturnal dyspnea, lower extremity edema.  Gastrointestinal: Negative for dysphagia, odynophagia, reflux symptoms, nausea, vomiting, change in bowel habits, diarrhea, constipation and abdominal pain.  Integument/breast: Negative for rash, skin lesions, and pruritus.  Hematologic/lymphatic: Negative for easy bruising, bleeding, and lymphadenopathy.  Musculoskeletal: Negative for myalgias, arthralgias, muscle weakness.  Neurological: Negative for headaches, dizziness, seizures, memory problems, trouble swallowing, speech problems, gait problems and weakness.  Behavioral/Psych: Negative for active tobacco use.  Endocrine: No history of of diabetes, thyroid disorder.  Deferred,     Vital signs in last 24 hours:  Patient Vitals for the past 24 hrs:   BP Temp Temp src Pulse Resp SpO2 Weight   02/21/24 1500 118/72 -- -- 84 15 100 % --   02/21/24 1400 119/66 -- -- 84 16 100 % --   02/21/24 1300 106/90 -- -- 84 20 100 % --   02/21/24 1218 -- -- -- -- -- 100 % --   02/21/24 1200 114/59 98 °F (36.7 °C) Temporal 82 20 100 % --   02/21/24 1100 123/76 -- -- 79 17 100 % --   02/21/24 1000 121/66 -- -- 77 16 100 % --   02/21/24 0942 115/63 -- -- 75 15 100 % --   02/21/24 0900 116/80 -- -- 79 (!) 29 98 % --   02/21/24  0833 117/63 97.5 °F (36.4 °C) Temporal 88 (!) 32 91 % --   02/21/24 0814 -- -- -- -- -- (!) 85 % --   02/21/24 0805 -- -- -- 74 (!) 27 (!) 87 % --   02/21/24 0800 -- -- -- 77 (!) 31 (!) 80 % --   02/21/24 0755 -- -- -- 77 (!) 29 (!) 75 % --   02/21/24 0715 112/78 -- -- 69 20 100 % --   02/21/24 0700 97/58 -- -- 74 21 100 % --   02/21/24 0645 111/62 -- -- 72 25 98 % --   02/21/24 0630 111/62 -- -- 77 19 96 % --   02/21/24 0615 115/63 -- -- 75 (!) 27 (!) 88 % --   02/21/24 0600 112/59 98 °F (36.7 °C) Temporal 76 (!) 34 95 % --   02/21/24 0545 129/62 -- -- 74 20 93 % --   02/21/24 0530 121/89 -- -- 86 (!) 27 97 % --   02/21/24 0515 119/73 -- -- 73 25 97 % --   02/21/24 0500 119/62 -- -- 73 (!) 34 93 % --   02/21/24 0445 102/66 -- -- 76 (!) 32 98 % --   02/21/24 0430 121/72 -- -- 73 22 98 % --   02/21/24 0415 99/82 -- -- 78 25 98 % --   02/21/24 0410 99/82 -- -- 70 14 100 % --   02/21/24 0400 99/63 98 °F (36.7 °C) -- 70 18 93 % 147 lb 11.3 oz (67 kg)   02/21/24 0345 (!) 110/96 -- -- 85 17 98 % --   02/21/24 0330 112/64 -- -- 70 16 100 % --   02/21/24 0315 107/63 -- -- 68 10 100 % --   02/21/24 0300 102/66 -- -- 67 11 100 % --   02/21/24 0245 106/63 -- -- 74 13 100 % --   02/21/24 0230 101/61 -- -- 70 12 100 % --   02/21/24 0215 108/61 -- -- 89 14 100 % --   02/21/24 0200 109/60 -- -- 82 17 100 % --   02/21/24 0147 -- -- -- 96 17 100 % --   02/21/24 0145 119/71 -- -- 91 14 100 % --   02/21/24 0130 -- -- -- 79 19 100 % --   02/21/24 0115 105/65 -- -- 76 12 100 % --   02/21/24 0100 (!) 81/59 -- -- 90 15 100 % --   02/21/24 0045 90/51 -- -- 88 22 100 % --   02/21/24 0030 90/68 -- -- 87 17 95 % --   02/21/24 0015 92/54 -- -- 92 13 99 % --   02/21/24 0000 92/45 98.2 °F (36.8 °C) Temporal 95 13 99 % --   02/20/24 2356 92/45 -- -- 95 15 100 % --   02/20/24 2345 94/47 -- -- 97 16 100 % --   02/20/24 2330 (!) 83/56 -- -- 85 20 99 % --   02/20/24 2315 99/52 -- -- 95 18 99 % --   02/20/24 2300 100/71 -- -- 93 18 98 % --   02/20/24  2245 93/64 -- -- 82 11 99 % --   02/20/24 2230 92/67 -- -- 87 13 98 % --   02/20/24 2215 106/50 -- -- 85 13 98 % --   02/20/24 2200 111/48 -- -- 93 18 92 % --   02/20/24 2147 105/50 -- -- 86 15 95 % --   02/20/24 2145 -- -- -- 88 16 95 % --   02/20/24 2130 100/67 -- -- 86 18 (!) 88 % --   02/20/24 2115 (!) 88/27 -- -- 103 25 94 % --   02/20/24 2100 114/64 -- -- 103 17 98 % --   02/20/24 2045 104/68 -- -- 95 15 98 % --   02/20/24 2030 106/55 -- -- 105 14 95 % --   02/20/24 2000 113/66 98.3 °F (36.8 °C) -- 91 22 96 % --   02/20/24 1900 104/65 -- -- 82 21 100 % --   02/20/24 1845 103/65 -- -- 78 15 97 % --   02/20/24 1830 (!) 89/66 -- -- 89 16 98 % --   02/20/24 1820 100/71 -- -- 90 17 98 % --   02/20/24 1815 (!) 86/68 -- -- 86 16 (!) 88 % --   02/20/24 1801 -- -- -- -- -- 94 % --   02/20/24 1800 105/72 -- -- 95 26 96 % --   02/20/24 1757 -- -- -- 93 21 93 % --   02/20/24 1746 -- -- -- -- -- 90 % --   02/20/24 1745 109/70 -- -- 100 21 (!) 81 % --   02/20/24 1730 102/54 -- -- 103 21 (!) 88 % --   02/20/24 1715 108/61 -- -- 96 22 92 % --   02/20/24 1700 108/52 -- -- 84 13 96 % --   02/20/24 1645 102/62 -- -- 88 15 98 % --   02/20/24 1642 -- -- -- 84 13 97 % --   02/20/24 1630 108/67 -- -- 90 14 100 % --   02/20/24 1615 108/72 -- -- 92 20 (!) 88 % --   02/20/24 1600 114/67 98.4 °F (36.9 °C) Temporal 97 21 92 % --       Physical Exam:   General: alert, cooperative, oriented.  No respiratory distress.   Head: Normocephalic, without obvious abnormality, atraumatic.   Eyes: Conjunctivae/corneas clear.  No scleral icterus.  No conjunctival     hemorrhage.   Nose: Nares normal.   Throat: Lips, mucosa, and tongue normal.  No thrush noted.   Neck: Soft, supple neck; trachea midline, no adenopathy, no thyromegaly.   Lungs: CTAB, normal and equal bilateral chest rise   Chest wall: No tenderness or deformity.   Heart: Regular rate and rhythm, normal S1S2, no murmur.   Abdomen: soft, + tender, non-distended, no masses, no guarding,  no     rebound, positive BS.   Extremity: no edema, no cyanosis   Skin: No rashes or lesions.   Neurological: Alert, interactive, no focal deficits    Labs:  Lab Results   Component Value Date    WBC 6.7 02/26/2024    HGB 6.0 02/26/2024    HCT 19.1 02/26/2024    PLT 99.0 02/26/2024    CREATSERUM 0.46 02/26/2024    BUN 25 02/26/2024     02/26/2024    K 3.3 02/26/2024     02/26/2024    CO2 33.0 02/26/2024     02/26/2024    CA 7.8 02/26/2024    PHOS 2.7 02/26/2024       Radiology:  CT 2/06 CONCLUSION:  There is a large amount of fecal material centered on the distal sigmoid colon and rectum most likely representing constipation/fecal impaction.  There is some possible wall thickening of the colon in this region which could represent   stercoral colitis.     CXR 2/21 Stable cardiac and mediastinal contours.  No significant interval change in mixed interstitial/alveolar opacities in a perihilar distribution throughout the lungs.  Persistent confluent opacification of the left lung base, likely a combination of mild   pleural effusion and passive atelectasis, similar to prior.  No pneumothorax.         Cultures:  Reviewed     Assessment and Plan:    1.  Sepsis with MRSA in blood cul on admission with Acute Resp failure:  - Blood cul with MRSA In 2/2 bottles on admission, repeats are NGTD  - CXR with multifocal Pneumonia,   - RVP with RSV + likely previous infection,   - Possible Secondary bacterial Pneumonia following RSV, Rule out Aspiration,   - On IV Vanc,  pharm to dose, 2/21- present,   - Restarted on IV Zosyn today too, 2/26/24 with worsening FiO2,   - TTE with no vegetation,   - Steroids per Pulm service,     2.    Cystitis: UA is mildly abn, Historically cul with E coli ( ESBL) and PSAR,     3.     Abd tenderness: Can be sec to Cystitis vs impacted stool seen on CT .  - Will get stool for C diff if any diarrhea,     4.     Disposition: in house, an elderly female with MRSA Sepsis, possible  secondary bacterial Pneumonia following RSV, now on supplemental O2 which worsened a little, also with anemia,    - Follow pending cul,   - Continue IV Vanc pharm to dose, Will plan for atleast six weeks of IV abx  - Continue IV Zosyn, pharm to dose,   - Pulm toilette,   - transfusion prn,   - Supportive care,     Discussed with RN, all questions answered, further recommendations to follow, Thanks,      Thank you for consulting DM ID for Nelly Potter.  If you have any questions or concerns please call [facility] at [phone].     Vinay Stewart MD  2/21/2024  3:47 PM

## 2024-02-26 NOTE — PLAN OF CARE
Received patient resting in the bed, knows her name, follows simple commands, said no to pain, smile, on vapotherm 35L/60%, saturation 92-98%, during nasal suctioning saturation down to 70's % and quickly recovers above 90 %. Daughter at bedside, plan of care discussed, support provided.       Problem: RESPIRATORY - ADULT  Goal: Achieves optimal ventilation and oxygenation  Description: INTERVENTIONS:  - Assess for changes in respiratory status  - Assess for changes in mentation and behavior  - Position to facilitate oxygenation and minimize respiratory effort  - Oxygen supplementation based on oxygen saturation or ABGs  - Provide Smoking Cessation handout, if applicable  - Encourage broncho-pulmonary hygiene including cough, deep breathe, Incentive Spirometry  - Assess the need for suctioning and perform as needed  - Assess and instruct to report SOB or any respiratory difficulty  - Respiratory Therapy support as indicated  - Manage/alleviate anxiety  - Monitor for signs/symptoms of CO2 retention  Outcome: Progressing     Problem: CARDIOVASCULAR - ADULT  Goal: Maintains optimal cardiac output and hemodynamic stability  Description: INTERVENTIONS:  - Monitor vital signs, rhythm, and trends  - Monitor for bleeding, hypotension and signs of decreased cardiac output  - Evaluate effectiveness of vasoactive medications to optimize hemodynamic stability  - Monitor arterial and/or venous puncture sites for bleeding and/or hematoma  - Assess quality of pulses, skin color and temperature  - Assess for signs of decreased coronary artery perfusion - ex. Angina  - Evaluate fluid balance, assess for edema, trend weights  Outcome: Progressing     Problem: GASTROINTESTINAL - ADULT  Goal: Achieves appropriate nutritional intake (bariatric)  Description: INTERVENTIONS:  - Monitor for over-consumption  - Identify factors contributing to increased intake, treat as appropriate  - Monitor I&O, WT and lab values  - Obtain nutritional  consult as needed  - Evaluate psychosocial factors contributing to over-consumption  Outcome: Progressing     Problem: GENITOURINARY - ADULT  Goal: Absence of urinary retention  Description: INTERVENTIONS:  - Assess patient’s ability to void and empty bladder  - Monitor intake/output and perform bladder scan as needed  - Follow urinary retention protocol/standard of care  - Consider collaborating with pharmacy to review patient's medication profile  - Implement strategies to promote bladder emptying  Outcome: Progressing     Problem: METABOLIC/FLUID AND ELECTROLYTES - ADULT  Goal: Glucose maintained within prescribed range  Description: INTERVENTIONS:  - Monitor Blood Glucose as ordered  - Assess for signs and symptoms of hyperglycemia and hypoglycemia  - Administer ordered medications to maintain glucose within target range  - Assess barriers to adequate nutritional intake and initiate nutrition consult as needed  - Instruct patient on self management of diabetes  Outcome: Progressing     Problem: SKIN/TISSUE INTEGRITY - ADULT  Goal: Skin integrity remains intact  Description: INTERVENTIONS  - Assess and document risk factors for pressure ulcer development  - Assess and document skin integrity  - Monitor for areas of redness and/or skin breakdown  - Initiate interventions, skin care algorithm/standards of care as needed  Outcome: Progressing     Problem: Safety Risk - Non-Violent Restraints  Goal: Patient will remain free from self-harm  Description: INTERVENTIONS:  - Apply the least restrictive restraint to prevent harm  - Notify patient and family of reasons restraints applied  - Assess for any contributing factors to confusion (electrolyte disturbances, delirium, medications)  - Discontinue any unnecessary medical devices as soon as possible  - Assess the patient's physical comfort, circulation, skin condition, hydration, nutrition and elimination needs   - Reorient and redirection as needed  - Assess for the  need to continue restraints  Outcome: Progressing      Ketoconazole Counseling:   Patient counseled regarding improving absorption with orange juice.  Adverse effects include but are not limited to breast enlargement, headache, diarrhea, nausea, upset stomach, liver function test abnormalities, taste disturbance, and stomach pain.  There is a rare possibility of liver failure that can occur when taking ketoconazole. The patient understands that monitoring of LFTs may be required, especially at baseline. The patient verbalized understanding of the proper use and possible adverse effects of ketoconazole.  All of the patient's questions and concerns were addressed.

## 2024-02-26 NOTE — PROGRESS NOTES
02/26/24 1257   Oxygen Utilization   $ RT Standby Charge (per 15 min) 1   O2 Device High flow/High humidity   O2 Flow Rate (L/min) (S)  30 L/min   FiO2 (%) 50 %   SpO2 95 %   Humidity High   [place] Filled     Pt remain stable on vapotherm with above setting. Pt having thick tan secretions with nasal suctioning.

## 2024-02-26 NOTE — PROGRESS NOTES
Daily Pulmonary/ICU/Critical Care Progress Note          SUBJECTIVE:  On vapotherm 35L/70%.  Hg down to 6. Being transfused.   Afebrile.       ALLERGIES:  Allergies   Allergen Reactions    Myrbetriq [Mirabegron] NAUSEA ONLY and DIZZINESS    Codeine NAUSEA ONLY    Evista [Raloxifene Hydrochloride] RASH    Meclizine NAUSEA ONLY    Sulfamethoxazole-Trimethoprim DIZZINESS         MEDS:  Home Medications:  Outpatient Medications Marked as Taking for the 2/20/24 encounter (Hospital Encounter)   Medication Sig Dispense Refill    furosemide 40 MG Oral Tab Take 1 tablet (40 mg total) by mouth 2 (two) times daily. 180 tablet 3    gabapentin 300 MG Oral Cap Take 1 capsule (300 mg total) by mouth in the morning and 1 capsule (300 mg total) before bedtime.      dilTIAZem  MG Oral Capsule SR 24 Hr Take 1 capsule (180 mg total) by mouth daily. 30 capsule 0    albuterol 108 (90 Base) MCG/ACT Inhalation Aero Soln Inhale 2 puffs into the lungs every 4 (four) hours as needed for Wheezing. 1 each 0    fleet enema 7-19 GM/118ML Rectal Enema Place 133 mL rectally every third day as needed (if no BM in 3 days).      polyethylene glycol, PEG 3350, 17 g Oral Powd Pack Take 17 g by mouth daily.      folic acid 1 MG Oral Tab Take 1 tablet (1 mg total) by mouth daily.      sodium chloride 1 g Oral Tab Take 1 tablet (1 g total) by mouth 2 (two) times daily. For hyponatremia      traMADol 50 MG Oral Tab Take 0.5 tablets (25 mg total) by mouth every 8 (eight) hours as needed for Pain (moderate to severe pain not relieved by tylenol). 30 tablet 0    tamsulosin 0.4 MG Oral Cap Take 1 capsule (0.4 mg total) by mouth every evening.      ipratropium-albuterol 0.5-2.5 (3) MG/3ML Inhalation Solution Take 3 mL by nebulization every 6 (six) hours while awake.      ipratropium 0.03 % Nasal Solution 2 sprays by Nasal route daily as needed for Rhinitis.      multivitamin Oral Chew Tab Chew 1 tablet by mouth daily.      dexamethasone 2 MG Oral Tab Take  3.75 mg by mouth daily with breakfast. Slow taper now to 3.75 starting 1/19/24      acidophilus-pectin Oral Cap Take 1 capsule by mouth daily.      midodrine 5 MG Oral Tab Take 1 tablet (5 mg total) by mouth every 8 (eight) hours as needed (SBP less than 95 and DBP less than 50). If SBP <95 or DBP <50      sennosides 8.6 MG Oral Tab Take 2 tablets (17.2 mg total) by mouth 2 (two) times daily.      acetaminophen 325 MG Oral Tab Take 2 tablets (650 mg total) by mouth every 6 (six) hours as needed for Pain.      rivaroxaban (XARELTO) 20 MG Oral Tab Take 1 tablet (20 mg total) by mouth daily with food.      sertraline 25 MG Oral Tab Take 1 tablet (25 mg total) by mouth daily.      fexofenadine 180 MG Oral Tab Take 1 tablet (180 mg total) by mouth daily as needed. 90 tablet 3    lansoprazole 30 MG Oral Capsule Delayed Release Take 1 capsule (30 mg total) by mouth daily. Before meal 90 capsule 3    atorvastatin 10 MG Oral Tab Take 1 tablet (10 mg total) by mouth daily. 90 tablet 3     Scheduled Medication:   insulin aspart  2-10 Units Subcutaneous 4 times per day    vancomycin  1,250 mg Intravenous Q24H    sodium chloride (hypertonic)  3 mL Nebulization TID    melatonin  3 mg Oral Nightly    ipratropium-albuterol  3 mL Nebulization TID @ 0700, 1400, 2100    QUEtiapine  25 mg Oral Nightly    guaiFENesin  400 mg Oral 6 times per day    dilTIAZem  60 mg Per NG Tube Q8H    thiamine  100 mg Per NG Tube Daily    midodrine  5 mg Oral TID    lansoprazole  30 mg Oral QAM AC    rivaroxaban  20 mg Oral Daily with food     Continuous Infusing Medication:   dextrose 10%       PRN Medications:  ibuprofen, traMADol, glucose **OR** glucose **OR** glucose-vitamin C **OR** dextrose **OR** glucose **OR** glucose **OR** glucose-vitamin C, docusate, dextrose 10%, lipase-protease-amylase (Lip-Prot-Amyl) **AND** sodium bicarbonate, acetaminophen, acetaminophen **OR** [DISCONTINUED] HYDROcodone-acetaminophen **OR** [DISCONTINUED]  HYDROcodone-acetaminophen, benzonatate, ondansetron, polyethylene glycol (PEG 3350), sennosides, bisacodyl, fleet enema       PHYSICAL EXAM:  BP 98/54 (BP Location: Left arm)   Pulse 96   Temp 98.7 °F (37.1 °C) (Temporal)   Resp (!) 28   Ht 5' 2\" (1.575 m)   Wt 161 lb 6 oz (73.2 kg)   SpO2 96%   BMI 29.52 kg/m²   FiO2 (%):  [50 %-80 %] 70 %      CONSTITUTIONAL: awake  HEENT: atraumatic normocephalic  MOUTH: mucous membranes are moist. No OP exudates  NECK/THROAT: no JVD. Trachea midline. No obvious thyromegaly  LUNG: clear upper, diminished and crackles at baseline. Chest symmetric with respiratory motion  HEART: regular rate and rhythm, no obvious murmers or gallops noted  ABD: soft non tender. + bowel sounds. No organomegaly noted  EXT: no clubbing, cyanosis, or edema noted. Pulses intact grossly  SKIN: warm, dry. Eccymosis on arms  LYMPH: no obvious LAD      IMAGES:   Reviewed in EMR      LABS:  Recent Labs   Lab 02/23/24  0534 02/24/24  0515 02/25/24  0415   RBC 3.49* 3.16* 2.68*   HGB 9.6* 8.8* 7.5*   HCT 29.9* 27.2* 23.4*   MCV 85.7 86.1 87.3   MCH 27.5 27.8 28.0   MCHC 32.1 32.4 32.1   RDW 20.0 20.1 20.3   NEPRELIM 6.93 9.21* 8.36*   WBC 8.2 10.5 9.7   .0 159.0 131.0*       Recent Labs   Lab 02/20/24  1218 02/20/24  2336 02/21/24  0957 02/22/24  0452 02/23/24  0533 02/24/24  0515 02/25/24  0415   * 193* 167*   < > 245* 218* 188*   BUN 36* 29* 30*   < > 26* 29* 32*   CREATSERUM 0.60 0.51* 0.53*   < > 0.53* 0.32* 0.34*   EGFRCR 87 90 89   < > 89 101 100   CA 10.0 9.2 8.5   < > 8.3* 8.2* 8.0*   ALB 1.9* 1.8* 1.7*  --   --   --   --    * 138 142   < > 141 140 142   K 2.8* 2.7* 3.9  3.9   < > 3.8 3.9  3.9 3.9   CL 90* 98 106   < > 110 109 109   CO2 35.0* 34.0* 34.0*   < > 28.0 31.0 31.0   ALKPHO 85 76 75  --   --   --   --    AST 6* 6* 4*  --   --   --   --    ALT 24 21 20  --   --   --   --    BILT 0.6 0.6 0.6  --   --   --   --    TP 5.6* 5.3* 5.2*  --   --   --   --     < > = values  in this interval not displayed.       ASSESSMENT/PLAN:  Acute hypoxic respiratory failure: 2/2 RSV with suspected superimposed bacterial multifocal PNA.  Underlying hx of COPD  -Wean oxygen as able  - Antibiotics broadened d/t worsening CXR and hypoxemia  Septic Shock: 2/2 PNA with MRSA bacteremia.  PCT mildly elevated. Now s/p vasopressors with improvement in blood pressure. Pressors weaned off  -IVF  -PNA worsened over the past 24 hrs; return to home steroid dose  -Home midodrine 5 mg TID  -Vanc. Zosyn was stopped on 2/23/24. Will restart on 2/26/24 since pneumonia appears worse (CXR worse and increased 02 needs)  -TTE with preserved EF, PASP: 30-35mmHg, no obvious valvular vegetations   -ID following  Multifocal PNA:   -RSV, +PCT suggesting superimposed bacterial pneumonia ?staph?  -Vanc, restarted zosyn. ID following  Multiple Myeloma - in acute remission  - Per outpatient hematology/oncology        - Acute anemia. Transfuse 1 unit pRBC today. No obvious s/s bleeding. If continues to drop, then check CT abd  Encephalopathy - suspect delirium given waxing & waning mental status  - Decrease opioid pain medications  - Melatonin scheduled nightly  - Seroquel nightly  COPD - no acute exacerbation  -Duonebs  -O2 as needed  - Steroid back to baseline  Afib  -Continue xarelto  -Restarted PO cardizem  FEN:  - Speech following; diet per them  Proph:  -hep sub Q  Dispo:  -ICU; we will follow     Critical care time 40 min.     Thank you for the opportunity to care for Nelly Johnson DO, MPH  Pulmonary Critical Care Medicine

## 2024-02-26 NOTE — CONSULTS
[facility]  Palliative Care Initial Consult    Nelly Potter Patient Status:  Inpatient    1935 MRN [id number]   Location [facility] 4SW-A Attending Geno Kaur MD   Hosp Day # 6 PCP Balta Wright MD   [id number]     Date of Consult: 24       History of Present Illness:        Nelly Potter is a 88 year old female with PMH of COPD, Dementia/functional decline, Multiple Myeloma, bladder ca, CAD, PE/DVT, Afib on Xarelto, urinary retention in addition to the other conditions noted below, presented to ED on 2024 with CC of SOB, and found to be hypoxic. She was placed on Vapotherm in ED with diagnostic tests showing elevated troponin and BNP, RVP + RSV, CXR showing PNA. Pt was admitted to ICU for further evaluation and management of acute hypoxic resp failure 2/2 multifocal PNA + RSV and underlying COPD, shock presumed septic 2/2 PNA, elevated trop felt to be demand ischemia, h/o Multiple myeloma in remission, PE/DVT, duodenal ulcer, GERD, COPD, CAD, HTN, ?dementia.  Hospital course includes blood cx + MRSA, ID on consult.  Cardiology consultation with GOC discussion surrounding BRENNA is noted.  Today, Hgb 6.0 down from 7.5 yesterday (8.8 on ) - receiving transfusion w plan to recheck later today, if continues to downtrend, possible abd imaging.  She has been seen by SLP for initial evaluation with recommendation for NPO, and has been unable to be seen for f/u d/t lethargy and O2 needs. Dietary following.  Wound on consult.    Our palliative service was asked by KAYLYN Diaz to evaluate for palliative care needs and support with Goals of care discussion.      Today is day #6 of hospital stay. This is the 3rd hospitalization in the past 6 months.    Medical History: obtained from [facility]  Past Medical History:   Diagnosis Date    Allergic rhinitis     Anxiety     Chronic midline low back pain without sciatica 10/13/2021    Constipation     Depression     Esophageal reflux      History of bladder cancer 8/1/2012    Incontinence     Long QT interval     per pt. hx of long QT.    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 Mathews    CYSTOURETHROSCOPY  10/24/12    BTS Cysto Dr. Mathews     CYSTOURETHROSCOPY  4/29/13, 8/7/13, 11/13/13, 11/12/14, 5/6/15, 11/11/15    cysto- Dr. Mathews, [place]    CYSTOURETHROSCOPY  10/11/2017    cysto Dr. mathews    CYSTOURETHROSCOPY,FULGUR .5-2CM LESN  7/23/12    cystourethroscopy, TURBT x 2- Dr. Mathews    FOOT SURGERY Left 10/25/11    lt foot    HIP REPLACEMENT SURGERY      left    HYSTERECTOMY      KNEE REPLACEMENT SURGERY Bilateral     OTHER SURGICAL HISTORY  1983     arthroscopic left knee    PART EXCIS 5TH METATARSAL HEAD Right 10/2/2014    Procedure: MADELINE'S BUNIONECTOMY;  Surgeon: Adryan Prajapati DPM;  Location: [facility]    PATIENT DOCUMENTED NOT TO HAVE EXPERIENCED ANY OF THE FOLLOWING EVENTS Right 10/2/2014    Procedure: MADELINE'S BUNIONECTOMY;  Surgeon: Adryan Prajapati DPM;  Location: [facility]    PATIENT WITH PREOPERATIVE ORDER FOR IV ANTIBIOTIC SURGICAL SITE INFECT Right 10/2/2014    Procedure: MADELINE'S BUNIONECTOMY;  Surgeon: Adryan Prajapati DPM;  Location: [facility]    REPAIR OF HAMMERTOE,ONE Left 10/2/2014    Procedure: ARTHROPLASTY ONE (1) TOE;  Surgeon: Adryan Prajapati DPM;  Location: [facility]    SINUS SURGERY    1983 & 1999    sinus    SLING OPER STRES INCONTINENCE  8/06    sling    SLING OPER STRES INCONTINENCE  10/2011    sling  repeat. TVT Exact. Dr. Dang at [facility]    TONSILLECTOMY         Allergies:  Allergies   Allergen Reactions    Myrbetriq [Mirabegron] NAUSEA ONLY and DIZZINESS    Codeine NAUSEA ONLY    Evista [Raloxifene Hydrochloride] RASH    Meclizine NAUSEA ONLY    Sulfamethoxazole-Trimethoprim DIZZINESS       Medications:     Current Facility-Administered Medications:     potassium chloride (Klor-Con) 20 MEQ oral powder 40 mEq, 40 mEq, Oral, Q4H    piperacillin-tazobactam (Zosyn) 3.375 g in dextrose 5% 100 mL IVPB-ADDV, 3.375 g, Intravenous, Q8H    QUEtiapine (SEROquel) partial tab 12.5 mg, 12.5 mg, Oral, Nightly    [START ON 2/27/2024] dexamethasone (Decadron) tab 3.75 mg, 3.75 mg, Per NG Tube, Daily with breakfast    insulin aspart (NovoLOG) 100 Units/mL FlexPen 2-10 Units, 2-10 Units, Subcutaneous, 4 times per day    vancomycin (Vancocin) 1.25 g in sodium chloride 0.9% 250mL IVPB premix, 1,250 mg, Intravenous, Q24H    sodium chloride (hypertonic) 3 % nebulizer solution 3 mL, 3 mL, Nebulization, TID    ibuprofen (Motrin) 100 MG/5ML oral suspension 400 mg, 400 mg, Per NG Tube, Q4H PRN    ipratropium-albuterol (Duoneb) 0.5-2.5 (3) MG/3ML inhalation solution 3 mL, 3 mL, Nebulization, TID @ 0700, 1400, 2100    traMADol (Ultram) tab 50 mg, 50 mg, Oral, Q6H PRN    guaiFENesin (Mucinex) tab 400 mg, 400 mg, Oral, 6 times per day    glucose (Dex4) 15 GM/59ML oral liquid 15 g, 15 g, Oral, Q15 Min PRN **OR** glucose (Glutose) 40% oral gel 15 g, 15 g, Oral, Q15 Min PRN **OR** glucose-vitamin C (Dex-4) chewable tab 4 tablet, 4 tablet, Oral, Q15 Min PRN **OR** dextrose 50% injection 50 mL, 50 mL, Intravenous, Q15 Min PRN **OR** glucose (Dex4) 15 GM/59ML oral liquid 30 g, 30 g, Oral, Q15 Min PRN **OR** glucose (Glutose) 40% oral gel 30 g, 30 g, Oral, Q15 Min PRN **OR** glucose-vitamin C (Dex-4) chewable tab 8 tablet, 8 tablet, Oral, Q15 Min PRN    dilTIAZem (cardIZEM) tab 60 mg, 60 mg, Per NG Tube, Q8H    midodrine (ProAmatine) tab 5 mg, 5 mg,  Oral, TID    docusate (Colace) 50 MG/5ML oral solution 100 mg, 100 mg, Per NG Tube, BID PRN    lansoprazole (Prevacid Solutab) disintegrating tab 30 mg, 30 mg, Oral, QAM AC    dextrose 10% infusion (TPN no rate), , Intravenous, Continuous PRN    pancrelipase (Lip-Prot-Amyl) (Zenpep) DR particles cap 10,000 Units, 10,000 Units, Per G Tube, PRN **AND** sodium bicarbonate tab 325 mg, 325 mg, Per G Tube, PRN    acetaminophen (Tylenol Extra Strength) tab 500 mg, 500 mg, Oral, Q4H PRN    acetaminophen (Tylenol) tab 650 mg, 650 mg, Oral, Q4H PRN **OR** [DISCONTINUED] HYDROcodone-acetaminophen (Norco) 5-325 MG per tab 1 tablet, 1 tablet, Oral, Q4H PRN **OR** [DISCONTINUED] HYDROcodone-acetaminophen (Norco) 5-325 MG per tab 2 tablet, 2 tablet, Oral, Q4H PRN    benzonatate (Tessalon) cap 200 mg, 200 mg, Oral, TID PRN    ondansetron (Zofran) 4 MG/2ML injection 4 mg, 4 mg, Intravenous, Q6H PRN    polyethylene glycol (PEG 3350) (Miralax) 17 g oral packet 17 g, 17 g, Oral, Daily PRN    sennosides (Senokot) tab 17.2 mg, 17.2 mg, Oral, Nightly PRN    bisacodyl (Dulcolax) 10 MG rectal suppository 10 mg, 10 mg, Rectal, Daily PRN    fleet enema (Fleet) 7-19 GM/118ML rectal enema 133 mL, 1 enema, Rectal, Once PRN    [Held by provider] rivaroxaban (Xarelto) tab 20 mg, 20 mg, Oral, Daily with food  No current outpatient medications on file.       Functional Status History:  ADLs: Dependent  (Bathing or showering, dressing, getting in and out of bed or chair, walking, using the toilet and eating)  IADLs: Dependent  (Use the phone, shop for groceries, meal preparation, manage medicines, clean living area, use transportation by self, manage money)    Palliative Care Social History:   Marital Status:   Children: Yes  Living Situation Prior to Admit:  [facility] [place]  Does Patient Live Alone: No  Is Patient Confused: Yes - h/o memory impairment      Social History     Socioeconomic History    Marital status:     Tobacco Use    Smoking status: Never    Smokeless tobacco: Never   Vaping Use    Vaping Use: Never used   Substance and Sexual Activity    Alcohol use: Yes     Comment: 1 per day    Drug use: No       Substance History:   reports that she has never smoked. She has never used smokeless tobacco.  reports current alcohol use.  reports no history of drug use.      Spiritual Assessment:   Spiritism - Parish Not Listed;  deferred    HPI obtained from Epic and pt's family.    SUBJECTIVE  Review of Systems - Palliative Care Symptom Assessment     Nelly was drowsy during visit, but agitated and moving around repeatedly in bed.   Son Murali (POA) and daughter Verónica at bedside interpreting agitation 2/2 recent BM as this has historically been her presentation as well as when she has pain.  Reporting pain per their assessment but unable to get her to endorse location - suspecting low back pain d/t h/o multiple myeloma.      OBJECTIVE       Hematology:   Lab Results   Component Value Date    WBC 6.7 02/26/2024    HGB 6.0 (LL) 02/26/2024    HCT 19.1 (L) 02/26/2024    PLT 99.0 (L) 02/26/2024       Chemistry:  Lab Results   Component Value Date    CREATSERUM 0.46 (L) 02/26/2024    BUN 25 (H) 02/26/2024     02/26/2024    K 3.3 (L) 02/26/2024     02/26/2024    CO2 33.0 (H) 02/26/2024     (H) 02/26/2024    CA 7.8 (L) 02/26/2024    ALB 1.9 (L) 02/26/2024    ALKPHO 95 02/26/2024    BILT 0.3 02/26/2024    TP 4.7 (L) 02/26/2024    AST 6 (L) 02/26/2024    ALT 24 02/26/2024    MG 2.3 02/25/2024    PHOS 2.7 02/26/2024       Imaging:  XR CHEST AP/PA (1 VIEW) (CPT=71045)    Result Date: 2/26/2024  CONCLUSION:    Increasing bilateral patchy upper-lower lung opacities, concerning for worsening pneumonia.  Poor inspiration.  Suspect left pleural effusion.  Heart partially obscured.  No sign of pneumothorax. Consider upright PA and lateral examination  of the chest, when tolerated.   LOCATION:  [facility]      Dictated by (CST):  Hever Cantor MD on 2/26/2024 at 7:12 AM     Finalized by (CST): Hever Cantor MD on 2/26/2024 at 7:13 AM       XR CHEST AP/PA (1 VIEW) (CPT=71045)    Result Date: 2/24/2024  CONCLUSION:   Stable cardiac and mediastinal contours.  Mixed interstitial/alveolar opacities of the mid/lower lungs bilaterally may reflect edema versus infectious/inflammatory process, similar to prior.  Small left pleural effusion is decreased in volume compared to  02/21/2024.  No appreciable pneumothorax.  Enteric catheter extends into the upper abdomen beyond the field of view.   LOCATION:  [facility]      Dictated by (CST): Ramos Novoa MD on 2/24/2024 at 8:03 PM     Finalized by (CST): Ramos Novoa MD on 2/24/2024 at 8:04 PM        Vital Signs:  Blood pressure 96/55, pulse 90, temperature 98 °F (36.7 °C), temperature source Temporal, resp. rate 21, height 5' 2\" (1.575 m), weight 162 lb 4.1 oz (73.6 kg), SpO2 96%.      Supplemental O2:  FiO2 (%):  [50 %-80 %] 50 %    Physical Exam:     + PRBCs    GENERAL: Drowsy in bed, agitated. Appears chronically and acutely ill. NAD.  BODY HABITUS:  Body mass index is 29.68 kg/m².  HEENT: + DHT  CARDIAC: HR 90s  RESPIRATORY: increased effort at rest on Vapotherm 35L, 50%.  NEUROLOGIC: Drowsy, not following commands. Weak, moving extremities but weak  PSYCHIATRIC:  restless.  SKIN: Warm and dry.     Pre-hospital Palliative Performance Scale: (pt/family reported/per chart review): 50%  Current Palliative Performance Scale:  30%    Palliative Performance Scale   % Ambulation Activity Level Self-Care Intake Consciousness   100 Full Normal Full   Normal Full   90 Full No disease  Normal Full Normal Full   80 Full Some disease  Normal w/effort Full Normal or  Reduced Full   70 Reduced Some disease  Can't perform job Full Normal or   Reduced Full   60 Reduced Significant disease  Can't perform hobby Occasional  Assist Normal or   Reduced Full or confused   50 Mainly sit/lie Extensive Disease  Can't do any  work Partial Assist Normal or Reduced Full or confused   40 Mainly in bed Extensive Disease  Can't do any work Mainly Assist Normal or Reduced Full or confused   30 Bedbound Extensive Disease  Can't do any work Max Assist  Total Care Reduced Drowsy/confused   20 Bedbound Extensive Disease  Can't do any work Max Assist  Total Care Minimal Drowsy/confused   10 Bedbound/coma Extensive Disease  Can't do any work  coma  Max Assist  Total Care Mouth care Drowsy or coma   0 Death     Palliative Care Assessment       Goals of Care:      Nelly was drowsy and unable to participate.  Provided introduction to Palliative Care and reason for consultation to Nelly's son Murali and daughter Verónica at her bedside. Discussion included the benefits of palliative care to provide extra layer of support to patient/family who wish to continue curative or restorative medical therapies. Palliative care was differentiated from hospice philosophy and model of care by reviewing the treatment-focus with palliative care, versus comfort-focused care with hospice. I also informed that having palliative care support does not limit medical treatment options or decisions.     Diagnostic understanding and Prognostic Awareness:  Murali and Verónica have accurate understanding of Nelly's multiple chronic and acute medical conditions.     They are forming an understanding of her response to treatment and hoping for her to respond/improve in response to current treatments, but also preparing for her baseline to change. It would be helpful for them to know the status of Multiple Myeloma as they have been previously informed that it was in remission, but are concerned for primary oncologist not being involved as he is not on staff here, and are unsure if/how this is assessed during hospital stay. Will relay question to treatment team.      Hopes / Goals:  For delirium to clear as family feels this presentation of restlessness and moving her head around  aimlessly is not consistent with who Nelly is. There is concern that she is either confused or in pain. Discussed various factors suspected to be contributing to delirium at this time. Reviewed risks vs benefits of pain management given c/f lethargy/sedation with norco dose over the weekend, but tramadol was felt to be ineffective. The goal at this time is to use opioids sparingly to see if mentation will clear, but if pain becomes a bigger issue, family may opt to pursue opioids for pain management again.  For information from testing - specifically with regard to hgb today as they anticipate repeat Hgb after transfusion and next steps to follow. They understand if Hgb drops then abd imaging may be completed, and further information or testing may be recommended to follow.  To be able to collaborate with staff to optimize her care.    Concerns / Fears:  Delirium - causes and how long it will last  Pain - balancing treating w c/f sedation vs under treating  Complications that occur when steroids are not tapered slowly as she had been on high doses with tx for multiple myeloma and when stopped abruptly she tends to have complications.      Advance Care Planning:    Code Status:  DNAR/Selective Treatment.  A voluntary discussion surrounding resuscitation and LST took place with Nelly's son (POA) Murali and daughter Verónica to confirm DNAR, DNI.  POLST on file.    HCPOA:  Document on file.  Healthcare Agent Appointed: Yes  Healthcare Agent's Name: SonMurali  Healthcare Agent's Phone Number: [phone]        Problem List:       Principal Problem:    Multifocal pneumonia  Active Problems:    Acute respiratory failure with hypoxia (HCC)    RSV infection    Hypokalemia    Elevated troponin    Lactic acidosis    Sepsis, due to unspecified organism, unspecified whether acute organ dysfunction present (HCC)    Palliative care encounter    Counseling regarding advance care planning and goals of care      Palliative  Care Recommendations / Plan:       Goals of Care:   Family is gaining information about her conditions, response to treatment while considering QOL and GOC.  Asking if possible to explore status of multiple myeloma (remission) while she is hospitalized to help them w GOC. Will d/w ICU.    Code Status: DNAR/Selective Treatment.  Confirmed 2/26. POLST on file, congruent w current GOC.    HCPOA: Son, Murali Potter.    Psychosocial:  Emotional support provided.    Disposition:  pending clinical course.      A total of 75 minutes were spent on this consult, which included all of the following:  Chart review, direct face to face contact, history taking, physical examination, counseling and coordinating care, and documentation.     I reviewed the above with patient's RN and ICU.    Thank you for inviting Palliative Care Service to participate in the care of Nelly Potter and family.       Will follow.      KAYLYN Moreno  Palliative Care    2/26/2024  11:53 AM      The 21st Century Cures Act makes medical notes like these available to patients in the interest of transparency. Please be advised this is a medical document. Medical documents are intended to carry relevant information, facts as evident, and the clinical opinion of the practitioner. The medical note is intended as a peer to peer communication, and may appear blunt or direct. It is written in medical language and may contain abbreviations or verbiage that are unfamiliar.

## 2024-02-26 NOTE — PROGRESS NOTES
[facility] Hospitalist Progress Note     Nelly Potter Patient Status:  Inpatient    1935 MRN [id number]   Location [facility] 4SW-A Attending Castillo Mcgill MD   Hosp Day # 6 PCP Balta Wright MD     Chief Complaint:   Fu Sob, hypoxic    Subjective:     Patient seen and examined.   On vapotherm 70%  Hgb down to 6 >> getting prbc  daughter at bedside  Afebrile  2/2 blood cx +MRSA    Objective:    Review of Systems:   10 point ROS completed and was negative, except for pertinent positive and negatives stated in subjective.    Vital signs:  Temp:  [97.9 °F (36.6 °C)-99.2 °F (37.3 °C)] 98.7 °F (37.1 °C)  Pulse:  [] 98  Resp:  [14-32] 20  BP: ()/(48-79) 98/56  SpO2:  [91 %-100 %] 100 %  FiO2 (%):  [50 %-80 %] 70 %    Physical Exam:    General: No acute distress.   HEENT:  EOMI, PERRLA, OP clear  Respiratory: Clear to auscultation bilaterally. No wheezes. No rhonchi.  Cardiovascular: S1, S2. Regular rate and rhythm. No murmurs.  Abdomen: Soft, nontender, nondistended.  Positive bowel sounds. No rebound or guarding.  Extremities: No edema.  Neuro:  Grossly non focal, no motor deficits.        Diagnostic Data:    Labs:  Recent Labs   Lab 24  0957 24  0452 24  1243 24  0534 24  0515 24  0415 24  0544   WBC 6.1 4.7  --  8.2 10.5 9.7 6.7   HGB 7.7* 6.7* 9.5* 9.6* 8.8* 7.5* 6.0*   MCV 83.2 85.9  --  85.7 86.1 87.3 87.2   .0 172.0  --  166.0 159.0 131.0* 99.0*   BAND 8  --   --  4 6 7  --        Recent Labs   Lab 24  1218 24  2336 24  0957 24  0452 24  0515 24  0415 24  0544   * 193* 167*   < > 218* 188* 129*   BUN 36* 29* 30*   < > 29* 32* 25*   CREATSERUM 0.60 0.51* 0.53*   < > 0.32* 0.34* 0.46*   CA 10.0 9.2 8.5   < > 8.2* 8.0* 7.8*   ALB 1.9* 1.8* 1.7*  --   --   --   --    * 138 142   < > 140 142 143   K 2.8* 2.7* 3.9  3.9   < > 3.9  3.9 3.9 3.3*   CL  90* 98 106   < > 109 109 108   CO2 35.0* 34.0* 34.0*   < > 31.0 31.0 33.0*   ALKPHO 85 76 75  --   --   --   --    AST 6* 6* 4*  --   --   --   --    ALT 24 21 20  --   --   --   --    BILT 0.6 0.6 0.6  --   --   --   --    TP 5.6* 5.3* 5.2*  --   --   --   --     < > = values in this interval not displayed.       Estimated Creatinine Clearance: 68.1 mL/min (A) (based on SCr of 0.46 mg/dL (L)).    No results for input(s): \"PTP\", \"INR\" in the last 168 hours.         COVID-19 Lab Results    COVID-19  Lab Results   Component Value Date    COVID19 Not Detected 02/21/2024    COVID19 Not Detected 02/20/2024    COVID19 Not Detected 02/06/2024       Pro-Calcitonin  Recent Labs   Lab 02/20/24  1218   PCT 0.24*       Cardiac  Recent Labs   Lab 02/26/24  0544   PBNP 647*       Creatinine Kinase  Recent Labs   Lab 02/22/24  0452   CK 11*       Inflammatory Markers  No results for input(s): \"CRP\", \"KARUNA\", \"LDH\", \"DDIMER\" in the last 168 hours.    Imaging: Imaging data reviewed in Epic.    Medications:    sodium chloride   Intravenous Once    potassium chloride  40 mEq Oral Q4H    insulin aspart  2-10 Units Subcutaneous 4 times per day    vancomycin  1,250 mg Intravenous Q24H    sodium chloride (hypertonic)  3 mL Nebulization TID    melatonin  3 mg Oral Nightly    ipratropium-albuterol  3 mL Nebulization TID @ 0700, 1400, 2100    QUEtiapine  25 mg Oral Nightly    guaiFENesin  400 mg Oral 6 times per day    dilTIAZem  60 mg Per NG Tube Q8H    thiamine  100 mg Per NG Tube Daily    midodrine  5 mg Oral TID    lansoprazole  30 mg Oral QAM AC    [Held by provider] rivaroxaban  20 mg Oral Daily with food       Assessment & Plan:    Nelly Potter is a 88 year old female with PMH significant for/anxiety, GERD, A-fib on Xarelto, HTN, DL, COPD, asthma, bladder CA, multiple myeloma, vertigo, dementia with short-term memory issues who presents from [place] with reports of shortness of breath and a wet cough.      Cough, sob - 2/2  RSV infection +/- multifocal PNA  Acute hypoxic resp failure 2/2 above  Asthma/COPD exacerbation   -RSV + in ED  -cxr reviewed >> multifocal pneumonia  -supplemental O2 via vapotherm  >> weaned down to 80% >> cont to wean as able  -check ABG  -nebs prn  -may benefit from IV steroids  -cont iv abx  -blood cx x 2  -ST, PT, OT    Shock - presumably related to sepsis: 2/2 bacteremia, pneumonia  Lactic acidosis - resolved  -Was on norepi but currently off and BP stable  -stress steroids  -2/2 blood cx growing MRSA  -MRSA bacteremia >> ID consulted >> rec BRENNA   -cardiology consulted >> apprec recs >> family opting to not go for BRENNA  -TTE w.o obv vegetations  -surveillance cx NTD    AMS 2/2 ICU delirium most likely  -helpful to keep awake during day and asleep at  night  -minimal sedation during the day if possible  -pain control with tylenol  -seroquel at night    Anemia  -hgb dropped to 6.0 this am  -prbc transfused  -maintain hgb >7     Troponin elevated - likely demand ischemia  -downtrended  -may need cardiology involvement      GERD  -PPI     HTN   -resume home meds if able  -monitor BP closely     DL  -statin     Afib on xarelto  -resume xarelto and po diltiazem     Depression, anxiety  -cont zoloft     Bladder ca  Multiple myeloma  Dementia  Vertigo  -stable chronic conditions        Quality:  DVT Prophylaxis: xarelto, scds  CODE status: DNR, select  [place]: no  If COVID testing is negative, may discontinue isolation: yes     DISPO:  Spoke with son and daughter at bedside  All questions answered    DNR/DNI - ok for selective treatment >> confirmed this with son and daughter           Geno Nichole Hospitalist  Pager [phone]  Answering Service number: [phone]          **Certification      PHYSICIAN Certification of Need for Inpatient Hospitalization - Initial Certification    Patient will require inpatient services that will reasonably be expected to span two midnight's based on the clinical  documentation in H+P.   Based on patients current state of illness, I anticipate that, after discharge, patient will require TBD.

## 2024-02-26 NOTE — PROGRESS NOTES
02/26/24 0351   Oxygen Utilization   $ RT Standby Charge (per 15 min) 1   O2 Device High flow/High humidity   O2 Flow Rate (L/min) 35 L/min   FiO2 (%) 70 %   SpO2 92 %   Humidity High   [place] 1/2 Full     Patient received on Vapotherm 35L and Fio2 up to 70%. Continuing nebs as ordered. Patient was also NT suctioned and produced a large amount of thick tan secretions. Will wean vapotherm as tolerated.

## 2024-02-27 NOTE — PROGRESS NOTES
Daily Pulmonary/ICU/Critical Care Progress Note          SUBJECTIVE:  On vapotherm 35L/40% this am.   Hg stable post transfusion yesterday.   Afebrile.       ALLERGIES:  Allergies   Allergen Reactions    Myrbetriq [Mirabegron] NAUSEA ONLY and DIZZINESS    Codeine NAUSEA ONLY    Evista [Raloxifene Hydrochloride] RASH    Meclizine NAUSEA ONLY    Sulfamethoxazole-Trimethoprim DIZZINESS         MEDS:  Home Medications:  Outpatient Medications Marked as Taking for the 2/20/24 encounter (Hospital Encounter)   Medication Sig Dispense Refill    furosemide 40 MG Oral Tab Take 1 tablet (40 mg total) by mouth 2 (two) times daily. 180 tablet 3    gabapentin 300 MG Oral Cap Take 1 capsule (300 mg total) by mouth in the morning and 1 capsule (300 mg total) before bedtime.      dilTIAZem  MG Oral Capsule SR 24 Hr Take 1 capsule (180 mg total) by mouth daily. 30 capsule 0    albuterol 108 (90 Base) MCG/ACT Inhalation Aero Soln Inhale 2 puffs into the lungs every 4 (four) hours as needed for Wheezing. 1 each 0    fleet enema 7-19 GM/118ML Rectal Enema Place 133 mL rectally every third day as needed (if no BM in 3 days).      polyethylene glycol, PEG 3350, 17 g Oral Powd Pack Take 17 g by mouth daily.      folic acid 1 MG Oral Tab Take 1 tablet (1 mg total) by mouth daily.      sodium chloride 1 g Oral Tab Take 1 tablet (1 g total) by mouth 2 (two) times daily. For hyponatremia      traMADol 50 MG Oral Tab Take 0.5 tablets (25 mg total) by mouth every 8 (eight) hours as needed for Pain (moderate to severe pain not relieved by tylenol). 30 tablet 0    tamsulosin 0.4 MG Oral Cap Take 1 capsule (0.4 mg total) by mouth every evening.      ipratropium-albuterol 0.5-2.5 (3) MG/3ML Inhalation Solution Take 3 mL by nebulization every 6 (six) hours while awake.      ipratropium 0.03 % Nasal Solution 2 sprays by Nasal route daily as needed for Rhinitis.      multivitamin Oral Chew Tab Chew 1 tablet by mouth daily.      dexamethasone 2 MG  Oral Tab Take 3.75 mg by mouth daily with breakfast. Slow taper now to 3.75 starting 1/19/24      acidophilus-pectin Oral Cap Take 1 capsule by mouth daily.      midodrine 5 MG Oral Tab Take 1 tablet (5 mg total) by mouth every 8 (eight) hours as needed (SBP less than 95 and DBP less than 50). If SBP <95 or DBP <50      sennosides 8.6 MG Oral Tab Take 2 tablets (17.2 mg total) by mouth 2 (two) times daily.      acetaminophen 325 MG Oral Tab Take 2 tablets (650 mg total) by mouth every 6 (six) hours as needed for Pain.      rivaroxaban (XARELTO) 20 MG Oral Tab Take 1 tablet (20 mg total) by mouth daily with food.      sertraline 25 MG Oral Tab Take 1 tablet (25 mg total) by mouth daily.      fexofenadine 180 MG Oral Tab Take 1 tablet (180 mg total) by mouth daily as needed. 90 tablet 3    lansoprazole 30 MG Oral Capsule Delayed Release Take 1 capsule (30 mg total) by mouth daily. Before meal 90 capsule 3    atorvastatin 10 MG Oral Tab Take 1 tablet (10 mg total) by mouth daily. 90 tablet 3     Scheduled Medication:   vancomycin  1,250 mg Intravenous Q24H    [START ON 2/28/2024] rivaroxaban  20 mg Oral Daily with food    piperacillin-tazobactam  3.375 g Intravenous Q8H    QUEtiapine  12.5 mg Oral Nightly    dexamethasone  3.75 mg Per NG Tube Daily with breakfast    insulin aspart  2-10 Units Subcutaneous 4 times per day    sodium chloride (hypertonic)  3 mL Nebulization TID    ipratropium-albuterol  3 mL Nebulization TID @ 0700, 1400, 2100    guaiFENesin  400 mg Oral 6 times per day    dilTIAZem  60 mg Per NG Tube Q8H    midodrine  5 mg Oral TID    lansoprazole  30 mg Oral QAM AC     Continuous Infusing Medication:   dextrose 10%       PRN Medications:  ibuprofen, traMADol, glucose **OR** glucose **OR** glucose-vitamin C **OR** dextrose **OR** glucose **OR** glucose **OR** glucose-vitamin C, docusate, dextrose 10%, lipase-protease-amylase (Lip-Prot-Amyl) **AND** sodium bicarbonate, acetaminophen, acetaminophen **OR**  [DISCONTINUED] HYDROcodone-acetaminophen **OR** [DISCONTINUED] HYDROcodone-acetaminophen, benzonatate, ondansetron, polyethylene glycol (PEG 3350), sennosides, bisacodyl, fleet enema       PHYSICAL EXAM:  BP 98/68   Pulse 87   Temp 97.9 °F (36.6 °C) (Temporal)   Resp 21   Ht 5' 2\" (1.575 m)   Wt 162 lb 4.1 oz (73.6 kg)   SpO2 94%   BMI 29.68 kg/m²   FiO2 (%):  [40 %-50 %] 40 %      CONSTITUTIONAL: awake  HEENT: atraumatic normocephalic  MOUTH: mucous membranes are moist. No OP exudates  NECK/THROAT: no JVD. Trachea midline. No obvious thyromegaly  LUNG: clear upper, diminished and crackles at baseline. Chest symmetric with respiratory motion  HEART: regular rate and rhythm, no obvious murmers or gallops noted  ABD: soft non tender. + bowel sounds. No organomegaly noted  EXT: no clubbing, cyanosis, or edema noted. Pulses intact grossly  SKIN: warm, dry. Eccymosis on arms  LYMPH: no obvious LAD      IMAGES:   Reviewed in EMR      LABS:  Recent Labs   Lab 02/25/24 0415 02/26/24  0544 02/26/24  1405 02/26/24 2118 02/27/24  0435   RBC 2.68* 2.19*  --   --  2.79*   HGB 7.5* 6.0* 7.8* 7.7* 7.8*   HCT 23.4* 19.1*  --   --  23.5*   MCV 87.3 87.2  --   --  84.2   MCH 28.0 27.4  --   --  28.0   MCHC 32.1 31.4  --   --  33.2   RDW 20.3 20.4  --   --  19.9   NEPRELIM 8.36* 5.90  --   --  6.45   WBC 9.7 6.7  --   --  7.4   .0* 99.0*  --   --  106.0*       Recent Labs   Lab 02/20/24  2336 02/21/24  0957 02/22/24  0452 02/25/24  0415 02/26/24  0544 02/26/24  1405 02/27/24  0435   * 167*   < > 188* 129*  --  177*   BUN 29* 30*   < > 32* 25*  --  21   CREATSERUM 0.51* 0.53*   < > 0.34* 0.46*  --  0.37*   EGFRCR 90 89   < > 100 93  --  98   CA 9.2 8.5   < > 8.0* 7.8*  --  7.9*   ALB 1.8* 1.7*  --   --  1.9*  --   --     142   < > 142 143  --  143   K 2.7* 3.9  3.9   < > 3.9 3.3* 5.0 4.2   CL 98 106   < > 109 108  --  111   CO2 34.0* 34.0*   < > 31.0 33.0*  --  30.0   ALKPHO 76 75  --   --  95  --   --     AST 6* 4*  --   --  6*  --   --    ALT 21 20  --   --  24  --   --    BILT 0.6 0.6  --   --  0.3  --   --    TP 5.3* 5.2*  --   --  4.7*  --   --     < > = values in this interval not displayed.       ASSESSMENT/PLAN:  Acute hypoxic respiratory failure: 2/2 RSV with suspected superimposed bacterial multifocal PNA.  Underlying hx of COPD  -Wean oxygen as able  - Antibiotics broadened d/t worsening CXR and hypoxemia on 2/26/24  Septic Shock: 2/2 PNA with MRSA bacteremia.  PCT mildly elevated. Now s/p vasopressors with improvement in blood pressure. Pressors weaned off  -IVF  -Return to home steroid dose  -Home midodrine 5 mg TID  -Vanc. Zosyn was stopped on 2/23/24. Restarted on 2/26/24 since pneumonia appears worse (CXR worse and increased 02 needs)  -TTE with preserved EF, PASP: 30-35mmHg, no obvious valvular vegetations   -ID following  Multifocal PNA:   -RSV, +PCT suggesting superimposed bacterial pneumonia ?staph?  -Vanc, restarted zosyn. ID following  Multiple Myeloma - in acute remission  - Per outpatient hematology/oncology        - Acute anemia. Transfuse 1 unit pRBC on 2/26/24. No obvious s/s bleeding. If continues to drop, then check CT abd.   Encephalopathy - suspect delirium given waxing & waning mental status  - Decrease opioid pain medications  - Melatonin scheduled nightly  - Seroquel nightly  COPD - no acute exacerbation  -Duonebs, saline nebs  -O2 as needed  - Steroid back to baseline  Afib  -Continue xarelto  -Restarted PO cardizem  FEN:  - Speech. Diet per them  Proph:  -Restart xarelto tomorrow if Hg stays stable  Dispo:  -ICU; we will follow     Critical care time 35 min.     Thank you for the opportunity to care for Nelly Johnson DO, MPH  Pulmonary Critical Care Medicine

## 2024-02-27 NOTE — SLP NOTE
ADULT SWALLOWING EVALUATION    ASSESSMENT    ASSESSMENT/OVERALL IMPRESSION:  Patient seen today for re-evaluation of oropharyngeal swallow. Respiratory status improving and patient more alert when compared to previous SLP attempts. Head of bed elevated and supported patient's head into a neutral position prior to PO trials. Vocal quality remains weak, but clear. Baseline congested cough significantly reduced when compared to prior SLP visit.    Patient presented with improved oropharyngeal swallow function. Bolus acceptance was adequate without evidence of anterior bolus loss. Mastication of soft solids was prolonged and effortful. Pharyngeal swallow initiation appeared timely and hyolaryngeal excursion was adequate per palpation.  Patient consistently initiated multiple swallows per bolus. No overt s/s of aspiration observed and patient denied odynophagia and globus sensation across consistencies.     Recommend patient initiate a minced and moist diet with thin liquids. Bolus size and rate of intake should be limited. Elevate head of bed and place pt's head in neutral position prior to PO intake; pt tends to rest in neck extension. SLP will continue to follow to monitor diet tolerance and adjust as appropriate. Education provided re: recommendations. Reviewed aspiration precautions with patient and her son at bedside. VFSS to be completed if indicated following initiation of PO intake.         RECOMMENDATIONS   Diet Recommendations - Solids: Mechanical soft ground/ Minced & Moist  Diet Recommendations - Liquids: Thin Liquids                        Compensatory Strategies Recommended: Small bites and sips  Aspiration Precautions: Upright position  Medication Administration Recommendations: One pill at a time;Whole in puree  Treatment Plan/Recommendations: Aspiration precautions    HISTORY   MEDICAL HISTORY  Reason for Referral: R/O aspiration    Problem List  Principal Problem:    Multifocal pneumonia  Active  Problems:    Acute respiratory failure with hypoxia ([facility])    RSV infection    Hypokalemia    Elevated troponin    Lactic acidosis    Sepsis, due to unspecified organism, unspecified whether acute organ dysfunction present ([facility])    Palliative care encounter    Counseling regarding advance care planning and goals of care      Past Medical History  Past Medical History:   Diagnosis Date    Allergic rhinitis     Anxiety     Chronic midline low back pain without sciatica 10/13/2021    Constipation     Depression     Esophageal reflux     History of bladder cancer 8/1/2012    Incontinence     Long QT interval     per pt. hx of long QT.    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    Osteoarthrosis, unspecified whether generalized or localized, unspecified site     Osteopenia of multiple sites 8/30/2018    Other and unspecified hyperlipidemia     Pleurisy 2012    Psychophysiological insomnia 9/20/2018    Unspecified essential hypertension     Urge incontinence     Vertigo        Prior Living Situation: Skilled nursing facility  Diet Prior to Admission: Regular;Thin liquids       Patient/Family Goals: none stated    SWALLOWING HISTORY  Current Diet Consistency: NPO  Dysphagia History: as above  Imaging Results:   CXR 2/26/24  CONCLUSION:    Increasing bilateral patchy upper-lower lung opacities, concerning for worsening pneumonia.  Poor inspiration.  Suspect left pleural effusion.  Heart partially obscured.  No sign of pneumothorax. Consider upright PA and lateral examination    of the chest, when tolerated.         LOCATION:  [facility]                  Dictated by (CST): Hever Cantor MD on 2/26/2024 at 7:12 AM       Finalized by (CST): Hever Cantor MD on 2/26/2024 at 7:13 AM     SUBJECTIVE       OBJECTIVE   ORAL MOTOR EXAMINATION  Dentition: Functional  Symmetry: Within Functional Limits  Strength:  (generalized weakness)     Range of Motion:  (reduced overall)       Voice Quality: Weak  Respiratory Status:  Vapotherm  Consistencies Trialed: Thin liquids;Puree;Soft solid  Method of Presentation: Staff/Clinician assistance  Patient Positioning: Upright;Midline    Oral Phase of Swallow: Impaired     Bilabial Seal: Impaired        Mastication: Impaired       Pharyngeal Phase of Swallow: Within Functional Limits     Laryngeal Elevation Strength: Appears impaired  Laryngeal Elevation Coordination: Appears impaired  (Please note: Silent aspiration cannot be evaluated clinically. Videofluoroscopic Swallow Study is required to rule-out silent aspiration.)    Esophageal Phase of Swallow: No complaints consistent with possible esophageal involvement  Comments: d/w RN              GOALS  Goal #1 Slp to re-assess as medically appropriate.   Met   Goal #2 The patient will tolerate minced & moist consistency and thin liquids without overt signs or symptoms of aspiration with 95 % accuracy over 1-2 session(s).    In Progress   Goal #3 The patient/family/caregiver will demonstrate understanding and implementation of aspiration precautions and swallow strategies independently over 1-2 session(s).  In Progress   Goal #4     Goal #5     Goal #6     Goal #7     Goal #8       FOLLOW UP  Treatment Plan/Recommendations: Aspiration precautions     Follow Up Needed (Documentation Required): Yes  SLP Follow-up Date: 02/28/24    Thank you for your referral.   If you have any questions, please contact JUANA Caceres

## 2024-02-27 NOTE — PROGRESS NOTES
[facility]     [facility] Consult    Nelly Potter Patient Status:  Inpatient    1935 MRN [id number]   Location [facility] 4SW-A Attending Geno Kaur MD   Hosp Day # 7 PCP MD Nelly Lewis seen and examined,   Afebrile,   Previous entries noted,   Not interactive,       History:  Past Medical History:   Diagnosis Date    Allergic rhinitis     Anxiety     Chronic midline low back pain without sciatica 10/13/2021    Constipation     Depression     Esophageal reflux     History of bladder cancer 2012    Incontinence     Long QT interval     per pt. hx of long QT.    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 Mathews    CYSTOURETHROSCOPY  10/24/12    [facility] Cysto Dr. Mathews     CYSTOURETHROSCOPY  13, 13, 13, 14, 5/6/15, 11/11/15    cysto- Dr. Mathews, [place]    CYSTOURETHROSCOPY  10/11/2017    cysto Dr. mathews    CYSTOURETHROSCOPY,FULGUR .5-2CM LESN  12    cystourethroscopy, TURBT x 2- Dr. Mathews    FOOT SURGERY Left 10/25/11    lt foot    HIP REPLACEMENT SURGERY      left    HYSTERECTOMY      KNEE REPLACEMENT SURGERY Bilateral     OTHER SURGICAL HISTORY       arthroscopic left knee    PART EXCIS 5TH METATARSAL HEAD Right 10/2/2014    Procedure: MADELINE'S BUNIONECTOMY;  Surgeon: Adryan Prajapati DPM;  Location: [facility]    PATIENT DOCUMENTED NOT TO HAVE EXPERIENCED ANY OF THE FOLLOWING EVENTS Right 10/2/2014    Procedure: TAILOR'S  BUNIONECTOMY;  Surgeon: Adryan Prajapati DPM;  Location: [facility]    PATIENT WITH PREOPERATIVE ORDER FOR IV ANTIBIOTIC SURGICAL SITE INFECT Right 10/2/2014    Procedure: TAILOR'S BUNIONECTOMY;  Surgeon: Adryan Prajapati DPM;  Location: [facility]    REPAIR OF ALETA,ONE Left 10/2/2014    Procedure: ARTHROPLASTY ONE (1) TOE;  Surgeon: Adryan Prajapati DPM;  Location: [facility]    SINUS SURGERY    1983 & 1999    sinus    SLING OPER STRES INCONTINENCE  8/06    sling    SLING OPER STRES INCONTINENCE  10/2011    sling repeat. TVT Exact. Dr. Dang at [facility]    TONSILLECTOMY       Family History   Problem Relation Age of Onset    Other (Other) Son         CF    Other (Other) Son         CF      reports that she has never smoked. She has never used smokeless tobacco. She reports current alcohol use. She reports that she does not use drugs.    Allergies:  Allergies   Allergen Reactions    Myrbetriq [Mirabegron] NAUSEA ONLY and DIZZINESS    Codeine NAUSEA ONLY    Evista [Raloxifene Hydrochloride] RASH    Meclizine NAUSEA ONLY    Sulfamethoxazole-Trimethoprim DIZZINESS       Medications:    Current Facility-Administered Medications:     vancomycin (Vancocin) 1.25 g in sodium chloride 0.9% 250mL IVPB premix, 1,250 mg, Intravenous, Q24H    [START ON 2/28/2024] rivaroxaban (Xarelto) tab 20 mg, 20 mg, Oral, Daily with food    piperacillin-tazobactam (Zosyn) 3.375 g in dextrose 5% 100 mL IVPB-ADDV, 3.375 g, Intravenous, Q8H    QUEtiapine (SEROquel) partial tab 12.5 mg, 12.5 mg, Oral, Nightly    dexamethasone (Decadron) tab 3.75 mg, 3.75 mg, Per NG Tube, Daily with breakfast    insulin aspart (NovoLOG) 100 Units/mL FlexPen 2-10 Units, 2-10 Units, Subcutaneous, 4 times per day    sodium chloride (hypertonic) 3 % nebulizer solution 3 mL, 3 mL, Nebulization, TID    ibuprofen (Motrin) 100 MG/5ML oral suspension 400 mg, 400 mg, Per NG Tube, Q4H PRN    ipratropium-albuterol (Duoneb) 0.5-2.5 (3) MG/3ML inhalation solution 3 mL,  3 mL, Nebulization, TID @ 0700, 1400, 2100    traMADol (Ultram) tab 50 mg, 50 mg, Oral, Q6H PRN    guaiFENesin (Mucinex) tab 400 mg, 400 mg, Oral, 6 times per day    glucose (Dex4) 15 GM/59ML oral liquid 15 g, 15 g, Oral, Q15 Min PRN **OR** glucose (Glutose) 40% oral gel 15 g, 15 g, Oral, Q15 Min PRN **OR** glucose-vitamin C (Dex-4) chewable tab 4 tablet, 4 tablet, Oral, Q15 Min PRN **OR** dextrose 50% injection 50 mL, 50 mL, Intravenous, Q15 Min PRN **OR** glucose (Dex4) 15 GM/59ML oral liquid 30 g, 30 g, Oral, Q15 Min PRN **OR** glucose (Glutose) 40% oral gel 30 g, 30 g, Oral, Q15 Min PRN **OR** glucose-vitamin C (Dex-4) chewable tab 8 tablet, 8 tablet, Oral, Q15 Min PRN    dilTIAZem (cardIZEM) tab 60 mg, 60 mg, Per NG Tube, Q8H    midodrine (ProAmatine) tab 5 mg, 5 mg, Oral, TID    docusate (Colace) 50 MG/5ML oral solution 100 mg, 100 mg, Per NG Tube, BID PRN    lansoprazole (Prevacid Solutab) disintegrating tab 30 mg, 30 mg, Oral, QAM AC    dextrose 10% infusion (TPN no rate), , Intravenous, Continuous PRN    pancrelipase (Lip-Prot-Amyl) (Zenpep) DR particles cap 10,000 Units, 10,000 Units, Per G Tube, PRN **AND** sodium bicarbonate tab 325 mg, 325 mg, Per G Tube, PRN    acetaminophen (Tylenol Extra Strength) tab 500 mg, 500 mg, Oral, Q4H PRN    acetaminophen (Tylenol) tab 650 mg, 650 mg, Oral, Q4H PRN **OR** [DISCONTINUED] HYDROcodone-acetaminophen (Norco) 5-325 MG per tab 1 tablet, 1 tablet, Oral, Q4H PRN **OR** [DISCONTINUED] HYDROcodone-acetaminophen (Norco) 5-325 MG per tab 2 tablet, 2 tablet, Oral, Q4H PRN    benzonatate (Tessalon) cap 200 mg, 200 mg, Oral, TID PRN    ondansetron (Zofran) 4 MG/2ML injection 4 mg, 4 mg, Intravenous, Q6H PRN    polyethylene glycol (PEG 3350) (Miralax) 17 g oral packet 17 g, 17 g, Oral, Daily PRN    sennosides (Senokot) tab 17.2 mg, 17.2 mg, Oral, Nightly PRN    bisacodyl (Dulcolax) 10 MG rectal suppository 10 mg, 10 mg, Rectal, Daily PRN    fleet enema (Fleet) 7-19 GM/118ML  rectal enema 133 mL, 1 enema, Rectal, Once PRN    Review of Systems:   Constitutional: Negative for anorexia, chills, fatigue, fevers, malaise, night sweats and weight loss.  Eyes: Negative for visual disturbance, irritation and redness.  Ears, nose, mouth, throat, and face: Negative for hearing loss, tinnitus, nasal congestion, snoring, sore throat, hoarseness and voice change.  Respiratory: Negative for cough, sputum, hemoptysis, chest pain, wheezing, dyspnea on exertion, or stridor.  Cardiovascular: Negative for chest pain, palpitations, irregular heart beats, syncope, fatigue, orthopnea, paroxysmal nocturnal dyspnea, lower extremity edema.  Gastrointestinal: Negative for dysphagia, odynophagia, reflux symptoms, nausea, vomiting, change in bowel habits, diarrhea, constipation and abdominal pain.  Integument/breast: Negative for rash, skin lesions, and pruritus.  Hematologic/lymphatic: Negative for easy bruising, bleeding, and lymphadenopathy.  Musculoskeletal: Negative for myalgias, arthralgias, muscle weakness.  Neurological: Negative for headaches, dizziness, seizures, memory problems, trouble swallowing, speech problems, gait problems and weakness.  Behavioral/Psych: Negative for active tobacco use.  Endocrine: No history of of diabetes, thyroid disorder.  Deferred,     Vital signs in last 24 hours:  Patient Vitals for the past 24 hrs:   BP Temp Temp src Pulse Resp SpO2 Weight   02/21/24 1500 118/72 -- -- 84 15 100 % --   02/21/24 1400 119/66 -- -- 84 16 100 % --   02/21/24 1300 106/90 -- -- 84 20 100 % --   02/21/24 1218 -- -- -- -- -- 100 % --   02/21/24 1200 114/59 98 °F (36.7 °C) Temporal 82 20 100 % --   02/21/24 1100 123/76 -- -- 79 17 100 % --   02/21/24 1000 121/66 -- -- 77 16 100 % --   02/21/24 0942 115/63 -- -- 75 15 100 % --   02/21/24 0900 116/80 -- -- 79 (!) 29 98 % --   02/21/24 0833 117/63 97.5 °F (36.4 °C) Temporal 88 (!) 32 91 % --   02/21/24 0814 -- -- -- -- -- (!) 85 % --   02/21/24 0805 --  -- -- 74 (!) 27 (!) 87 % --   02/21/24 0800 -- -- -- 77 (!) 31 (!) 80 % --   02/21/24 0755 -- -- -- 77 (!) 29 (!) 75 % --   02/21/24 0715 112/78 -- -- 69 20 100 % --   02/21/24 0700 97/58 -- -- 74 21 100 % --   02/21/24 0645 111/62 -- -- 72 25 98 % --   02/21/24 0630 111/62 -- -- 77 19 96 % --   02/21/24 0615 115/63 -- -- 75 (!) 27 (!) 88 % --   02/21/24 0600 112/59 98 °F (36.7 °C) Temporal 76 (!) 34 95 % --   02/21/24 0545 129/62 -- -- 74 20 93 % --   02/21/24 0530 121/89 -- -- 86 (!) 27 97 % --   02/21/24 0515 119/73 -- -- 73 25 97 % --   02/21/24 0500 119/62 -- -- 73 (!) 34 93 % --   02/21/24 0445 102/66 -- -- 76 (!) 32 98 % --   02/21/24 0430 121/72 -- -- 73 22 98 % --   02/21/24 0415 99/82 -- -- 78 25 98 % --   02/21/24 0410 99/82 -- -- 70 14 100 % --   02/21/24 0400 99/63 98 °F (36.7 °C) -- 70 18 93 % 147 lb 11.3 oz (67 kg)   02/21/24 0345 (!) 110/96 -- -- 85 17 98 % --   02/21/24 0330 112/64 -- -- 70 16 100 % --   02/21/24 0315 107/63 -- -- 68 10 100 % --   02/21/24 0300 102/66 -- -- 67 11 100 % --   02/21/24 0245 106/63 -- -- 74 13 100 % --   02/21/24 0230 101/61 -- -- 70 12 100 % --   02/21/24 0215 108/61 -- -- 89 14 100 % --   02/21/24 0200 109/60 -- -- 82 17 100 % --   02/21/24 0147 -- -- -- 96 17 100 % --   02/21/24 0145 119/71 -- -- 91 14 100 % --   02/21/24 0130 -- -- -- 79 19 100 % --   02/21/24 0115 105/65 -- -- 76 12 100 % --   02/21/24 0100 (!) 81/59 -- -- 90 15 100 % --   02/21/24 0045 90/51 -- -- 88 22 100 % --   02/21/24 0030 90/68 -- -- 87 17 95 % --   02/21/24 0015 92/54 -- -- 92 13 99 % --   02/21/24 0000 92/45 98.2 °F (36.8 °C) Temporal 95 13 99 % --   02/20/24 2356 92/45 -- -- 95 15 100 % --   02/20/24 2345 94/47 -- -- 97 16 100 % --   02/20/24 2330 (!) 83/56 -- -- 85 20 99 % --   02/20/24 2315 99/52 -- -- 95 18 99 % --   02/20/24 2300 100/71 -- -- 93 18 98 % --   02/20/24 2245 93/64 -- -- 82 11 99 % --   02/20/24 2230 92/67 -- -- 87 13 98 % --   02/20/24 2215 106/50 -- -- 85 13 98 % --    02/20/24 2200 111/48 -- -- 93 18 92 % --   02/20/24 2147 105/50 -- -- 86 15 95 % --   02/20/24 2145 -- -- -- 88 16 95 % --   02/20/24 2130 100/67 -- -- 86 18 (!) 88 % --   02/20/24 2115 (!) 88/27 -- -- 103 25 94 % --   02/20/24 2100 114/64 -- -- 103 17 98 % --   02/20/24 2045 104/68 -- -- 95 15 98 % --   02/20/24 2030 106/55 -- -- 105 14 95 % --   02/20/24 2000 113/66 98.3 °F (36.8 °C) -- 91 22 96 % --   02/20/24 1900 104/65 -- -- 82 21 100 % --   02/20/24 1845 103/65 -- -- 78 15 97 % --   02/20/24 1830 (!) 89/66 -- -- 89 16 98 % --   02/20/24 1820 100/71 -- -- 90 17 98 % --   02/20/24 1815 (!) 86/68 -- -- 86 16 (!) 88 % --   02/20/24 1801 -- -- -- -- -- 94 % --   02/20/24 1800 105/72 -- -- 95 26 96 % --   02/20/24 1757 -- -- -- 93 21 93 % --   02/20/24 1746 -- -- -- -- -- 90 % --   02/20/24 1745 109/70 -- -- 100 21 (!) 81 % --   02/20/24 1730 102/54 -- -- 103 21 (!) 88 % --   02/20/24 1715 108/61 -- -- 96 22 92 % --   02/20/24 1700 108/52 -- -- 84 13 96 % --   02/20/24 1645 102/62 -- -- 88 15 98 % --   02/20/24 1642 -- -- -- 84 13 97 % --   02/20/24 1630 108/67 -- -- 90 14 100 % --   02/20/24 1615 108/72 -- -- 92 20 (!) 88 % --   02/20/24 1600 114/67 98.4 °F (36.9 °C) Temporal 97 21 92 % --       Physical Exam:   General: alert, cooperative, oriented.  No respiratory distress.   Head: Normocephalic, without obvious abnormality, atraumatic.   Eyes: Conjunctivae/corneas clear.  No scleral icterus.  No conjunctival     hemorrhage.   Nose: Nares normal.   Throat: Lips, mucosa, and tongue normal.  No thrush noted.   Neck: Soft, supple neck; trachea midline, no adenopathy, no thyromegaly.   Lungs: CTAB, normal and equal bilateral chest rise   Chest wall: No tenderness or deformity.   Heart: Regular rate and rhythm, normal S1S2, no murmur.   Abdomen: soft, + tender, non-distended, no masses, no guarding, no     rebound, positive BS.   Extremity: no edema, no cyanosis   Skin: No rashes or lesions.   Neurological: Alert,  interactive, no focal deficits    Labs:  Lab Results   Component Value Date    WBC 7.4 02/27/2024    HGB 7.8 02/27/2024    HCT 23.5 02/27/2024    .0 02/27/2024    CREATSERUM 0.37 02/27/2024    BUN 21 02/27/2024     02/27/2024    K 4.2 02/27/2024     02/27/2024    CO2 30.0 02/27/2024     02/27/2024    CA 7.9 02/27/2024       Radiology:  CT 2/06 CONCLUSION:  There is a large amount of fecal material centered on the distal sigmoid colon and rectum most likely representing constipation/fecal impaction.  There is some possible wall thickening of the colon in this region which could represent   stercoral colitis.     CXR 2/21 Stable cardiac and mediastinal contours.  No significant interval change in mixed interstitial/alveolar opacities in a perihilar distribution throughout the lungs.  Persistent confluent opacification of the left lung base, likely a combination of mild   pleural effusion and passive atelectasis, similar to prior.  No pneumothorax.         Cultures:  Reviewed     Assessment and Plan:    1.  Sepsis with MRSA in blood cul on admission with Acute Resp failure:  - Blood cul with MRSA In 2/2 bottles on admission, repeats are NGTD  - CXR with multifocal Pneumonia,   - RVP with RSV + likely previous infection,   - Possible Secondary bacterial Pneumonia following RSV, Rule out Aspiration,   - On IV Vanc,  pharm to dose, 2/21- present,   - S/P three days and now restarted on IV Zosyn 2/26/24 - present,    - TTE with no vegetation,   - FiO2 requirement 35L ->25L   - Steroids per Pulm service,     2.    Cystitis: UA is mildly abn, Historically cul with E coli ( ESBL) and PSAR,     3.     Abd tenderness: Can be sec to Cystitis vs impacted stool seen on CT .  - Now frequent stools with a small wound in perirectal area,   - Wound care on board, off loading,     4.     Disposition: in house, an elderly female with MRSA Sepsis, possible secondary bacterial Pneumonia following RSV, now on  supplemental O2 which worsened a little, also with anemia,    - Follow pending cul,   - Continue IV Vanc pharm to dose, Will plan for a long course,   - Continue IV Zosyn, pharm to dose,   - Wean O2 off as tolerated,   - Pulm toilette,   - transfusion prn,   - Supportive care,     Discussed with RN, all questions answered, further recommendations to follow, Thanks,      Thank you for consulting [facility] for Nelly Potter.  If you have any questions or concerns please call [facility] Infectious Disease at [phone].     Vinay Stewart MD  2/21/2024  3:47 PM

## 2024-02-27 NOTE — PLAN OF CARE
Received patient visiting with her daughter, patient is awake, follows all simple commands, nodded head yes or no to questions, denies pain, on vapotherm 30L/40%, saturation 91-94%, during cares or while repositioned saturation drops down to 80's and at rest goes above 90%. Lungs sound much better today, patient has strong cough, suctioned. Plan of care discussed with patient and her daughter.    Problem: CARDIOVASCULAR - ADULT  Goal: Maintains optimal cardiac output and hemodynamic stability  Description: INTERVENTIONS:  - Monitor vital signs, rhythm, and trends  - Monitor for bleeding, hypotension and signs of decreased cardiac output  - Evaluate effectiveness of vasoactive medications to optimize hemodynamic stability  - Monitor arterial and/or venous puncture sites for bleeding and/or hematoma  - Assess quality of pulses, skin color and temperature  - Assess for signs of decreased coronary artery perfusion - ex. Angina  - Evaluate fluid balance, assess for edema, trend weights  Outcome: Progressing     Problem: RESPIRATORY - ADULT  Goal: Achieves optimal ventilation and oxygenation  Description: INTERVENTIONS:  - Assess for changes in respiratory status  - Assess for changes in mentation and behavior  - Position to facilitate oxygenation and minimize respiratory effort  - Oxygen supplementation based on oxygen saturation or ABGs  - Provide Smoking Cessation handout, if applicable  - Encourage broncho-pulmonary hygiene including cough, deep breathe, Incentive Spirometry  - Assess the need for suctioning and perform as needed  - Assess and instruct to report SOB or any respiratory difficulty  - Respiratory Therapy support as indicated  - Manage/alleviate anxiety  - Monitor for signs/symptoms of CO2 retention  Outcome: Progressing     Problem: METABOLIC/FLUID AND ELECTROLYTES - ADULT  Goal: Glucose maintained within prescribed range  Description: INTERVENTIONS:  - Monitor Blood Glucose as ordered  - Assess for  signs and symptoms of hyperglycemia and hypoglycemia  - Administer ordered medications to maintain glucose within target range  - Assess barriers to adequate nutritional intake and initiate nutrition consult as needed  - Instruct patient on self management of diabetes  Outcome: Progressing     Problem: SKIN/TISSUE INTEGRITY - ADULT  Goal: Skin integrity remains intact  Description: INTERVENTIONS  - Assess and document risk factors for pressure ulcer development  - Assess and document skin integrity  - Monitor for areas of redness and/or skin breakdown  - Initiate interventions, skin care algorithm/standards of care as needed  Outcome: Progressing     Problem: Safety Risk - Non-Violent Restraints  Goal: Patient will remain free from self-harm  Description: INTERVENTIONS:  - Apply the least restrictive restraint to prevent harm  - Notify patient and family of reasons restraints applied  - Assess for any contributing factors to confusion (electrolyte disturbances, delirium, medications)  - Discontinue any unnecessary medical devices as soon as possible  - Assess the patient's physical comfort, circulation, skin condition, hydration, nutrition and elimination needs   - Reorient and redirection as needed  - Assess for the need to continue restraints  Outcome: Progressing

## 2024-02-27 NOTE — PROGRESS NOTES
02/26/24 2946   Oxygen Utilization   $ RT Standby Charge (per 15 min) 1   O2 Device High flow/High humidity   O2 Flow Rate (L/min) 30 L/min   FiO2 (%) 40 %   Humidity High   [place] 1/2 Full     Started on metaneb, tolerated well

## 2024-02-27 NOTE — PROGRESS NOTES
[facility] Hospitalist Progress Note     Nelly Potter Patient Status:  Inpatient    1935 MRN [id number]   Location [facility] 4SW-A Attending Castillo Mcgill MD   Hosp Day # 7 PCP Balta Wright MD     Chief Complaint:   Fu Sob, hypoxic    Subjective:     Patient seen and examined.   On vapotherm 40%  Hgb 7.8 this am  son at bedside  Afebrile  2/2 blood cx +MRSA    Objective:    Review of Systems:   10 point ROS completed and was negative, except for pertinent positive and negatives stated in subjective.    Vital signs:  Temp:  [98.1 °F (36.7 °C)-99.1 °F (37.3 °C)] 98.7 °F (37.1 °C)  Pulse:  [] 92  Resp:  [18-33] 19  BP: ()/(40-91) 112/63  SpO2:  [88 %-98 %] 95 %  FiO2 (%):  [40 %-50 %] 40 %    Physical Exam:    General: No acute distress.   HEENT:  EOMI, PERRLA, OP clear  Respiratory: Clear to auscultation bilaterally. No wheezes. No rhonchi.  Cardiovascular: S1, S2. Regular rate and rhythm. No murmurs.  Abdomen: Soft, nontender, nondistended.  Positive bowel sounds. No rebound or guarding.  Extremities: No edema.  Neuro:  Grossly non focal, no motor deficits.        Diagnostic Data:    Labs:  Recent Labs   Lab 24  0957 24  0452 24  0534 24  0515 24  0415 24  0544 24  1405 24  2118 24  0435   WBC 6.1   < > 8.2 10.5 9.7 6.7  --   --  7.4   HGB 7.7*   < > 9.6* 8.8* 7.5* 6.0* 7.8* 7.7* 7.8*   MCV 83.2   < > 85.7 86.1 87.3 87.2  --   --  84.2   .0   < > 166.0 159.0 131.0* 99.0*  --   --  106.0*   BAND 8  --  4 6 7  --   --   --  7    < > = values in this interval not displayed.       Recent Labs   Lab 24  2336 24  0957 24  0452 24  0415 24  0544 24  1405 24  0435   * 167*   < > 188* 129*  --  177*   BUN 29* 30*   < > 32* 25*  --  21   CREATSERUM 0.51* 0.53*   < > 0.34* 0.46*  --  0.37*   CA 9.2 8.5   < > 8.0* 7.8*  --  7.9*   ALB 1.8* 1.7*  --    --  1.9*  --   --     142   < > 142 143  --  143   K 2.7* 3.9  3.9   < > 3.9 3.3* 5.0 4.2   CL 98 106   < > 109 108  --  111   CO2 34.0* 34.0*   < > 31.0 33.0*  --  30.0   ALKPHO 76 75  --   --  95  --   --    AST 6* 4*  --   --  6*  --   --    ALT 21 20  --   --  24  --   --    BILT 0.6 0.6  --   --  0.3  --   --    TP 5.3* 5.2*  --   --  4.7*  --   --     < > = values in this interval not displayed.       Estimated Creatinine Clearance: 84.7 mL/min (A) (based on SCr of 0.37 mg/dL (L)).    No results for input(s): \"PTP\", \"INR\" in the last 168 hours.         COVID-19 Lab Results    COVID-19  Lab Results   Component Value Date    COVID19 Not Detected 02/21/2024    COVID19 Not Detected 02/20/2024    COVID19 Not Detected 02/06/2024       Pro-Calcitonin  Recent Labs   Lab 02/20/24  1218   PCT 0.24*       Cardiac  Recent Labs   Lab 02/26/24  0544   PBNP 647*       Creatinine Kinase  Recent Labs   Lab 02/22/24  0452   CK 11*       Inflammatory Markers  Recent Labs   Lab 02/26/24  0544   KARUNA 271.9       Imaging: Imaging data reviewed in Epic.    Medications:    vancomycin  1,250 mg Intravenous Q24H    piperacillin-tazobactam  3.375 g Intravenous Q8H    QUEtiapine  12.5 mg Oral Nightly    dexamethasone  3.75 mg Per NG Tube Daily with breakfast    insulin aspart  2-10 Units Subcutaneous 4 times per day    sodium chloride (hypertonic)  3 mL Nebulization TID    ipratropium-albuterol  3 mL Nebulization TID @ 0700, 1400, 2100    guaiFENesin  400 mg Oral 6 times per day    dilTIAZem  60 mg Per NG Tube Q8H    midodrine  5 mg Oral TID    lansoprazole  30 mg Oral QAM AC    [Held by provider] rivaroxaban  20 mg Oral Daily with food       Assessment & Plan:    Nelly Potter is a 88 year old female with PMH significant for/anxiety, GERD, A-fib on Xarelto, HTN, DL, COPD, asthma, bladder CA, multiple myeloma, vertigo, dementia with short-term memory issues who presents from [place] with reports of shortness of breath  and a wet cough.      Cough, sob - 2/2 RSV infection +/- multifocal PNA  Acute hypoxic resp failure 2/2 above  Asthma/COPD exacerbation   -RSV + in ED  -cxr reviewed >> multifocal pneumonia  -supplemental O2 via vapotherm  >> weaned down to 40% >> cont to wean as able  -nebs prn  -may benefit from IV steroids  -cont iv abx  -blood cx x 2  -ST, PT, OT    Shock - presumably related to sepsis: 2/2 bacteremia, pneumonia  Lactic acidosis - resolved  -Was on norepi but currently off and BP stable  -stress steroids  -2/2 blood cx growing MRSA  -MRSA bacteremia >> ID consulted >> rec BRENNA   -cardiology consulted >> apprec recs >> family opting to not go for [facility]  -TTE w.o obv vegetations  -surveillance cx NTD    AMS 2/2 ICU delirium most likely  -helpful to keep awake during day and asleep at  night  -minimal sedation during the day if possible  -pain control with tylenol  -seroquel at night    Anemia  -hgb dropped to 6.0 this am  -prbc transfused  -maintain hgb >7  -stob ordered  -will defer to critical care team regarding CT a/p - son expressed wanting to get it done      Troponin elevated - likely demand ischemia  -downtrended  -may need cardiology involvement      GERD  -PPI     HTN   -resume home meds if able  -monitor BP closely     DL  -statin     Afib on xarelto  -resume xarelto and po diltiazem     Depression, anxiety  -cont zoloft     Bladder ca  Multiple myeloma  Dementia  Vertigo  -stable chronic conditions        Quality:  DVT Prophylaxis: xarelto, scds  CODE status: DNR, select  [place]: no  If COVID testing is negative, may discontinue isolation: yes     DISPO:  Spoke with son and daughter at bedside  All questions answered  Cont ICU care    DNR/DNI - ok for selective treatment >> confirmed this with son and daughter           Geno Kaur MD  [facility]  Pager [phone]  Answering Service number: [phone]          **Certification      PHYSICIAN Certification of Need for Inpatient Hospitalization -  Initial Certification    Patient will require inpatient services that will reasonably be expected to span two midnight's based on the clinical documentation in H+P.   Based on patients current state of illness, I anticipate that, after discharge, patient will require TBD.

## 2024-02-27 NOTE — PROGRESS NOTES
[facility]  Palliative Care Follow Up    Nelly Potter Patient Status:  Inpatient    1935 MRN [id number]   Location [facility] 4SW-A Attending Geno Kaur MD   Hosp Day # 7 PCP Balta Wright MD   [id number]    Date of visit:  2024  Day 7 of hospitalization.        Summary:         Nelly Potter is a 88 year old female with PMH of COPD, Dementia/functional decline, Multiple Myeloma, bladder ca, CAD, PE/DVT, Afib on Xarelto, urinary retention in addition to the other conditions noted below, presented to ED on 2024 with CC of SOB, and found to be hypoxic. She was placed on Vapotherm in ED with diagnostic tests showing elevated troponin and BNP, RVP + RSV, CXR showing PNA. Pt was admitted to ICU for further evaluation and management of acute hypoxic resp failure 2/2 multifocal PNA + RSV and underlying COPD, shock presumed septic 2/2 PNA, elevated trop felt to be demand ischemia, h/o Multiple myeloma in remission, PE/DVT, duodenal ulcer, GERD, COPD, CAD, HTN, ?dementia.  Hospital course includes blood cx + MRSA, ID on consult.  Cardiology consultation with C discussion surrounding BRENNA is noted.  Today, Hgb 6.0 down from 7.5 yesterday (8.8 on ) - receiving transfusion w plan to recheck later today, if continues to downtrend, possible abd imaging.  She has been seen by SLP for initial evaluation with recommendation for NPO, and has been unable to be seen for f/u d/t lethargy and O2 needs. Dietary following.  Wound on consult.    Palliative care is following for support w Goals of Care.    Interval Events: Hgb 7.8 this AM  - SLP recommends mechanical soft ground/minced and moist, thin liquids  - Case d/w RN  - Mentation improved    SUBJECTIVE  Review of Systems - Palliative Care Symptom Assessment     Nelly is more alert today, appears more comfortable. Spoke with son Murali FOX outside of room while she was cleaned up.    OBJECTIVE     Hematology:   Lab Results   Component  Value Date    WBC 7.4 02/27/2024    HGB 7.8 (L) 02/27/2024    HCT 23.5 (L) 02/27/2024    .0 (L) 02/27/2024       Chemistry:  Lab Results   Component Value Date    CREATSERUM 0.37 (L) 02/27/2024    BUN 21 02/27/2024     02/27/2024    K 4.2 02/27/2024     02/27/2024    CO2 30.0 02/27/2024     (H) 02/27/2024    CA 7.9 (L) 02/27/2024    ALB 1.9 (L) 02/26/2024    ALKPHO 95 02/26/2024    BILT 0.3 02/26/2024    TP 4.7 (L) 02/26/2024    AST 6 (L) 02/26/2024    ALT 24 02/26/2024    MG 2.3 02/25/2024    PHOS 2.7 02/26/2024       Imaging:  XR CHEST AP/PA (1 VIEW) (CPT=71045)    Result Date: 2/26/2024  CONCLUSION:    Increasing bilateral patchy upper-lower lung opacities, concerning for worsening pneumonia.  Poor inspiration.  Suspect left pleural effusion.  Heart partially obscured.  No sign of pneumothorax. Consider upright PA and lateral examination  of the chest, when tolerated.   LOCATION:  [facility]      Dictated by (CST): Hever Cantor MD on 2/26/2024 at 7:12 AM     Finalized by (CST): Hever Cantor MD on 2/26/2024 at 7:13 AM        Vital Signs:  Blood pressure 110/56, pulse 103, temperature 97.9 °F (36.6 °C), temperature source Temporal, resp. rate 17, height 5' 2\" (1.575 m), weight 162 lb 4.1 oz (73.6 kg), SpO2 95%.  Body mass index is 29.68 kg/m².    Supplemental O2 - Delivery Device:  FiO2 (%):  [40 %-50 %] 40 %      Physical Exam:     GENERAL: Alert in bed w staff helping to clean her up. NAD.  HEENT: + DHT  RESPIRATORY: some increased effort at rest while getting cleaned up, on vapotherm 30L 40%, sats ~ 95%  NEUROLOGIC: Alert, responding verbally.   PSYCHIATRIC:  no non-verbal signs of agitation noted    Palliative Performance Scale: 30%   Palliative Performance Scale   % Ambulation Activity Level Self-Care Intake Consciousness   100 Full Normal Full   Normal Full   90 Full No disease  Normal Full Normal Full   80 Full Some disease  Normal w/effort Full Normal or  Reduced Full   70  Reduced Some disease  Can't perform job Full Normal or   Reduced Full   60 Reduced Significant disease  Can't perform hobby Occasional  Assist Normal or   Reduced Full or confused   50 Mainly sit/lie Extensive Disease  Can't do any work Partial Assist Normal or Reduced Full or confused   40 Mainly in bed Extensive Disease  Can't do any work Mainly Assist Normal or Reduced Full or confused   30 Bedbound Extensive Disease  Can't do any work Max Assist  Total Care Reduced Drowsy/confused   20 Bedbound Extensive Disease  Can't do any work Max Assist  Total Care Minimal Drowsy/confused   10 Bedbound/coma Extensive Disease  Can't do any work  coma  Max Assist  Total Care Mouth care Drowsy or coma   0 Death       Palliative Care Assessment:     Goals of Care Discussion:     Spoke with Murali/RUDDY outside of room. He reports things are better today with regard to Hgb being stable and she worked with SLP with recommendations for minced/chopped solids and thin liquids. He hopes to maintain DHT for some time to confirm she will be able to maintain nutritional requirements and consistently take po as he hopes she does not have to have it removed and replaced again.    He again discussed hope to review oncological situation as she has labs done monthly to assess disease and direct treatment. This has not been assessed since Nov/Dec 2023, so he wonders what the status is. He understands she would be too weak for any cancer-directed treatment in her current state, but feels this information would be important for overall GOC.      Otherwise, he reports current POC aligns with GOC, and open to palliative f/u later this week.      Problem List:       Principal Problem:    Multifocal pneumonia  Active Problems:    Acute respiratory failure with hypoxia (HCC)    RSV infection    Hypokalemia    Elevated troponin    Lactic acidosis    Sepsis, due to unspecified organism, unspecified whether acute organ dysfunction present (HCC)     Palliative care encounter    Counseling regarding advance care planning and goals of care      Palliative Care Recommendations/Plan:         Goals of Care:  Established and treatment-focused with limits.  Son requests to discuss multiple myeloma evaluation / management further with treatment team or oncology. Concerned that her labs for this have not been checked since Nov/Dec 2023, and were previously being checked by her oncologist every month. Will relay concern to primary.  Otherwise, POC is in line with GO, and son accepts palliative f/u later this week to check in.  [facility] [place], family appreciative.      Code Status: DNAR/Selective Treatment.  Confirmed w POA/family 2/26. POLST on file.    HCPOA: Son, Murali Potter. Document on file.    Disposition:  Pending clinical course.      A total of 35 minutes were spent on this visit, which included all of the following:  Chart review, direct face to face contact, history taking, physical examination, counseling and coordinating care, and documentation.        Reviewed the above with patient's RN, primary.    Thank you for inviting Palliative Care to participate in the care of Nelly Peralta.       Will follow peripherally and check in later this week or as needed.      KAYLYN Moreno  Palliative Care    2/27/2024  11:39 AM    The 21st Century Cures Act makes medical notes like these available to patients in the interest of transparency. Please be advised this is a medical document. Medical documents are intended to carry relevant information, facts as evident, and the clinical opinion of the practitioner. The medical note is intended as a peer to peer communication, and may appear blunt or direct. It is written in medical language and may contain abbreviations or verbiage that are unfamiliar.

## 2024-02-27 NOTE — DIETARY NOTE
[facility]  NUTRITION ASSESSMENT    Pt does not meet malnutrition criteria at this time.    NUTRITION INTERVENTION:    Meal and Snacks - ADAT per SLP. Monitor and encourage adequate PO intake.   Medical Food Supplements - Will evaluate need when diet is advanced. Rationale/use for oral supplements discussed.  Enteral Nutrition - Via DHT, continue TF at GOAL: Jevity 1.5 at 60 mL/hr x 22hrs.   This will provide 1980 kcal, 84 grams protein, 1003 mL total free water, and 100% of RDI's.   Recommend 160 mL water flush q 4 hours, TF+FWF provides 1963 mL total fluids.   Hold TF 1 hour before and after administering lansoprazole.  Continue Banatrol BID prn for loose stools.  Coordination of Nutrition Care - SLP consult prior to diet advancement. and Palliative care consult for goal of care    PATIENT STATUS: 2/27: Pt weaning on vapotherm. Plan for SLP to re-eval today for diet advancement. Tolerating TF at goal via DHT. RN reports pt still having multiple BM's daily and using Banatrol - will continue to send for now. Will continue to monitor and follow up as appropriate.    2/23: Pt continues on vapotherm but planning to wean to HFNC today. Tolerating TF at goal via DHT. SLP to re-eval today for diet advancement. No GI symptoms noted. Pt with elevated BG 2/2 steroids - started on SSI aspart this AM. No A1c level but no hx of DM.   2/21: 88 year female admitted on 2/20 w/ multifocal pneumonia. Pt seen for tube feed consult.  Pt positive for RSV. patient on Vapotherm 40L,100%. SLP following, recommended NPO diet. Pt sleeping at time of visit, talked to daughter in law at bedside. Daughter in law reports pt w/ poor appetite prior to admit. Stated that pt started coughing when attempted to eat. Reports this has been going of for a week. No n/v/d. Last BM 2/20. Reported UBW of 130 lbs. However per chart review, pts wt has been ~ 145-155 lb over the past 2 months. And mentioned that wt has been fluctuating d/t steroids.  Provided tube feed recs. Will continue to monitor.     PMH: anxiety, GERD, A-fib on Xarelto, HTN, DL, COPD, asthma, bladder CA, multiple myeloma, vertigo, dementia with short-term memory issues.    ANTHROPOMETRICS:  Ht: 157.5 cm (5' 2\")  Wt: 73.6 kg (162 lb 4.1 oz).   BMI: Body mass index is 29.68 kg/m².  IBW: 50 kg    WEIGHT HISTORY:   Weight loss: No  Wt Readings from Last 10 Encounters:   02/26/24 73.6 kg (162 lb 4.1 oz)   02/19/24 66 kg (145 lb 9.6 oz)   02/16/24 71 kg (156 lb 9.6 oz)   02/15/24 70.6 kg (155 lb 9.6 oz)   02/06/24 69.2 kg (152 lb 9.6 oz)   02/05/24 71.2 kg (157 lb)   01/31/24 70.5 kg (155 lb 6.4 oz)   01/29/24 70.5 kg (155 lb 6.4 oz)   01/25/24 70.5 kg (155 lb 6.4 oz)   01/22/24 69.6 kg (153 lb 6.4 oz)        NUTRITION:  Diet:       Procedures    NPO      Food Allergies: No  Cultural/Ethnic/Church Preferences Addressed: Yes    GI system review: WNL; Last BM: 2/25  Skin and wounds: full thickness wound to anus    NUTRITION RELATED PHYSICAL FINDINGS:     1. Body Fat/Muscle Mass:  no wasting noted per visual     2. Fluid Accumulation: upper extremity edema, lower extremity edema, and b/l hands      NUTRITION PRESCRIPTION: 67 kg  Calories: 1525-1233 calories/day (25-30 kcal/kg)  Protein:  grams protein/day (1.0-1.5 grams protein per kg)  Fluid: ~1 ml/kcal or per MD discretion    NUTRITION DIAGNOSIS/PROBLEM:  Inadequate oral intake related to inability to take or tolerate as evidenced by need for NPO status and TF to meet nutrition needs.    MONITOR AND EVALUATE/NUTRITION GOALS:  Weight stable within 1 to 2 lbs during admission - Ongoing  Tolerate alternative nutrition at 100% of goal - Met, continues    MEDICATIONS:  decadron, SSI aspart, lansoprazole, abx    LABS:  , POC glucose x 24hrs: 172-240 mg/dl    Pt is at High nutrition risk    Cathleen Perla RD, LDN, CNSC  Clinical Dietitian  Spectra: [phone]

## 2024-02-28 NOTE — PROGRESS NOTES
02/27/24 2046   Oxygen Utilization   O2 Device High flow/High humidity   O2 Flow Rate (L/min) 30 L/min   FiO2 (%) 50 %   SpO2 95 %     Received patient on Vapotherm 30L/40%. RN increased the FIO2 to 50% after patient desaturated to 88%.

## 2024-02-28 NOTE — SLP NOTE
SPEECH DAILY NOTE - INPATIENT    ASSESSMENT & PLAN   ASSESSMENT  Pt seen for dysphagia tx to assess tolerance with recommended diet, ensure proper utilization of aspiration precautions and provide pt/family education.  Patient awake, but minimally participatory during my visit. Patient's daughter at bedside and reported attempting to assist with PO intake yesterday evening, but patient was too drowsy to participate. Education provided re: results and recommendations of yesterday's clinical swallow evaluation. Patient too lethargic for PO trials during my visit, but education provided to family re: safe swallowing strategies. Recommend continued PO intake of minced & moist solids with thin liquids when patient is alert and participatory. Elevate head of bed and bring head to a neutral position prior to PO intake. SLP will continue to follow to monitor diet tolerance and adjust as appropriate.    Diet Recommendations - Solids: Mechanical soft ground/ Minced & Moist  Diet Recommendations - Liquids: Thin Liquids    Compensatory Strategies Recommended: Small bites and sips  Aspiration Precautions: Upright position  Medication Administration Recommendations: One pill at a time;Whole in puree    Patient Experiencing Pain: No                Treatment Plan  Treatment Plan/Recommendations: Aspiration precautions    Interdisciplinary Communication: Discussed with RN            GOALS  Goal #1 Slp to re-assess as medically appropriate.   Met   Goal #2 The patient will tolerate minced & moist consistency and thin liquids without overt signs or symptoms of aspiration with 95 % accuracy over 1-2 session(s).     In Progress   Goal #3 The patient/family/caregiver will demonstrate understanding and implementation of aspiration precautions and swallow strategies independently over 1-2 session(s).  In Progress   Goal #4       Goal #5       Goal #6       Goal #7       Goal #8            FOLLOW UP  Follow Up Needed (Documentation  Required): Yes  SLP Follow-up Date: 02/29/24       Session: 1    If you have any questions, please contact JUANA Caceres

## 2024-02-28 NOTE — PROGRESS NOTES
[facility] Hospitalist Progress Note     Nelly Potter Patient Status:  Inpatient    1935 MRN [id number]   Location [facility] 4SW-A Attending Castillo Mcgill MD   Hosp Day # 8 PCP Balta Wright MD     Chief Complaint:   Fu Sob, hypoxic    Subjective:     Patient seen and examined.   On vapotherm.   Transferred out of ICU.  Feeling weak/SOB.  NAD.     Objective:    Review of Systems:   10 point ROS completed and was negative, except for pertinent positive and negatives stated in subjective.    Vital signs:  Temp:  [97 °F (36.1 °C)-97.5 °F (36.4 °C)] 97.4 °F (36.3 °C)  Pulse:  [] 83  Resp:  [12-32] 19  BP: (116-144)/(60-93) 138/80  SpO2:  [87 %-100 %] 98 %  FiO2 (%):  [40 %-50 %] 50 %    Physical Exam:    General: No acute distress.   HEENT:  EOMI, PERRLA, OP clear  Respiratory: Clear to auscultation bilaterally. No wheezes. No rhonchi.  Cardiovascular: S1, S2. Regular rate and rhythm. No murmurs.  Abdomen: Soft, nontender, nondistended.  Positive bowel sounds. No rebound or guarding.  Extremities: BUE/BLE edema. Anasarca  Neuro:  Grossly non focal, no motor deficits.        Diagnostic Data:    Labs:  Recent Labs   Lab 24  0534 24  0515 24  0415 24  0544 24  1405 24  2118 24  0435 24  0453   WBC 8.2 10.5 9.7 6.7  --   --  7.4 6.7   HGB 9.6* 8.8* 7.5* 6.0* 7.8* 7.7* 7.8* 7.7*   MCV 85.7 86.1 87.3 87.2  --   --  84.2 85.9   .0 159.0 131.0* 99.0*  --   --  106.0* 113.0*   BAND 4 6 7  --   --   --  7  --        Recent Labs   Lab 24  0544 24  1405 24  0435 24  9953   *  --  177* 208*   BUN 25*  --  21 19   CREATSERUM 0.46*  --  0.37* 0.28*   CA 7.8*  --  7.9* 7.9*   ALB 1.9*  --   --   --      --  143 137   K 3.3* 5.0 4.2 3.8     --  111 105   CO2 33.0*  --  30.0 28.0   ALKPHO 95  --   --   --    AST 6*  --   --   --    ALT 24  --   --   --    BILT 0.3  --   --   --     TP 4.7*  --   --   --        Estimated Creatinine Clearance: 112 mL/min (A) (based on SCr of 0.28 mg/dL (L)).    No results for input(s): \"PTP\", \"INR\" in the last 168 hours.         COVID-19 Lab Results    COVID-19  Lab Results   Component Value Date    COVID19 Not Detected 02/21/2024    COVID19 Not Detected 02/20/2024    COVID19 Not Detected 02/06/2024       Pro-Calcitonin  No results for input(s): \"PCT\" in the last 168 hours.      Cardiac  Recent Labs   Lab 02/26/24  0544   PBNP 647*       Creatinine Kinase  Recent Labs   Lab 02/22/24  0452   CK 11*       Inflammatory Markers  Recent Labs   Lab 02/26/24  0544   KARUNA 271.9       Imaging: Imaging data reviewed in Epic.    Medications:    vancomycin  1,250 mg Intravenous Q24H    rivaroxaban  20 mg Oral Daily with food    piperacillin-tazobactam  3.375 g Intravenous Q8H    QUEtiapine  12.5 mg Oral Nightly    dexamethasone  3.75 mg Per NG Tube Daily with breakfast    insulin aspart  2-10 Units Subcutaneous 4 times per day    sodium chloride (hypertonic)  3 mL Nebulization TID    ipratropium-albuterol  3 mL Nebulization TID @ 0700, 1400, 2100    guaiFENesin  400 mg Oral 6 times per day    dilTIAZem  60 mg Per NG Tube Q8H    midodrine  5 mg Oral TID    lansoprazole  30 mg Oral QAM AC       Assessment & Plan:    Nelly Potter is a 88 year old female with PMH significant for/anxiety, GERD, A-fib on Xarelto, HTN, DL, COPD, asthma, bladder CA, multiple myeloma, vertigo, dementia with short-term memory issues who presents from [facility] with reports of shortness of breath and a wet cough.      Cough, sob - 2/2 RSV infection +/- multifocal PNA  Acute hypoxic resp failure 2/2 above  Asthma/COPD exacerbation   -RSV + in ED  -cxr reviewed >> multifocal pneumonia  -supplemental O2 via vapotherm  >> weaned down to 40% >> cont to wean as able  -nebs prn  -may benefit from IV steroids  -cont iv abx  -blood cx x 2  -ST, PT, OT  - will start IV lasix for anasarca with  likely component of volume overload contributing to hypoxia - start 40 IV lasix every day with BP parameters    Shock - presumably related to sepsis: 2/2 bacteremia, pneumonia  Lactic acidosis - resolved  -Was on norepi but currently off and BP stable  -stress steroids  -2/2 blood cx growing MRSA  -MRSA bacteremia >> ID consulted >> rec BRENNA   -cardiology consulted >> apprec recs >> family opting to not go for BRENNA  -TTE w.o obv vegetations  -surveillance cx NTD    AMS 2/2 ICU delirium most likely  -helpful to keep awake during day and asleep at  night  -minimal sedation during the day if possible  -pain control with tylenol  -seroquel at night    Anemia  -hgb dropped to 6.0   -prbc transfused  -maintain hgb >7  -stob ordered  -will defer to critical care team regarding CT a/p - son expressed wanting to get it done      Troponin elevated - likely demand ischemia  -downtrended  -may need cardiology involvement      GERD  -PPI     HTN   -resume home meds if able  -monitor BP closely     DL  -statin     Afib on xarelto  -resume xarelto and po diltiazem     Depression, anxiety  -cont zoloft     Bladder ca  Multiple myeloma  Dementia  Vertigo  -stable chronic conditions        Quality:  DVT Prophylaxis: xarelto, scds  CODE status: DNR, select  Lentz: no  If COVID testing is negative, may discontinue isolation: yes     DISPO:  Transferred to floor    DNR/DNI - ok for selective treatment >> prior hospitalist Dr. Kaur confirmed this with son and daughter           Jayson Guamanshlomo [facility] Hospitalist  Pager [phone]  Answering Service number: [phone]          **Certification      PHYSICIAN Certification of Need for Inpatient Hospitalization - Initial Certification    Patient will require inpatient services that will reasonably be expected to span two midnight's based on the clinical documentation in H+P.   Based on patients current state of illness, I anticipate that, after discharge, patient will require TBD.

## 2024-02-28 NOTE — PROGRESS NOTES
[facility]     [place] [facility] Consult    Nelly Potter Patient Status:  Inpatient    1935 MRN [id number]   Location [place] 4SW-A Attending Geno Kaur MD   Hosp Day # 8 PCP MD Nelly Lewis seen and examined,   Afebrile,   Previous entries noted,   Little more interactive,   Transferred out of ICU      History:  Past Medical History:   Diagnosis Date    Allergic rhinitis     Anxiety     Chronic midline low back pain without sciatica 10/13/2021    Constipation     Depression     Esophageal reflux     History of bladder cancer 2012    Incontinence     Long QT interval     per pt. hx of long QT.    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 Mathews    CYSTOURETHROSCOPY  10/24/12    [facility] Cysto Dr. Mathews     CYSTOURETHROSCOPY  13, 13, 13, 14, 5/6/15, 11/11/15    cysto- Dr. Mathews, [place]    CYSTOURETHROSCOPY  10/11/2017    cysto Dr. mathews    CYSTOURETHROSCOPY,FULGUR .5-2CM LESN  12    cystourethroscopy, TURBT x 2- Dr. Mathews    FOOT SURGERY Left 10/25/11    lt foot    HIP REPLACEMENT SURGERY      left    HYSTERECTOMY      KNEE REPLACEMENT SURGERY Bilateral     OTHER SURGICAL HISTORY       arthroscopic left knee    PART EXCIS 5TH METATARSAL HEAD Right 10/2/2014    Procedure: TAILOR'S BUNIONECTOMY;  Surgeon: Adryan Prajapati DPM;  Location: [facility]    PATIENT DOCUMENTED NOT TO HAVE EXPERIENCED ANY OF THE FOLLOWING EVENTS Right  10/2/2014    Procedure: TAILOR'S BUNIONECTOMY;  Surgeon: Adryan Prajapati DPM;  Location: [facility]    PATIENT WITH PREOPERATIVE ORDER FOR IV ANTIBIOTIC SURGICAL SITE INFECT Right 10/2/2014    Procedure: TAILOR'S BUNIONECTOMY;  Surgeon: Adryan Prajapati DPM;  Location: [facility]    REPAIR OF ALEJANDROERTOE,ONE Left 10/2/2014    Procedure: ARTHROPLASTY ONE (1) TOE;  Surgeon: Adryan Prajapati DPM;  Location: [facility]    SINUS SURGERY    1983 & 1999    sinus    SLING OPER STRES INCONTINENCE  8/06    sling    SLING OPER STRES INCONTINENCE  10/2011    sling repeat. TVT Exact. Dr[facility] Dang at [facility]    TONSILLECTOMY       Family History   Problem Relation Age of Onset    Other (Other) Son         CF    Other (Other) Son         CF      reports that she has never smoked. She has never used smokeless tobacco. She reports current alcohol use. She reports that she does not use drugs.    Allergies:  Allergies   Allergen Reactions    Myrbetriq [Mirabegron] NAUSEA ONLY and DIZZINESS    Codeine NAUSEA ONLY    Evista [Raloxifene Hydrochloride] RASH    Meclizine NAUSEA ONLY    Sulfamethoxazole-Trimethoprim DIZZINESS       Medications:    Current Facility-Administered Medications:     vancomycin (Vancocin) 1.25 g in sodium chloride 0.9% 250mL IVPB premix, 1,250 mg, Intravenous, Q24H    rivaroxaban (Xarelto) tab 20 mg, 20 mg, Oral, Daily with food    piperacillin-tazobactam (Zosyn) 3.375 g in dextrose 5% 100 mL IVPB-ADDV, 3.375 g, Intravenous, Q8H    QUEtiapine (SEROquel) partial tab 12.5 mg, 12.5 mg, Oral, Nightly    dexamethasone (Decadron) tab 3.75 mg, 3.75 mg, Per NG Tube, Daily with breakfast    insulin aspart (NovoLOG) 100 Units/mL FlexPen 2-10 Units, 2-10 Units, Subcutaneous, 4 times per day    sodium chloride (hypertonic) 3 % nebulizer solution 3 mL, 3 mL, Nebulization, TID    ibuprofen (Motrin) 100 MG/5ML oral suspension 400 mg, 400 mg, Per NG Tube, Q4H PRN    ipratropium-albuterol (Duoneb) 0.5-2.5 (3) MG/3ML inhalation  solution 3 mL, 3 mL, Nebulization, TID @ 0700, 1400, 2100    traMADol (Ultram) tab 50 mg, 50 mg, Oral, Q6H PRN    guaiFENesin (Mucinex) tab 400 mg, 400 mg, Oral, 6 times per day    glucose (Dex4) 15 GM/59ML oral liquid 15 g, 15 g, Oral, Q15 Min PRN **OR** glucose (Glutose) 40% oral gel 15 g, 15 g, Oral, Q15 Min PRN **OR** glucose-vitamin C (Dex-4) chewable tab 4 tablet, 4 tablet, Oral, Q15 Min PRN **OR** dextrose 50% injection 50 mL, 50 mL, Intravenous, Q15 Min PRN **OR** glucose (Dex4) 15 GM/59ML oral liquid 30 g, 30 g, Oral, Q15 Min PRN **OR** glucose (Glutose) 40% oral gel 30 g, 30 g, Oral, Q15 Min PRN **OR** glucose-vitamin C (Dex-4) chewable tab 8 tablet, 8 tablet, Oral, Q15 Min PRN    dilTIAZem (cardIZEM) tab 60 mg, 60 mg, Per NG Tube, Q8H    midodrine (ProAmatine) tab 5 mg, 5 mg, Oral, TID    docusate (Colace) 50 MG/5ML oral solution 100 mg, 100 mg, Per NG Tube, BID PRN    lansoprazole (Prevacid Solutab) disintegrating tab 30 mg, 30 mg, Oral, QAM AC    dextrose 10% infusion (TPN no rate), , Intravenous, Continuous PRN    pancrelipase (Lip-Prot-Amyl) (Zenpep) DR particles cap 10,000 Units, 10,000 Units, Per G Tube, PRN **AND** sodium bicarbonate tab 325 mg, 325 mg, Per G Tube, PRN    acetaminophen (Tylenol Extra Strength) tab 500 mg, 500 mg, Oral, Q4H PRN    acetaminophen (Tylenol) tab 650 mg, 650 mg, Oral, Q4H PRN **OR** [DISCONTINUED] HYDROcodone-acetaminophen (Norco) 5-325 MG per tab 1 tablet, 1 tablet, Oral, Q4H PRN **OR** [DISCONTINUED] HYDROcodone-acetaminophen (Norco) 5-325 MG per tab 2 tablet, 2 tablet, Oral, Q4H PRN    benzonatate (Tessalon) cap 200 mg, 200 mg, Oral, TID PRN    ondansetron (Zofran) 4 MG/2ML injection 4 mg, 4 mg, Intravenous, Q6H PRN    polyethylene glycol (PEG 3350) (Miralax) 17 g oral packet 17 g, 17 g, Oral, Daily PRN    sennosides (Senokot) tab 17.2 mg, 17.2 mg, Oral, Nightly PRN    bisacodyl (Dulcolax) 10 MG rectal suppository 10 mg, 10 mg, Rectal, Daily PRN    fleet enema (Fleet)  7-19 GM/118ML rectal enema 133 mL, 1 enema, Rectal, Once PRN    Review of Systems:   Constitutional: Negative for anorexia, chills, fatigue, fevers, malaise, night sweats and weight loss.  Eyes: Negative for visual disturbance, irritation and redness.  Ears, nose, mouth, throat, and face: Negative for hearing loss, tinnitus, nasal congestion, snoring, sore throat, hoarseness and voice change.  Respiratory: Negative for cough, sputum, hemoptysis, chest pain, wheezing, dyspnea on exertion, or stridor.  Cardiovascular: Negative for chest pain, palpitations, irregular heart beats, syncope, fatigue, orthopnea, paroxysmal nocturnal dyspnea, lower extremity edema.  Gastrointestinal: Negative for dysphagia, odynophagia, reflux symptoms, nausea, vomiting, change in bowel habits, diarrhea, constipation and abdominal pain.  Integument/breast: Negative for rash, skin lesions, and pruritus.  Hematologic/lymphatic: Negative for easy bruising, bleeding, and lymphadenopathy.  Musculoskeletal: Negative for myalgias, arthralgias, muscle weakness.  Neurological: Negative for headaches, dizziness, seizures, memory problems, trouble swallowing, speech problems, gait problems and weakness.  Behavioral/Psych: Negative for active tobacco use.  Endocrine: No history of of diabetes, thyroid disorder.  Deferred,     Vital signs in last 24 hours:  Patient Vitals for the past 24 hrs:   BP Temp Temp src Pulse Resp SpO2 Weight   02/21/24 1500 118/72 -- -- 84 15 100 % --   02/21/24 1400 119/66 -- -- 84 16 100 % --   02/21/24 1300 106/90 -- -- 84 20 100 % --   02/21/24 1218 -- -- -- -- -- 100 % --   02/21/24 1200 114/59 98 °F (36.7 °C) Temporal 82 20 100 % --   02/21/24 1100 123/76 -- -- 79 17 100 % --   02/21/24 1000 121/66 -- -- 77 16 100 % --   02/21/24 0942 115/63 -- -- 75 15 100 % --   02/21/24 0900 116/80 -- -- 79 (!) 29 98 % --   02/21/24 0833 117/63 97.5 °F (36.4 °C) Temporal 88 (!) 32 91 % --   02/21/24 0814 -- -- -- -- -- (!) 85 % --    02/21/24 0805 -- -- -- 74 (!) 27 (!) 87 % --   02/21/24 0800 -- -- -- 77 (!) 31 (!) 80 % --   02/21/24 0755 -- -- -- 77 (!) 29 (!) 75 % --   02/21/24 0715 112/78 -- -- 69 20 100 % --   02/21/24 0700 97/58 -- -- 74 21 100 % --   02/21/24 0645 111/62 -- -- 72 25 98 % --   02/21/24 0630 111/62 -- -- 77 19 96 % --   02/21/24 0615 115/63 -- -- 75 (!) 27 (!) 88 % --   02/21/24 0600 112/59 98 °F (36.7 °C) Temporal 76 (!) 34 95 % --   02/21/24 0545 129/62 -- -- 74 20 93 % --   02/21/24 0530 121/89 -- -- 86 (!) 27 97 % --   02/21/24 0515 119/73 -- -- 73 25 97 % --   02/21/24 0500 119/62 -- -- 73 (!) 34 93 % --   02/21/24 0445 102/66 -- -- 76 (!) 32 98 % --   02/21/24 0430 121/72 -- -- 73 22 98 % --   02/21/24 0415 99/82 -- -- 78 25 98 % --   02/21/24 0410 99/82 -- -- 70 14 100 % --   02/21/24 0400 99/63 98 °F (36.7 °C) -- 70 18 93 % 147 lb 11.3 oz (67 kg)   02/21/24 0345 (!) 110/96 -- -- 85 17 98 % --   02/21/24 0330 112/64 -- -- 70 16 100 % --   02/21/24 0315 107/63 -- -- 68 10 100 % --   02/21/24 0300 102/66 -- -- 67 11 100 % --   02/21/24 0245 106/63 -- -- 74 13 100 % --   02/21/24 0230 101/61 -- -- 70 12 100 % --   02/21/24 0215 108/61 -- -- 89 14 100 % --   02/21/24 0200 109/60 -- -- 82 17 100 % --   02/21/24 0147 -- -- -- 96 17 100 % --   02/21/24 0145 119/71 -- -- 91 14 100 % --   02/21/24 0130 -- -- -- 79 19 100 % --   02/21/24 0115 105/65 -- -- 76 12 100 % --   02/21/24 0100 (!) 81/59 -- -- 90 15 100 % --   02/21/24 0045 90/51 -- -- 88 22 100 % --   02/21/24 0030 90/68 -- -- 87 17 95 % --   02/21/24 0015 92/54 -- -- 92 13 99 % --   02/21/24 0000 92/45 98.2 °F (36.8 °C) Temporal 95 13 99 % --   02/20/24 2356 92/45 -- -- 95 15 100 % --   02/20/24 2345 94/47 -- -- 97 16 100 % --   02/20/24 2330 (!) 83/56 -- -- 85 20 99 % --   02/20/24 2315 99/52 -- -- 95 18 99 % --   02/20/24 2300 100/71 -- -- 93 18 98 % --   02/20/24 2245 93/64 -- -- 82 11 99 % --   02/20/24 2230 92/67 -- -- 87 13 98 % --   02/20/24 2215 106/50 --  -- 85 13 98 % --   02/20/24 2200 111/48 -- -- 93 18 92 % --   02/20/24 2147 105/50 -- -- 86 15 95 % --   02/20/24 2145 -- -- -- 88 16 95 % --   02/20/24 2130 100/67 -- -- 86 18 (!) 88 % --   02/20/24 2115 (!) 88/27 -- -- 103 25 94 % --   02/20/24 2100 114/64 -- -- 103 17 98 % --   02/20/24 2045 104/68 -- -- 95 15 98 % --   02/20/24 2030 106/55 -- -- 105 14 95 % --   02/20/24 2000 113/66 98.3 °F (36.8 °C) -- 91 22 96 % --   02/20/24 1900 104/65 -- -- 82 21 100 % --   02/20/24 1845 103/65 -- -- 78 15 97 % --   02/20/24 1830 (!) 89/66 -- -- 89 16 98 % --   02/20/24 1820 100/71 -- -- 90 17 98 % --   02/20/24 1815 (!) 86/68 -- -- 86 16 (!) 88 % --   02/20/24 1801 -- -- -- -- -- 94 % --   02/20/24 1800 105/72 -- -- 95 26 96 % --   02/20/24 1757 -- -- -- 93 21 93 % --   02/20/24 1746 -- -- -- -- -- 90 % --   02/20/24 1745 109/70 -- -- 100 21 (!) 81 % --   02/20/24 1730 102/54 -- -- 103 21 (!) 88 % --   02/20/24 1715 108/61 -- -- 96 22 92 % --   02/20/24 1700 108/52 -- -- 84 13 96 % --   02/20/24 1645 102/62 -- -- 88 15 98 % --   02/20/24 1642 -- -- -- 84 13 97 % --   02/20/24 1630 108/67 -- -- 90 14 100 % --   02/20/24 1615 108/72 -- -- 92 20 (!) 88 % --   02/20/24 1600 114/67 98.4 °F (36.9 °C) Temporal 97 21 92 % --       Physical Exam:   General: alert, cooperative, oriented.  No respiratory distress.   Head: Normocephalic, without obvious abnormality, atraumatic.   Eyes: Conjunctivae/corneas clear.  No scleral icterus.  No conjunctival     hemorrhage.   Nose: Nares normal.   Throat: Lips, mucosa, and tongue normal.  No thrush noted.   Neck: Soft, supple neck; trachea midline, no adenopathy, no thyromegaly.   Lungs: CTAB, normal and equal bilateral chest rise   Chest wall: No tenderness or deformity.   Heart: Regular rate and rhythm, normal S1S2, no murmur.   Abdomen: soft, + tender, non-distended, no masses, no guarding, no     rebound, positive BS.   Extremity: no edema, no cyanosis   Skin: No rashes or  lesions.   Neurological: Alert, interactive, no focal deficits    Labs:  Lab Results   Component Value Date    WBC 6.7 02/28/2024    HGB 7.7 02/28/2024    HCT 23.8 02/28/2024    .0 02/28/2024    CREATSERUM 0.28 02/28/2024    BUN 19 02/28/2024     02/28/2024    K 3.8 02/28/2024     02/28/2024    CO2 28.0 02/28/2024     02/28/2024    CA 7.9 02/28/2024       Radiology:  CT 2/06 CONCLUSION:  There is a large amount of fecal material centered on the distal sigmoid colon and rectum most likely representing constipation/fecal impaction.  There is some possible wall thickening of the colon in this region which could represent   stercoral colitis.     CXR 2/21 Stable cardiac and mediastinal contours.  No significant interval change in mixed interstitial/alveolar opacities in a perihilar distribution throughout the lungs.  Persistent confluent opacification of the left lung base, likely a combination of mild   pleural effusion and passive atelectasis, similar to prior.  No pneumothorax.         Cultures:  Reviewed     Assessment and Plan:    1.  Sepsis with MRSA in blood cul on admission with Acute Resp failure:  - Blood cul with MRSA In 2/2 bottles on admission, repeats are NGTD  - CXR with multifocal Pneumonia,   - RVP with RSV + likely previous infection,   - Possible Secondary bacterial Pneumonia following RSV, Rule out Aspiration,   - On IV Vanc,  pharm to dose, 2/21- present,   - S/P three days and now restarted on IV Zosyn 2/26/24 - present,    - TTE with no vegetation,   - FiO2 requirement 35L ->25L->4L  - Steroids per Pulm service,     2.    Cystitis: UA is mildly abn, Historically cul with E coli ( ESBL) and PSAR,     3.     Abd tenderness: Can be sec to Cystitis vs impacted stool seen on CT .  - Now frequent stools with a small wound in perirectal area,   - Wound care on board, off loading,     4.     Disposition: in house, an elderly female with MRSA Sepsis, possible secondary bacterial  Pneumonia following RSV, now on supplemental O2 which worsened a little, also with anemia,    - Follow pending cul,   - Continue IV Vanc pharm to dose, Will plan for a long course,   - Continue IV Zosyn, pharm to dose,   - Wean O2 off as tolerated,   - Pulm toilette,   - transfusion prn,   - Supportive care,     Discussed with RN, all questions answered, further recommendations to follow, Thanks,      Thank you for consulting DM ID for Nelly Potter.  If you have any questions or concerns please call [facility] Infectious Disease at [phone].     Vinay Stewart MD  2/21/2024  3:47 PM

## 2024-02-28 NOTE — PROGRESS NOTES
Daily Pulmonary/ICU/Critical Care Progress Note          SUBJECTIVE:  Weaned to NC this am.   Dtr at bedside.   Afebrile.       ALLERGIES:  Allergies   Allergen Reactions    Myrbetriq [Mirabegron] NAUSEA ONLY and DIZZINESS    Codeine NAUSEA ONLY    Evista [Raloxifene Hydrochloride] RASH    Meclizine NAUSEA ONLY    Sulfamethoxazole-Trimethoprim DIZZINESS         MEDS:  Home Medications:  Outpatient Medications Marked as Taking for the 2/20/24 encounter (Hospital Encounter)   Medication Sig Dispense Refill    furosemide 40 MG Oral Tab Take 1 tablet (40 mg total) by mouth 2 (two) times daily. 180 tablet 3    gabapentin 300 MG Oral Cap Take 1 capsule (300 mg total) by mouth in the morning and 1 capsule (300 mg total) before bedtime.      dilTIAZem  MG Oral Capsule SR 24 Hr Take 1 capsule (180 mg total) by mouth daily. 30 capsule 0    albuterol 108 (90 Base) MCG/ACT Inhalation Aero Soln Inhale 2 puffs into the lungs every 4 (four) hours as needed for Wheezing. 1 each 0    fleet enema 7-19 GM/118ML Rectal Enema Place 133 mL rectally every third day as needed (if no BM in 3 days).      polyethylene glycol, PEG 3350, 17 g Oral Powd Pack Take 17 g by mouth daily.      folic acid 1 MG Oral Tab Take 1 tablet (1 mg total) by mouth daily.      sodium chloride 1 g Oral Tab Take 1 tablet (1 g total) by mouth 2 (two) times daily. For hyponatremia      traMADol 50 MG Oral Tab Take 0.5 tablets (25 mg total) by mouth every 8 (eight) hours as needed for Pain (moderate to severe pain not relieved by tylenol). 30 tablet 0    tamsulosin 0.4 MG Oral Cap Take 1 capsule (0.4 mg total) by mouth every evening.      ipratropium-albuterol 0.5-2.5 (3) MG/3ML Inhalation Solution Take 3 mL by nebulization every 6 (six) hours while awake.      ipratropium 0.03 % Nasal Solution 2 sprays by Nasal route daily as needed for Rhinitis.      multivitamin Oral Chew Tab Chew 1 tablet by mouth daily.      dexamethasone 2 MG Oral Tab Take 3.75 mg by  mouth daily with breakfast. Slow taper now to 3.75 starting 1/19/24      acidophilus-pectin Oral Cap Take 1 capsule by mouth daily.      midodrine 5 MG Oral Tab Take 1 tablet (5 mg total) by mouth every 8 (eight) hours as needed (SBP less than 95 and DBP less than 50). If SBP <95 or DBP <50      sennosides 8.6 MG Oral Tab Take 2 tablets (17.2 mg total) by mouth 2 (two) times daily.      acetaminophen 325 MG Oral Tab Take 2 tablets (650 mg total) by mouth every 6 (six) hours as needed for Pain.      rivaroxaban (XARELTO) 20 MG Oral Tab Take 1 tablet (20 mg total) by mouth daily with food.      sertraline 25 MG Oral Tab Take 1 tablet (25 mg total) by mouth daily.      fexofenadine 180 MG Oral Tab Take 1 tablet (180 mg total) by mouth daily as needed. 90 tablet 3    lansoprazole 30 MG Oral Capsule Delayed Release Take 1 capsule (30 mg total) by mouth daily. Before meal 90 capsule 3    atorvastatin 10 MG Oral Tab Take 1 tablet (10 mg total) by mouth daily. 90 tablet 3     Scheduled Medication:   vancomycin  1,250 mg Intravenous Q24H    rivaroxaban  20 mg Oral Daily with food    piperacillin-tazobactam  3.375 g Intravenous Q8H    QUEtiapine  12.5 mg Oral Nightly    dexamethasone  3.75 mg Per NG Tube Daily with breakfast    insulin aspart  2-10 Units Subcutaneous 4 times per day    sodium chloride (hypertonic)  3 mL Nebulization TID    ipratropium-albuterol  3 mL Nebulization TID @ 0700, 1400, 2100    guaiFENesin  400 mg Oral 6 times per day    dilTIAZem  60 mg Per NG Tube Q8H    midodrine  5 mg Oral TID    lansoprazole  30 mg Oral QAM AC     Continuous Infusing Medication:   dextrose 10%       PRN Medications:  ibuprofen, traMADol, glucose **OR** glucose **OR** glucose-vitamin C **OR** dextrose **OR** glucose **OR** glucose **OR** glucose-vitamin C, docusate, dextrose 10%, lipase-protease-amylase (Lip-Prot-Amyl) **AND** sodium bicarbonate, acetaminophen, acetaminophen **OR** [DISCONTINUED] HYDROcodone-acetaminophen  **OR** [DISCONTINUED] HYDROcodone-acetaminophen, benzonatate, ondansetron, polyethylene glycol (PEG 3350), sennosides, bisacodyl, fleet enema       PHYSICAL EXAM:  /75   Pulse 85   Temp 97.5 °F (36.4 °C) (Temporal)   Resp 26   Ht 5' 2\" (1.575 m)   Wt 162 lb 4.1 oz (73.6 kg)   SpO2 98%   BMI 29.68 kg/m²   FiO2 (%):  [30 %-50 %] 50 %      CONSTITUTIONAL: awake  HEENT: atraumatic normocephalic  MOUTH: mucous membranes are moist. No OP exudates  NECK/THROAT: no JVD. Trachea midline. No obvious thyromegaly  LUNG: clear upper, diminished and crackles at baseline. Chest symmetric with respiratory motion  HEART: regular rate and rhythm, no obvious murmers or gallops noted  ABD: soft non tender. + bowel sounds. No organomegaly noted  EXT: no clubbing, cyanosis, or edema noted. Pulses intact grossly  SKIN: warm, dry. Eccymosis on arms  LYMPH: no obvious LAD      IMAGES:   Reviewed in EMR      LABS:  Recent Labs   Lab 02/26/24  0544 02/26/24  1405 02/26/24 2118 02/27/24  0435 02/28/24  0453   RBC 2.19*  --   --  2.79* 2.77*   HGB 6.0*   < > 7.7* 7.8* 7.7*   HCT 19.1*  --   --  23.5* 23.8*   MCV 87.2  --   --  84.2 85.9   MCH 27.4  --   --  28.0 27.8   MCHC 31.4  --   --  33.2 32.4   RDW 20.4  --   --  19.9 19.2   NEPRELIM 5.90  --   --  6.45 5.76   WBC 6.7  --   --  7.4 6.7   PLT 99.0*  --   --  106.0* 113.0*    < > = values in this interval not displayed.       Recent Labs   Lab 02/26/24  0544 02/26/24  1405 02/27/24  0435 02/28/24  0453   *  --  177* 208*   BUN 25*  --  21 19   CREATSERUM 0.46*  --  0.37* 0.28*   EGFRCR 93  --  98 104   CA 7.8*  --  7.9* 7.9*   ALB 1.9*  --   --   --      --  143 137   K 3.3* 5.0 4.2 3.8     --  111 105   CO2 33.0*  --  30.0 28.0   ALKPHO 95  --   --   --    AST 6*  --   --   --    ALT 24  --   --   --    BILT 0.3  --   --   --    TP 4.7*  --   --   --        ASSESSMENT/PLAN:  Acute hypoxic respiratory failure: 2/2 RSV with suspected superimposed bacterial  multifocal PNA.  Underlying hx of COPD  -Wean oxygen as able  - Antibiotics broadened d/t worsening CXR and hypoxemia on 2/26/24. Now improved to NC from vapotherm  Septic Shock: 2/2 PNA with MRSA bacteremia.  PCT mildly elevated. Now s/p vasopressors with improvement in blood pressure. Pressors weaned off  -IVF  -Returned to home steroid dose  -Home midodrine 5 mg TID  -Vanc. Zosyn was stopped on 2/23/24. Restarted on 2/26/24 since pneumonia appeared worse (CXR worse and increased 02 needs)  -TTE with preserved EF, PASP: 30-35mmHg, no obvious valvular vegetations   -ID following  Multifocal PNA:   -RSV, +PCT suggesting superimposed bacterial pneumonia ?staph?  -Vanc, restarted zosyn. ID following  Multiple Myeloma - in acute remission  - Per outpatient hematology/oncology        - Acute anemia. Transfused 1 unit pRBC on 2/26/24. No obvious s/s bleeding. If continues to drop, then check CT abd.   Encephalopathy - suspect delirium given waxing & waning mental status  - Decrease opioid pain medications  - Melatonin scheduled nightly  - Seroquel nightly  COPD - no acute exacerbation  -Duonebs, saline nebs  -O2 as needed  - Steroid back to baseline  Afib  -Continue xarelto  -Restarted PO cardizem  FEN:  - Speech. Diet per them  Proph:  -Restart xarelto tomorrow if Hg stays stable  Dispo:  -Can transfer out of ICU.     Thank you for the opportunity to care for Nelly Johnson DO, MPH  Pulmonary Critical Care Medicine

## 2024-02-28 NOTE — PLAN OF CARE
Received pt at change of shift. Pt on 2L NC, BP stable VSS. Transfer orders placed this afternoon. Report called and given to PMU RN. Pt to transfer to room 530.

## 2024-02-28 NOTE — CM/SW NOTE
Sent clinical updates to the [facility] in aidin.     GONSALO/CM to remain available for support and/or discharge planning.    JAKOB Roca  Discharge Planner  [phone]

## 2024-02-28 NOTE — PLAN OF CARE
Patient alert and follows very simple commands. Sometimes moaning and restless. Vapo increased from 40 to 50%. Afebrile. VSS. DH with TF. Several bms.

## 2024-02-28 NOTE — DIETARY NOTE
[facility]   CLINICAL NUTRITION    Discussed pt on rounds this AM. SLP evaluated yesterday and recommends ground/minced and moist diet with thin liquids so diet advanced. However, pt with very poor appetite and requires 1:1 assistance with meals. Will change TF to nocturnal TF to try to stimulate appetite during the day. Adjust TF regimen to Jevity 1.5 at 75 ml/hr x 12hrs (6pm-6am) meeting ~80% needs. If appetite does not improve, can change back to continuous TF regimen. Will continue to monitor and follow up as appropriate.    Cathleen Perla, TRISTA, LDN, [facility]  Clinical Dietitian  Spectra: [phone]

## 2024-02-29 NOTE — PLAN OF CARE
Problem: RSV, ESBL, PNA  Data: Ax02-3. Fatigue. Telemetry- Afib. , 2L 02. Afebrile. Incontinent. Purewick. Briefed. Right nare NG tube connected to tube feedings overnight. 65ml/hr, 160 ml/flush q4. Accu checks. Q6. Right arm precautions, midline.TKO. Edema bilateral and lower extremities.  Mepliex on sacrum with zinc applied. Right hand edema and fingers MD notified. Elevated extremities. Heels elevated, mepliex. Right elbow mepliex. Wound consult.   Intervention: IV Vancomycin, IV Zosyn, Accu checks. Cardizem. Acetaminophen.       Problem: Patient/Family Goals  Goal: Patient/Family Long Term Goal  Description: Patient's Long Term Goal: Discharge to home     Interventions:  - Follow plan of care  - See additional Care Plan goals for specific interventions  Outcome: Progressing  Goal: Patient/Family Short Term Goal  Description: Patient's Short Term Goal:   2/28 noc: tolerate tube feedings     Interventions:   - Tube feedings  - Accu checks  - See additional Care Plan goals for specific interventions  Outcome: Progressing     Problem: CARDIOVASCULAR - ADULT  Goal: Maintains optimal cardiac output and hemodynamic stability  Description: INTERVENTIONS:  - Monitor vital signs, rhythm, and trends  - Monitor for bleeding, hypotension and signs of decreased cardiac output  - Evaluate effectiveness of vasoactive medications to optimize hemodynamic stability  - Monitor arterial and/or venous puncture sites for bleeding and/or hematoma  - Assess quality of pulses, skin color and temperature  - Assess for signs of decreased coronary artery perfusion - ex. Angina  - Evaluate fluid balance, assess for edema, trend weights  Outcome: Progressing     Problem: RESPIRATORY - ADULT  Goal: Achieves optimal ventilation and oxygenation  Description: INTERVENTIONS:  - Assess for changes in respiratory status  - Assess for changes in mentation and behavior  - Position to facilitate oxygenation and minimize respiratory effort  - Oxygen  supplementation based on oxygen saturation or ABGs  - Provide Smoking Cessation handout, if applicable  - Encourage broncho-pulmonary hygiene including cough, deep breathe, Incentive Spirometry  - Assess the need for suctioning and perform as needed  - Assess and instruct to report SOB or any respiratory difficulty  - Respiratory Therapy support as indicated  - Manage/alleviate anxiety  - Monitor for signs/symptoms of CO2 retention  Outcome: Progressing     Problem: GASTROINTESTINAL - ADULT  Goal: Achieves appropriate nutritional intake (bariatric)  Description: INTERVENTIONS:  - Monitor for over-consumption  - Identify factors contributing to increased intake, treat as appropriate  - Monitor I&O, WT and lab values  - Obtain nutritional consult as needed  - Evaluate psychosocial factors contributing to over-consumption  Outcome: Progressing     Problem: GENITOURINARY - ADULT  Goal: Absence of urinary retention  Description: INTERVENTIONS:  - Assess patient’s ability to void and empty bladder  - Monitor intake/output and perform bladder scan as needed  - Follow urinary retention protocol/standard of care  - Consider collaborating with pharmacy to review patient's medication profile  - Implement strategies to promote bladder emptying  Outcome: Progressing     Problem: METABOLIC/FLUID AND ELECTROLYTES - ADULT  Goal: Glucose maintained within prescribed range  Description: INTERVENTIONS:  - Monitor Blood Glucose as ordered  - Assess for signs and symptoms of hyperglycemia and hypoglycemia  - Administer ordered medications to maintain glucose within target range  - Assess barriers to adequate nutritional intake and initiate nutrition consult as needed  - Instruct patient on self management of diabetes  Outcome: Progressing  Goal: Electrolytes maintained within normal limits  Description: INTERVENTIONS:  - Monitor labs and rhythm and assess patient for signs and symptoms of electrolyte imbalances  - Administer electrolyte  replacement as ordered  - Monitor response to electrolyte replacements, including rhythm and repeat lab results as appropriate  - Fluid restriction as ordered  - Instruct patient on fluid and nutrition restrictions as appropriate  Outcome: Progressing  Goal: Hemodynamic stability and optimal renal function maintained  Description: INTERVENTIONS:  - Monitor labs and assess for signs and symptoms of volume excess or deficit  - Monitor intake, output and patient weight  - Monitor urine specific gravity, serum osmolarity and serum sodium as indicated or ordered  - Monitor response to interventions for patient's volume status, including labs, urine output, blood pressure (other measures as available)  - Encourage oral intake as appropriate  - Instruct patient on fluid and nutrition restrictions as appropriate  Outcome: Progressing     Problem: SKIN/TISSUE INTEGRITY - ADULT  Goal: Skin integrity remains intact  Description: INTERVENTIONS  - Assess and document risk factors for pressure ulcer development  - Assess and document skin integrity  - Monitor for areas of redness and/or skin breakdown  - Initiate interventions, skin care algorithm/standards of care as needed  Outcome: Progressing     Problem: Delirium  Goal: Minimize duration of delirium  Description: Interventions:  - Encourage use of hearing aids, eye glasses  - Promote highest level of mobility daily  - Provide frequent reorientation  - Promote wakefulness i.e. lights on, blinds open  - Promote sleep, encourage patient's normal rest cycle i.e. lights off, TV off, minimize noise and interruptions  - Encourage family to assist in orientation and promotion of home routines  Outcome: Progressing     Problem: Diabetes/Glucose Control  Goal: Glucose maintained within prescribed range  Description: INTERVENTIONS:  - Monitor Blood Glucose as ordered  - Assess for signs and symptoms of hyperglycemia and hypoglycemia  - Administer ordered medications to maintain glucose  within target range  - Assess barriers to adequate nutritional intake and initiate nutrition consult as needed  - Instruct patient on self management of diabetes  Outcome: Progressing     Problem: Safety Risk - Non-Violent Restraints  Goal: Patient will remain free from self-harm  Description: INTERVENTIONS:  - Apply the least restrictive restraint to prevent harm  - Notify patient and family of reasons restraints applied  - Assess for any contributing factors to confusion (electrolyte disturbances, delirium, medications)  - Discontinue any unnecessary medical devices as soon as possible  - Assess the patient's physical comfort, circulation, skin condition, hydration, nutrition and elimination needs   - Reorient and redirection as needed  - Assess for the need to continue restraints  Outcome: Progressing

## 2024-02-29 NOTE — SLP NOTE
SPEECH DAILY NOTE - INPATIENT    ASSESSMENT & PLAN   ASSESSMENT  Pt seen for dysphagia tx to assess tolerance with recommended diet, ensure proper utilization of aspiration precautions and provide pt/family education.  Patient received drowsy, but arousable, in bed. She appeared more alert and was able to converse with me today. PO trials of thin liquids and pureed solids provided. Bolus acceptance was adequate without evidence of anterior bolus loss. No overt s/s of aspiration observed and patient denied odynophagia and globus sensation across consistencies. Recommend further evaluation via VFSS given family reported history of coughing with PO intake. Patient no longer requiring Vapotherm support and now appropriate for exam. Further recommendations pending VFSS.     Diet Recommendations - Solids: Mechanical soft ground/ Minced & Moist  Diet Recommendations - Liquids: Thin Liquids    Compensatory Strategies Recommended: Alternate consistencies;Small bites and sips  Aspiration Precautions: Upright position  Medication Administration Recommendations: One pill at a time;Whole in puree    Patient Experiencing Pain: No                Treatment Plan  Treatment Plan/Recommendations: Videofluoroscopic swallow study    Interdisciplinary Communication: Discussed with RN            GOALS  Goal #1 Slp to re-assess as medically appropriate.   Met   Goal #2 The patient will tolerate minced & moist consistency and thin liquids without overt signs or symptoms of aspiration with 95 % accuracy over 1-2 session(s).     Revised   Goal #3 The patient/family/caregiver will demonstrate understanding and implementation of aspiration precautions and swallow strategies independently over 1-2 session(s).  In Progress   Goal #4  VFSS  In Progress       FOLLOW UP  Follow Up Needed (Documentation Required): Yes  SLP Follow-up Date: 03/01/24       Session: 2    If you have any questions, please contact JUANA Caceres

## 2024-02-29 NOTE — PLAN OF CARE
2/29 AM: Pt is A/O x 1-2, family at bedside. On room air, lung sounds are diminished. BP and HR are WNL, NSR on tele. BM today, voids, purewick in place. Wound care came and saw patient, cream applied. NG tube in place, tube feedings overnight.     Problem: Patient/Family Goals  Goal: Patient/Family Long Term Goal  Description: Patient's Long Term Goal: Go home    Interventions:  - Abx  - See additional Care Plan goals for specific interventions  2/29/2024 1605 by Murali Romo, RN  Outcome: Progressing  2/29/2024 1604 by Murali Romo, RN  Outcome: Progressing  Goal: Patient/Family Short Term Goal  Description: Patient's Short Term Goal:   2/28 noc: tolerate tube feedings   2/29 AM: Video swallow    Interventions:   - Tube feedings  - Accu checks  - See additional Care Plan goals for specific interventions  2/29/2024 1605 by Murali Romo, RN  Outcome: Progressing  2/29/2024 1604 by Murali Romo, RN  Outcome: Progressing

## 2024-02-29 NOTE — CONSULTS
[facility]    Nelly Potter Patient Status:  Inpatient    1935 MRN [id number]   Location [facility] 5NW-A Attending Skyler Lema*   Hosp Day # 9 PCP Balta Wright MD     Date of Admission: 2024 11:59 AM  Admission Diagnosis: Hypokalemia [E87.6]  Lactic acidosis [E87.20]  Elevated troponin [R79.89]  Acute respiratory failure with hypoxia (HCC) [J96.01]  RSV infection [B33.8]  Multifocal pneumonia [J18.9]  Sepsis, due to unspecified organism, unspecified whether acute organ dysfunction present (HCC) [A41.9]  Reason for Consult: PNA, COPD     History of Present Illness: 87 y/o with h/o afib, HTN, COPD, bladder CA, MM, dementia admitted on  from NH with resp failure and +RSV with superimposed bacterial PNA.  Required ICU admission bc of septic shock and need for vapotherm.  Found to have MRSA bacteremia and severe multifocal PNA; course further complicated by afib with RVR.  Slowly improved on abx/pressors and was transferred to floor yesterday.  Currently appears to be feeling better.  Pt tolerating modified dysphagia diet fairly well.     Past Medical History:   Diagnosis Date    Allergic rhinitis     Anxiety     Chronic midline low back pain without sciatica 10/13/2021    Constipation     Depression     Esophageal reflux     History of bladder cancer 2012    Incontinence     Long QT interval     per pt. hx of long QT.    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    Osteoarthrosis, unspecified whether generalized or localized, unspecified site     Osteopenia of multiple sites 2018    Other and unspecified hyperlipidemia     Pleurisy     Psychophysiological insomnia 2018    Unspecified essential hypertension     Urge incontinence     Vertigo       Past Surgical History:   Procedure Laterality Date    APPENDECTOMY      CATARACT      COLONOSCOPY  2012    normal    CYSTOSCOPY CHEMODENERVATION  10/17/2017    100 U    CYSTOURETHROSCOPY  12     CystoCraig Mathews    CYSTOURETHROSCOPY  10/24/12    BTS Jose Mathews     CYSTOURETHROSCOPY  4/29/13, 8/7/13, 11/13/13, 11/12/14, 5/6/15, 11/11/15    cysto- Dr. Mathews, MELLY    CYSTOURETHROSCOPY  10/11/2017    cysto Dr. mathews    CYSTOURETHROSCOPY,FULGUR .5-2CM LESN  7/23/12    cystourethroscopy, TURBT x 2- Dr. Mathews    FOOT SURGERY Left 10/25/11    lt foot    HIP REPLACEMENT SURGERY      left    HYSTERECTOMY      KNEE REPLACEMENT SURGERY Bilateral     OTHER SURGICAL HISTORY  1983     arthroscopic left knee    PART EXCIS 5TH METATARSAL HEAD Right 10/2/2014    Procedure: TAILOR'S BUNIONECTOMY;  Surgeon: Adryan Prajapati DPM;  Location: [facility]    PATIENT DOCUMENTED NOT TO HAVE EXPERIENCED ANY OF THE FOLLOWING EVENTS Right 10/2/2014    Procedure: TAILOR'S BUNIONECTOMY;  Surgeon: Adryan Prajapati DPM;  Location: [facility]    PATIENT WITH PREOPERATIVE ORDER FOR IV ANTIBIOTIC SURGICAL SITE INFECT Right 10/2/2014    Procedure: TAILOR'S BUNIONECTOMY;  Surgeon: Adryan Prajapati DPM;  Location: [facility]    REPAIR OF HAMMERTOE,ONE Left 10/2/2014    Procedure: ARTHROPLASTY ONE (1) TOE;  Surgeon: Adryan Prajapati DPM;  Location: [facility]    SINUS SURGERY    1983 & 1999    sinus    SLING OPER STRES INCONTINENCE  8/06    sling    SLING OPER STRES INCONTINENCE  10/2011    sling repeat. TVT Exact. Dr. Dang at [facility]    TONSILLECTOMY        Allergies   Allergen Reactions    Myrbetriq [Mirabegron] NAUSEA ONLY and DIZZINESS    Codeine NAUSEA ONLY    Evista [Raloxifene Hydrochloride] RASH    Meclizine NAUSEA ONLY    Sulfamethoxazole-Trimethoprim DIZZINESS        Social History:   reports that she has never smoked. She has never used smokeless tobacco. She reports current alcohol use. She reports that she does not use drugs.      Family History:  Family History   Problem Relation Age of Onset    Other (Other) Son         CF    Other (Other) Son         CF         Home Medications:  Outpatient Medications Marked as Taking for the  2/20/24 encounter (Hospital Encounter)   Medication Sig Dispense Refill    furosemide 40 MG Oral Tab Take 1 tablet (40 mg total) by mouth 2 (two) times daily. 180 tablet 3    gabapentin 300 MG Oral Cap Take 1 capsule (300 mg total) by mouth in the morning and 1 capsule (300 mg total) before bedtime.      dilTIAZem  MG Oral Capsule SR 24 Hr Take 1 capsule (180 mg total) by mouth daily. 30 capsule 0    albuterol 108 (90 Base) MCG/ACT Inhalation Aero Soln Inhale 2 puffs into the lungs every 4 (four) hours as needed for Wheezing. 1 each 0    fleet enema 7-19 GM/118ML Rectal Enema Place 133 mL rectally every third day as needed (if no BM in 3 days).      polyethylene glycol, PEG 3350, 17 g Oral Powd Pack Take 17 g by mouth daily.      folic acid 1 MG Oral Tab Take 1 tablet (1 mg total) by mouth daily.      sodium chloride 1 g Oral Tab Take 1 tablet (1 g total) by mouth 2 (two) times daily. For hyponatremia      traMADol 50 MG Oral Tab Take 0.5 tablets (25 mg total) by mouth every 8 (eight) hours as needed for Pain (moderate to severe pain not relieved by tylenol). 30 tablet 0    tamsulosin 0.4 MG Oral Cap Take 1 capsule (0.4 mg total) by mouth every evening.      ipratropium-albuterol 0.5-2.5 (3) MG/3ML Inhalation Solution Take 3 mL by nebulization every 6 (six) hours while awake.      ipratropium 0.03 % Nasal Solution 2 sprays by Nasal route daily as needed for Rhinitis.      multivitamin Oral Chew Tab Chew 1 tablet by mouth daily.      dexamethasone 2 MG Oral Tab Take 3.75 mg by mouth daily with breakfast. Slow taper now to 3.75 starting 1/19/24      acidophilus-pectin Oral Cap Take 1 capsule by mouth daily.      midodrine 5 MG Oral Tab Take 1 tablet (5 mg total) by mouth every 8 (eight) hours as needed (SBP less than 95 and DBP less than 50). If SBP <95 or DBP <50      sennosides 8.6 MG Oral Tab Take 2 tablets (17.2 mg total) by mouth 2 (two) times daily.      acetaminophen 325 MG Oral Tab Take 2 tablets (650 mg  total) by mouth every 6 (six) hours as needed for Pain.      rivaroxaban (XARELTO) 20 MG Oral Tab Take 1 tablet (20 mg total) by mouth daily with food.      sertraline 25 MG Oral Tab Take 1 tablet (25 mg total) by mouth daily.      fexofenadine 180 MG Oral Tab Take 1 tablet (180 mg total) by mouth daily as needed. 90 tablet 3    lansoprazole 30 MG Oral Capsule Delayed Release Take 1 capsule (30 mg total) by mouth daily. Before meal 90 capsule 3    atorvastatin 10 MG Oral Tab Take 1 tablet (10 mg total) by mouth daily. 90 tablet 3        Current Medications:    Current Facility-Administered Medications:     furosemide (Lasix) 10 mg/mL injection 40 mg, 40 mg, Intravenous, BID (Diuretic)    vancomycin (Vancocin) 1.25 g in sodium chloride 0.9% 250mL IVPB premix, 1,250 mg, Intravenous, Q24H    rivaroxaban (Xarelto) tab 20 mg, 20 mg, Oral, Daily with food    piperacillin-tazobactam (Zosyn) 3.375 g in dextrose 5% 100 mL IVPB-ADDV, 3.375 g, Intravenous, Q8H    QUEtiapine (SEROquel) partial tab 12.5 mg, 12.5 mg, Oral, Nightly    dexamethasone (Decadron) tab 3.75 mg, 3.75 mg, Per NG Tube, Daily with breakfast    insulin aspart (NovoLOG) 100 Units/mL FlexPen 2-10 Units, 2-10 Units, Subcutaneous, 4 times per day    sodium chloride (hypertonic) 3 % nebulizer solution 3 mL, 3 mL, Nebulization, TID    ibuprofen (Motrin) 100 MG/5ML oral suspension 400 mg, 400 mg, Per NG Tube, Q4H PRN    ipratropium-albuterol (Duoneb) 0.5-2.5 (3) MG/3ML inhalation solution 3 mL, 3 mL, Nebulization, TID @ 0700, 1400, 2100    traMADol (Ultram) tab 50 mg, 50 mg, Oral, Q6H PRN    guaiFENesin (Mucinex) tab 400 mg, 400 mg, Oral, 6 times per day    glucose (Dex4) 15 GM/59ML oral liquid 15 g, 15 g, Oral, Q15 Min PRN **OR** glucose (Glutose) 40% oral gel 15 g, 15 g, Oral, Q15 Min PRN **OR** glucose-vitamin C (Dex-4) chewable tab 4 tablet, 4 tablet, Oral, Q15 Min PRN **OR** dextrose 50% injection 50 mL, 50 mL, Intravenous, Q15 Min PRN **OR** glucose (Dex4) 15  GM/59ML oral liquid 30 g, 30 g, Oral, Q15 Min PRN **OR** glucose (Glutose) 40% oral gel 30 g, 30 g, Oral, Q15 Min PRN **OR** glucose-vitamin C (Dex-4) chewable tab 8 tablet, 8 tablet, Oral, Q15 Min PRN    dilTIAZem (cardIZEM) tab 60 mg, 60 mg, Per NG Tube, Q8H    midodrine (ProAmatine) tab 5 mg, 5 mg, Oral, TID    docusate (Colace) 50 MG/5ML oral solution 100 mg, 100 mg, Per NG Tube, BID PRN    lansoprazole (Prevacid Solutab) disintegrating tab 30 mg, 30 mg, Oral, QAM AC    dextrose 10% infusion (TPN no rate), , Intravenous, Continuous PRN    pancrelipase (Lip-Prot-Amyl) (Zenpep) DR particles cap 10,000 Units, 10,000 Units, Per G Tube, PRN **AND** sodium bicarbonate tab 325 mg, 325 mg, Per G Tube, PRN    acetaminophen (Tylenol Extra Strength) tab 500 mg, 500 mg, Oral, Q4H PRN    acetaminophen (Tylenol) tab 650 mg, 650 mg, Oral, Q4H PRN **OR** [DISCONTINUED] HYDROcodone-acetaminophen (Norco) 5-325 MG per tab 1 tablet, 1 tablet, Oral, Q4H PRN **OR** [DISCONTINUED] HYDROcodone-acetaminophen (Norco) 5-325 MG per tab 2 tablet, 2 tablet, Oral, Q4H PRN    benzonatate (Tessalon) cap 200 mg, 200 mg, Oral, TID PRN    ondansetron (Zofran) 4 MG/2ML injection 4 mg, 4 mg, Intravenous, Q6H PRN    polyethylene glycol (PEG 3350) (Miralax) 17 g oral packet 17 g, 17 g, Oral, Daily PRN    sennosides (Senokot) tab 17.2 mg, 17.2 mg, Oral, Nightly PRN    bisacodyl (Dulcolax) 10 MG rectal suppository 10 mg, 10 mg, Rectal, Daily PRN    fleet enema (Fleet) 7-19 GM/118ML rectal enema 133 mL, 1 enema, Rectal, Once PRN     REVIEW OF SYSTEMS:   As listed in HPI, otherwise ten point ROS is negative.     OBJECTIVE:  Patient Vitals for the past 24 hrs:   BP Temp Temp src Pulse Resp SpO2   02/29/24 1114 116/66 97.6 °F (36.4 °C) Oral 66 20 91 %   02/29/24 0810 -- -- -- (!) 36 22 92 %   02/29/24 0740 130/52 97.8 °F (36.6 °C) Oral 80 20 92 %   02/29/24 0630 119/78 97.9 °F (36.6 °C) Axillary 78 20 95 %   02/29/24 0218 117/83 -- -- 84 -- 93 %   02/29/24  0043 126/78 97 °F (36.1 °C) Axillary 86 20 93 %   02/28/24 2241 -- -- -- -- -- 96 %   02/28/24 2049 -- -- -- 72 -- 98 %   02/28/24 2042 135/77 97.2 °F (36.2 °C) Axillary 74 18 97 %   02/28/24 1400 138/80 -- -- 83 19 98 %   02/28/24 1300 -- -- -- 77 19 99 %   02/28/24 1200 124/75 97.4 °F (36.3 °C) Temporal 74 15 100 %     O2 requirement: on 2L nasal canula    Wt Readings from Last 3 Encounters:   02/26/24 162 lb 4.1 oz (73.6 kg)   02/19/24 145 lb 9.6 oz (66 kg)   02/16/24 156 lb 9.6 oz (71 kg)        I/O last 3 completed shifts:  In: 1506 [I.V.:96; NG/GT:960; IV PIGGYBACK:450]  Out: 100 [Urine:100]  No intake/output data recorded.    General appearance: appears stated age and slowed mentation  Head: Normocephalic, without obvious abnormality, atraumatic  Neck: no adenopathy, no carotid bruit, no JVD, and supple, symmetrical, trachea midline  Back: symmetric, no curvature. ROM normal. No CVA tenderness.  Lungs: clear to auscultation bilaterally  Heart: irregularly irregular rhythm  Abdomen: soft, non-tender; bowel sounds normal; no masses,  no organomegaly  Extremities: edema +1 cw anasarca  Pulses: 2+ and symmetric  Skin: Skin color, texture, turgor normal. No rashes or lesions     Lab Results   Component Value Date    WBC 6.3 02/29/2024    RBC 3.08 02/29/2024    HGB 8.6 02/29/2024    HCT 27.2 02/29/2024    MCV 88.3 02/29/2024    MCH 27.9 02/29/2024    MCHC 31.6 02/29/2024    RDW 19.4 02/29/2024    .0 02/29/2024     Lab Results   Component Value Date     02/29/2024    K 3.6 02/29/2024    K 3.6 02/29/2024     02/29/2024    CO2 28.0 02/29/2024    BUN 22 02/29/2024    CREATSERUM 0.39 02/29/2024     02/29/2024    CA 7.8 02/29/2024     Lab Results   Component Value Date     02/29/2024    K 3.6 02/29/2024    K 3.6 02/29/2024     02/29/2024    CO2 28.0 02/29/2024    BUN 22 02/29/2024    CREATSERUM 0.39 02/29/2024     02/29/2024    CA 7.8 02/29/2024    ALKPHO 176 02/29/2024    ALT  45 02/29/2024    AST 15 02/29/2024    BILT 0.5 02/29/2024    ALB 1.8 02/29/2024    TP 5.0 02/29/2024     Lab Results   Component Value Date    INR 2.00 (H) 02/06/2024          Imaging: reviewed. Per EMR     ASSESSMENT/PLAN:  Acute hypoxic respiratory failure: 2/2 RSV with suspected superimposed bacterial multifocal PNA and underlying hx of COPD  -Wean oxygen as able- down to 2L nc and subsequently room air  - bronchial hygiene  Multifocal PNA:   -RSV, +PCT suggesting superimposed bacterial pneumonia  - abx per ID  - currently on vanco for MRSA bacteremia and zosyn for PNA  COPD - no acute exacerbation; per family. Pt is lifelong nonsmoker and never had this diagnosis.  COPD Is doubtful.  -Duonebs prn  -O2 as needed  Afib- rate controlled  -Continue xarelto  -on  PO cardizem  Proph: xarelto   Dispo: [facility]  - will follow peripherally, please call with questions    Vitaliy Tam MD  2/29/2024  11:56 AM

## 2024-02-29 NOTE — VIDEO SWALLOW STUDY NOTE
ADULT VIDEOFLUOROSCOPIC SWALLOWING STUDY    Admission Date: 2/20/2024  Evaluation Date: 02/29/24  Radiologist: Peyton YANES   Diet Recommendations - Solids: Mechanical soft ground/ Minced & Moist  Diet Recommendations - Liquids: Nectar thick liquids/ Mildly thick    Further Follow-up:  Follow Up Needed (Documentation Required): Yes  SLP Follow-up Date: 03/01/24  Compensatory Strategies Recommended: Alternate consistencies;Small bites and sips  Aspiration Precautions: Upright position  Medication Administration Recommendations: One pill at a time;Whole in puree  Treatment Plan/Recommendations: Aspiration precautions    HISTORY   Background/Objective Information: patient is an 87 y/o female known to this service for prior dysphagia assessment and management. VFSS recommended for further evaluation. Please see previous SLP note for full history.     Problem List  Principal Problem:    Multifocal pneumonia  Active Problems:    Acute respiratory failure with hypoxia ([facility])    RSV infection    Hypokalemia    Elevated troponin    Lactic acidosis    Sepsis, due to unspecified organism, unspecified whether acute organ dysfunction present ([facility])    Palliative care encounter    Counseling regarding advance care planning and goals of care      Past Medical History  Past Medical History:   Diagnosis Date    Allergic rhinitis     Anxiety     Chronic midline low back pain without sciatica 10/13/2021    Constipation     Depression     Esophageal reflux     History of bladder cancer 8/1/2012    Incontinence     Long QT interval     per pt. hx of long QT.    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    Osteoarthrosis, unspecified whether generalized or localized, unspecified site     Osteopenia of multiple sites 8/30/2018    Other and unspecified hyperlipidemia     Pleurisy 2012    Psychophysiological insomnia 9/20/2018    Unspecified essential hypertension     Urge incontinence     Vertigo        Current Diet  Consistency: NPO  Prior Level of Function: Assistance/Support for ADL's  Prior Living Situation: Skilled nursing facility  History of Recent: Difficulty breathing     Imaging results:   CXR 2/26/24  CONCLUSION:    Increasing bilateral patchy upper-lower lung opacities, concerning for worsening pneumonia.  Poor inspiration.  Suspect left pleural effusion.  Heart partially obscured.  No sign of pneumothorax. Consider upright PA and lateral examination    of the chest, when tolerated.         LOCATION:  [facility]                  Dictated by (CST): Hever Cantor MD on 2/26/2024 at 7:12 AM       Finalized by (CST): Hever Cantor MD on 2/26/2024 at 7:13 AM     Reason for Referral: R/O aspiration    Family/Patient Goals: none stated     ASSESSMENT   DYSPHAGIA ASSESSMENT  Test completed in conjunction with Radiologist.  Patient Positioned: Upright;Midline.  Patient Viewed: Lateral.  Patient Alertness: Fully alert.  Consistencies Presented: Thin liquids;Nectar thick liquids/ Mildly thick;Puree;Soft solid to assess oropharyngeal swallow function and assess for compensatory strategies to improve safe swallow function.    THIN LIQUIDS  Method of Presentation: Cup;Straw  Oral Phase of Swallow (VFSS - Thin Liquids): Impaired  Bolus Retrieval (VFSS - Thin Liquids): Impaired (w/ cup sip; improved with straw)  Bolus Formation (VFSS - Thin Liquids): Impaired  Triggered at: Pyriform sinuses  Premature Spillage to: Pyriform sinuses  Residue Severity, Location: Throughout pharynx  Cleared/Reduced with: Secondary swallow  Laryngeal Penetration: Before the swallow  Cough/Throat Clear Response: Yes  Cough/Throat Clear Effective: Partially  NECTAR THICK LIQUIDS/ MILDLY THICK  Method of Presentation: Straw  Oral Phase of Swallow (VFSS - Nectar Thick Liquids): Within Functional Limits  Triggered at: Pyriform sinuses  Residue Severity, Location: Moderate;Valleculae  Cleared/Reduced with: Secondary swallow  Laryngeal Penetration:  Transient  Tracheal Aspiration: None     PUREE  Oral Phase of Swallow (VFSS - Puree): Within Functional Limits  Triggered at: Valleculae  Residue Severity, Location: Severe;Valleculae  Cleared/Reduced with: Secondary swallow  Laryngeal Penetration: None  Tracheal Aspiration: None  SOFT SOLID  Oral Phase of Swallow (VFSS - Soft Solid): Impaired  Bolus Propulsion (VFSS - Soft Solid): Impaired  Mastication (VFSS - Soft Solid): Impaired  Triggered at: Valleculae  Laryngeal Penetration: None  Tracheal Aspiration: None     Penetration Aspiration Scale Score: Score 5: Material enters the airway, contacts the vocal folds, and is not ejected from the airway       Overall Impression:   Patient presented with moderate oropharyngeal dysphagia characterized by impaired mastication and AP bolus transit, delayed pharyngeal swallow initiation, reduced base of tongue retraction, and inconsistent epiglottic inversion. Premature bolus loss noted with thin liquid which resulted in deep penetration before the swallow. Patient exhibited reflexive secondary swallow and throat clear which appeared to eject majority of penetrated material. Transient penetration observed with mildly thick liquids. Vallecular residue was cleared with secondary swallow and/or liquid wash.     Recommend patient initiate a minced & moist diet with mildly thick liquids. Monitor level of alertness for appropriate time to offer PO intake. Bolus size and rate of intake should be limited. Recommend alternating solids and liquids to help reduce pharyngeal residue. Recommend medications be administered one at a time in a pureed bolus. Education provided to patient and her daughter following exam re: results and recommendations. No aspiration observed with thin liquids, but patient may benefit from cautious PO diet recommendations given her waxing and waning mentation. Discussed safe feeding strategies with daughter. SLP will continue to follow to monitor diet tolerance  and adjust as appropriate.              GOALS  Goal #1 Slp to re-assess as medically appropriate.   Met   Goal #2 The patient will tolerate minced & moist consistency and thin liquids without overt signs or symptoms of aspiration with 95 % accuracy over 1-2 session(s).     Revised   Goal #3 The patient/family/caregiver will demonstrate understanding and implementation of aspiration precautions and swallow strategies independently over 1-2 session(s).  In Progress   Goal #4  VFSS  Met   Goal #5  The patient will tolerate minced & moist consistency and mildly thick liquids without overt signs or symptoms of aspiration with 95 % accuracy over 1-2 session(s).  In Progress   Goal #6       Goal #7       Goal #8          EDUCATION/INSTRUCTION  Reviewed results and recommendations with patient/family/caregiver.  Agreement/Understanding verbalized and all questions answered to their apparent satisfaction.    INTERDISCIPLINARY COMMUNICATION  Reviewed results with Radiologist; agreement verbalized.    Patient Experiencing Pain: No                FOLLOW UP  Treatment Plan/Recommendations: Aspiration precautions         Thank you for your referral.   If you have any questions, please contact JUANA Caceres

## 2024-02-29 NOTE — PROGRESS NOTES
[facility]     [facility] Consult    Nelly Potter Patient Status:  Inpatient    1935 MRN [id number]   Location [facility] 4SW-A Attending Geno Kaur MD   Hosp Day # 9 PCP MD Nelly Lewis seen and examined,   Afebrile,   Previous entries noted,   Little more interactive,   Complains of Pain in RLE  Dobbhoff in place,         History:  Past Medical History:   Diagnosis Date    Allergic rhinitis     Anxiety     Chronic midline low back pain without sciatica 10/13/2021    Constipation     Depression     Esophageal reflux     History of bladder cancer 2012    Incontinence     Long QT interval     per pt. hx of long QT.    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 Mathews    CYSTOURETHROSCOPY  10/24/12    BTS Cysto Dr. Mathews     CYSTOURETHROSCOPY  13, 13, 13, 14, 5/6/15, 11/11/15    cysto- Dr. Mathews, [place]    CYSTOURETHROSCOPY  10/11/2017    cysto Dr. mathews    CYSTOURETHROSCOPY,FULGUR .5-2CM LESN  12    cystourethroscopy, TURBT x 2- Dr. Mathews    FOOT SURGERY Left 10/25/11    lt foot    HIP REPLACEMENT SURGERY      left    HYSTERECTOMY      KNEE REPLACEMENT SURGERY Bilateral     OTHER SURGICAL HISTORY       arthroscopic left knee    PART EXCIS 5TH METATARSAL HEAD Right 10/2/2014    Procedure: TAILOR'S BUNIONECTOMY;  Surgeon: Adryan Prajapati DPM;  Location: [facility]    PATIENT DOCUMENTED NOT TO HAVE EXPERIENCED ANY OF THE  FOLLOWING EVENTS Right 10/2/2014    Procedure: TAILOR'S BUNIONECTOMY;  Surgeon: Adryan Prajapati DPM;  Location: [facility]    PATIENT WITH PREOPERATIVE ORDER FOR IV ANTIBIOTIC SURGICAL SITE INFECT Right 10/2/2014    Procedure: TAILOR'S BUNIONECTOMY;  Surgeon: Adryan Prajapati DPM;  Location: [facility]    REPAIR OF ALEJANDROERTOE,ONE Left 10/2/2014    Procedure: ARTHROPLASTY ONE (1) TOE;  Surgeon: Adryan Prajapati DPM;  Location: [facility]    SINUS SURGERY    1983 & 1999    sinus    SLING OPER STRES INCONTINENCE  8/06    sling    SLING OPER STRES INCONTINENCE  10/2011    sling repeat. TVT Exact. Dr[facility] Dang at [facility]    TONSILLECTOMY       Family History   Problem Relation Age of Onset    Other (Other) Son         CF    Other (Other) Son         CF      reports that she has never smoked. She has never used smokeless tobacco. She reports current alcohol use. She reports that she does not use drugs.    Allergies:  Allergies   Allergen Reactions    Myrbetriq [Mirabegron] NAUSEA ONLY and DIZZINESS    Codeine NAUSEA ONLY    Evista [Raloxifene Hydrochloride] RASH    Meclizine NAUSEA ONLY    Sulfamethoxazole-Trimethoprim DIZZINESS       Medications:    Current Facility-Administered Medications:     furosemide (Lasix) 10 mg/mL injection 40 mg, 40 mg, Intravenous, BID (Diuretic)    ipratropium-albuterol (Duoneb) 0.5-2.5 (3) MG/3ML inhalation solution 3 mL, 3 mL, Nebulization, Q6H PRN    vancomycin (Vancocin) 1.25 g in sodium chloride 0.9% 250mL IVPB premix, 1,250 mg, Intravenous, Q24H    rivaroxaban (Xarelto) tab 20 mg, 20 mg, Oral, Daily with food    piperacillin-tazobactam (Zosyn) 3.375 g in dextrose 5% 100 mL IVPB-ADDV, 3.375 g, Intravenous, Q8H    QUEtiapine (SEROquel) partial tab 12.5 mg, 12.5 mg, Oral, Nightly    dexamethasone (Decadron) tab 3.75 mg, 3.75 mg, Per NG Tube, Daily with breakfast    insulin aspart (NovoLOG) 100 Units/mL FlexPen 2-10 Units, 2-10 Units, Subcutaneous, 4 times per day    ibuprofen (Motrin)  100 MG/5ML oral suspension 400 mg, 400 mg, Per NG Tube, Q4H PRN    traMADol (Ultram) tab 50 mg, 50 mg, Oral, Q6H PRN    guaiFENesin (Mucinex) tab 400 mg, 400 mg, Oral, 6 times per day    glucose (Dex4) 15 GM/59ML oral liquid 15 g, 15 g, Oral, Q15 Min PRN **OR** glucose (Glutose) 40% oral gel 15 g, 15 g, Oral, Q15 Min PRN **OR** glucose-vitamin C (Dex-4) chewable tab 4 tablet, 4 tablet, Oral, Q15 Min PRN **OR** dextrose 50% injection 50 mL, 50 mL, Intravenous, Q15 Min PRN **OR** glucose (Dex4) 15 GM/59ML oral liquid 30 g, 30 g, Oral, Q15 Min PRN **OR** glucose (Glutose) 40% oral gel 30 g, 30 g, Oral, Q15 Min PRN **OR** glucose-vitamin C (Dex-4) chewable tab 8 tablet, 8 tablet, Oral, Q15 Min PRN    dilTIAZem (cardIZEM) tab 60 mg, 60 mg, Per NG Tube, Q8H    midodrine (ProAmatine) tab 5 mg, 5 mg, Oral, TID    docusate (Colace) 50 MG/5ML oral solution 100 mg, 100 mg, Per NG Tube, BID PRN    lansoprazole (Prevacid Solutab) disintegrating tab 30 mg, 30 mg, Oral, QAM AC    dextrose 10% infusion (TPN no rate), , Intravenous, Continuous PRN    pancrelipase (Lip-Prot-Amyl) (Zenpep) DR particles cap 10,000 Units, 10,000 Units, Per G Tube, PRN **AND** sodium bicarbonate tab 325 mg, 325 mg, Per G Tube, PRN    acetaminophen (Tylenol Extra Strength) tab 500 mg, 500 mg, Oral, Q4H PRN    acetaminophen (Tylenol) tab 650 mg, 650 mg, Oral, Q4H PRN **OR** [DISCONTINUED] HYDROcodone-acetaminophen (Norco) 5-325 MG per tab 1 tablet, 1 tablet, Oral, Q4H PRN **OR** [DISCONTINUED] HYDROcodone-acetaminophen (Norco) 5-325 MG per tab 2 tablet, 2 tablet, Oral, Q4H PRN    benzonatate (Tessalon) cap 200 mg, 200 mg, Oral, TID PRN    ondansetron (Zofran) 4 MG/2ML injection 4 mg, 4 mg, Intravenous, Q6H PRN    polyethylene glycol (PEG 3350) (Miralax) 17 g oral packet 17 g, 17 g, Oral, Daily PRN    sennosides (Senokot) tab 17.2 mg, 17.2 mg, Oral, Nightly PRN    bisacodyl (Dulcolax) 10 MG rectal suppository 10 mg, 10 mg, Rectal, Daily PRN    fleet enema  (Fleet) 7-19 GM/118ML rectal enema 133 mL, 1 enema, Rectal, Once PRN    Review of Systems:   Constitutional: Negative for anorexia, chills, fatigue, fevers, malaise, night sweats and weight loss.  Eyes: Negative for visual disturbance, irritation and redness.  Ears, nose, mouth, throat, and face: Negative for hearing loss, tinnitus, nasal congestion, snoring, sore throat, hoarseness and voice change.  Respiratory: Negative for cough, sputum, hemoptysis, chest pain, wheezing, dyspnea on exertion, or stridor.  Cardiovascular: Negative for chest pain, palpitations, irregular heart beats, syncope, fatigue, orthopnea, paroxysmal nocturnal dyspnea, lower extremity edema.  Gastrointestinal: Negative for dysphagia, odynophagia, reflux symptoms, nausea, vomiting, change in bowel habits, diarrhea, constipation and abdominal pain.  Integument/breast: Negative for rash, skin lesions, and pruritus.  Hematologic/lymphatic: Negative for easy bruising, bleeding, and lymphadenopathy.  Musculoskeletal: Negative for myalgias, arthralgias, muscle weakness.  Neurological: Negative for headaches, dizziness, seizures, memory problems, trouble swallowing, speech problems, gait problems and weakness.  Behavioral/Psych: Negative for active tobacco use.  Endocrine: No history of of diabetes, thyroid disorder.  Deferred,     Vital signs in last 24 hours:  Patient Vitals for the past 24 hrs:   BP Temp Temp src Pulse Resp SpO2 Weight   02/21/24 1500 118/72 -- -- 84 15 100 % --   02/21/24 1400 119/66 -- -- 84 16 100 % --   02/21/24 1300 106/90 -- -- 84 20 100 % --   02/21/24 1218 -- -- -- -- -- 100 % --   02/21/24 1200 114/59 98 °F (36.7 °C) Temporal 82 20 100 % --   02/21/24 1100 123/76 -- -- 79 17 100 % --   02/21/24 1000 121/66 -- -- 77 16 100 % --   02/21/24 0942 115/63 -- -- 75 15 100 % --   02/21/24 0900 116/80 -- -- 79 (!) 29 98 % --   02/21/24 0833 117/63 97.5 °F (36.4 °C) Temporal 88 (!) 32 91 % --   02/21/24 0814 -- -- -- -- -- (!) 85 %  --   02/21/24 0805 -- -- -- 74 (!) 27 (!) 87 % --   02/21/24 0800 -- -- -- 77 (!) 31 (!) 80 % --   02/21/24 0755 -- -- -- 77 (!) 29 (!) 75 % --   02/21/24 0715 112/78 -- -- 69 20 100 % --   02/21/24 0700 97/58 -- -- 74 21 100 % --   02/21/24 0645 111/62 -- -- 72 25 98 % --   02/21/24 0630 111/62 -- -- 77 19 96 % --   02/21/24 0615 115/63 -- -- 75 (!) 27 (!) 88 % --   02/21/24 0600 112/59 98 °F (36.7 °C) Temporal 76 (!) 34 95 % --   02/21/24 0545 129/62 -- -- 74 20 93 % --   02/21/24 0530 121/89 -- -- 86 (!) 27 97 % --   02/21/24 0515 119/73 -- -- 73 25 97 % --   02/21/24 0500 119/62 -- -- 73 (!) 34 93 % --   02/21/24 0445 102/66 -- -- 76 (!) 32 98 % --   02/21/24 0430 121/72 -- -- 73 22 98 % --   02/21/24 0415 99/82 -- -- 78 25 98 % --   02/21/24 0410 99/82 -- -- 70 14 100 % --   02/21/24 0400 99/63 98 °F (36.7 °C) -- 70 18 93 % 147 lb 11.3 oz (67 kg)   02/21/24 0345 (!) 110/96 -- -- 85 17 98 % --   02/21/24 0330 112/64 -- -- 70 16 100 % --   02/21/24 0315 107/63 -- -- 68 10 100 % --   02/21/24 0300 102/66 -- -- 67 11 100 % --   02/21/24 0245 106/63 -- -- 74 13 100 % --   02/21/24 0230 101/61 -- -- 70 12 100 % --   02/21/24 0215 108/61 -- -- 89 14 100 % --   02/21/24 0200 109/60 -- -- 82 17 100 % --   02/21/24 0147 -- -- -- 96 17 100 % --   02/21/24 0145 119/71 -- -- 91 14 100 % --   02/21/24 0130 -- -- -- 79 19 100 % --   02/21/24 0115 105/65 -- -- 76 12 100 % --   02/21/24 0100 (!) 81/59 -- -- 90 15 100 % --   02/21/24 0045 90/51 -- -- 88 22 100 % --   02/21/24 0030 90/68 -- -- 87 17 95 % --   02/21/24 0015 92/54 -- -- 92 13 99 % --   02/21/24 0000 92/45 98.2 °F (36.8 °C) Temporal 95 13 99 % --   02/20/24 2356 92/45 -- -- 95 15 100 % --   02/20/24 2345 94/47 -- -- 97 16 100 % --   02/20/24 2330 (!) 83/56 -- -- 85 20 99 % --   02/20/24 2315 99/52 -- -- 95 18 99 % --   02/20/24 2300 100/71 -- -- 93 18 98 % --   02/20/24 2245 93/64 -- -- 82 11 99 % --   02/20/24 2230 92/67 -- -- 87 13 98 % --   02/20/24 2215 106/50  -- -- 85 13 98 % --   02/20/24 2200 111/48 -- -- 93 18 92 % --   02/20/24 2147 105/50 -- -- 86 15 95 % --   02/20/24 2145 -- -- -- 88 16 95 % --   02/20/24 2130 100/67 -- -- 86 18 (!) 88 % --   02/20/24 2115 (!) 88/27 -- -- 103 25 94 % --   02/20/24 2100 114/64 -- -- 103 17 98 % --   02/20/24 2045 104/68 -- -- 95 15 98 % --   02/20/24 2030 106/55 -- -- 105 14 95 % --   02/20/24 2000 113/66 98.3 °F (36.8 °C) -- 91 22 96 % --   02/20/24 1900 104/65 -- -- 82 21 100 % --   02/20/24 1845 103/65 -- -- 78 15 97 % --   02/20/24 1830 (!) 89/66 -- -- 89 16 98 % --   02/20/24 1820 100/71 -- -- 90 17 98 % --   02/20/24 1815 (!) 86/68 -- -- 86 16 (!) 88 % --   02/20/24 1801 -- -- -- -- -- 94 % --   02/20/24 1800 105/72 -- -- 95 26 96 % --   02/20/24 1757 -- -- -- 93 21 93 % --   02/20/24 1746 -- -- -- -- -- 90 % --   02/20/24 1745 109/70 -- -- 100 21 (!) 81 % --   02/20/24 1730 102/54 -- -- 103 21 (!) 88 % --   02/20/24 1715 108/61 -- -- 96 22 92 % --   02/20/24 1700 108/52 -- -- 84 13 96 % --   02/20/24 1645 102/62 -- -- 88 15 98 % --   02/20/24 1642 -- -- -- 84 13 97 % --   02/20/24 1630 108/67 -- -- 90 14 100 % --   02/20/24 1615 108/72 -- -- 92 20 (!) 88 % --   02/20/24 1600 114/67 98.4 °F (36.9 °C) Temporal 97 21 92 % --       Physical Exam:   General: alert, cooperative, oriented.  No respiratory distress.   Head: Normocephalic, without obvious abnormality, atraumatic.   Eyes: Conjunctivae/corneas clear.  No scleral icterus.  No conjunctival     hemorrhage.   Nose: Nares normal.   Throat: Lips, mucosa, and tongue normal.  No thrush noted.   Neck: Soft, supple neck; trachea midline, no adenopathy, no thyromegaly.   Lungs: CTAB, normal and equal bilateral chest rise   Chest wall: No tenderness or deformity.   Heart: Regular rate and rhythm, normal S1S2, no murmur.   Abdomen: soft, + tender, non-distended, no masses, no guarding, no     rebound, positive BS.   Extremity: no edema, no cyanosis   Skin: No rashes or  lesions.   Neurological: Alert, interactive, no focal deficits    Labs:  Lab Results   Component Value Date    WBC 6.3 02/29/2024    HGB 8.6 02/29/2024    HCT 27.2 02/29/2024    .0 02/29/2024    CREATSERUM 0.39 02/29/2024    BUN 22 02/29/2024     02/29/2024    K 3.6 02/29/2024    K 3.6 02/29/2024     02/29/2024    CO2 28.0 02/29/2024     02/29/2024    CA 7.8 02/29/2024    ALB 1.8 02/29/2024    ALKPHO 176 02/29/2024    BILT 0.5 02/29/2024    TP 5.0 02/29/2024    AST 15 02/29/2024    ALT 45 02/29/2024       Radiology:  CT 2/06 CONCLUSION:  There is a large amount of fecal material centered on the distal sigmoid colon and rectum most likely representing constipation/fecal impaction.  There is some possible wall thickening of the colon in this region which could represent   stercoral colitis.     CXR 2/21 Stable cardiac and mediastinal contours.  No significant interval change in mixed interstitial/alveolar opacities in a perihilar distribution throughout the lungs.  Persistent confluent opacification of the left lung base, likely a combination of mild   pleural effusion and passive atelectasis, similar to prior.  No pneumothorax.         Cultures:  Reviewed     Assessment and Plan:    1.  Sepsis with MRSA in blood cul on admission with Acute Resp failure:  - Blood cul with MRSA In 2/2 bottles on admission, repeats are NGTD  - CXR with multifocal Pneumonia,   - RVP with RSV + likely previous infection,   - Possible Secondary bacterial Pneumonia following RSV, Rule out Aspiration,   - On IV Vanc, 2/21- present, IV Zosyn for 3 days, now restarted, 2/26- present,   - TTE with no vegetation,   - FiO2 requirement 35L ->25L->4L->2L  - Steroids per Pulm service,     2.    Cystitis: UA is mildly abn, Historically cul with E coli ( ESBL) and PSAR,     3.     Abd tenderness: Can be sec to Cystitis vs impacted stool seen on CT .  - Now frequent stools with a small wound in perirectal area,   - Wound care  on board, off loading,     4.     Disposition: in house, an elderly female with MRSA Sepsis, possible secondary bacterial Pneumonia following RSV, FiO2 improving, some pain in RLE,    - Follow pending cul,   - Continue IV Vanc pharm to dose, Will plan for a long course for MRSA bacteremia of more than 24hrs, ~ 4 weeks atleast from negative cul,   - Continue IV Zosyn for 5-7 days, pharm to dose,   - Wean O2 off as tolerated,   - Pulm toilette,   - transfusion prn,   - Supportive care,     Discussed with RN, all questions answered, further recommendations to follow, Thanks,      Thank you for consulting [facility] for Nelly Potter.  If you have any questions or concerns please call [facility] at [phone].     Vinay Stewart MD  2/21/2024  3:47 PM

## 2024-02-29 NOTE — PROGRESS NOTES
[facility]  Palliative Care Follow Up    Nelly Potter Patient Status:  Inpatient    1935 MRN [id number]   Location [facility] 4SW-A Attending Geno Kaur MD   Hosp Day # 9 PCP Balta Wright MD   [id number]    Date of visit:  2024  Day 9 of hospitalization.        Summary:         Nelly Potter is a 88 year old female with PMH of COPD, Dementia/functional decline, Multiple Myeloma, bladder ca, CAD, PE/DVT, Afib on Xarelto, urinary retention in addition to the other conditions noted below, presented to ED on 2024 with CC of SOB, and found to be hypoxic. She was placed on Vapotherm in ED with diagnostic tests showing elevated troponin and BNP, RVP + RSV, CXR showing PNA. Pt was admitted to ICU for further evaluation and management of acute hypoxic resp failure 2/2 multifocal PNA + RSV and underlying COPD, shock presumed septic 2/2 PNA, elevated trop felt to be demand ischemia, h/o Multiple myeloma in remission, PE/DVT, duodenal ulcer, GERD, COPD, CAD, HTN, ?dementia.  Hospital course includes blood cx + MRSA, ID on consult.  Cardiology consultation with C discussion surrounding BRENNA is noted.  Today, Hgb 6.0 down from 7.5 yesterday (8.8 on ) - receiving transfusion w plan to recheck later today, if continues to downtrend, possible abd imaging.  She has been seen by SLP for initial evaluation with recommendation for NPO, and has been unable to be seen for f/u d/t lethargy and O2 needs. Dietary following.  Wound on consult.    Palliative care is following for support w Goals of Care.    Interval Events: SLP recommends mechanical soft ground/minced and moist, thin liquids, VFSS today with recs for mechanical soft ground/minced and nectar thick liquids  - Case d/w RN, doppler pending for RUE swelling to r/o DVT  - Case d/w hospitalist    SUBJECTIVE  Review of Systems - Palliative Care Symptom Assessment     Nelly is asleep following VFSS.  Family reports she ate fairly well  with their assistance.     OBJECTIVE     Hematology:   Lab Results   Component Value Date    WBC 6.3 02/29/2024    HGB 8.6 (L) 02/29/2024    HCT 27.2 (L) 02/29/2024    .0 02/29/2024       Chemistry:  Lab Results   Component Value Date    CREATSERUM 0.39 (L) 02/29/2024    BUN 22 02/29/2024     (L) 02/29/2024    K 3.6 02/29/2024    K 3.6 02/29/2024     02/29/2024    CO2 28.0 02/29/2024     (H) 02/29/2024    CA 7.8 (L) 02/29/2024    ALB 1.8 (L) 02/29/2024    ALKPHO 176 (H) 02/29/2024    BILT 0.5 02/29/2024    TP 5.0 (L) 02/29/2024    AST 15 02/29/2024    ALT 45 02/29/2024    MG 2.3 02/25/2024    PHOS 2.7 02/26/2024         Vital Signs:  Blood pressure 130/52, pulse (!) 36, temperature 97.8 °F (36.6 °C), temperature source Oral, resp. rate 22, height 5' 2\" (1.575 m), weight 162 lb 4.1 oz (73.6 kg), SpO2 92%.  Body mass index is 29.68 kg/m².    Physical Exam:     GENERAL: Asleep in bed. NAD.  HEENT: + DHT TF on held  RESPIRATORY: some increased effort at rest with sats ~ 91% on Room air.  NEUROLOGIC: Asleep.  PSYCHIATRIC:  resting.    Palliative Performance Scale: 30%   Palliative Performance Scale   % Ambulation Activity Level Self-Care Intake Consciousness   100 Full Normal Full   Normal Full   90 Full No disease  Normal Full Normal Full   80 Full Some disease  Normal w/effort Full Normal or  Reduced Full   70 Reduced Some disease  Can't perform job Full Normal or   Reduced Full   60 Reduced Significant disease  Can't perform hobby Occasional  Assist Normal or   Reduced Full or confused   50 Mainly sit/lie Extensive Disease  Can't do any work Partial Assist Normal or Reduced Full or confused   40 Mainly in bed Extensive Disease  Can't do any work Mainly Assist Normal or Reduced Full or confused   30 Bedbound Extensive Disease  Can't do any work Max Assist  Total Care Reduced Drowsy/confused   20 Bedbound Extensive Disease  Can't do any work Max Assist  Total Care Minimal Drowsy/confused   10  Bedbound/coma Extensive Disease  Can't do any work  coma  Max Assist  Total Care Mouth care Drowsy or coma   0 Death       Palliative Care Assessment:     Goals of Care Discussion:     Case d/w RN and hospitalist. Met with Murali choi Verónica at Nelly's bedside. Following d/w primary, they are considering her QOL and tend to feel that coming back and forth to hospital may no longer be to her benefit. They are interested in starting an exploration of hospice care, but have concerns about level of service, timely attention to her needs, logistics, and her overall QOL.    They were hoping to hear she could go to a dedicated I[facility] for hospice care. We discussed criteria to qualify for this level of service. They will hear from 2 agencies ([facility]) before making a decision on comfort w hospice vs continue tx with limits + PC support.     Hospice consult placed, and will f/u tomorrow for any further needs. [facility] [place].      Problem List:       Principal Problem:    Multifocal pneumonia  Active Problems:    Acute respiratory failure with hypoxia (HCC)    RSV infection    Hypokalemia    Elevated troponin    Lactic acidosis    Sepsis, due to unspecified organism, unspecified whether acute organ dysfunction present (HCC)    Palliative care encounter    Counseling regarding advance care planning and goals of care      Palliative Care Recommendations/Plan:         Goals of Care:    Family is considering direction of care, and considering possible hospice care. Consult placed for information on hospice.  [facility] [place], family appreciative.      Code Status: DNAR/Selective Treatment.  Confirmed w POA/family 2/26. POLST on file.    HCPOA: Son, Murali Potter. Document on file.    Disposition:  Pending clinical course, [facility].      A total of 65 minutes were spent on this visit, which included all of the following:  Chart review, direct face to  face contact, history taking, physical examination, counseling and coordinating care, and documentation.        Reviewed the above with patient's RN, primary, GONSALO.    Thank you for inviting Palliative Care to participate in the care of Nelly Potter and family.       Will follow.      KAYLYN Moreno  Palliative Care    2:33 PM    The 21st Century Cures Act makes medical notes like these available to patients in the interest of transparency. Please be advised this is a medical document. Medical documents are intended to carry relevant information, facts as evident, and the clinical opinion of the practitioner. The medical note is intended as a peer to peer communication, and may appear blunt or direct. It is written in medical language and may contain abbreviations or verbiage that are unfamiliar.

## 2024-02-29 NOTE — PROGRESS NOTES
Assumed care at 1530, alert to self, drowsy but follows command. Baseline  RA, currently on 4L. Tele, a.fib. IV lasix given. TF running at 60ml/hr, q4 water flush. Generalized swelling. Q6 accucheck. Pt's family at bedside. Pt updated on poc. Call light in reach. Safety precautions in place. All needs are met at this time.

## 2024-02-29 NOTE — CM/SW NOTE
Informed by [facility] APR[facility] ED that patient's family interested in meeting with [facility]. Sent referral in aidin. Left message with Karen [phone] from [facility]. Left message with [facility] [phone].    SW/CM to remain available for support and/or discharge planning.    JAKOB Roca  Discharge Planner  [phone]

## 2024-02-29 NOTE — PROGRESS NOTES
[facility] Hospitalist Progress Note     Nelly Potter Patient Status:  Inpatient    1935 MRN [id number]   Location [facility] 4SW-A Attending Castillo Mcgill MD   Hosp Day # 9 PCP Balta Wright MD     Chief Complaint:   Fu Sob, hypoxic    Subjective:     Patient seen and examined.   On RA.  Long discussion with son/daughter about GOC.   Feeling tired.  Denies CP/SOB.   NAD.     Objective:    Review of Systems:   10 point ROS completed and was negative, except for pertinent positive and negatives stated in subjective.    Vital signs:  Temp:  [97 °F (36.1 °C)-97.9 °F (36.6 °C)] 97.6 °F (36.4 °C)  Pulse:  [36-86] 66  Resp:  [18-22] 20  BP: (116-138)/(52-83) 116/66  SpO2:  [91 %-98 %] 91 %    Physical Exam:    General: No acute distress.   HEENT:  EOMI, PERRLA, OP clear  Respiratory: Clear to auscultation bilaterally. No wheezes. No rhonchi.  Cardiovascular: S1, S2. Regular rate and rhythm. No murmurs.  Abdomen: Soft, nontender, nondistended.  Positive bowel sounds. No rebound or guarding.  Extremities: BUE/BLE edema. Anasarca  Neuro:  Grossly non focal, no motor deficits.        Diagnostic Data:    Labs:  Recent Labs   Lab 24  0534 24  0515 24  0415 24  0544 24  1405 24  2118 24  0435 24  0453 24  0703   WBC 8.2 10.5 9.7 6.7  --   --  7.4 6.7 6.3   HGB 9.6* 8.8* 7.5* 6.0* 7.8* 7.7* 7.8* 7.7* 8.6*   MCV 85.7 86.1 87.3 87.2  --   --  84.2 85.9 88.3   .0 159.0 131.0* 99.0*  --   --  106.0* 113.0* 151.0   BAND 4 6 7  --   --   --  7  --   --        Recent Labs   Lab 24  0544 24  1405 24  0435 24  0453 24  0703   *  --  177* 208* 145*   BUN 25*  --   22   CREATSERUM 0.46*  --  0.37* 0.28* 0.39*   CA 7.8*  --  7.9* 7.9* 7.8*   ALB 1.9*  --   --   --  1.8*     --  143 137 135*   K 3.3*   < > 4.2 3.8 3.6  3.6     --  111 105 102   CO2 33.0*  --  30.0 28.0  28.0   ALKPHO 95  --   --   --  176*   AST 6*  --   --   --  15   ALT 24  --   --   --  45   BILT 0.3  --   --   --  0.5   TP 4.7*  --   --   --  5.0*    < > = values in this interval not displayed.       Estimated Creatinine Clearance: 80.4 mL/min (A) (based on SCr of 0.39 mg/dL (L)).    No results for input(s): \"PTP\", \"INR\" in the last 168 hours.         COVID-19 Lab Results    COVID-19  Lab Results   Component Value Date    COVID19 Not Detected 02/21/2024    COVID19 Not Detected 02/20/2024    COVID19 Not Detected 02/06/2024       Pro-Calcitonin  No results for input(s): \"PCT\" in the last 168 hours.      Cardiac  Recent Labs   Lab 02/26/24  0544   PBNP 647*       Creatinine Kinase  No results for input(s): \"CK\" in the last 168 hours.      Inflammatory Markers  Recent Labs   Lab 02/26/24  0544   KARUNA 271.9       Imaging: Imaging data reviewed in Epic.    Medications:    furosemide  40 mg Intravenous BID (Diuretic)    lidocaine-menthol  1 patch Transdermal Daily    vancomycin  1,250 mg Intravenous Q24H    rivaroxaban  20 mg Oral Daily with food    piperacillin-tazobactam  3.375 g Intravenous Q8H    QUEtiapine  12.5 mg Oral Nightly    dexamethasone  3.75 mg Per NG Tube Daily with breakfast    insulin aspart  2-10 Units Subcutaneous 4 times per day    guaiFENesin  400 mg Oral 6 times per day    dilTIAZem  60 mg Per NG Tube Q8H    midodrine  5 mg Oral TID    lansoprazole  30 mg Oral QAM AC       Assessment & Plan:    Nelly Potter is a 88 year old female with PMH significant for/anxiety, GERD, A-fib on Xarelto, HTN, DL, COPD, asthma, bladder CA, multiple myeloma, vertigo, dementia with short-term memory issues who presents from [email or web address] with reports of shortness of breath and a wet cough.      Cough, sob - 2/2 RSV infection +/- multifocal PNA  Acute hypoxic resp failure 2/2 above  Asthma/COPD exacerbation   -RSV + in ED  -cxr reviewed >> multifocal pneumonia  -supplemental O2 via vapotherm  >> weaned down to  40% >> cont to wean as able  -nebs prn  -cont iv abx - iv vanc for MRSA bacteremia, likely need 4 week course per ID, PICC placed   -blood cx x 2  -ST, PT, OT  - cont IV lasix BID for volume overload contributing to hypoxia - home dose 40 bid     Shock - presumably related to sepsis: 2/2 bacteremia, pneumonia  Lactic acidosis - resolved  -Was on norepi but currently off and BP stable  -stress steroids  -2/2 blood cx growing MRSA  -MRSA bacteremia >> ID consulted >> rec BRENNA   -cardiology consulted >> apprec recs >> family opting to not go for BRENNA  -TTE w.o obv vegetations  -surveillance cx NTD    AMS 2/2 ICU delirium most likely - resolving   -helpful to keep awake during day and asleep at  night  -minimal sedation during the day if possible  -pain control with tylenol  -seroquel at night    Dysphagia from above  - will dc TF's to encourage po intake  - maintain NGT for now until we're certain she has adequate po intake    BLE Edema  - likely from anasarca, will r/o DVT with US duplex    Anemia  -hgb dropped to 6.0   -prbc transfused  -maintain hgb >7  -stob ordered  -will defer to critical care team regarding CT a/p - son expressed wanting to get it done      Troponin elevated - likely demand ischemia  -downtrended  -cardiology reviewed     GERD  -PPI     HTN   -resume home meds if able  -monitor BP closely     DL  -statin     Afib on xarelto  -resumed xarelto and po diltiazem     Depression, anxiety  -cont zoloft     Bladder ca  Multiple myeloma  Dementia  Vertigo  -stable chronic conditions        Quality:  DVT Prophylaxis: xarelto, scds  CODE status: DNR, select  Lentz: no  If COVID testing is negative, may discontinue isolation: yes     DISPO:  Transferred to floor    DNR/DNI - ok for selective treatment >> prior hospitalist Dr. Kaur confirmed this with son and daughter  Advanced care planning 30 minutes were spent discussing advanced care planning with POA/daughter and son.  This time was exclusive of the  documented time for this visit.        Jayson Lema MD  [facility]ist  Pager [phone]  Answering Service number: [phone]       BD.

## 2024-02-29 NOTE — CONSULTS
.[facility]  Report of Inpatient Wound Care Consultation    Nelly Potter Patient Status:  Inpatient    1935 MRN [id number]   Location [facility] 5NW-A Attending Skyler Lema*   Hosp Day # 9 PCP Balta Wright MD     Reason for Consultation:    bottom wound eval       History of Present Illness:  Nelly Potter is a a(n) 88 year old female. Pt observed lying in bed, family member present but excused themself from the room during assessment. Floor RN helped staff turn and assess wound. Wound noted to perianal area. Patient is incontinent and stays at the [facility] normally - family reported. Pt unable to tell RN if she is having pain or not but noted when turning and cleaning patient she was yelling\" help me\" the entire time.  Patient with multiple comorbidities, with skin breakdown described below.     History:  Past Medical History:   Diagnosis Date    Allergic rhinitis     Anxiety     Chronic midline low back pain without sciatica 10/13/2021    Constipation     Depression     Esophageal reflux     History of bladder cancer 2012    Incontinence     Long QT interval     per pt. hx of long QT.    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    Osteoarthrosis, unspecified whether generalized or localized, unspecified site     Osteopenia of multiple sites 2018    Other and unspecified hyperlipidemia     Pleurisy     Psychophysiological insomnia 2018    Unspecified essential hypertension     Urge incontinence     Vertigo      Past Surgical History:   Procedure Laterality Date    APPENDECTOMY      CATARACT      COLONOSCOPY  2012    normal    CYSTOSCOPY CHEMODENERVATION  10/17/2017    100 U    CYSTOURETHROSCOPY  12    Lucrecia Mathews    CYSTOURETHROSCOPY  10/24/12    [facility] Jose Mathews     CYSTOURETHROSCOPY  13, 13, 13, 14, 5/6/15, 11/11/15    lucrecia Mathews, MELLY    CYSTOURETHROSCOPY  10/11/2017    jose mathews     CYSTOURETHROSCOPY,FULGUR .5-2CM LEONILAN  7/23/12    cystourethroscopy, TURBT x 2- Dr. Cheng    FOOT SURGERY Left 10/25/11    lt foot    HIP REPLACEMENT SURGERY      left    HYSTERECTOMY      KNEE REPLACEMENT SURGERY Bilateral     OTHER SURGICAL HISTORY  1983     arthroscopic left knee    PART EXCIS 5TH METATARSAL HEAD Right 10/2/2014    Procedure: TAILOR'S BUNIONECTOMY;  Surgeon: Adryan Prajapati DPM;  Location: [facility]    PATIENT DOCUMENTED NOT TO HAVE EXPERIENCED ANY OF THE FOLLOWING EVENTS Right 10/2/2014    Procedure: TAILOR'S BUNIONECTOMY;  Surgeon: Adryan Prajapati DPM;  Location: [facility]    PATIENT WITH PREOPERATIVE ORDER FOR IV ANTIBIOTIC SURGICAL SITE INFECT Right 10/2/2014    Procedure: TAILOR'S BUNIONECTOMY;  Surgeon: Adryan Prajapati DPM;  Location: [facility]    REPAIR OF HAMMERTOE,ONE Left 10/2/2014    Procedure: ARTHROPLASTY ONE (1) TOE;  Surgeon: Adryan Prajapati DPM;  Location: [facility]    SINUS SURGERY    1983 & 1999    sinus    SLING OPER STRES INCONTINENCE  8/06    sling    SLING OPER STRES INCONTINENCE  10/2011    sling repeat. TVT Exact. Dr. Dang at [facility]    TONSILLECTOMY        reports that she has never smoked. She has never used smokeless tobacco. She reports current alcohol use. She reports that she does not use drugs.      Allergies:  @ALLERGY    Laboratory Data:    Recent Labs   Lab 02/26/24  0544 02/26/24  0546 02/27/24  0435 02/27/24  0515 02/28/24  0453 02/28/24  0456 02/29/24  0047 02/29/24  0556 02/29/24  0703 02/29/24  1112   WBC 6.7  --  7.4  --  6.7  --   --   --  6.3  --    HGB 6.0*   < > 7.8*  --  7.7*  --   --   --  8.6*  --    HCT 19.1*  --  23.5*  --  23.8*  --   --   --  27.2*  --    PLT 99.0*  --  106.0*  --  113.0*  --   --   --  151.0  --    CREATSERUM 0.46*  --  0.37*  --  0.28*  --   --   --  0.39*  --    BUN 25*  --  21  --  19  --   --   --  22  --    *  --  177*  --  208*  --   --   --  145*  --    CA 7.8*  --  7.9*  --  7.9*  --   --   --  7.8*   --    ALB 1.9*  --   --   --   --   --   --   --  1.8*  --    TP 4.7*  --   --   --   --   --   --   --  5.0*  --    PGLU  --    < >  --    < >  --    < > 169* 172*  --  156*    < > = values in this interval not displayed.         ASSESSMENT:  Wound 02/24/24 Sacrum (Active)   Date First Assessed: 02/24/24   Primary Wound Type: Pressure Injury  Location: Sacrum      Assessments 2/29/2024  3:18 PM   Wound Image     Wound Length (cm) 9.5 cm   Wound Width (cm) 5.5 cm   Wound Surface Area (cm^2) 52.25 cm^2        Wound Cleaning and Dressings:  Wound cleansing:  Cleanse with normal saline or wound cleanser  Wound cleaning frequency: Daily  Wound product: Other Cleanse area with normal saline, apply generous layer of coloplast triad paste - reapply with each brief change   Dressing change frequency:  Change dressing daily and/or PRN     ALL WOUND CARE SUPPLIES CAN BE OBTAINED FROM [facility]     Miscellaneous/Additional Orders:  Off loading: Off loading device: Pressure-relief seat cushionTurn Schedules: Heels elevated using pillows, heel wedge or heel boots to float heels off the bed  To prevent sliding: decrease head of bed and elevate foot of bed as medical condition tolerates  Prompt incontinent care  Moisture barrier for incontinence.  May consider coloplast triad paste or zinc based barrier cream     If patient is Diabetic : want to make sure blood sugars are within a controled range for wound healing     Protein intake: depending on providers recommendations and patients kidney functions - if kidneys are good then recommend patient to increase protein intake (Boost, Bret, Ensure, Premiere Protein)     Additional Notes:    -Educated family about dressing recommendations , also spoke with family about patients goals for wound  - educated family about option such as outpatient wound clinic  -educated family about importance of incontinence care frequently     Recommendations:  -Off loading air mattress  and-or waffle/EHOB cushion  -Turning and repositioning every 2 hours  -Prompt and strict incontinence care   -Prompt and strict application of paste as needed /daily       Thank you for this consultation and for allowing me to participate in the care of your patient.  Please page me at #[phone] if you have any questions about this consultation and plan of care.     Time Spent 45 Minutes.    Thank you,  Riley Shaffer RN  Wound/Ostomy/Continence nurse    2/29/2024  4:03 PM

## 2024-03-01 NOTE — PROGRESS NOTES
[facility] Hospitalist Progress Note     Nelly Potter Patient Status:  Inpatient    1935 MRN [id number]   Location [facility] 4SW-A Attending Castillo Mcgill MD   Hosp Day # 10 PCP Balta Wright MD     Chief Complaint:   Fu Sob, hypoxic    Subjective:     Patient seen and examined.   Family inclined towards hospice options.   Still with RLE pain.   Denies CP/SOB.   NAD.     Objective:    Review of Systems:   10 point ROS completed and was negative, except for pertinent positive and negatives stated in subjective.    Vital signs:  Temp:  [97.4 °F (36.3 °C)-98.1 °F (36.7 °C)] 98.1 °F (36.7 °C)  Pulse:  [71-88] 77  Resp:  [20-24] 22  BP: (110-131)/(63-88) 131/87  SpO2:  [90 %-100 %] 98 %    Physical Exam:    General: No acute distress. Pleasant confusion   HEENT:  EOMI, PERRLA, OP clear  Respiratory: Clear to auscultation bilaterally. No wheezes. No rhonchi.  Cardiovascular: S1, S2. Regular rate and rhythm. No murmurs.  Abdomen: Soft, nontender, nondistended.  Positive bowel sounds. No rebound or guarding.  Extremities: BUE/BLE edema. Anasarca  Neuro:  Grossly non focal, no motor deficits.        Diagnostic Data:    Labs:  Recent Labs   Lab 24  0515 24  0415 24  0544 24  1405 24  2118 24  0435 24  0453 24  0703 24  0625   WBC 10.5 9.7 6.7  --   --  7.4 6.7 6.3 5.8   HGB 8.8* 7.5* 6.0*   < > 7.7* 7.8* 7.7* 8.6* 8.7*   MCV 86.1 87.3 87.2  --   --  84.2 85.9 88.3 85.4   .0 131.0* 99.0*  --   --  106.0* 113.0* 151.0 155.0   BAND 6 7  --   --   --  7  --   --   --     < > = values in this interval not displayed.       Recent Labs   Lab 24  0544 24  1405 24  0453 24  0703 24  0625   *   < > 208* 145* 218*   BUN 25*   < > 19 22 22   CREATSERUM 0.46*   < > 0.28* 0.39* 0.41*   CA 7.8*   < > 7.9* 7.8* 8.0*   ALB 1.9*  --   --  1.8* 2.0*      < > 137 135* 136   K 3.3*   <  > 3.8 3.6  3.6 3.1*      < > 105 102 100   CO2 33.0*   < > 28.0 28.0 28.0   ALKPHO 95  --   --  176* 171*   AST 6*  --   --  15 10*   ALT 24  --   --  45 45   BILT 0.3  --   --  0.5 0.4   TP 4.7*  --   --  5.0* 5.3*    < > = values in this interval not displayed.       Estimated Creatinine Clearance: 76.5 mL/min (A) (based on SCr of 0.41 mg/dL (L)).    No results for input(s): \"PTP\", \"INR\" in the last 168 hours.         COVID-19 Lab Results    COVID-19  Lab Results   Component Value Date    COVID19 Not Detected 02/21/2024    COVID19 Not Detected 02/20/2024    COVID19 Not Detected 02/06/2024       Pro-Calcitonin  No results for input(s): \"PCT\" in the last 168 hours.      Cardiac  Recent Labs   Lab 02/26/24  0544   PBNP 647*       Creatinine Kinase  No results for input(s): \"CK\" in the last 168 hours.      Inflammatory Markers  Recent Labs   Lab 02/26/24  0544   KARUNA 271.9       Imaging: Imaging data reviewed in Epic.    Medications:    potassium chloride  40 mEq Oral Q4H    acetaminophen  500 mg Oral q6h    furosemide  40 mg Intravenous BID (Diuretic)    lidocaine-menthol  1 patch Transdermal Daily    gabapentin  100 mg Oral BID    vancomycin  1,250 mg Intravenous Q24H    rivaroxaban  20 mg Oral Daily with food    piperacillin-tazobactam  3.375 g Intravenous Q8H    QUEtiapine  12.5 mg Oral Nightly    dexamethasone  3.75 mg Per NG Tube Daily with breakfast    insulin aspart  2-10 Units Subcutaneous 4 times per day    guaiFENesin  400 mg Oral 6 times per day    dilTIAZem  60 mg Per NG Tube Q8H    midodrine  5 mg Oral TID    lansoprazole  30 mg Oral QAM AC       Assessment & Plan:    Nelly Potter is a 88 year old female with PMH significant for/anxiety, GERD, A-fib on Xarelto, HTN, DL, COPD, asthma, bladder CA, multiple myeloma, vertigo, dementia with short-term memory issues who presents from [place] with reports of shortness of breath and a wet cough.      Cough, sob - 2/2 RSV infection +/-  multifocal PNA  Acute hypoxic resp failure 2/2 above  Asthma/COPD exacerbation   -RSV + in ED  -cxr reviewed >> multifocal pneumonia  -supplemental O2 via vapotherm  >> weaned down to 40% >> cont to wean as able  -nebs prn  -cont iv abx - iv vanc for MRSA bacteremia, likely need 4 week course per ID, PICC placed   -blood cx x 2  -ST, PT, OT  - cont IV lasix BID for volume overload contributing to hypoxia - home dose 40 bid     Shock - presumably related to sepsis: 2/2 bacteremia, pneumonia  Lactic acidosis - resolved  -Was on norepi but currently off and BP stable  -stress steroids  -2/2 blood cx growing MRSA  -MRSA bacteremia >> ID consulted >> rec BRENNA   -cardiology consulted >> apprec recs >> family opting to not go for BRENNA  -TTE w.o obv vegetations  -surveillance cx NTD    AMS 2/2 ICU delirium most likely - resolving   -helpful to keep awake during day and asleep at  night  -minimal sedation during the day if possible  -pain control with tylenol  -seroquel at night    Dysphagia from above  - will dc TF's to encourage po intake  - maintain NGT for now until we're certain she has adequate po intake    BLE Edema  - likely from anasarca, will r/o DVT with US duplex    Anemia  -hgb dropped to 6.0   -prbc transfused  -maintain hgb >7  -stob ordered  -will defer to critical care team regarding CT a/p - son expressed wanting to get it done      Troponin elevated - likely demand ischemia  -downtrended  -cardiology reviewed     GERD  -PPI     HTN   -resume home meds if able  -monitor BP closely     DL  -statin     Afib on xarelto  -resumed xarelto and po diltiazem     Depression, anxiety  -cont zoloft     Bladder ca  Multiple myeloma  Dementia  Vertigo  -stable chronic conditions        Quality:  DVT Prophylaxis: xarelto, scds  CODE status: DNR, select  Lentz: no  If COVID testing is negative, may discontinue isolation: yes     DISPO:  Transferred to floor    DNR/DNI - ok for selective treatment >> prior hospitalist   Vincent confirmed this with son and daughter  Advanced care planning 30 minutes were spent discussing advanced care planning with POA/daughter and son.  This time was exclusive of the documented time for this visit.        Jayson Lema MD  [facility]ist  Pager [phone]  Answering Service number: [phone]       .

## 2024-03-01 NOTE — PROGRESS NOTES
[facility]     [facility] Consult    Nelly Potter Patient Status:  Inpatient    1935 MRN [id number]   Location [facility] 4SW-A Attending Geno Kaur MD   Hosp Day # 10 PCP MD Nelly Lewis DENIZ Abbey seen and examined,   Afebrile,   Previous entries noted,   Little more interactive,   Groggy, was given pain medicine for RLE pain,   Dobbhoff in place,         History:  Past Medical History:   Diagnosis Date    Allergic rhinitis     Anxiety     Chronic midline low back pain without sciatica 10/13/2021    Constipation     Depression     Esophageal reflux     History of bladder cancer 2012    Incontinence     Long QT interval     per pt. hx of long QT.    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 Mathews    CYSTOURETHROSCOPY  10/24/12    BTS Cysto Dr. Mathews     CYSTOURETHROSCOPY  13, 13, 13, 14, 5/6/15, 11/11/15    cysto- Dr. Mathews, MELLY    CYSTOURETHROSCOPY  10/11/2017    cysto Dr. mathews    CYSTOURETHROSCOPY,FULGUR .5-2CM LESN  12    cystourethroscopy, TURBT x 2- Dr. Mathews    FOOT SURGERY Left 10/25/11    lt foot    HIP REPLACEMENT SURGERY      left    HYSTERECTOMY      KNEE REPLACEMENT SURGERY Bilateral     OTHER SURGICAL HISTORY       arthroscopic left knee    PART EXCIS 5TH METATARSAL HEAD Right 10/2/2014    Procedure: TAILOR'S BUNIONECTOMY;  Surgeon: Adryan Prajapati DPM;  Location: [facility]    PATIENT DOCUMENTED NOT TO HAVE  EXPERIENCED ANY OF THE FOLLOWING EVENTS Right 10/2/2014    Procedure: TAILOR'S BUNIONECTOMY;  Surgeon: Adryan Prajapati DPM;  Location: [facility]    PATIENT WITH PREOPERATIVE ORDER FOR IV ANTIBIOTIC SURGICAL SITE INFECT Right 10/2/2014    Procedure: TAILOR'S BUNIONECTOMY;  Surgeon: Adryan Prajapati DPM;  Location: [facility]    REPAIR OF HAMMERTOE,ONE Left 10/2/2014    Procedure: ARTHROPLASTY ONE (1) TOE;  Surgeon: Adryan Prajapati DPM;  Location: [facility]    SINUS SURGERY    1983 & 1999    sinus    SLING OPER STRES INCONTINENCE  8/06    sling    SLING OPER STRES INCONTINENCE  10/2011    sling repeat. TVT Exact. Dr. Dang at [facility]    TONSILLECTOMY       Family History   Problem Relation Age of Onset    Other (Other) Son         CF    Other (Other) Son         CF      reports that she has never smoked. She has never used smokeless tobacco. She reports current alcohol use. She reports that she does not use drugs.    Allergies:  Allergies   Allergen Reactions    Myrbetriq [Mirabegron] NAUSEA ONLY and DIZZINESS    Codeine NAUSEA ONLY    Evista [Raloxifene Hydrochloride] RASH    Meclizine NAUSEA ONLY    Sulfamethoxazole-Trimethoprim DIZZINESS       Medications:    Current Facility-Administered Medications:     potassium chloride (Klor-Con) 20 MEQ oral powder 40 mEq, 40 mEq, Oral, Q4H    acetaminophen (Tylenol Extra Strength) tab 500 mg, 500 mg, Oral, q6h    furosemide (Lasix) 10 mg/mL injection 40 mg, 40 mg, Intravenous, BID (Diuretic)    ipratropium-albuterol (Duoneb) 0.5-2.5 (3) MG/3ML inhalation solution 3 mL, 3 mL, Nebulization, Q6H PRN    lidocaine-menthol 4-1 % patch 1 patch, 1 patch, Transdermal, Daily    gabapentin (Neurontin) cap 100 mg, 100 mg, Oral, BID    vancomycin (Vancocin) 1.25 g in sodium chloride 0.9% 250mL IVPB premix, 1,250 mg, Intravenous, Q24H    rivaroxaban (Xarelto) tab 20 mg, 20 mg, Oral, Daily with food    piperacillin-tazobactam (Zosyn) 3.375 g in dextrose 5% 100 mL IVPB-ADDV, 3.375  g, Intravenous, Q8H    QUEtiapine (SEROquel) partial tab 12.5 mg, 12.5 mg, Oral, Nightly    dexamethasone (Decadron) tab 3.75 mg, 3.75 mg, Per NG Tube, Daily with breakfast    insulin aspart (NovoLOG) 100 Units/mL FlexPen 2-10 Units, 2-10 Units, Subcutaneous, 4 times per day    ibuprofen (Motrin) 100 MG/5ML oral suspension 400 mg, 400 mg, Per NG Tube, Q4H PRN    traMADol (Ultram) tab 50 mg, 50 mg, Oral, Q6H PRN    guaiFENesin (Mucinex) tab 400 mg, 400 mg, Oral, 6 times per day    glucose (Dex4) 15 GM/59ML oral liquid 15 g, 15 g, Oral, Q15 Min PRN **OR** glucose (Glutose) 40% oral gel 15 g, 15 g, Oral, Q15 Min PRN **OR** glucose-vitamin C (Dex-4) chewable tab 4 tablet, 4 tablet, Oral, Q15 Min PRN **OR** dextrose 50% injection 50 mL, 50 mL, Intravenous, Q15 Min PRN **OR** glucose (Dex4) 15 GM/59ML oral liquid 30 g, 30 g, Oral, Q15 Min PRN **OR** glucose (Glutose) 40% oral gel 30 g, 30 g, Oral, Q15 Min PRN **OR** glucose-vitamin C (Dex-4) chewable tab 8 tablet, 8 tablet, Oral, Q15 Min PRN    dilTIAZem (cardIZEM) tab 60 mg, 60 mg, Per NG Tube, Q8H    midodrine (ProAmatine) tab 5 mg, 5 mg, Oral, TID    docusate (Colace) 50 MG/5ML oral solution 100 mg, 100 mg, Per NG Tube, BID PRN    lansoprazole (Prevacid Solutab) disintegrating tab 30 mg, 30 mg, Oral, QAM AC    dextrose 10% infusion (TPN no rate), , Intravenous, Continuous PRN    pancrelipase (Lip-Prot-Amyl) (Zenpep) DR particles cap 10,000 Units, 10,000 Units, Per G Tube, PRN **AND** sodium bicarbonate tab 325 mg, 325 mg, Per G Tube, PRN    acetaminophen (Tylenol) tab 650 mg, 650 mg, Oral, Q4H PRN **OR** [DISCONTINUED] HYDROcodone-acetaminophen (Norco) 5-325 MG per tab 1 tablet, 1 tablet, Oral, Q4H PRN **OR** [DISCONTINUED] HYDROcodone-acetaminophen (Norco) 5-325 MG per tab 2 tablet, 2 tablet, Oral, Q4H PRN    benzonatate (Tessalon) cap 200 mg, 200 mg, Oral, TID PRN    ondansetron (Zofran) 4 MG/2ML injection 4 mg, 4 mg, Intravenous, Q6H PRN    polyethylene glycol (PEG  3350) (Miralax) 17 g oral packet 17 g, 17 g, Oral, Daily PRN    sennosides (Senokot) tab 17.2 mg, 17.2 mg, Oral, Nightly PRN    bisacodyl (Dulcolax) 10 MG rectal suppository 10 mg, 10 mg, Rectal, Daily PRN    fleet enema (Fleet) 7-19 GM/118ML rectal enema 133 mL, 1 enema, Rectal, Once PRN    Review of Systems:   Constitutional: Negative for anorexia, chills, fatigue, fevers, malaise, night sweats and weight loss.  Eyes: Negative for visual disturbance, irritation and redness.  Ears, nose, mouth, throat, and face: Negative for hearing loss, tinnitus, nasal congestion, snoring, sore throat, hoarseness and voice change.  Respiratory: Negative for cough, sputum, hemoptysis, chest pain, wheezing, dyspnea on exertion, or stridor.  Cardiovascular: Negative for chest pain, palpitations, irregular heart beats, syncope, fatigue, orthopnea, paroxysmal nocturnal dyspnea, lower extremity edema.  Gastrointestinal: Negative for dysphagia, odynophagia, reflux symptoms, nausea, vomiting, change in bowel habits, diarrhea, constipation and abdominal pain.  Integument/breast: Negative for rash, skin lesions, and pruritus.  Hematologic/lymphatic: Negative for easy bruising, bleeding, and lymphadenopathy.  Musculoskeletal: Negative for myalgias, arthralgias, muscle weakness.  Neurological: Negative for headaches, dizziness, seizures, memory problems, trouble swallowing, speech problems, gait problems and weakness.  Behavioral/Psych: Negative for active tobacco use.  Endocrine: No history of of diabetes, thyroid disorder.  Deferred,     Vital signs in last 24 hours:  Patient Vitals for the past 24 hrs:   BP Temp Temp src Pulse Resp SpO2 Weight   02/21/24 1500 118/72 -- -- 84 15 100 % --   02/21/24 1400 119/66 -- -- 84 16 100 % --   02/21/24 1300 106/90 -- -- 84 20 100 % --   02/21/24 1218 -- -- -- -- -- 100 % --   02/21/24 1200 114/59 98 °F (36.7 °C) Temporal 82 20 100 % --   02/21/24 1100 123/76 -- -- 79 17 100 % --   02/21/24 1000  121/66 -- -- 77 16 100 % --   02/21/24 0942 115/63 -- -- 75 15 100 % --   02/21/24 0900 116/80 -- -- 79 (!) 29 98 % --   02/21/24 0833 117/63 97.5 °F (36.4 °C) Temporal 88 (!) 32 91 % --   02/21/24 0814 -- -- -- -- -- (!) 85 % --   02/21/24 0805 -- -- -- 74 (!) 27 (!) 87 % --   02/21/24 0800 -- -- -- 77 (!) 31 (!) 80 % --   02/21/24 0755 -- -- -- 77 (!) 29 (!) 75 % --   02/21/24 0715 112/78 -- -- 69 20 100 % --   02/21/24 0700 97/58 -- -- 74 21 100 % --   02/21/24 0645 111/62 -- -- 72 25 98 % --   02/21/24 0630 111/62 -- -- 77 19 96 % --   02/21/24 0615 115/63 -- -- 75 (!) 27 (!) 88 % --   02/21/24 0600 112/59 98 °F (36.7 °C) Temporal 76 (!) 34 95 % --   02/21/24 0545 129/62 -- -- 74 20 93 % --   02/21/24 0530 121/89 -- -- 86 (!) 27 97 % --   02/21/24 0515 119/73 -- -- 73 25 97 % --   02/21/24 0500 119/62 -- -- 73 (!) 34 93 % --   02/21/24 0445 102/66 -- -- 76 (!) 32 98 % --   02/21/24 0430 121/72 -- -- 73 22 98 % --   02/21/24 0415 99/82 -- -- 78 25 98 % --   02/21/24 0410 99/82 -- -- 70 14 100 % --   02/21/24 0400 99/63 98 °F (36.7 °C) -- 70 18 93 % 147 lb 11.3 oz (67 kg)   02/21/24 0345 (!) 110/96 -- -- 85 17 98 % --   02/21/24 0330 112/64 -- -- 70 16 100 % --   02/21/24 0315 107/63 -- -- 68 10 100 % --   02/21/24 0300 102/66 -- -- 67 11 100 % --   02/21/24 0245 106/63 -- -- 74 13 100 % --   02/21/24 0230 101/61 -- -- 70 12 100 % --   02/21/24 0215 108/61 -- -- 89 14 100 % --   02/21/24 0200 109/60 -- -- 82 17 100 % --   02/21/24 0147 -- -- -- 96 17 100 % --   02/21/24 0145 119/71 -- -- 91 14 100 % --   02/21/24 0130 -- -- -- 79 19 100 % --   02/21/24 0115 105/65 -- -- 76 12 100 % --   02/21/24 0100 (!) 81/59 -- -- 90 15 100 % --   02/21/24 0045 90/51 -- -- 88 22 100 % --   02/21/24 0030 90/68 -- -- 87 17 95 % --   02/21/24 0015 92/54 -- -- 92 13 99 % --   02/21/24 0000 92/45 98.2 °F (36.8 °C) Temporal 95 13 99 % --   02/20/24 2356 92/45 -- -- 95 15 100 % --   02/20/24 2345 94/47 -- -- 97 16 100 % --   02/20/24  2330 (!) 83/56 -- -- 85 20 99 % --   02/20/24 2315 99/52 -- -- 95 18 99 % --   02/20/24 2300 100/71 -- -- 93 18 98 % --   02/20/24 2245 93/64 -- -- 82 11 99 % --   02/20/24 2230 92/67 -- -- 87 13 98 % --   02/20/24 2215 106/50 -- -- 85 13 98 % --   02/20/24 2200 111/48 -- -- 93 18 92 % --   02/20/24 2147 105/50 -- -- 86 15 95 % --   02/20/24 2145 -- -- -- 88 16 95 % --   02/20/24 2130 100/67 -- -- 86 18 (!) 88 % --   02/20/24 2115 (!) 88/27 -- -- 103 25 94 % --   02/20/24 2100 114/64 -- -- 103 17 98 % --   02/20/24 2045 104/68 -- -- 95 15 98 % --   02/20/24 2030 106/55 -- -- 105 14 95 % --   02/20/24 2000 113/66 98.3 °F (36.8 °C) -- 91 22 96 % --   02/20/24 1900 104/65 -- -- 82 21 100 % --   02/20/24 1845 103/65 -- -- 78 15 97 % --   02/20/24 1830 (!) 89/66 -- -- 89 16 98 % --   02/20/24 1820 100/71 -- -- 90 17 98 % --   02/20/24 1815 (!) 86/68 -- -- 86 16 (!) 88 % --   02/20/24 1801 -- -- -- -- -- 94 % --   02/20/24 1800 105/72 -- -- 95 26 96 % --   02/20/24 1757 -- -- -- 93 21 93 % --   02/20/24 1746 -- -- -- -- -- 90 % --   02/20/24 1745 109/70 -- -- 100 21 (!) 81 % --   02/20/24 1730 102/54 -- -- 103 21 (!) 88 % --   02/20/24 1715 108/61 -- -- 96 22 92 % --   02/20/24 1700 108/52 -- -- 84 13 96 % --   02/20/24 1645 102/62 -- -- 88 15 98 % --   02/20/24 1642 -- -- -- 84 13 97 % --   02/20/24 1630 108/67 -- -- 90 14 100 % --   02/20/24 1615 108/72 -- -- 92 20 (!) 88 % --   02/20/24 1600 114/67 98.4 °F (36.9 °C) Temporal 97 21 92 % --       Physical Exam:   General: alert, cooperative, oriented.  No respiratory distress.   Head: Normocephalic, without obvious abnormality, atraumatic.   Eyes: Conjunctivae/corneas clear.  No scleral icterus.  No conjunctival     hemorrhage.   Nose: Nares normal.   Throat: Lips, mucosa, and tongue normal.  No thrush noted.   Neck: Soft, supple neck; trachea midline, no adenopathy, no thyromegaly.   Lungs: CTAB, normal and equal bilateral chest rise   Chest wall: No tenderness or  deformity.   Heart: Regular rate and rhythm, normal S1S2, no murmur.   Abdomen: soft, + tender, non-distended, no masses, no guarding, no     rebound, positive BS.   Extremity: no edema, no cyanosis   Skin: No rashes or lesions.   Neurological: Alert, interactive, no focal deficits    Labs:  Lab Results   Component Value Date    WBC 5.8 03/01/2024    HGB 8.7 03/01/2024    HCT 26.4 03/01/2024    .0 03/01/2024    CREATSERUM 0.41 03/01/2024    BUN 22 03/01/2024     03/01/2024    K 3.1 03/01/2024     03/01/2024    CO2 28.0 03/01/2024     03/01/2024    CA 8.0 03/01/2024    ALB 2.0 03/01/2024    ALKPHO 171 03/01/2024    BILT 0.4 03/01/2024    TP 5.3 03/01/2024    AST 10 03/01/2024    ALT 45 03/01/2024       Radiology:  CT 2/06 CONCLUSION:  There is a large amount of fecal material centered on the distal sigmoid colon and rectum most likely representing constipation/fecal impaction.  There is some possible wall thickening of the colon in this region which could represent   stercoral colitis.     CXR 2/21 Stable cardiac and mediastinal contours.  No significant interval change in mixed interstitial/alveolar opacities in a perihilar distribution throughout the lungs.  Persistent confluent opacification of the left lung base, likely a combination of mild   pleural effusion and passive atelectasis, similar to prior.  No pneumothorax.     Cultures:  Reviewed     Assessment and Plan:    1.  Sepsis with MRSA in blood cul on admission with Acute Resp failure:  - Blood cul with MRSA In 2/2 bottles on admission, repeats are NGTD  - CXR with multifocal Pneumonia,   - RVP with RSV + likely recent past infection,   - Possible Secondary bacterial Pneumonia following RSV, Rule out Aspiration,   - On IV Vanc, 2/21- present, IV Zosyn for 3 days, now restarted, 2/26- present,   - TTE with no vegetation,   - FiO2 requirement 35L ->25L->4L->2L  - Steroids per Pulm service,     2.    Cystitis: UA is mildly abn,  Historically cul with E coli ( ESBL) and PSAR,     3.     Abd tenderness: Can be sec to Cystitis vs impacted stool seen on CT .  - Now frequent stools with a small wound in perirectal area,   - Wound care on board, off loading,     4.    RLE Pain: swelling of extremity,  - Venous dopplers is negative,   - Peripheral pulse is palpable but feeble,   - ROM of Hip, knee and ankle are optimal, no erythema of joint, no warmth,   - ? Etiology of RLE pain, may need CT scan if persistent pain,     4.     Disposition: in house, an elderly female with MRSA Sepsis, possible secondary bacterial Pneumonia following RSV, FiO2 improving, some pain in RLE,    - Follow pending cul,   - Continue IV Vanc pharm to dose, Will plan for a long course for MRSA bacteremia of more than 24hrs, ~ 4 weeks atleast from negative cul,   - Continue IV Zosyn for 5-7 days,Will dc in am,   - Wean O2 off as tolerated,   - XR of RLE,   - Pulm toilette,   - Supportive care,     Discussed with RN, all questions answered, further recommendations to follow, Thanks,      Thank you for consulting [facility] for Nelly Potter.  If you have any questions or concerns please call [facility] hospital[facility] Infectious Disease at [phone].     Vinay Stewart MD  2/21/2024  3:47 PM

## 2024-03-01 NOTE — CM/SW NOTE
Patient/family meeting with [facility] today at 10:30 per Palliative Care APRN progress note.      SW/CM to remain available for any further discharge planning needs.   Arabella De La Cruz RN Case Manager [phone]

## 2024-03-01 NOTE — PROGRESS NOTES
[facility] Pharmacy Dosing Service      Follow Up Pharmacokinetic Consult for Vancomycin Dosing     Nelly Potter is a 88 year old female who is receiving vancomycin therapy for bacteremia. Patient is on day 10 of vancomycin and is currently receiving 1.25 g  every 24 hours. The current treatment and monitoring approach is steady state AUC strategy.        Weight and Temperature:    Wt Readings from Last 1 Encounters:   24 73.6 kg (162 lb 4.1 oz)         Temp Readings from Last 1 Encounters:   24 97.5 °F (36.4 °C) (Axillary)      Labs:   Recent Labs   Lab 24  0435 24  0453 24  0703   CREATSERUM 0.37* 0.28* 0.39*      Estimated Creatinine Clearance: 80.4 mL/min (A) (based on SCr of 0.39 mg/dL (L)).     Recent Labs   Lab 24  0435 24  0453 24  0703   WBC 7.4 6.7 6.3        Vancomycin Levels:  Lab Results   Component Value Date/Time    VANCT 13.0 2024 08:55 PM    VANCT 10.5 2024 07:59 PM    VANCP 25.0 (L) 2024 10:57 PM         The Pharmacokinetic Target is:     to 600 mg-h/L and trough <=15 mg/L    Renal Dosing Considerations:    None     Assessment/Plan:   Maintenance Regimen: Continue vancomycin 1250 mg IV every 24 hours    Monitorin) Plan for vancomycin trough to be obtained in 5 - 7 days    2) Pharmacy will order SCr as clinically indicated to assess renal function.    3) Pharmacy will monitor for toxicity and efficacy, adjust vancomycin dose and/or frequency, and order vancomycin levels as appropriate per the Pharmacy and Therapeutics Committee approved protocol until discontinuation of the medication.       We appreciate the opportunity to assist in the care of this patient.     Angela Zuniga, PharmD  2024  10:07 PM  [facility] Extension: [phone]

## 2024-03-01 NOTE — DIETARY NOTE
[facility]  NUTRITION ASSESSMENT    Pt does not meet malnutrition criteria at this time.    NUTRITION INTERVENTION:    Meal and Snacks - ADAT per SLP. Monitor and encourage adequate PO intake.   Medical Food Supplements - Will evaluate need when diet is advanced. Rationale/use for oral supplements discussed.  Enteral Nutrition - Via DHT, continue TF atJevity 1.5 at 75 mL/hr x 12hrs (6pm to 6am) .   This will provide 1980 kcal, 84 grams protein, 1003 mL total free water, and 100% of RDI's.   Recommend 160 mL water flush q 4 hours, TF+FWF provides 1963 mL total fluids.   Hold TF 1 hour before and after administering lansoprazole.  Continue Banatrol BID prn for loose stools.  Coordination of Nutrition Care - SLP consult prior to diet advancement. and Palliative care consult for goal of care    PATIENT STATUS:   3/1- started on ground/minced diet per speech recommendations and TF adjusted to nocturnal over 12hours, 6pm to 6am.  Pt not eating much per RN but is drinking.  Will continue with TF overnight to promote hunger. Noted family meeting with hospice.  RD continue to monitor.   2/27: Pt weaning on vapotherm. Plan for SLP to re-eval today for diet advancement. Tolerating TF at goal via DHT. RN reports pt still having multiple BM's daily and using Banatrol - will continue to send for now. Will continue to monitor and follow up as appropriate.    2/23: Pt continues on vapotherm but planning to wean to HFNC today. Tolerating TF at goal via DHT. SLP to re-eval today for diet advancement. No GI symptoms noted. Pt with elevated BG 2/2 steroids - started on SSI aspart this AM. No A1c level but no hx of DM.   2/21: 88 year female admitted on 2/20 w/ multifocal pneumonia. Pt seen for tube feed consult.  Pt positive for RSV. patient on Vapotherm 40L,100%. SLP following, recommended NPO diet. Pt sleeping at time of visit, talked to daughter in law at bedside. Daughter in law reports pt w/ poor appetite prior to admit.  Stated that pt started coughing when attempted to eat. Reports this has been going of for a week. No n/v/d. Last BM 2/20. Reported UBW of 130 lbs. However per chart review, pts wt has been ~ 145-155 lb over the past 2 months. And mentioned that wt has been fluctuating d/t steroids. Provided tube feed recs. Will continue to monitor.     PMH: anxiety, GERD, A-fib on Xarelto, HTN, DL, COPD, asthma, bladder CA, multiple myeloma, vertigo, dementia with short-term memory issues.    ANTHROPOMETRICS:  Ht: 157.5 cm (5' 2\")  Wt: 73.6 kg (162 lb 4.1 oz).   BMI: Body mass index is 29.68 kg/m².  IBW: 50 kg    WEIGHT HISTORY:   Weight loss: No  Wt Readings from Last 10 Encounters:   02/26/24 73.6 kg (162 lb 4.1 oz)   02/19/24 66 kg (145 lb 9.6 oz)   02/16/24 71 kg (156 lb 9.6 oz)   02/15/24 70.6 kg (155 lb 9.6 oz)   02/06/24 69.2 kg (152 lb 9.6 oz)   02/05/24 71.2 kg (157 lb)   01/31/24 70.5 kg (155 lb 6.4 oz)   01/29/24 70.5 kg (155 lb 6.4 oz)   01/25/24 70.5 kg (155 lb 6.4 oz)   01/22/24 69.6 kg (153 lb 6.4 oz)        NUTRITION:  Diet:       Procedures    Regular/General diet Fluid Consistency: Nectar Thick / Mildly Thick Liquids; Texture Consistency: Ground / Minced & Moist; Is Patient on Accuchecks? Yes; Is Patient on Suicide Precautions? No      Food Allergies: No  Cultural/Ethnic/Voodoo Preferences Addressed: Yes    GI system review: WNL; Last BM: 3/1  Skin and wounds: full thickness wound to anus    NUTRITION RELATED PHYSICAL FINDINGS:     1. Body Fat/Muscle Mass:  no wasting noted per visual     2. Fluid Accumulation: upper extremity edema, lower extremity edema, and b/l hands      NUTRITION PRESCRIPTION: 67 kg  Calories: 8667-5847 calories/day (25-30 kcal/kg)  Protein:  grams protein/day (1.0-1.5 grams protein per kg)  Fluid: ~1 ml/kcal or per MD discretion    NUTRITION DIAGNOSIS/PROBLEM:  Inadequate oral intake related to inability to take or tolerate as evidenced by need for NPO status and TF to meet nutrition  needs.    MONITOR AND EVALUATE/NUTRITION GOALS:  Weight stable within 1 to 2 lbs during admission - Ongoing  Tolerate alternative nutrition at 100% of goal - Met, continues    MEDICATIONS:  decadron, SSI aspart, lansoprazole, abx, lasix    LABS:  , POC glucose x 24hrs: 138-249 mg/dl    Pt is at High nutrition risk    Monalisa Mijares RD, LDN  Clinical Dietitian  Spectra: [phone]

## 2024-03-01 NOTE — PROGRESS NOTES
[facility]  Palliative Care Follow Up    Nelly Potter Patient Status:  Inpatient    1935 MRN [id number]   Location [facility] 4SW-A Attending Geno Kaur MD   Hosp Day # 10 PCP Balta Wright MD   [id number]    Date of visit:  3/1/2024  Day 10 of hospitalization.        Summary:         Nelly Potter is a 88 year old female with PMH of COPD, Dementia/functional decline, Multiple Myeloma, bladder ca, CAD, PE/DVT, Afib on Xarelto, urinary retention in addition to the other conditions noted below, presented to ED on 2024 with CC of SOB, and found to be hypoxic. She was placed on Vapotherm in ED with diagnostic tests showing elevated troponin and BNP, RVP + RSV, CXR showing PNA. Pt was admitted to ICU for further evaluation and management of acute hypoxic resp failure 2/2 multifocal PNA + RSV and underlying COPD, shock presumed septic 2/2 PNA, elevated trop felt to be demand ischemia, h/o Multiple myeloma in remission, PE/DVT, duodenal ulcer, GERD, COPD, CAD, HTN, ?dementia.  Hospital course includes blood cx + MRSA, ID on consult.  Cardiology consultation with C discussion surrounding BRENNA is noted.  Today, Hgb 6.0 down from 7.5 yesterday (8.8 on ) - receiving transfusion w plan to recheck later today, if continues to downtrend, possible abd imaging.  She has been seen by SLP for initial evaluation with recommendation for NPO, and has been unable to be seen for f/u d/t lethargy and O2 needs. Dietary following.  Wound on consult.  SLP recommends mechanical soft ground/minced and moist, thin liquids, VFSS  with recs for mechanical soft ground/minced and nectar thick liquids  - Case d/w RN, doppler pending for RUE swelling to r/o DVT      Palliative care is following for support w Goals of Care.    Interval Events: Per chart review, agitated overnight requiring BL wrist restraints for attempts to remove DHT, pain meds administered for yelling out  - Family in process of mtg w  hospice agencies for information  - RUE doppler completed results pending    SUBJECTIVE  Review of Systems - Palliative Care Symptom Assessment     Nelly is moaning and appears extremely uncomfortable at time of my visit. When asked if she is having pain she says no, however, continues to moan out and say \"help me.\"  Daughter suspects possible pain to RLE, unclear exactly where. H/o arthritis and recent shingles infection w post-herpetic neuralgia for which she has been taking gabapentin    Just returned from [place] [facility]     Daughter states plan to meet w [facility] hospice at 1030 today.    OBJECTIVE     Hematology:   Lab Results   Component Value Date    WBC 6.3 02/29/2024    HGB 8.6 (L) 02/29/2024    HCT 27.2 (L) 02/29/2024    .0 02/29/2024       Chemistry:  Lab Results   Component Value Date    CREATSERUM 0.39 (L) 02/29/2024    BUN 22 02/29/2024     (L) 02/29/2024    K 3.6 02/29/2024    K 3.6 02/29/2024     02/29/2024    CO2 28.0 02/29/2024     (H) 02/29/2024    CA 7.8 (L) 02/29/2024    ALB 1.8 (L) 02/29/2024    ALKPHO 176 (H) 02/29/2024    BILT 0.5 02/29/2024    TP 5.0 (L) 02/29/2024    AST 15 02/29/2024    ALT 45 02/29/2024    MG 2.3 02/25/2024    PHOS 2.7 02/26/2024         Vital Signs:  Blood pressure 110/69, pulse 79, temperature 97.5 °F (36.4 °C), temperature source Axillary, resp. rate 22, height 5' 2\" (1.575 m), weight 162 lb 4.1 oz (73.6 kg), SpO2 90%.  Body mass index is 29.68 kg/m².    Physical Exam:     GENERAL: Agitated, restlessness, moaning, appears uncomfortable  HEENT: + DHT   CARDIAC:  HR 80s  RESPIRATORY: some increased effort at rest   NEUROLOGIC: Awake, speaking though difficult to understand  PSYCHIATRIC:  agitation, unable to determine source of unrest    Palliative Performance Scale: 30%   Palliative Performance Scale   % Ambulation Activity Level Self-Care Intake Consciousness   100 Full Normal Full   Normal Full   90 Full No disease  Normal Full Normal Full   80  Full Some disease  Normal w/effort Full Normal or  Reduced Full   70 Reduced Some disease  Can't perform job Full Normal or   Reduced Full   60 Reduced Significant disease  Can't perform hobby Occasional  Assist Normal or   Reduced Full or confused   50 Mainly sit/lie Extensive Disease  Can't do any work Partial Assist Normal or Reduced Full or confused   40 Mainly in bed Extensive Disease  Can't do any work Mainly Assist Normal or Reduced Full or confused   30 Bedbound Extensive Disease  Can't do any work Max Assist  Total Care Reduced Drowsy/confused   20 Bedbound Extensive Disease  Can't do any work Max Assist  Total Care Minimal Drowsy/confused   10 Bedbound/coma Extensive Disease  Can't do any work  coma  Max Assist  Total Care Mouth care Drowsy or coma   0 Death       Palliative Care Assessment:     Goals of Care Discussion:     3/1: Received call from daughter Verónica church concerns for agitation and suspected pain. Visit to room where Nelly is visibly uncomfortable. Verónica communicates c/f goal of comfort not being achieved. We discussed scheduling tylenol and continuing tramadol prn for presumed pain. Will d/w hospitalist further.     She plans to meet [facility] for information on hospice at 1030am today. Has not yet arranged appt to meet w [facility] per plan to hear from 2 agencies.    Emotional support provided.    Problem List:       Principal Problem:    Multifocal pneumonia  Active Problems:    Acute respiratory failure with hypoxia (HCC)    RSV infection    Hypokalemia    Elevated troponin    Lactic acidosis    Sepsis, due to unspecified organism, unspecified whether acute organ dysfunction present ([facility])    Palliative care encounter    Counseling regarding advance care planning and goals of care      Palliative Care Recommendations/Plan:           Symptoms:    Pain - presumed d/t h/o arthritis and h/o shingles infection w post-herpetic neuralgia  Schedule extra strength tylenol q6hr   Offer tramadol  50mg tab q6hr PRN - recommend alternating within 3 hours of tylenol admin  Continue gabapentin 100mg BID per home dose for postherpetic neuralgia    Goals of Care:    Plan to meet with [facility] agency at 1030 today.    [facility], family plans to resume if treatment remains the focus of care.      Code Status: DNAR/Selective Treatment.  Confirmed w POA/family 2/26. POLST on file.    HCPOA: Son, Murali Potter. Document on file.    Disposition:  Pending clinical course, GOC/hospice meetings.      A total of 50 minutes were spent on this visit, which included all of the following:  Chart review, direct face to face contact, history taking, physical examination, counseling and coordinating care, and documentation.        Reviewed the above with patient's RN, primary, GONSALO.    Thank you for inviting Palliative Care to participate in the care of Nelly Potter and family.       Will follow.      KAYLYN Moreno  Palliative Care    3/1/2024  9:21 AM    The 21st Century Cures Act makes medical notes like these available to patients in the interest of transparency. Please be advised this is a medical document. Medical documents are intended to carry relevant information, facts as evident, and the clinical opinion of the practitioner. The medical note is intended as a peer to peer communication, and may appear blunt or direct. It is written in medical language and may contain abbreviations or verbiage that are unfamiliar.    Addendum:  Called back by daughter and RN with daughter concerns for pain management plan. Concerns with mental status, inability to eat, and pain control were discussed extensively. Met with family w hospice present - questions and concerns addressed. Family requests imaging of RLE to explore what might be causing her RLE pain. Note hospitalist has ordered. Attempt to explore specifically what GOC are while hospice decisions are  pending. Will continue to alternate scheduled tylenol q6hr with prn tramadol q6hr.     An additional 50 minutes spent on this encounter for a total of 100 mins.  Communicated above w hospitalist and rn, unit manager.    Sandie PATIÑO

## 2024-03-01 NOTE — HOSPICE RN NOTE
[facility] consult order received. Hospice will follow up with this patient and family today.    Mojgan Kenney RN, [facility]  [facility] Liaison  [phone] after hours

## 2024-03-01 NOTE — HOSPICE RN NOTE
Met with son Murali and daughter Verónica and provided information on the hospice philosophy and benefit.  Levels of care, team approach and focus on comfort were reviewed.  Questions were answered and they expressed the desire for their mother to be comfortable and not in pain; they recognize that coming back and forth to the hospital must be hard on her.  Palliative APN Sandie participated with hospice for part of our meeting and together, pain management was addressed under both the current model of care and how hospice can support their mother for symptom management needs.  Currently, they are looking for further evaluation of R leg pain and determination of best pain management regimen.  They expressed appreciation for hospice information but they are not ready at present for hospice.  They are open to hospice TNL follow up tomorrow to provide support for any questions they may have.  POC reviewed with MADHU Rizo.

## 2024-03-01 NOTE — PLAN OF CARE
Alert to self. Room air. Dubhoff to R nare. TF overnight. Minimal oral intake, spits out meds at times. A fib controlled on tele. QID accuchecks - insulin coverage provided. Voids per purewick. Bed rest at this time. PRN pain med given for pt in distress, yelling out and per family request for comfort. Bed rest. IV abx. Midline. Unit draw. Updated family on POC, verbalized understanding, no questions or concerns at this time. Bed alarm on. Video monitoring on for safety. Bilateral soft wrist restraints applied due to patient attempting to remove dubhoff multiple times. Spoke to Murali FOX, regarding update in situation. All questions answered. Verbalized understanding.     Problem: Patient/Family Goals  Goal: Patient/Family Long Term Goal  Description: Patient's Long Term Goal: discharge back to [place]    Interventions:  - comply with POC  - See additional Care Plan goals for specific interventions  Outcome: Progressing  Goal: Patient/Family Short Term Goal  Description: Patient's Short Term Goal:   2/29 noc: maintain dubhoff    Interventions:   - soft restraints  - See additional Care Plan goals for specific interventions  Outcome: Progressing

## 2024-03-02 NOTE — SPIRITUAL CARE NOTE
Spiritual Care Visit Note    Patient Name: Nelly Potter Date of Spiritual Care Visit: 24   : 1935 Primary Dx: Multifocal pneumonia       Referred By: Referral From: Other (Comment)    Spiritual Care Taxonomy:                   Visit Type/Summary:     - Spiritual Care: Provided information regarding how to contact Spiritual Care and left a Spiritual Care information card.  remains available as needed for follow up.    Spiritual Care support can be requested via an Epic consult. For urgent/immediate needs, please contact the On Call  at: [place]: ext [phone]

## 2024-03-02 NOTE — PLAN OF CARE
Patient mostly lethargic today with periods of alertness. Patient is able to verbalize needs like pain/discomfort but is unable to specify, and also verbalizes thirst - PO NTL administered when patient awake & alert. DHT to R nare. Bilat wrist restraints in place, patient tends to pull at DHT. ML dressing c/d/I, flushes & draws back. K+ replaced per protocol. Patient incontinent of b/b - anal wound re-dressed whenever patient has BM. Purwick in place, not capturing all void amount but patient had wet brief when changed, abdomen is soft & nontender. Pain to RLE - imaging ordered. Palliative following, care planning in place. Scheduled tylenol, PRN tramadol. Patient generally edematous & somewhat weepy in extremities. Patient turned & repositioned. IV ABX. Patient afebrile, other VSS. Patient & family members at bedside updated on POC, rounded on routinely.     Problem: Patient/Family Goals  Goal: Patient/Family Long Term Goal  Description: Patient's Long Term Goal: discharge back to [place]    Interventions:  - comply with POC  - See additional Care Plan goals for specific interventions  Outcome: Progressing  Goal: Patient/Family Short Term Goal  Description: Patient's Short Term Goal:   2/29 noc: maintain dubhoff    Interventions:   - soft restraints  - See additional Care Plan goals for specific interventions  Outcome: Progressing

## 2024-03-02 NOTE — PLAN OF CARE
Pt alert to self, lethargic at times. Spo2 >90% on 2-3L. DHT to R nare at 65. NSR/afib on tele. Purewick in place. Pain meds scheduled and prn. Generalized edema. Q6 accucheck. TF through DHT. IV abx. R midline- unit draw. Clinitron bed. Bunny boots. Generalized scabs/ redness to bottom- mepilexs in place. Denies n/v/d. Pt family updated on poc. Call light within reach, safety precautions in place. No further pt needs at this time.     Problem: Patient/Family Goals  Goal: Patient/Family Long Term Goal  Description: Patient's Long Term Goal: discharge back to [place]    Interventions:  - comply with POC  - See additional Care Plan goals for specific interventions  Outcome: Progressing  Goal: Patient/Family Short Term Goal  Description: Patient's Short Term Goal:   2/29 noc: maintain dubhoff    Interventions:   - soft restraints  - See additional Care Plan goals for specific interventions  Outcome: Progressing

## 2024-03-02 NOTE — PROGRESS NOTES
[facility]   part of [facility] Infectious Disease  Progress Note    Nelly Potter Patient Status:  Inpatient    1935 MRN [id number]   Location [facility] 5NW-A Attending Williams Chambers, DO   Hosp Day # 11 PCP Balta Wright MD     Subjective:  Patient seen/examined.  Clinical course reviewed.  Patient being treated for MRSA sepsis with multifocal pneumonia and hypoxia which is slowly improving.    Objective:  Blood pressure 126/82, pulse 80, temperature 97.5 °F (36.4 °C), temperature source Axillary, resp. rate 22, height 5' 2\" (1.575 m), weight 162 lb 4.1 oz (73.6 kg), SpO2 100%.    Intake/Output:    Intake/Output Summary (Last 24 hours) at 3/2/2024 1224  Last data filed at 3/2/2024 1200  Gross per 24 hour   Intake 160 ml   Output 1000 ml   Net -840 ml       Physical Exam:  General: Awake, alert, non-tox, NAD.  HEENT:  Oropharynx clear, trachea ML.  Heart: RRR S1S2 no murmurs.  Lungs: Essentially CTA b/l, no rhonchi, rales, wheezes.  Abdomen: Soft, NT/ND.  BS present.  No organomegaly.  Extremity: No edema.  Neurological: No focal deficits.  Derm:  Warm, dry, free from rashes.    Lab Data Review:  Lab Results   Component Value Date    WBC 6.2 2024    HGB 8.6 2024    HCT 26.9 2024    .0 2024    CREATSERUM 0.43 2024    BUN 23 2024     2024    K 3.5 2024     2024    CO2 29.0 2024     2024    CA 7.8 2024    ALB 2.0 2024    ALKPHO 156 2024    BILT 0.3 2024    TP 5.1 2024    AST 8 2024    ALT 40 2024    CRP 0.73 2024    MG 2.8 2024        Cultures:  RVP + RSV 24    Blood cultures + MRSA 24, repeats negative    MRSA PCR+    Radiology:  Reviewed    Antibiotics Reviewed:  Vancomycin  Zosyn    Assessment and Plan:    MRSA sepsis in this patient presenting with multifocal pneumonia and acute hypoxic respiratory  failure  - RVP positive for RSV, secondary MRSA pneumonia suspected  - Repeat blood cultures negative  - TTE negative  - O2 needs weaning  - IV vancomycin ongoing    2.  Abnormal urine sediment/cystitis  - Historically with ESBL e.coli and pseudomonas  - IV zosyn given x 3 days, restarted 2/26/24 and completes today    3.  RLE pain and swelling  - Dopplers and XR unremarkable    4.  Disposition - inpatient.  Discontinue IV zosyn.  Continue IV vancomycin as Rx with 4 week course anticipated through 3/20/24.  Trending temps and WBCs.  Supportive care ongoing.  Will follow.    Sarah Allison DO, [facility] Infectious Disease  [phone]    3/2/2024  12:24 PM

## 2024-03-02 NOTE — PLAN OF CARE
Patient AAOx1. Scheduled tylenol, lidocaine patch to R leg. Tele afib/NSR. Purewick, IV lasix. TF overnight, minced and moist diet, nectar thick liquids. Q6 accucheck. 3L NC. Barrier cream to bottom. Bunny boots, clinitron bed. Patient rounded on routinely. Patient and family updated on plan of care.      Problem: Patient/Family Goals  Goal: Patient/Family Long Term Goal  Description: Patient's Long Term Goal: discharge back to [place]    Interventions:  - comply with POC  - See additional Care Plan goals for specific interventions  Outcome: Progressing  Goal: Patient/Family Short Term Goal  Description: Patient's Short Term Goal:   2/29 noc: maintain dubhoff    Interventions:   - soft restraints  - See additional Care Plan goals for specific interventions  Outcome: Progressing

## 2024-03-02 NOTE — PROGRESS NOTES
[facility] Hospitalist Progress Note     Nelly Potter Patient Status:  Inpatient    1935 MRN [id number]   Location [facility] 4SW-A Attending Castillo Mcgill MD   Hosp Day # 11 PCP Balta Wright MD     Chief Complaint:   Fu Sob, hypoxic    Subjective:     Patient seen and examined.  She states that her RLE pain is improved today.  Denies feeling SOB.  Son Murali is at bedside, states she is more awake and alert today.  No fevers.  RN reported 32 and then 25 beats of VT on tele.     Objective:    Review of Systems:   10 point ROS completed and was negative, except for pertinent positive and negatives stated in subjective.    Vital signs:  Temp:  [97.3 °F (36.3 °C)-98.1 °F (36.7 °C)] 97.5 °F (36.4 °C)  Pulse:  [73-85] 80  Resp:  [18-22] 22  BP: (109-131)/(60-91) 126/82  SpO2:  [94 %-100 %] 100 %    Physical Exam:    General: No acute distress.  Elderly and chronically ill appearing female.    HEENT:  EOMI, PERRLA, OP clear  Respiratory: Clear to auscultation bilaterally. No wheezes. No rhonchi.  Cardiovascular: S1, S2. Regular rate and rhythm. No murmurs.  Abdomen: Soft, nontender, nondistended.  Positive bowel sounds. No rebound or guarding.  Extremities: BUE/BLE edema. Anasarca.  RLE appears well perfused.  Bandage over R shin skin tear.    Neuro:  Grossly non focal, no motor deficits.        Diagnostic Data:    Labs:  Recent Labs   Lab 24  0415 24  0544 24  0435 24  0453 24  0703 24  0625 24  0626   WBC 9.7   < > 7.4 6.7 6.3 5.8 6.2   HGB 7.5*   < > 7.8* 7.7* 8.6* 8.7* 8.6*   MCV 87.3   < > 84.2 85.9 88.3 85.4 88.8   .0*   < > 106.0* 113.0* 151.0 155.0 178.0   BAND 7  --  7  --   --   --   --     < > = values in this interval not displayed.       Recent Labs   Lab 24  0703 24  0625 24  06   * 218* 181*   BUN 22 22 23   CREATSERUM 0.39* 0.41* 0.43*   CA 7.8* 8.0* 7.8*   ALB 1.8* 2.0* 2.0*    * 136 136   K 3.6  3.6 3.1* 3.5    100 100   CO2 28.0 28.0 29.0   ALKPHO 176* 171* 156*   AST 15 10* 8*   ALT 45 45 40   BILT 0.5 0.4 0.3   TP 5.0* 5.3* 5.1*       Estimated Creatinine Clearance: 72.9 mL/min (A) (based on SCr of 0.43 mg/dL (L)).    No results for input(s): \"PTP\", \"INR\" in the last 168 hours.         COVID-19 Lab Results    COVID-19  Lab Results   Component Value Date    COVID19 Not Detected 02/21/2024    COVID19 Not Detected 02/20/2024    COVID19 Not Detected 02/06/2024       Pro-Calcitonin  No results for input(s): \"PCT\" in the last 168 hours.      Cardiac  Recent Labs   Lab 02/26/24  0544   PBNP 647*       Creatinine Kinase  No results for input(s): \"CK\" in the last 168 hours.      Inflammatory Markers  Recent Labs   Lab 02/26/24  0544 03/02/24  0626   CRP  --  0.73*   KARUNA 271.9  --        Imaging: Imaging data reviewed in Epic.    Medications:    acetaminophen  500 mg Oral q6h    furosemide  40 mg Intravenous BID (Diuretic)    lidocaine-menthol  1 patch Transdermal Daily    gabapentin  100 mg Oral BID    vancomycin  1,250 mg Intravenous Q24H    rivaroxaban  20 mg Oral Daily with food    piperacillin-tazobactam  3.375 g Intravenous Q8H    QUEtiapine  12.5 mg Oral Nightly    dexamethasone  3.75 mg Per NG Tube Daily with breakfast    insulin aspart  2-10 Units Subcutaneous 4 times per day    dilTIAZem  60 mg Per NG Tube Q8H    midodrine  5 mg Oral TID    lansoprazole  30 mg Oral QAM AC       Assessment & Plan:     88 year old female with PMH significant for/anxiety, GERD, A-fib on Xarelto, HTN, DL, COPD, asthma, bladder CA, multiple myeloma, vertigo, dementia with short-term memory issues who presents from [place] with reports of shortness of breath and a wet cough.      Cough, sob - 2/2 RSV infection +/- multifocal PNA  Acute hypoxic resp failure 2/2 above  Asthma/COPD exacerbation   -RSV + in ED  -cxr reviewed >> multifocal pneumonia  -supplemental O2 via vapotherm  >>  weaned down to 40% >> cont to wean as able  -nebs prn  -cont iv abx - iv vanc for MRSA bacteremia, likely need 4 week course per ID, PICC placed   -repeat blood cx x 2 from 2/21 (NGTD)  -ST, PT, OT  - cont IV lasix BID for volume overload contributing to hypoxia - home dose 40 bid      Shock - presumably related to sepsis: 2/2 bacteremia, pneumonia  Lactic acidosis - resolved  -Was on norepi but currently off and BP stable  -stress steroids reduced to home dosing   -2/2 blood cx growing MRSA  -MRSA bacteremia >> ID consulted >> rec BRENNA   -cardiology consulted >> apprec recs >> family opting to not go for BRENNA  -TTE w.o obv vegetations  -surveillance cx NTD     AMS 2/2 ICU delirium most likely - resolving   -helpful to keep awake during day and asleep at  night  -minimal sedation during the day if possible  -pain control with tylenol  -seroquel at night     Dysphagia from above  - will dc TF's to encourage po intake  - maintain NGT for now until we're certain she has adequate po intake     BLE Edema  - likely from anasarca, b/l LE venous doppler neg for DVT     Anemia  -hgb dropped to 6.0   -prbc transfused  -maintain hgb >7  -stob ordered     Troponin elevated - likely demand ischemia  -downtrended  -cardiology reviewed     GERD  -PPI     HTN   -resume home meds if able  -monitor BP closely     DL  -statin     Afib on xarelto  -resumed xarelto and po diltiazem     Reported VT on tele  - continue to monitor   - follow lytes and mag    Depression, anxiety  -cont zoloft     Bladder ca  Multiple myeloma  Dementia  Vertigo  -stable chronic conditions      Dysphagia  Poor oral intake   - pt currently with dobhoff, cont TF's per dietary recs  - assess oral intake, speech re-eval  - I explained to pt's son Murali that her ECF will not accept dobhoff so the dobhoff would have to be removed or PEG would have to be considered, he does not think they want to pursue PEG.      RLE pain  - b/l LE venous doppler neg for DVT  - XR of R  hip, R knee, R tib/fib, and R ankle neg for fx or dislocation   - pt with skin tear on R shin  - check inflammatory markers   - RN to check R DP and PT pulses via doppler  - no indication to order further imaging     Quality:  DVT Prophylaxis: xarelto, scds  CODE status: DNR, select  Lentz: no  If COVID testing is negative, may discontinue isolation: yes      DISPO:  Transferred to floor     DNR/DNI - ok for selective treatment >> prior hospitalist Dr. Kaur confirmed this with son and daughter    Greater than 51 minutes spent on care of this complex pt, more than 50% face to face time.     Advanced Care Planning  While discussing goals of care with pt, Waqar voluntarily participated in an advanced care planning discussion.  Additionally, her son Murali participated in the conversation.  The following was discussed: POA, code status, and consideration of hospice.  Her son Murali confirms that he has her healthcare POA.  He confirms DNAR/select status, remains undecided about hospice vs pursuing a PEG.  He wants to see if her oral intake improves over the next 24-48 hours.  17 minutes were spent discussing advanced care planning.  This time was exclusive of the documented time for this visit.      Williams Conway Hospitalist  Pager [phone]  Answering Service number: [phone]

## 2024-03-03 NOTE — PROGRESS NOTES
[facility]   part of [facility] Infectious Disease  Progress Note    Nelly Potter Patient Status:  Inpatient    1935 MRN [id number]   Location [facility] 5NW-A Attending Williams Chambers, DO   Hosp Day # 12 PCP Balta Wright MD     Subjective:  Patient seen/examined.  Clinical course reviewed.  Did have some agitation overnight, pulled dobb-hamida.  O2 supplementation ongoing.    Objective:  Blood pressure 109/71, pulse 69, temperature 97.4 °F (36.3 °C), temperature source Axillary, resp. rate 16, height 5' 2\" (1.575 m), weight 158 lb 1.1 oz (71.7 kg), SpO2 98%.    Intake/Output:    Intake/Output Summary (Last 24 hours) at 3/3/2024 1142  Last data filed at 3/3/2024 0900  Gross per 24 hour   Intake --   Output 1900 ml   Net -1900 ml       Physical Exam:  General: Appears comfortable.  Soft restraints for safety.  HEENT:  Oropharynx clear, trachea ML.  Heart: RRR S1S2 no murmurs.  Lungs: Essentially CTA b/l, no rhonchi, rales, wheezes.  Abdomen: Soft, NT/ND.  BS present.  No organomegaly.  Extremity: No edema.  Neurological: No focal deficits.  Derm:  Warm, dry, free from rashes.    Lab Data Review:  Lab Results   Component Value Date    WBC 5.8 2024    HGB 8.2 2024    HCT 25.9 2024    .0 2024    CREATSERUM 0.36 2024    BUN 18 2024     2024    K 3.0 2024     2024    CO2 33.0 2024     2024    CA 7.7 2024        Cultures:  RVP + RSV 24     Blood cultures + MRSA 24, repeats negative     MRSA PCR+     Radiology:  Reviewed     Antibiotics Reviewed:  Vancomycin  Zosyn     Assessment and Plan:     MRSA sepsis in this patient presenting with multifocal pneumonia and acute hypoxic respiratory failure  - RVP positive for RSV, secondary MRSA pneumonia suspected  - Repeat blood cultures negative  - TTE negative  - O2 needs weaning  - IV vancomycin ongoing     2.   Abnormal urine sediment/cystitis  - Historically with ESBL e.coli and pseudomonas  - IV zosyn given x 3 days, restarted 2/26/24 and completes completed 3/2/24     3.  RLE pain and swelling  - Dopplers and XR unremarkable     4.  Disposition - inpatient.  Discontinue IV zosyn.  Continue IV vancomycin as Rx with 4 week course anticipated through 3/20/24.  Trending temps and WBCs.  Supportive care ongoing.  Will follow.    Sarah Allison [facility] Infectious Disease  [phone]    3/3/2024  11:42 AM

## 2024-03-03 NOTE — PLAN OF CARE
Patient AAOx1. 2L NC. Tele afib. Xarelto. Purewick/incontinent. Scheduled tylenol, lidocaine patch. PRN tramadol. Mepilex's generalized. Barrier cream to bottom. Max assist, clinitron bed. Right arm midline. Minced/moist diet, nectar thick liquids. Q6 accuchecks. Meds crushed/applesauce. Restraints continued due to agitation. Patient rounded on routinely. Patient and family updated on plan of care.    Restraints discontinued today, patient remains on video monitoring.       Problem: Patient/Family Goals  Goal: Patient/Family Long Term Goal  Description: Patient's Long Term Goal: discharge back to [place]    Interventions:  - comply with POC  - See additional Care Plan goals for specific interventions  Outcome: Progressing  Goal: Patient/Family Short Term Goal  Description: Patient's Short Term Goal:   2/29 noc: maintain dubhoff    Interventions:   - soft restraints  - See additional Care Plan goals for specific interventions  Outcome: Progressing

## 2024-03-03 NOTE — PLAN OF CARE
Pt alert to self, lethargic at times. Spo2 >90% on 2-3L. DHT to R nare at 65cm. Pt removed DHT- per MD ok to not replace over night. NSR/afib on tele, xarelto. Purewick in place. Pain meds scheduled and prn. Generalized edema. Q6 accucheck. TF through DHT. IV abx. R midline- unit draw. Clinitron bed. Bunny boots. Generalized scabs/ redness to bottom- mepilexs in place. Denies n/v/d. Pt family updated on poc. Call light within reach, safety precautions in place. No further pt needs at this time.       Problem: Patient/Family Goals  Goal: Patient/Family Long Term Goal  Description: Patient's Long Term Goal: discharge back to [place]    Interventions:  - comply with POC  - See additional Care Plan goals for specific interventions  Outcome: Progressing  Goal: Patient/Family Short Term Goal  Description: Patient's Short Term Goal:   2/29 noc: maintain dubhoff    Interventions:   - soft restraints  - See additional Care Plan goals for specific interventions  Outcome: Progressing

## 2024-03-03 NOTE — PROGRESS NOTES
[facility] Hospitalist Progress Note     Nelly Potter Patient Status:  Inpatient    1935 MRN [id number]   Location [facility] 4SW-A Attending Castillo Mcgill MD   Hosp Day # 12 PCP Balta Wright MD     Chief Complaint:   Fu Sob, hypoxic    Subjective:     Patient seen and examined.  Pt agitated overnight, pulled out her dobhoff tube.   Remains in restraints, eating well per her son and daughter at bedside.  On 2 L O2 sating 100%.  No fevers.     Objective:    Review of Systems:   10 point ROS completed and was negative, except for pertinent positive and negatives stated in subjective.    Vital signs:  Temp:  [97.4 °F (36.3 °C)-98.2 °F (36.8 °C)] 97.4 °F (36.3 °C)  Pulse:  [41-86] 69  Resp:  [16-20] 16  BP: (109-120)/(63-83) 109/71  SpO2:  [94 %-100 %] 98 %    Physical Exam:    General: No acute distress.  Elderly and chronically ill appearing female.   In restraints.  Oriented to person, not to place or time.   HEENT:  EOMI, PERRLA, OP clear  Respiratory: Clear to auscultation bilaterally. No wheezes. No rhonchi.  Cardiovascular: S1, S2. Regular rate and rhythm. No murmurs.  Abdomen: Soft, nontender, nondistended.  Positive bowel sounds. No rebound or guarding.  Extremities: BUE/BLE edema. Anasarca.  RLE appears well perfused.  Bandage over R shin skin tear.    Neuro:  Grossly non focal, no motor deficits.        Diagnostic Data:    Labs:  Recent Labs   Lab 24  0435 24  0453 24  0703 24  0625 24  0626 24  0530   WBC 7.4 6.7 6.3 5.8 6.2 5.8   HGB 7.8* 7.7* 8.6* 8.7* 8.6* 8.2*   MCV 84.2 85.9 88.3 85.4 88.8 87.2   .0* 113.0* 151.0 155.0 178.0 179.0   BAND 7  --   --   --   --   --        Recent Labs   Lab 24  0703 24  0625 24  0626   * 218* 181*   BUN 22 22 23   CREATSERUM 0.39* 0.41* 0.43*   CA 7.8* 8.0* 7.8*   ALB 1.8* 2.0* 2.0*   * 136 136   K 3.6  3.6 3.1* 3.5    100 100   CO2  28.0 28.0 29.0   ALKPHO 176* 171* 156*   AST 15 10* 8*   ALT 45 45 40   BILT 0.5 0.4 0.3   TP 5.0* 5.3* 5.1*       Estimated Creatinine Clearance: 72.9 mL/min (A) (based on SCr of 0.43 mg/dL (L)).    No results for input(s): \"PTP\", \"INR\" in the last 168 hours.         COVID-19 Lab Results    COVID-19  Lab Results   Component Value Date    COVID19 Not Detected 02/21/2024    COVID19 Not Detected 02/20/2024    COVID19 Not Detected 02/06/2024       Pro-Calcitonin  No results for input(s): \"PCT\" in the last 168 hours.      Cardiac  Recent Labs   Lab 02/26/24  0544   PBNP 647*       Creatinine Kinase  No results for input(s): \"CK\" in the last 168 hours.      Inflammatory Markers  Recent Labs   Lab 02/26/24  0544 03/02/24  0626   CRP  --  0.73*   KARUNA 271.9  --        Imaging: Imaging data reviewed in Epic.    Medications:    acetaminophen  500 mg Oral q6h    furosemide  40 mg Intravenous BID (Diuretic)    lidocaine-menthol  1 patch Transdermal Daily    gabapentin  100 mg Oral BID    vancomycin  1,250 mg Intravenous Q24H    rivaroxaban  20 mg Oral Daily with food    QUEtiapine  12.5 mg Oral Nightly    dexamethasone  3.75 mg Per NG Tube Daily with breakfast    insulin aspart  2-10 Units Subcutaneous 4 times per day    dilTIAZem  60 mg Per NG Tube Q8H    midodrine  5 mg Oral TID    lansoprazole  30 mg Oral QAM AC       Assessment & Plan:     88 year old female with PMH significant for/anxiety, GERD, A-fib on Xarelto, HTN, DL, COPD, asthma, bladder CA, multiple myeloma, vertigo, dementia with short-term memory issues who presents from [place] with reports of shortness of breath and a wet cough.      Cough, sob - 2/2 RSV infection +/- multifocal PNA  Acute hypoxic resp failure 2/2 above  Asthma/COPD exacerbation   -RSV + in ED  -cxr reviewed >> multifocal pneumonia  -supplemental O2 via vapotherm  >> weaned down to 40% >> cont to wean as able  -nebs prn  -cont iv abx - iv vanc for MRSA bacteremia, likely need 4 week  course per ID, RUE midline placed.  IV vanco until 3/20.  -repeat blood cx x 2 from 2/21 (NGTD)  -ST, PT, OT recs appreciated   - cont IV lasix BID for volume overload contributing to hypoxia - home dose 40 bid, change to po today.      Shock - presumably related to sepsis: 2/2 bacteremia, pneumonia  Lactic acidosis - resolved  -Was on norepi but currently off and BP stable  -stress steroids reduced to home dosing  - change midodrine to PRN for systolic < 100   -2/2 blood cx growing MRSA  -MRSA bacteremia >> ID consulted >> rec BRENNA   -cardiology consulted >> apprec recs >> family opting to not go for BRENNA  -TTE w.o obv vegetations  -surveillance cx NTD     AMS 2/2 ICU delirium most likely - resolving   -helpful to keep awake during day and asleep at  night  -minimal sedation during the day if possible  -pain control with tylenol  -seroquel at night  - remove restraints      Dysphagia from above  - will dc TF's to encourage po intake  - maintain NGT for now until we're certain she has adequate po intake     BLE Edema  - likely from anasarca, b/l LE venous doppler neg for DVT     Anemia  -hgb dropped to 6.0   -prbc transfused  -maintain hgb >7  -stob ordered  - hgb now stable      Troponin elevated - likely demand ischemia  -downtrended  -cardiology reviewed     GERD  -PPI     HTN   -resume home meds if able  -monitor BP closely     DL  -statin     Afib on xarelto  -resumed xarelto and po diltiazem (changed diltiazem back to 180 mg ER daily dose)     Reported VT on tele  - continue to monitor   - follow lytes and mag    Depression, anxiety  -cont zoloft     Bladder ca  Multiple myeloma  Dementia  Vertigo  -stable chronic conditions      Dysphagia  Poor oral intake   - pt currently with dobhoff, cont TF's per dietary recs  - assess oral intake, speech re-eval  - I explained to pt's son Murali that her ECF will not accept dobhoff so the dobhoff would have to be removed or PEG would have to be considered, he does not think  they want to pursue PEG.   Pt removed PEG overnight 3/2-3/3.  Monitor oral intake.     RLE pain  - b/l LE venous doppler neg for DVT  - XR of R hip, R knee, R tib/fib, and R ankle neg for fx or dislocation   - pt with skin tear on R shin  - check inflammatory markers   - RN to check R DP and PT pulses via doppler  - no indication to order further imaging     Quality:  DVT Prophylaxis: xarelleidy [place]  CODE status: DNR, select  Lentz: no  If COVID testing is negative, may discontinue isolation: yes      DISPO:  Inpt care.  Pt's son Murali and daughter Verónica are still considering hospice, awaiting re-eval from hospice team.      DNR/DNI - ok for selective treatment >> prior hospitalist Dr. Kaur confirmed this with son and daughter   Williams Chambers DO   [facility] Hospitalist  Pager [phone]  Answering Service number: [phone]

## 2024-03-04 NOTE — CM/SW NOTE
GONSALO spoke with GONSALO and floor RN.  Patient and family are still pursuing treatment at this time.  Hospice will continue to follow until discharge unless notified to sign off.    Leidy Adorno LCSW  [facility]  [phone]

## 2024-03-04 NOTE — CM/SW NOTE
Informed by [facility] ID Dr. Hobbs that patient will be discharged with 4 weeks of IV abx (Vanco). Requested IV abx rx to send to the [facility]. Midline already placed.    Sent message to the [facility] to provide update. Also sent clinical updates on aidin.    GONSALO/CM to remain available for support and/or discharge planning.    Ofelia Bill, JAKOB  Discharge Planner  [phone]

## 2024-03-04 NOTE — PROGRESS NOTES
[facility]  Palliative Care Follow Up    Nelly Potter Patient Status:  Inpatient    1935 MRN [id number]   Location [facility] 4SW-A Attending Geno Kaur MD   Hosp Day # 13 PCP Balta Wright MD   [id number]    Date of visit:  3/4/2024  Day 13 of hospitalization.        Summary:         Nelly Potter is a 88 year old female with PMH of COPD, Dementia/functional decline, Multiple Myeloma, bladder ca, CAD, PE/DVT, Afib on Xarelto, urinary retention in addition to the other conditions noted below, presented to ED on 2024 with CC of SOB, and found to be hypoxic. She was placed on Vapotherm in ED with diagnostic tests showing elevated troponin and BNP, RVP + RSV, CXR showing PNA. Pt was admitted to ICU for further evaluation and management of acute hypoxic resp failure 2/2 multifocal PNA + RSV and underlying COPD, shock presumed septic 2/2 PNA, elevated trop felt to be demand ischemia, h/o Multiple myeloma in remission, PE/DVT, duodenal ulcer, GERD, COPD, CAD, HTN, ?dementia.  Hospital course includes blood cx + MRSA, ID on consult.  Cardiology consultation with C discussion surrounding BRENNA is noted.  Today, Hgb 6.0 down from 7.5 yesterday (8.8 on ) - receiving transfusion w plan to recheck later today, if continues to downtrend, possible abd imaging.  She has been seen by SLP for initial evaluation with recommendation for NPO, and has been unable to be seen for f/u d/t lethargy and O2 needs. Dietary following.  Wound on consult.  SLP recommends mechanical soft ground/minced and moist, thin liquids, VFSS  with recs for mechanical soft ground/minced and nectar thick liquids  - Case d/w RN, doppler pending for RUE swelling to r/o DVT - neg      Palliative care is following for support w Goals of Care.    Interval Events: Family met w [facility] to gather info about hospice,  Note DHT was dislodged over the weekend.  Contacted by RN to inform of family request  for lorazepam for agitation - would recommend alternative to benzo d/t c/f paradoxical effects, especially if treatment is focus of care.    SUBJECTIVE  Review of Systems - Palliative Care Symptom Assessment     Nelly was asleep during my visit, and family requested to discuss care outside of room. Nelly did not appear to have nonverbal signs of distress or discomfort.    OBJECTIVE     Hematology:   Lab Results   Component Value Date    WBC 5.3 03/04/2024    HGB 8.1 (L) 03/04/2024    HCT 24.8 (L) 03/04/2024    .0 03/04/2024       Chemistry:  Lab Results   Component Value Date    CREATSERUM 0.34 (L) 03/04/2024    BUN 18 03/04/2024     03/04/2024    K 3.2 (L) 03/04/2024     03/04/2024    CO2 32.0 03/04/2024     (H) 03/04/2024    CA 8.2 (L) 03/04/2024    ALB 2.0 (L) 03/02/2024    ALKPHO 156 (H) 03/02/2024    BILT 0.3 03/02/2024    TP 5.1 (L) 03/02/2024    AST 8 (L) 03/02/2024    ALT 40 03/02/2024    MG 2.8 (H) 03/02/2024    PHOS 2.7 02/26/2024         Vital Signs:  Blood pressure 131/72, pulse 82, temperature 97 °F (36.1 °C), temperature source Axillary, resp. rate 17, height 5' 2\" (1.575 m), weight 158 lb 1.1 oz (71.7 kg), SpO2 100%.  Body mass index is 28.91 kg/m².    Physical Exam:     GENERAL: Asleep in bed  CARDIAC:  HR 80s  RESPIRATORY: no increased effort at rest  NEUROLOGIC: deferred  PSYCHIATRIC:  restlessness noted by family earlier, though asleep during my visit    Palliative Performance Scale: 30%   Palliative Performance Scale   % Ambulation Activity Level Self-Care Intake Consciousness   100 Full Normal Full   Normal Full   90 Full No disease  Normal Full Normal Full   80 Full Some disease  Normal w/effort Full Normal or  Reduced Full   70 Reduced Some disease  Can't perform job Full Normal or   Reduced Full   60 Reduced Significant disease  Can't perform hobby Occasional  Assist Normal or   Reduced Full or confused   50 Mainly sit/lie Extensive Disease  Can't do any work  Partial Assist Normal or Reduced Full or confused   40 Mainly in bed Extensive Disease  Can't do any work Mainly Assist Normal or Reduced Full or confused   30 Bedbound Extensive Disease  Can't do any work Max Assist  Total Care Reduced Drowsy/confused   20 Bedbound Extensive Disease  Can't do any work Max Assist  Total Care Minimal Drowsy/confused   10 Bedbound/coma Extensive Disease  Can't do any work  coma  Max Assist  Total Care Mouth care Drowsy or coma   0 Death       Palliative Care Assessment:     Goals of Care Discussion:     Called to bedside at family request and discussed GOC outside of room w VerónicaAkua. Family's concerns relate to struggle with deciding between hospice agencies and locations for possible need for inpatient level of care.     Provided family opportunity to share concerns and thoughts and fears, but ultimately encouraged further discussion and decision-making process to take place with hospice agencies directly.    Family plans to revisit with [facility] this afternoon, and may consider revisiting with [facility] as well.      Emotional support provided.    GOC are established to pursue comfort care with hospice when agency is selected. Will sign off.      Problem List:       Principal Problem:    Multifocal pneumonia  Active Problems:    Acute respiratory failure with hypoxia (HCC)    RSV infection    Hypokalemia    Elevated troponin    Lactic acidosis    Sepsis, due to unspecified organism, unspecified whether acute organ dysfunction present (HCC)    Palliative care encounter    Counseling regarding advance care planning and goals of care      Palliative Care Recommendations/Plan:         Goals of Care:  Hospice.  Family voices plan to pursue hospice care - to decide on agency.    Code Status: DNAR/Selective Treatment.  Confirmed w POA/family 2/26. POLST on file.    HCPOA: Son, Murali Potter. Document on file.    Disposition:  Pending decision on hospice.      A total of 50  minutes were spent on this visit, which included all of the following:  Chart review, direct face to face contact, history taking, physical examination, counseling and coordinating care, and documentation.        Reviewed the above with patient's RN, [facility].    Thank you for inviting Palliative Care to participate in the care of Nelly Potter and family.       Will sign off as [facility] are to pursue hospice      KAYLYN Moreno  Palliative Care    3/4/2024  1:42 PM    The 21st Century Cures Act makes medical notes like these available to patients in the interest of transparency. Please be advised this is a medical document. Medical documents are intended to carry relevant information, facts as evident, and the clinical opinion of the practitioner. The medical note is intended as a peer to peer communication, and may appear blunt or direct. It is written in medical language and may contain abbreviations or verbiage that are unfamiliar.

## 2024-03-04 NOTE — CDS QUERY
CLINICAL DOCUMENTATION CLARIFICATION FORM  Dear  ,  Clinical information (provided below) includes documentation of a pressure ulcer by Wound Care Consultant . Please clarify.  PLEASE CHECK THE DIAGNOSIS THAT APPLIES  SITE  : Sacrum  Type of Ulcer-    [  ] Pressure ulcer, stage___4__  [  ] Other (please specify): __________________________________________________________      Documentation from the Medical Record:   Risk; incontinent    Clinical indicators; Paulette rectal wound noted on admission avatar and 2/23 Wound care consult note.location anus described as incontinence skin breakdown    2/29 Wound Care consult note ; Pressure injury sacrum. First assessed 2/24/24  9.5cmx5.5cm  surface area 52.25 cm2    Treatment; Cleanse with normal saline or wound cleanser  apply generous layer of coloplast triad paste - reapply with each brief change. Off loading device: Pressure-relief seat cushion Turn Schedules: Heels elevated using pillows, heel wedge or heel boots to float heels off the bed To prevent sliding: decrease head of bed and elevate foot of bed as medical condition tolerates.Prompt incontinent care. Moisture barrier for incontinence   .        For questions regarding this query, please contact Clinical : Dieudonne Adorno RN, BSN ext. [phone] or [email or web address]  THIS FORM IS A PERMANENT PART OF THE MEDICAL RECORD

## 2024-03-04 NOTE — PLAN OF CARE
Pt is aox1. VSS, afebrile. On 2L O2. RA baseline. Tele NSR/ afib. Daily weight, strict I/O's. Minced moist diet with nectar thick liquids. Tolerating well. Pt was anxious this AM, more confused then her baseline per family. Did not require any interventions with medications. Pt is resting now, as she did not sleep much lastnight due to multiple formed bowel movements. Purewick in place. Barrier cream applied to bottom with changing her. Up max. On clinitron bed. Resting in bed with family at bedside. Family still having discussions with palliative care and hospice care, deciding what to choose. Does not appear SOB or in distress. Is not having any n/v/d. C/o generalized pain. Lidocaine patch on left shin. Call light in place. Will continue to monitor.       Addendum 1800: paged MD several times for increased anxiety and elevated heart rate from 130's-150's. Order for 0.5 po xanax once.     Addendum 1810: order for 5mg IV metoprolol once    Addendum 1840: gave IV metoprolol, HR 70's sustaining. Pt desaturated to 85%, increased O2 to 3L. Spoke with daughter and son at bedside in length regarding GOC and plans for hospice care. Pt was not responsive to oral anti-anxiety therapies. Pt daughter stated she would speak with both her brothers regarding hospice decision.     1843: pt dtr spoke with both brothers and they would like to pursue hospice at this time. Will contact residential hosp[facility].       Addendum: plan to meet with hospice tomorrow 10:30AM to sign consents. Received verbal order to place comfort care order set at this time from hospitalist Dr. Lema. Pt son and daughter will be back in AM around 9 and would like to sign hospice consents at that time. Hospice updated. Family updated on poc, all in agreement.       Problem: Patient/Family Goals  Goal: Patient/Family Short Term Goal  Description: Patient's Short Term Goal:   2/29 noc: maintain dubhoff    Interventions:   - soft restraints  - See additional Care  Plan goals for specific interventions  Outcome: Progressing

## 2024-03-04 NOTE — PLAN OF CARE
Pt is A&Ox1. VSS, afebrile. SPO2 maintained on 1L NC, RA-BL. Encouraged IS. Tele, NSR/a-fib. EP. DW. Strict I&Os. Last BM 3/3. General/nectar thick diet. Meds crushed in a/s. Incontinent, utilizing purewick. Up max, clinitron bed. Denies pain. Midline- RA precautions. Wounds c/d/i. No further needs at this time, continue POC. Safety precautions in place.     Problem: Patient/Family Goals  Goal: Patient/Family Long Term Goal  Description: Patient's Long Term Goal: discharge back to [place]    Interventions:  - comply with POC  - See additional Care Plan goals for specific interventions  Outcome: Progressing  Goal: Patient/Family Short Term Goal  Description: Patient's Short Term Goal:   2/29 noc: maintain dubhoff    Interventions:   - soft restraints  - See additional Care Plan goals for specific interventions  Outcome: Progressing

## 2024-03-04 NOTE — PROGRESS NOTES
Infectious Disease Progress Note      Date of admission: 2/20/2024 11:59 AM     Reason for consult: MRSA bacteremia, pneumonia    Subjective: Patient is more confused today as per the family.  Does not appear to be in pain or respiratory distress.    Interval events: This is an 88-year-old female patient, who is currently bedridden, who presents here with MRSA bacteremia in the setting of multifocal pneumonia after having an RSV infection.  TTE negative  Repeat blood cultures with no growth to date  Currently on IV vancomycin    Medications:    furosemide    dilTIAZem ER    midodrine    guaiFENesin    acetaminophen    traMADol    ipratropium-albuterol    lidocaine-menthol    gabapentin    vancomycin    rivaroxaban    QUEtiapine    dexamethasone    ibuprofen    glucose **OR** glucose **OR** glucose-vitamin C **OR** dextrose **OR** glucose **OR** glucose **OR** glucose-vitamin C    docusate    lansoprazole    acetaminophen **OR** [DISCONTINUED] HYDROcodone-acetaminophen **OR** [DISCONTINUED] HYDROcodone-acetaminophen    benzonatate    ondansetron    polyethylene glycol (PEG 3350)    sennosides    bisacodyl    fleet enema     Allergies:  Allergies   Allergen Reactions    Myrbetriq [Mirabegron] NAUSEA ONLY and DIZZINESS    Codeine NAUSEA ONLY    Evista [Raloxifene Hydrochloride] RASH    Meclizine NAUSEA ONLY    Sulfamethoxazole-Trimethoprim DIZZINESS       Physical Exam:  Vitals:    03/04/24 0845   BP: 131/72   Pulse: 94   Resp: 17   Temp:      Vitals signs and nursing note reviewed.   Constitutional:       Appearance: Normal appearance.   HENT:      Head: Normocephalic and atraumatic.      Mouth: Mucous membranes are moist.   Neck:      Musculoskeletal: Neck supple.   Cardiovascular:      Rate and Rhythm: Normal rate.      Heart sounds: Normal heart sounds. No murmur. No friction rub. No gallop.    Pulmonary:      Effort: Pulmonary effort is normal. No respiratory distress.      Breath sounds: Normal breath sounds.  No stridor. No wheezing, rhonchi or rales.   Chest:      Chest wall: No tenderness.   Abdominal:      General: Abdomen is flat. There is no distension.      Palpations: Abdomen is soft. There is no mass.      Tenderness: There is no tenderness. There is no guarding or rebound.      Hernia: No hernia is present.   Musculoskeletal:      Right lower leg: No edema.      Left lower leg: No edema.      No bilateral hip or knee pain at the site of her prosthetic joints  Skin:     General: Skin is warm and dry.   Neurological:      General: Confused    Laboratory data:  I have reviewed all the lab results independently.  Lab Results   Component Value Date    WBC 5.3 03/04/2024    HGB 8.1 03/04/2024    HCT 24.8 03/04/2024    .0 03/04/2024    CREATSERUM 0.34 03/04/2024    BUN 18 03/04/2024     03/04/2024    K 3.2 03/04/2024     03/04/2024    CO2 32.0 03/04/2024     03/04/2024    CA 8.2 03/04/2024      Recent Labs   Lab 03/04/24  0659   RBC 2.85*   HGB 8.1*   HCT 24.8*   MCV 87.0   MCH 28.4   MCHC 32.7   RDW 19.9   NEPRELIM 4.46   WBC 5.3   .0   NEUT 87   LYMPH 6   MON 0   EOS 0   NRBC 2*      Microbiology data:  Hospital Encounter on 02/20/24   1. Blood Culture     Status: None    Collection Time: 02/21/24  8:29 AM    Specimen: Blood,peripheral   Result Value Ref Range    Blood Culture Result No Growth 5 Days N/A      Impression:  Nelly Potter is a 88 year old female with    MRSA bacteremia/sepsis in the setting of multifocal pneumonia  This is after having an RSV infection  Cleared her bacteremia on 2/21  TTE without endocarditis  Currently on IV vancomycin  History of UTI  In the past grew ESBL E. coli and Pseudomonas  Completed Zosyn on 3/2/2024    Recommendations:    Continue IV vancomycin to finish a total of 4 weeks of therapy through 3/20/2024  Management of delirium as per the primary team  Okay to discharge from ID perspective once okay with the other services  Weekly CBC with  differential and CMP and vancomycin trough.  Fax results to [facility]. Fax: [phone]. Tel: [phone].  PICC line care as per protocol.  Follow-up with infectious disease 1 week after discharge    The plan of care was discussed with the primary hospital team, Skyler Lema*     Recommendations were also discussed with the patient; all questions were answered.     Thank you for this consultation. Please don't hesitate to call the ID team for questions or any acute changes in patient's clinical condition.    Please note that this report has been produced using speech recognition software and may contain errors related to that system including, but not limited to, errors in grammar, punctuation, and spelling, as well as words and phrases that possibly may have been recognized inappropriately.  If there are any questions or concerns, contact the dictating provider for clarification.    The  Century Cures Act makes medical notes like these available to patients in the interest of transparency. Please be advised this is a medical document. Medical documents are intended to carry relevant information, facts as evident, and the clinical opinion of the practitioner. The medical note is intended as peer to peer communication and may appear blunt or direct. It is written in medical language and may contain abbreviations or verbiage that are unfamiliar.     MD [facility]. Tel: [phone]. Fax: [phone].     Nelly GUAMAN Annacarlatanjajaxson : 1935 MRN: [id number] [facility]: [id number]

## 2024-03-04 NOTE — PROGRESS NOTES
[facility] Hospitalist Progress Note     Nelly Pottre Patient Status:  Inpatient    1935 MRN [id number]   Location [place] 4SW-A Attending Castillo Mcgill MD   Hosp Day # 13 PCP Balta Wright MD     Chief Complaint:   Fu Sob, hypoxic    Subjective:     Patient seen and examined.  Poor sleep overnight.  Sleeping now.  Son/daughter feel like she's slowly improving.   On 1L O2.   No fevers.  NAD.     Objective:    Review of Systems:   10 point ROS completed and was negative, except for pertinent positive and negatives stated in subjective.    Vital signs:  Temp:  [97 °F (36.1 °C)-98.1 °F (36.7 °C)] 97 °F (36.1 °C)  Pulse:  [82-94] 82  Resp:  [16-17] 17  BP: (121-131)/(72-94) 131/72  SpO2:  [90 %-100 %] 100 %    Physical Exam:    General: No acute distress.  Elderly and chronically ill appearing female.   In restraints.  Oriented to person, not to place or time.   HEENT:  EOMI, PERRLA, OP clear  Respiratory: Clear to auscultation bilaterally. No wheezes. No rhonchi.  Cardiovascular: S1, S2. Regular rate and rhythm. No murmurs.  Abdomen: Soft, nontender, nondistended.  Positive bowel sounds. No rebound or guarding.  Extremities: BUE/BLE edema. Anasarca.  RLE appears well perfused.  Bandage over R shin skin tear.    Neuro:  Grossly non focal, no motor deficits.        Diagnostic Data:    Labs:  Recent Labs   Lab 24  0435 24  0453 24  0703 24  0625 24  0626 24  0530 24  0659   WBC 7.4   < > 6.3 5.8 6.2 5.8 5.3   HGB 7.8*   < > 8.6* 8.7* 8.6* 8.2* 8.1*   MCV 84.2   < > 88.3 85.4 88.8 87.2 87.0   .0*   < > 151.0 155.0 178.0 179.0 195.0   BAND 7  --   --   --   --   --  6    < > = values in this interval not displayed.       Recent Labs   Lab 24  0703 24  0625 24  0626 24  0536 24  0659   * 218* 181* 113* 134*   BUN 22 22 23 18 18   CREATSERUM 0.39* 0.41* 0.43* 0.36* 0.34*   CA 7.8*  8.0* 7.8* 7.7* 8.2*   ALB 1.8* 2.0* 2.0*  --   --    * 136 136 135* 140   K 3.6  3.6 3.1* 3.5 3.0* 3.2*    100 100 100 101   CO2 28.0 28.0 29.0 33.0* 32.0   ALKPHO 176* 171* 156*  --   --    AST 15 10* 8*  --   --    ALT 45 45 40  --   --    BILT 0.5 0.4 0.3  --   --    TP 5.0* 5.3* 5.1*  --   --        Estimated Creatinine Clearance: 92.2 mL/min (A) (based on SCr of 0.34 mg/dL (L)).    No results for input(s): \"PTP\", \"INR\" in the last 168 hours.         COVID-19 Lab Results    COVID-19  Lab Results   Component Value Date    COVID19 Not Detected 02/21/2024    COVID19 Not Detected 02/20/2024    COVID19 Not Detected 02/06/2024       Pro-Calcitonin  No results for input(s): \"PCT\" in the last 168 hours.      Cardiac  No results for input(s): \"TROP\", \"PBNP\" in the last 168 hours.      Creatinine Kinase  No results for input(s): \"CK\" in the last 168 hours.      Inflammatory Markers  Recent Labs   Lab 03/02/24  0626   CRP 0.73*       Imaging: Imaging data reviewed in Epic.    Medications:    furosemide  40 mg Oral BID (Diuretic)    dilTIAZem ER  240 mg Oral Daily    acetaminophen  500 mg Oral q6h    lidocaine-menthol  1 patch Transdermal Daily    gabapentin  100 mg Oral BID    vancomycin  1,250 mg Intravenous Q24H    rivaroxaban  20 mg Oral Daily with food    QUEtiapine  12.5 mg Oral Nightly    dexamethasone  3.75 mg Per NG Tube Daily with breakfast    lansoprazole  30 mg Oral QAM AC       Assessment & Plan:     88 year old female with PMH significant for/anxiety, GERD, A-fib on Xarelto, HTN, DL, COPD, asthma, bladder CA, multiple myeloma, vertigo, dementia with short-term memory issues who presents from [email or web address] with reports of shortness of breath and a wet cough.      Cough, sob - 2/2 RSV infection +/- multifocal PNA  Acute hypoxic resp failure 2/2 above  Asthma/COPD exacerbation   -RSV + in ED  -cxr reviewed >> multifocal pneumonia  -supplemental O2 via vapotherm  >> weaned down to 40% >> cont to  wean as able  -nebs prn  -cont iv abx - iv vanc for MRSA bacteremia, likely need 4 week course per ID, RUE midline placed.  IV vanco until 3/20. Dc orders placed by ID  -repeat blood cx x 2 from 2/21 (TD)  -ST, PT, OT recs appreciated   - completed course of IV lasix BID with significant improvement of anasarca, now switched over to po lasix 3/3      Shock - presumably related to sepsis: 2/2 bacteremia, pneumonia  Lactic acidosis - resolved  -Was on norepi but currently off and BP stable  -stress steroids reduced to home dosing (on chronic decadron, tapering as OP every 6w per daughter)   - change midodrine to PRN for systolic < 100   -2/2 blood cx growing MRSA  -MRSA bacteremia >> ID consulted >> rec BRENNA   -cardiology consulted >> apprec recs >> family opting to not go for BRENNA  -TTE w.o obv vegetations  -surveillance cx NTD     AMS 2/2 ICU delirium most likely - resolving   -helpful to keep awake during day and asleep at  night  -minimal sedation during the day if possible  -pain control with tylenol  -seroquel at night  - remove restraints       BLE Edema  - likely from anasarca, b/l LE venous doppler neg for DVT     Anemia  -hgb dropped to 6.0   -prbc transfused  -maintain hgb >7  -stob ordered  - hgb now stable      Troponin elevated - likely demand ischemia  -downtrended  -cardiology reviewed     GERD  -PPI     HTN   -resume home meds if able  -monitor BP closely     DL  -statin     Afib on xarelto  -resumed xarelto and po diltiazem (changed diltiazem back to 180 mg ER daily dose)     Reported VT on tele  - continue to monitor   - follow lytes and mag    Depression, anxiety  -cont zoloft     Bladder ca  Multiple myeloma  Dementia  Vertigo  -stable chronic conditions - follows onc as OP       Dysphagia  Poor oral intake   - pt currently with dobhoff, cont TF's per dietary recs  - assess oral intake, speech re-eval  - Prior hospitalist explained to pt's son Murali that her ECF will not accept dobhoff so the  dobhoff would have to be removed or PEG would have to be considered, he does not think they want to pursue PEG.   Pt removed PEG overnight 3/2-3/3.  Monitor oral intake.     RLE pain  - b/l LE venous doppler neg for DVT  - XR of R hip, R knee, R tib/fib, and R ankle neg for fx or dislocation   - pt with skin tear on R shin  - RN to check R DP and PT pulses via doppler  - no indication to order further imaging     Quality:  DVT Prophylaxis: xarelshannon scds  CODE status: DNR, select  Lentz: no  If COVID testing is negative, may discontinue isolation: yes      DISPO:  Inpt care.  Pt's son Murali and daughter Verónica are still considering hospice, awaiting re-eval from hospice team.      DNR/DNI - ok for selective treatment >> prior hospitalist Dr. Kaur confirmed this with son and daughter    Jayson Nichole Hospitalist  Pager [phone]  Answering Service number: [phone]

## 2024-03-04 NOTE — SLP NOTE
SPEECH DAILY NOTE - INPATIENT    ASSESSMENT & PLAN   ASSESSMENT  Pt seen for dysphagia tx to assess tolerance with recommended diet, ensure proper utilization of aspiration precautions and provide pt/family education.  Patient received alert in bed with son present at bedside. Results and recommendations of VFSS were reviewed. Patient and family reported good PO intake since VFSS without overt s/s of aspiration. Patient somewhat more alert when compared to prior SLP visit. Mastication mildly improved, with more stable respiratory rate. No overt s/s of aspiration observed during my visit. Recommend advancing to a soft & bite-sized diet and continuing mildly thick liquids at this time. Continue to alternate solids as liquids to help clear pharyngeal residue. SLP will continue to follow per POC.     Diet Recommendations - Solids: Mechanical soft chopped/ Soft & Bite Sized  Diet Recommendations - Liquids: Nectar thick liquids/ Mildly thick    Compensatory Strategies Recommended: Alternate consistencies;Small bites and sips  Aspiration Precautions: Upright position  Medication Administration Recommendations: One pill at a time;Whole in puree    Patient Experiencing Pain: No                Treatment Plan  Treatment Plan/Recommendations: Aspiration precautions    Interdisciplinary Communication: NA            GOALS  Goal #1 Slp to re-assess as medically appropriate.   Met   Goal #2 The patient will tolerate minced & moist consistency and thin liquids without overt signs or symptoms of aspiration with 95 % accuracy over 1-2 session(s).     Revised   Goal #3 The patient/family/caregiver will demonstrate understanding and implementation of aspiration precautions and swallow strategies independently over 1-2 session(s).  In Progress   Goal #4  VFSS  Met   Goal #5  The patient will tolerate soft & bite-sized consistency and mildly thick liquids without overt signs or symptoms of aspiration with 95 % accuracy over 1-2  session(s).  In Progress   Goal #6       Goal #7       Goal #8            FOLLOW UP  Follow Up Needed (Documentation Required): Yes  SLP Follow-up Date: 03/07/24       Session: 1 s/p VFSS    If you have any questions, please contact JUANA Caceres

## 2024-03-04 NOTE — CM/SW NOTE
SW met with the patient's two children in the family room to answer additional questions regarding hospice.  Hospice RN came up as well to evaluate the patient.  Family still deciding between [facility] and [facility] for Hospice.    Leidy Adorno, \[facility]\""ARTIE  [facility]  [phone]

## 2024-03-04 NOTE — HOSPICE RN NOTE
Addendum: Received call from [facility] stating additional anxiolytic did not help pt's symptoms. Family now interested in hospice admission. Plan to start comfort measures tonight and likely admit to [facility] hospice tomorrow at 9am with consent signing.      [facility] follow up:     Met with Pt, daughter, and son at bedside. Discussed inpatient hospice again. Pt may be eligible today for [facility] hospice for mild dyspnea (RR=24) and agitation/anxiety (pt feels like she is falling, appears nervous, requiring frequent reassurance, having hallucinations per daughter). Family's barrier is the hospital environment for inpatient hospice. Family still talking with [facility] re: their inpatient unit. Either way, pt would likely still be able to transfer back to the [facility] on routine LOC hospice. Reached out to Dr. eLma for anxiolytic. MD to order. RH to continue to follow and daily eval for [facility]. Plan to re-meet tomorrow at 10:30am. Please call with any questions/concerns or changes.     Shanti Valentin RN, BSN  [facility]  Transitional Nurse Liaison  [phone] or After hours: [phone]

## 2024-03-05 PROBLEM — J96.91 RESPIRATORY FAILURE WITH HYPOXIA (HCC): Status: ACTIVE | Noted: 2024-01-01

## 2024-03-05 NOTE — HOSPICE RN NOTE
[facility] RN admitted the patient to general inpatient hospice per Dr. Lema and Dr. Peters. Patient is eligible for general inpatient hospice care for symptom management of dyspnea, pain, anxiety requiring frequent nursing assessments and interventions including titration of IV medications.    Chart flipped.    Comfort order set placed.     Regular diet with pleasure feeding, as long as patient is alert, awake and able.    Aspiration precautions in place.    PPS: 20%    POC discussed with family and Krystyna LEUNG. All are in agreement. [facility] to follow daily to support the patient and family.       Kathia Lee RN  [facility] RN Liaison  [phone] (After-hours)

## 2024-03-05 NOTE — PROGRESS NOTES
[facility]   part of [facility] Infectious Disease Progress Note    Nelly Potter Patient Status:  Inpatient    1935 MRN [id number]   Location [facility] 5NW-A Attending No att. providers found   Hosp Day # 14 PCP Balta Wright MD     Subjective:  Chart reviewed,  pt seen bedside with family present.  Pt has been moved to hospice care today.   Per daughter the patient has been worse over the past 24 hrs, more agitated and fluctuating vital signs.   We discussed that in this setting that antibiotics will be stopped as patient has begun hospice care.      Objective:    Allergies:  Allergies   Allergen Reactions    Myrbetriq [Mirabegron] NAUSEA ONLY and DIZZINESS    Codeine NAUSEA ONLY    Evista [Raloxifene Hydrochloride] RASH    Meclizine NAUSEA ONLY    Sulfamethoxazole-Trimethoprim DIZZINESS       Medications:  No current facility-administered medications for this encounter.    Facility-Administered Medications Ordered in Other Encounters:     bisacodyl (Dulcolax) 10 MG rectal suppository 10 mg, 10 mg, Rectal, Daily PRN    furosemide (Lasix) 10 mg/mL injection 40 mg, 40 mg, Intravenous, Q8H PRN    glycopyrrolate (Robinul) 0.2 MG/ML injection 0.4 mg, 0.4 mg, Intravenous, Q3H PRN    LORazepam (Ativan) 2 mg/mL injection 1 mg, 1 mg, Intravenous, Q4H PRN **OR** LORazepam (Ativan) 2 mg/mL injection 2 mg, 2 mg, Intravenous, Q4H PRN    LORazepam (Ativan) tab 1 mg, 1 mg, Oral, Once    LORazepam (Ativan) tab 1 mg, 1 mg, Oral, Q6H PRN    LORazepam Intensol 2 mg/mL oral concentrated solution 1 mg, 1 mg, Sublingual, Q6H PRN    morphINE in sodium chloride 0.9% 100 mg/100mL infusion premix, 1 mg/hr, Intravenous, Continuous PRN    morphINE PF 2 MG/ML injection 1 mg, 1 mg, Intravenous, Q1H PRN    ondansetron (Zofran) 4 MG/2ML injection 4 mg, 4 mg, Intravenous, Q6H PRN    QUEtiapine (SEROquel) tab 25 mg, 25 mg, Oral, Nightly    [START ON 3/6/2024] rivaroxaban (Xarelto) tab 20 mg, 20 mg, Oral, Daily  with food    scopolamine (Transderm-Scop) 1 MG/3DAYS patch 1 patch, 1 patch, Transdermal, Q72H PRN    acetaminophen (Tylenol) rectal suppository 650 mg, 650 mg, Rectal, Q6H PRN    haloperidol lactate (Haldol) 5 MG/ML injection 1 mg, 1 mg, Intravenous, Q1H PRN **OR** haloperidol lactate (Haldol) 5 MG/ML injection 2 mg, 2 mg, Intravenous, Q1H PRN    atropine (Atropine-Care) 1 % ophthalmic solution 2 drop, 2 drop, Oral, Q2H PRN    Physical Exam:  General:  No apparent distress.  Vital Signs:  Blood pressure 128/55, pulse 75, temperature 97.3 °F (36.3 °C), temperature source Axillary, resp. rate 17, height 5' 2\" (1.575 m), weight 158 lb 1.1 oz (71.7 kg), SpO2 100%.   Temp (24hrs), Av.3 °F (36.3 °C), Min:97.3 °F (36.3 °C), Max:97.3 °F (36.3 °C)      HEENT: Exam is unremarkable.  Oropharynx is clear.  Lungs: Clear to auscultation bilaterally.  Cardiac: Regular rate   Abdomen:  Soft, non-distended, non-tender, with no rebound or guarding.    Extremities:  No lower extremity edema noted.  Without clubbing or cyanosis.    Skin: Normal texture and turgor.  Neurologic: Cranial nerves are grossly intact.      Labs:           Assessment/Plan:    1.  MRSA bacteremia  -admitted with multifocal pneumonia and 2/2 blood cultures on  with MRSA  -repeat blood cultures negative  -after recent hx of RSV  -TTE negative, family deferred  BRENNA  -on IV Vancomycin     2. Hx of UTI  -hx of ESBL E Coli and PSAR  -s/p IV Zosyn    DISPO:  Pt has moved into hospice care.  Discussed with family that in this setting it is appropriate to stop antibiotics.  They are understanding and in agreement of this.  ID will sign off.  Please contact for any changes if needed.  D/w Dr Hobbs       If you have any questions or concerns please call [facility] at [phone].     SARAI Morgan  3/5/2024  12:36 PM

## 2024-03-05 NOTE — PLAN OF CARE
Pt is A&Ox1, very lethargic. VSS, afebrile. SPO2 maintained on 3-4L NC, RA-BL. Encouraged IS. Tele, NSR/a-fib. EP. DW. Strict I&Os. Last BM 3/3. General/nectar thick diet. Meds crushed in a/s. Incontinent, utilizing purewick. Up max, clinitron bed. Denies pain. Midline- RA precautions. Wounds c/d/i. No further needs at this time, continue POC. Safety precautions in place.    Problem: Patient/Family Goals  Goal: Patient/Family Long Term Goal  Description: Patient's Long Term Goal: discharge back to [facility]    Interventions:  - comply with POC  - See additional Care Plan goals for specific interventions  Outcome: Progressing  Goal: Patient/Family Short Term Goal  Description: Patient's Short Term Goal:   2/29 noc: maintain dubhoff  3/4 NOC: sleep, stay comfortable    Interventions:   - soft restraints  - See additional Care Plan goals for specific interventions  Outcome: Progressing

## 2024-03-05 NOTE — PLAN OF CARE
Pt is alert x1, lethargic, will open her eyes to verbal and some tactile stimulus. On 4L O2. Tele NSR/ST, max assist, on clinitron bed for wounds. Lidocaine patch to right leg. Meds crushed in apple sauce, pt was too lethargic to tolerate oral intake this AM. Right midline TKO. Pt transitioned to hospice this AM, comfort orders in place. IV ativan for anxiety this AM, pt tolerated well. Scop patch behind right ear. Family at bedside. Pt does not appear to be in pain, SOB or anxious at this time. Will continue to monitor.         Problem: Delirium  Goal: Minimize duration of delirium  Description: Interventions:  - Encourage use of hearing aids, eye glasses  - Promote highest level of mobility daily  - Provide frequent reorientation  - Promote wakefulness i.e. lights on, blinds open  - Promote sleep, encourage patient's normal rest cycle i.e. lights off, TV off, minimize noise and interruptions  - Encourage family to assist in orientation and promotion of home routines  Outcome: Progressing

## 2024-03-05 NOTE — SPIRITUAL CARE NOTE
Spiritual Care Visit Note    Patient Name: Nelly Potter Date of Spiritual Care Visit: 24   : 1935 Primary Dx: Multifocal pneumonia       Referred By: Referral From: Other (Comment)    Spiritual Care Taxonomy:    Intended Effects: Promote a sense of peace    Methods: Offer support    Interventions: Active listening;Acknowledge response to difficult experience;Explain  role;Facilitate closure;Perform a Amish rite or ritual    Visit Type/Summary:     - Spiritual Care: Consulted with RN prior to visit. Provided resources related to grief and grieving.  remains available as needed for follow up.    Spiritual Care support can be requested via an Epic consult. For urgent/immediate needs, please contact the On Call  at: [place]: ext [phone]         Fr. Augusto Huddleston ([facility]) was contacted, per daughter's request.

## 2024-03-05 NOTE — DIETARY NOTE
[facility]  NUTRITION ASSESSMENT    Pt does not meet malnutrition criteria at this time.    NUTRITION INTERVENTION:    Meal and Snacks - Monitor and encourage adequate PO intake.   Medical Food Supplements - Will evaluate need when diet is advanced. Rationale/use for oral supplements discussed.  Coordination of Nutrition Care - SLP consult prior to diet advancement. and Palliative care consult for goal of care    PATIENT STATUS:   3/5- Pt is transitioning to hospice per chart. Last TF 3/1. Pt very lethargic. Pt at Low risk, RD remains available if POC changes.     3/1- started on ground/minced diet per speech recommendations and TF adjusted to nocturnal over 12hours, 6pm to 6am.  Pt not eating much per RN but is drinking.  Will continue with TF overnight to promote hunger. Noted family meeting with hospice.  RD continue to monitor.   2/27: Pt weaning on vapotherm. Plan for SLP to re-eval today for diet advancement. Tolerating TF at goal via DHT. RN reports pt still having multiple BM's daily and using Banatrol - will continue to send for now. Will continue to monitor and follow up as appropriate.    2/23: Pt continues on vapotherm but planning to wean to HFNC today. Tolerating TF at goal via DHT. SLP to re-eval today for diet advancement. No GI symptoms noted. Pt with elevated BG 2/2 steroids - started on SSI aspart this AM. No A1c level but no hx of DM.   2/21: 88 year female admitted on 2/20 w/ multifocal pneumonia. Pt seen for tube feed consult.  Pt positive for RSV. patient on Vapotherm 40L,100%. SLP following, recommended NPO diet. Pt sleeping at time of visit, talked to daughter in law at bedside. Daughter in law reports pt w/ poor appetite prior to admit. Stated that pt started coughing when attempted to eat. Reports this has been going of for a week. No n/v/d. Last BM 2/20. Reported UBW of 130 lbs. However per chart review, pts wt has been ~ 145-155 lb over the past 2 months. And mentioned that wt has  been fluctuating d/t steroids. Provided tube feed recs. Will continue to monitor.     PMH: anxiety, GERD, A-fib on Xarelto, HTN, DL, COPD, asthma, bladder CA, multiple myeloma, vertigo, dementia with short-term memory issues.    ANTHROPOMETRICS:  Ht: 157.5 cm (5' 2\")  Wt: 71.7 kg (158 lb 1.1 oz).   BMI: Body mass index is 28.91 kg/m².  IBW: 50 kg    WEIGHT HISTORY:   Weight loss: No  Wt Readings from Last 10 Encounters:   03/03/24 71.7 kg (158 lb 1.1 oz)   02/19/24 66 kg (145 lb 9.6 oz)   02/16/24 71 kg (156 lb 9.6 oz)   02/15/24 70.6 kg (155 lb 9.6 oz)   02/06/24 69.2 kg (152 lb 9.6 oz)   02/05/24 71.2 kg (157 lb)   01/31/24 70.5 kg (155 lb 6.4 oz)   01/29/24 70.5 kg (155 lb 6.4 oz)   01/25/24 70.5 kg (155 lb 6.4 oz)   01/22/24 69.6 kg (153 lb 6.4 oz)        NUTRITION:  Diet:       Procedures    Regular/General diet Fluid Consistency: Nectar Thick / Mildly Thick Liquids; Texture Consistency: Chopped / Soft & Bite Sized; Is Patient on Accuchecks? No; Is Patient on Suicide Precautions? No; Misc Restriction: Pleasure Feed      Food Allergies: No  Cultural/Ethnic/Jain Preferences Addressed: Yes    GI system review: WNL; Last BM: 3/1  Skin and wounds: full thickness wound to anus    NUTRITION RELATED PHYSICAL FINDINGS:     1. Body Fat/Muscle Mass:  no wasting noted per visual     2. Fluid Accumulation: upper extremity edema, lower extremity edema, and b/l hands      NUTRITION PRESCRIPTION: 67 kg  Calories: 5840-2295 calories/day (25-30 kcal/kg)  Protein:  grams protein/day (1.0-1.5 grams protein per kg)  Fluid: ~1 ml/kcal or per MD discretion    NUTRITION DIAGNOSIS/PROBLEM:  Inadequate oral intake related to inability to take or tolerate as evidenced by need for NPO status and TF to meet nutrition needs.    MONITOR AND EVALUATE/NUTRITION GOALS:  No nutrition goals at this time    MEDICATIONS:  reviewed    LABS:  reviewed    Pt is at Low nutrition risk    Iris Boucher RD, LDN  Clinical Dietitian

## 2024-03-05 NOTE — PROGRESS NOTES
[facility] Hospitalist Progress Note     Nelly Potter Patient Status:  Inpatient    1935 MRN [id number]   Location [facility] 4SW-A Attending Castillo Mcgill MD   Hosp Day # 0 PCP Balta Wright MD     Chief Complaint:   Fu Sob, hypoxic    Subjective:     Patient seen and examined.  Family decided to pursue hospice.   Discontinuing all major therapies.   Currently sleeping on light O2.  Daughter bedside, questions answered.     Objective:    Review of Systems:   10 point ROS completed and was negative, except for pertinent positive and negatives stated in subjective.    Vital signs:  Temp:  [97.3 °F (36.3 °C)] 97.3 °F (36.3 °C)  Pulse:  [75-81] 75  Resp:  [16-18] 17  BP: ()/(48-55) 128/55  SpO2:  [94 %-100 %] 100 %    Physical Exam:    General: No acute distress.  Elderly and chronically ill appearing female.  Oriented to person, not to place or time. Lethargic  HEENT:  EOMI, PERRLA, OP clear  Respiratory: Clear to auscultation bilaterally. No wheezes. No rhonchi.  Cardiovascular: S1, S2. Regular rate and rhythm. No murmurs.  Abdomen: Soft, nontender, nondistended.  Positive bowel sounds. No rebound or guarding.  Extremities: BUE/BLE edema. Anasarca.  RLE appears well perfused.  Bandage over R shin skin tear.    Neuro:  Grossly non focal, no motor deficits.        Diagnostic Data:    Labs:  Recent Labs   Lab 24  0703 24  0625 24  0626 24  0530 24  0659   WBC 6.3 5.8 6.2 5.8 5.3   HGB 8.6* 8.7* 8.6* 8.2* 8.1*   MCV 88.3 85.4 88.8 87.2 87.0   .0 155.0 178.0 179.0 195.0   BAND  --   --   --   --  6       Recent Labs   Lab 24  0703 24  0625 24  0626 24  0536 24  0659   * 218* 181* 113* 134*   BUN 22 22 23 18 18   CREATSERUM 0.39* 0.41* 0.43* 0.36* 0.34*   CA 7.8* 8.0* 7.8* 7.7* 8.2*   ALB 1.8* 2.0* 2.0*  --   --    * 136 136 135* 140   K 3.6  3.6 3.1* 3.5 3.0* 3.2*    100  100 100 101   CO2 28.0 28.0 29.0 33.0* 32.0   ALKPHO 176* 171* 156*  --   --    AST 15 10* 8*  --   --    ALT 45 45 40  --   --    BILT 0.5 0.4 0.3  --   --    TP 5.0* 5.3* 5.1*  --   --        CrCl cannot be calculated (Unknown ideal weight.).    No results for input(s): \"PTP\", \"INR\" in the last 168 hours.         COVID-19 Lab Results    COVID-19  Lab Results   Component Value Date    COVID19 Not Detected 02/21/2024    COVID19 Not Detected 02/20/2024    COVID19 Not Detected 02/06/2024       Pro-Calcitonin  No results for input(s): \"PCT\" in the last 168 hours.      Cardiac  No results for input(s): \"TROP\", \"PBNP\" in the last 168 hours.      Creatinine Kinase  No results for input(s): \"CK\" in the last 168 hours.      Inflammatory Markers  Recent Labs   Lab 03/02/24  0626   CRP 0.73*       Imaging: Imaging data reviewed in Epic.    Medications:    LORazepam  1 mg Oral Once    QUEtiapine  25 mg Oral Nightly    [START ON 3/6/2024] rivaroxaban  20 mg Oral Daily with food       Assessment & Plan:     88 year old female with PMH significant for/anxiety, GERD, A-fib on Xarelto, HTN, DL, COPD, asthma, bladder CA, multiple myeloma, vertigo, dementia with short-term memory issues who presents from [place] with reports of shortness of breath and a wet cough.     Comfort Care  Hospice  - dc all nonessential meds  - comfort care protocol  - Palliative/Hospice following  - currently stable for transfer to hospice     Cough, sob - 2/2 RSV infection +/- multifocal PNA  Acute hypoxic resp failure 2/2 above  Asthma/COPD exacerbation   -RSV + in ED  -cxr reviewed >> multifocal pneumonia  -supplemental O2 via vapotherm  >> weaned down to 40% >> cont to wean as able  -nebs prn  -cont iv abx - iv vanc for MRSA bacteremia, likely need 4 week course per ID, RUE midline placed.  IV vanco until 3/20. Dc orders placed by ID  -repeat blood cx x 2 from 2/21 (NGTD)  -ST, PT, OT recs appreciated   - completed course of IV lasix BID with  significant improvement of anasarca, now switched over to po lasix 3/3   - ID dc'd abx given comfort care      Shock - presumably related to sepsis: 2/2 bacteremia, pneumonia  Lactic acidosis - resolved  -Was on norepi but currently off and BP stable  -stress steroids reduced to home dosing (on chronic decadron, tapering as OP every 6w per daughter)   - change midodrine to PRN for systolic < 100   -2/2 blood cx growing MRSA  -MRSA bacteremia >> ID consulted >> rec BRENNA   -cardiology consulted >> apprec recs >> family opting to not go for BRENNA  -TTE w.o obv vegetations  -surveillance cx NTD     AMS 2/2 ICU delirium most likely - resolving   -helpful to keep awake during day and asleep at  night  -minimal sedation during the day if possible  -pain control with tylenol  -seroquel at night  - remove restraints       BLE Edema  - likely from anasarca, b/l LE venous doppler neg for DVT     Anemia  -hgb dropped to 6.0   -prbc transfused  -maintain hgb >7  -stob ordered  - hgb now stable      Troponin elevated - likely demand ischemia  -downtrended  -cardiology reviewed     GERD  -PPI     HTN   -resume home meds if able  -monitor BP closely     DL  -statin     Afib on xarelto  -resumed xarelto and po diltiazem (changed diltiazem back to 180 mg ER daily dose)     Reported VT on tele  - continue to monitor   - follow lytes and mag    Depression, anxiety  -cont zoloft     Bladder ca  Multiple myeloma  Dementia  Vertigo  -stable chronic conditions - follows onc as OP       Dysphagia  Poor oral intake   - pt currently with dobhoff, cont TF's per dietary recs  - assess oral intake, speech re-eval  - Prior hospitalist explained to pt's son Murali that her ECF will not accept dobhoff so the dobhoff would have to be removed or PEG would have to be considered, he does not think they want to pursue PEG.   Pt removed PEG overnight 3/2-3/3.  Monitor oral intake.     RLE pain  - b/l LE venous doppler neg for DVT  - XR of R hip, R knee, R  tib/fib, and R ankle neg for fx or dislocation   - pt with skin tear on R shin  - RN to check R DP and PT pulses via doppler  - no indication to order further imaging     Quality:  DVT Prophylaxis: xarelto, scds  CODE status: DNR, select  [place]: no  If COVID testing is negative, may discontinue isolation: yes      DISPO:  Hospice once arranged    DNAR/comfort     Jayson Lema MD   [facility]  Pager [phone]  Answering Service number: [phone]

## 2024-03-06 NOTE — HOSPICE RN NOTE
[facility] Nursing Rounds:    Patient and family seen at bedside, after daughter requested me to visit. The daughter was very anxious and asking about what can be done for her mom, \"cause she does not look comfortable.\" Patient restless/fidgeting,  unable to stay asleep, with facial grimace present. RN recommended Morphine gtt, or IVP, explained the benefits of Morphine, and that it works well with Ativan. Daughter still very unsure, but stating \"she does not look as comfortable as yesterday,\"  then she was agreeable to another dose of Ativan.  spoke with Breanna LEUNG about conversation and recommendations. Family then refused the medication once RN brought it bedside to administer. [facility] will remain available to support the family and the patient.    Kathia Lee RN  [facility] RN Liaison  [phone] (After-hours)

## 2024-03-06 NOTE — PROGRESS NOTES
Patient on hospice. Alert x1, 4L O2. Clinatron bed. Wound dressings intact. Pt unable to take night time meds. Too lethargic to take. Midline saline locked. Right scope patch behind the ear. 2-3 max asssit with turns. Isolation continued. Bed alarm on.

## 2024-03-06 NOTE — CM/SW NOTE
SW stopped by patient's room to provide support.  Family would just like time with the patient at this time.  They asked for RN.  GONSALO informed hospice RN who will be on her way shortly.    Leidy Adorno [facility]  [facility]  [phone]

## 2024-03-06 NOTE — PROGRESS NOTES
[facility] Hospitalist Progress Note     Nelly Potter Patient Status:  Inpatient    1935 MRN [id number]   Location [facility] 4SW-A Attending Castillo Mcgill MD   Hosp Day # 1 PCP Balta Wright MD     Chief Complaint:   Fu Sob, hypoxic    Subjective:     Patient seen and examined.  Family decided to pursue hospice.   Discontinuing all major therapies.   Currently sleeping on light O2.  Daughter bedside, questions answered.     Objective:    Review of Systems:   10 point ROS completed and was negative, except for pertinent positive and negatives stated in subjective.    Vital signs:  Pulse:  [111-116] 111  Resp:  [18] 18  BP: (129)/(83) 129/83  SpO2:  [62 %-80 %] 80 %    Physical Exam:    General: No acute distress.  Elderly and chronically ill appearing female.  Oriented to person, not to place or time. Lethargic  HEENT:  EOMI, PERRLA, OP clear  Respiratory: Clear to auscultation bilaterally. No wheezes. No rhonchi.  Cardiovascular: S1, S2. Regular rate and rhythm. No murmurs.  Abdomen: Soft, nontender, nondistended.  Positive bowel sounds. No rebound or guarding.  Extremities: BUE/BLE edema. Anasarca.  RLE appears well perfused.  Bandage over R shin skin tear.    Neuro:  Grossly non focal, no motor deficits.        Diagnostic Data:    Labs:  Recent Labs   Lab 24  0724  0624  0626 24  0530 24  0659   WBC 6.3 5.8 6.2 5.8 5.3   HGB 8.6* 8.7* 8.6* 8.2* 8.1*   MCV 88.3 85.4 88.8 87.2 87.0   .0 155.0 178.0 179.0 195.0   BAND  --   --   --   --  6       Recent Labs   Lab 24  0724  0625 24  0626 24  0536 24  0659   * 218* 181* 113* 134*   BUN 22 22 23 18 18   CREATSERUM 0.39* 0.41* 0.43* 0.36* 0.34*   CA 7.8* 8.0* 7.8* 7.7* 8.2*   ALB 1.8* 2.0* 2.0*  --   --    * 136 136 135* 140   K 3.6  3.6 3.1* 3.5 3.0* 3.2*    100 100 100 101   CO2 28.0 28.0 29.0 33.0* 32.0   ALKPHO  176* 171* 156*  --   --    AST 15 10* 8*  --   --    ALT 45 45 40  --   --    BILT 0.5 0.4 0.3  --   --    TP 5.0* 5.3* 5.1*  --   --        CrCl cannot be calculated (Unknown ideal weight.).    No results for input(s): \"PTP\", \"INR\" in the last 168 hours.         COVID-19 Lab Results    COVID-19  Lab Results   Component Value Date    COVID19 Not Detected 02/21/2024    COVID19 Not Detected 02/20/2024    COVID19 Not Detected 02/06/2024       Pro-Calcitonin  No results for input(s): \"PCT\" in the last 168 hours.      Cardiac  No results for input(s): \"TROP\", \"PBNP\" in the last 168 hours.      Creatinine Kinase  No results for input(s): \"CK\" in the last 168 hours.      Inflammatory Markers  Recent Labs   Lab 03/02/24  0626   CRP 0.73*       Imaging: Imaging data reviewed in Epic.    Medications:    LORazepam  1 mg Oral Once    QUEtiapine  25 mg Oral Nightly    rivaroxaban  20 mg Oral Daily with food       Assessment & Plan:     88 year old female with PMH significant for/anxiety, GERD, A-fib on Xarelto, HTN, DL, COPD, asthma, bladder CA, multiple myeloma, vertigo, dementia with short-term memory issues who presents from [facility] with reports of shortness of breath and a wet cough.     Comfort Care  Hospice  - dc all nonessential meds  - comfort care protocol  - family prefers scheduled tylenol for pain control and minimizing opioids right now  - Palliative/Hospice following  - currently stable for transfer to hospice     Cough, sob - 2/2 RSV infection +/- multifocal PNA  Acute hypoxic resp failure 2/2 above  Asthma/COPD exacerbation   -RSV + in ED  -cxr reviewed >> multifocal pneumonia  -supplemental O2 via vapotherm  >> weaned down to 40% >> cont to wean as able  -nebs prn  -cont iv abx - iv vanc for MRSA bacteremia, likely need 4 week course per ID, RUE midline placed.  IV vanco until 3/20. Dc orders placed by ID  -repeat blood cx x 2 from 2/21 (TD)  -ST, PT, OT recs appreciated   - completed course of IV  lasix BID with significant improvement of anasarca, now switched over to po lasix 3/3   - ID dc'd abx given comfort care      Shock - presumably related to sepsis: 2/2 bacteremia, pneumonia  Lactic acidosis - resolved  -Was on norepi but currently off and BP stable  -stress steroids reduced to home dosing (on chronic decadron, tapering as OP every 6w per daughter)   - change midodrine to PRN for systolic < 100   -2/2 blood cx growing MRSA  -MRSA bacteremia >> ID consulted >> rec BRENNA   -cardiology consulted >> apprec recs >> family opting to not go for BRENNA  -TTE w.o obv vegetations  -surveillance cx NTD     AMS 2/2 ICU delirium most likely - resolving   -helpful to keep awake during day and asleep at  night  -minimal sedation during the day if possible  -pain control with tylenol  -seroquel at night  - remove restraints       BLE Edema  - likely from anasarca, b/l LE venous doppler neg for DVT     Anemia  -hgb dropped to 6.0   -prbc transfused  -maintain hgb >7  -stob ordered  - hgb now stable      Troponin elevated - likely demand ischemia  -downtrended  -cardiology reviewed     GERD  -PPI     HTN   -resume home meds if able  -monitor BP closely     DL  -statin     Afib on xarelto  -resumed xarelto and po diltiazem (changed diltiazem back to 180 mg ER daily dose)     Reported VT on tele  - continue to monitor   - follow lytes and mag    Depression, anxiety  -cont zoloft     Bladder ca  Multiple myeloma  Dementia  Vertigo  -stable chronic conditions - follows onc as OP       Dysphagia  Poor oral intake   - pt currently with dobhoff, cont TF's per dietary recs  - assess oral intake, speech re-eval  - Prior hospitalist explained to pt's son Murali that her ECF will not accept dobhoff so the dobhoff would have to be removed or PEG would have to be considered, he does not think they want to pursue PEG.   Pt removed PEG overnight 3/2-3/3.  Monitor oral intake.     RLE pain  - b/l LE venous doppler neg for DVT  - XR of R  hip, R knee, R tib/fib, and R ankle neg for fx or dislocation   - pt with skin tear on R shin  - RN to check R DP and PT pulses via doppler  - no indication to order further imaging     Quality:  DVT Prophylaxis: xarelto, scds  CODE status: DNR, select  Lentz: no  If COVID testing is negative, may discontinue isolation: yes      DISPO:  Inpt hospice at this time  DNAR/comfort         Geno Kaur MD  [facility] Hospitalist  Pager [phone]  Answering Service number: [phone]

## 2024-03-06 NOTE — PROGRESS NOTES
Assumed care of pt around 0730. Hospice. Pt alert x1. Lethargic. 3L for comfort. Pt has no signs of tensing, restlessness per assessment. Awake this am, able to deny pain and vocalize wanting food. Pt 1-1 feed. Prn ativan given this am per family c/o pt being anxious. Daughter asked about ativan again this afternoon but refused when brought to bedside. This RN educated about the use of ativan and morphine and its effects. Clinitron bed. Incontinent.

## 2024-03-06 NOTE — SPIRITUAL CARE NOTE
[facility] Chaplain visited pt for initial assessment and spiritual support. No family at bedside. Pt received asleep and barely awakening when spoken to. Pt is Faith and has been anointed by . CHP provided bedside prayer. CHP phone bj Carrion and offered support and gave an overview of visit. Son is choosing a  home today. No issues/concerns.    Karen Abraham,   [facility]  [phone]

## 2024-03-06 NOTE — HOSPICE RN NOTE
[facility] Inpatient Nursing Rounds:     Patient seen at bedside without family present. Patient awake, with labored breathing, during her restlessness she removed O2 NC.      Ativan IVP, Robinul IVP, scopolamine patch all given in the past 24 hours.     PPS: 20%    Patient remains eligible for general inpatient hospice care for symptom management of dyspnea, anxiety/restlessness requiring frequent nursing assessments and interventions including titration of IV medications. POC discussed with family and RN . All are in agreement. [facility] will continue to support the patient and family.     Kathia Lee RN  [facility] RN Liaison  [phone] (After-hours)

## 2024-03-07 NOTE — PROGRESS NOTES
[facility] Hospitalist Progress Note     Nelly Pottre Patient Status:  Inpatient    1935 MRN [id number]   Location [facility] 4SW-A Attending Castillo Mcgill MD   Hosp Day # 2 PCP Balta Wright MD     Chief Complaint:   Fu Sob, hypoxic    Subjective:     Patient seen and examined.  Pt resting comfortably - asleep   Son at bedside      Objective:    Review of Systems:   10 point ROS completed and was negative, except for pertinent positive and negatives stated in subjective.    Vital signs:  Temp:  [98.8 °F (37.1 °C)-98.9 °F (37.2 °C)] 98.8 °F (37.1 °C)  Pulse:  [] 102  Resp:  [17] 17  BP: (107-112)/(68-92) 112/92  SpO2:  [96 %-100 %] 100 %    Physical Exam:    General: No acute distress.  Elderly and chronically ill appearing female.  Oriented to person, not to place or time. Lethargic  HEENT:  EOMI, PERRLA, OP clear  Respiratory: Clear to auscultation bilaterally. No wheezes. No rhonchi.  Cardiovascular: S1, S2. Regular rate and rhythm. No murmurs.  Abdomen: Soft, nontender, nondistended.  Positive bowel sounds. No rebound or guarding.  Extremities: BUE/BLE edema. Anasarca.  RLE appears well perfused.  Bandage over R shin skin tear.    Neuro:  Grossly non focal, no motor deficits.        Diagnostic Data:    Labs:  Recent Labs   Lab 24  0530 24  0659   WBC 5.8 6.2 5.8 5.3   HGB 8.7* 8.6* 8.2* 8.1*   MCV 85.4 88.8 87.2 87.0   .0 178.0 179.0 195.0   BAND  --   --   --  6       Recent Labs   Lab 24  0624  0626 24  0536 24  0659   * 181* 113* 134*   BUN 22 23 18 18   CREATSERUM 0.41* 0.43* 0.36* 0.34*   CA 8.0* 7.8* 7.7* 8.2*   ALB 2.0* 2.0*  --   --     136 135* 140   K 3.1* 3.5 3.0* 3.2*    100 100 101   CO2 28.0 29.0 33.0* 32.0   ALKPHO 171* 156*  --   --    AST 10* 8*  --   --    ALT 45 40  --   --    BILT 0.4 0.3  --   --    TP 5.3* 5.1*  --   --        CrCl  cannot be calculated (Unknown ideal weight.).    No results for input(s): \"PTP\", \"INR\" in the last 168 hours.         COVID-19 Lab Results    COVID-19  Lab Results   Component Value Date    COVID19 Not Detected 02/21/2024    COVID19 Not Detected 02/20/2024    COVID19 Not Detected 02/06/2024       Pro-Calcitonin  No results for input(s): \"PCT\" in the last 168 hours.      Cardiac  No results for input(s): \"TROP\", \"PBNP\" in the last 168 hours.      Creatinine Kinase  No results for input(s): \"CK\" in the last 168 hours.      Inflammatory Markers  Recent Labs   Lab 03/02/24  0626   CRP 0.73*       Imaging: Imaging data reviewed in Epic.    Medications:    LORazepam  1 mg Oral Once    QUEtiapine  25 mg Oral Nightly    rivaroxaban  20 mg Oral Daily with food       Assessment & Plan:     88 year old female with PMH significant for/anxiety, GERD, A-fib on Xarelto, HTN, DL, COPD, asthma, bladder CA, multiple myeloma, vertigo, dementia with short-term memory issues who presents from [facility] with reports of shortness of breath and a wet cough.     Comfort Care  Hospice  - dc all nonessential meds  - comfort care protocol  - family prefers scheduled tylenol for pain control and minimizing opioids right now  - Palliative/Hospice following  - currently stable for transfer to hospice     Cough, sob - 2/2 RSV infection +/- multifocal PNA  Acute hypoxic resp failure 2/2 above  Asthma/COPD exacerbation   -RSV + in ED  -cxr reviewed >> multifocal pneumonia  -supplemental O2 via vapotherm  >> weaned down to 40% >> cont to wean as able  -nebs prn  -cont iv abx - iv vanc for MRSA bacteremia, likely need 4 week course per ID, RUE midline placed.  IV vanco until 3/20. Dc orders placed by ID  -repeat blood cx x 2 from 2/21 (TD)  -ST, PT, OT recs appreciated   - completed course of IV lasix BID with significant improvement of anasarca, now switched over to po lasix 3/3   - ID dc'd abx given comfort care      Shock - presumably  related to sepsis: 2/2 bacteremia, pneumonia  Lactic acidosis - resolved  -Was on norepi but currently off and BP stable  -stress steroids reduced to home dosing (on chronic decadron, tapering as OP every 6w per daughter)   - change midodrine to PRN for systolic < 100   -2/2 blood cx growing MRSA  -MRSA bacteremia >> ID consulted >> rec BRENNA   -cardiology consulted >> apprec recs >> family opting to not go for BRENNA  -TTE w/o obv vegetations  -surveillance cx NTD     AMS 2/2 ICU delirium most likely - resolving   -helpful to keep awake during day and asleep at  night  -minimal sedation during the day if possible  -pain control with tylenol  -seroquel at night  - remove restraints       BLE Edema  - likely from anasarca, b/l LE venous doppler neg for DVT     Anemia  -hgb dropped to 6.0   -prbc transfused  -maintain hgb >7  -stob ordered  - hgb now stable      Troponin elevated - likely demand ischemia  -downtrended  -cardiology reviewed     GERD  -PPI     HTN   -resume home meds if able  -monitor BP closely     DL  -statin     Afib on xarelto  -resumed xarelto and po diltiazem (changed diltiazem back to 180 mg ER daily dose)     Reported VT on tele  - continue to monitor   - follow lytes and mag    Depression, anxiety  -cont zoloft     Bladder ca  Multiple myeloma  Dementia  Vertigo  -stable chronic conditions - follows onc as OP       Dysphagia  Poor oral intake   - pt currently with dobhoff, cont TF's per dietary recs  - assess oral intake, speech re-eval  - Prior hospitalist explained to pt's son Murali that her ECF will not accept dobhoff so the dobhoff would have to be removed or PEG would have to be considered, he does not think they want to pursue PEG.   Pt removed PEG overnight 3/2-3/3.  Monitor oral intake.     RLE pain  - b/l LE venous doppler neg for DVT  - XR of R hip, R knee, R tib/fib, and R ankle neg for fx or dislocation   - pt with skin tear on R shin  - RN to check R DP and PT pulses via doppler  - no  indication to order further imaging     Quality:  DVT Prophylaxis: xarelto, scds  CODE status: DNR, select  Lentz: no  If COVID testing is negative, may discontinue isolation: yes      DISPO:  Inpt hospice at this time  DNAR/comfort   Discussed with son at bedside   Discussed with RN        Geno Nichole Hospitalist  Pager [phone]  Answering Service number: [phone]

## 2024-03-07 NOTE — PROGRESS NOTES
Patient admitted to Inpatient hospice. No telemetry, 3 L NC for comfort. Ativan given x 1, patient asleep for most of the night and comfortable. BM x 1 overnight, Hygiene provided. All needs met.

## 2024-03-07 NOTE — PLAN OF CARE
Received patient on NC 3L, saturating well. VSS. Medications held due to hospice/patient too lethargic this am. Family at bedside. Patient is resting in bed, at lowest position with bed rails up and call light within reach.   Problem: Delirium  Goal: Minimize duration of delirium  Description: Interventions:  - Encourage use of hearing aids, eye glasses  - Promote highest level of mobility daily  - Provide frequent reorientation  - Promote wakefulness i.e. lights on, blinds open  - Promote sleep, encourage patient's normal rest cycle i.e. lights off, TV off, minimize noise and interruptions  - Encourage family to assist in orientation and promotion of home routines  Outcome: Progressing

## 2024-03-07 NOTE — HOSPICE RN NOTE
[facility] In[facility] Nursing Rounds:     Patient seen and examined at bedside. Pt was sleeping, would wake up to tactile stimulus. Breathing was labored, occasionally restless. On 3L O2 nasal cannula. Also discussed code status with family, filled out a DNR/comfort with family, copy left on chart for hospitalist to sign.     Family gave a copy of family contact information form as well.  home chosen by family was [facility] in [facility].     Pt is not on a continuous gtt, family is open and agreeable to giving prn IV medications such as ativan or morphine for increased SOB, labored breathing or agitation. Due to pt labored breathing I spoke with RN about giving a prn dose of IV morphine, family in agreement with this plan. Pt has scop patch on behind ear    PPS: 20%     Patient remains eligable for general inpatient hospice care for symptom management of pain, dyspnea, and agitation requiring frequent nursing assessments and interventions including titration of IV medications. POC discussed with the patient, family, and Ara LEUNG at bedside. Updated Dr. ambrose. All in agreement. [facility] will continue to support the patient and family.    Krystyna Mora, BONITAN, RN  [facility] RN Liaison   [phone] (after-hours)

## 2024-03-08 NOTE — PROGRESS NOTES
[facility] Hospitalist Progress Note     Nelly Potter Patient Status:  Inpatient    1935 MRN [id number]   Location [facility] 4SW-A Attending Castillo Mcgill MD   Hosp Day # 3 PCP Balta Wright MD     Chief Complaint:   Fu Sob, hypoxic    Subjective:     Patient seen and examined.  Pt resting comfortably - asleep   Son at bedside  Per RN pt was agitated this morning and morphine and ativan had to be administered  afebrile      Objective:    Review of Systems:   10 point ROS completed and was negative, except for pertinent positive and negatives stated in subjective.    Vital signs:  Temp:  [97 °F (36.1 °C)-97.7 °F (36.5 °C)] 97 °F (36.1 °C)  Pulse:  [] 109  Resp:  [16] 16  BP: (108-125)/(63-79) 120/76  SpO2:  [97 %-100 %] 97 %    Physical Exam:    General: No acute distress.  Elderly and chronically ill appearing female.  Oriented to person, not to place or time. Lethargic  HEENT:  EOMI, PERRLA, OP clear  Respiratory: Clear to auscultation bilaterally. No wheezes. No rhonchi.  Cardiovascular: S1, S2. Regular rate and rhythm. No murmurs.  Abdomen: Soft, nontender, nondistended.  Positive bowel sounds. No rebound or guarding.  Extremities: BUE/BLE edema. Anasarca.  RLE appears well perfused.  Bandage over R shin skin tear.    Neuro:  Grossly non focal, no motor deficits.        Diagnostic Data:    Labs:  Recent Labs   Lab 24  0624  0530 24  0659   WBC 6.2 5.8 5.3   HGB 8.6* 8.2* 8.1*   MCV 88.8 87.2 87.0   .0 179.0 195.0   BAND  --   --  6       Recent Labs   Lab 24  0624  0536 24  0659   * 113* 134*   BUN 23 18 18   CREATSERUM 0.43* 0.36* 0.34*   CA 7.8* 7.7* 8.2*   ALB 2.0*  --   --     135* 140   K 3.5 3.0* 3.2*    100 101   CO2 29.0 33.0* 32.0   ALKPHO 156*  --   --    AST 8*  --   --    ALT 40  --   --    BILT 0.3  --   --    TP 5.1*  --   --        CrCl cannot be calculated (Unknown  ideal weight.).    No results for input(s): \"PTP\", \"INR\" in the last 168 hours.         COVID-19 Lab Results    COVID-19  Lab Results   Component Value Date    COVID19 Not Detected 02/21/2024    COVID19 Not Detected 02/20/2024    COVID19 Not Detected 02/06/2024       Pro-Calcitonin  No results for input(s): \"PCT\" in the last 168 hours.      Cardiac  No results for input(s): \"TROP\", \"PBNP\" in the last 168 hours.      Creatinine Kinase  No results for input(s): \"CK\" in the last 168 hours.      Inflammatory Markers  Recent Labs   Lab 03/02/24  0626   CRP 0.73*       Imaging: Imaging data reviewed in Epic.    Medications:    LORazepam  1 mg Oral Once    QUEtiapine  25 mg Oral Nightly    rivaroxaban  20 mg Oral Daily with food       Assessment & Plan:     88 year old female with PMH significant for/anxiety, GERD, A-fib on Xarelto, HTN, DL, COPD, asthma, bladder CA, multiple myeloma, vertigo, dementia with short-term memory issues who presents from [facility] with reports of shortness of breath and a wet cough.     Comfort Care  Hospice  - dc all nonessential meds  - comfort care protocol  - family prefers scheduled tylenol for pain control and minimizing opioids right now  - Palliative/Hospice following  - currently stable for transfer to hospice     Cough, sob - 2/2 RSV infection +/- multifocal PNA  Acute hypoxic resp failure 2/2 above  Asthma/COPD exacerbation   -RSV + in ED  -cxr reviewed >> multifocal pneumonia  -supplemental O2 via vapotherm  >> weaned down to 40% >> cont to wean as able  -nebs prn  -cont iv abx - iv vanc for MRSA bacteremia, likely need 4 week course per ID, RUE midline placed.  IV vanco until 3/20. Dc orders placed by ID  -repeat blood cx x 2 from 2/21 (TD)  -ST, PT, OT recs appreciated   - completed course of IV lasix BID with significant improvement of anasarca, now switched over to po lasix 3/3   - ID dc'd abx given comfort care      Shock - presumably related to sepsis: 2/2 bacteremia,  pneumonia  Lactic acidosis - resolved  -Was on norepi but currently off and BP stable  -stress steroids reduced to home dosing (on chronic decadron, tapering as OP every 6w per daughter)   - change midodrine to PRN for systolic < 100   -2/2 blood cx growing MRSA  -MRSA bacteremia >> ID consulted >> rec BRENNA   -cardiology consulted >> apprec recs >> family opting to not go for BRENNA  -TTE w/o obv vegetations  -surveillance cx NTD     AMS 2/2 ICU delirium most likely - resolving   -helpful to keep awake during day and asleep at  night  -minimal sedation during the day if possible  -pain control with tylenol  -seroquel at night  - remove restraints       BLE Edema  - likely from anasarca, b/l LE venous doppler neg for DVT     Anemia  -hgb dropped to 6.0   -prbc transfused  -maintain hgb >7  -stob ordered  - hgb now stable      Troponin elevated - likely demand ischemia  -downtrended  -cardiology reviewed     GERD  -PPI     HTN   -resume home meds if able  -monitor BP closely     DL  -statin     Afib on xarelto  -resumed xarelto and po diltiazem (changed diltiazem back to 180 mg ER daily dose)     Reported VT on tele  - continue to monitor   - follow lytes and mag    Depression, anxiety  -cont zoloft     Bladder ca  Multiple myeloma  Dementia  Vertigo  -stable chronic conditions - follows onc as OP       Dysphagia  Poor oral intake   - pt currently with dobhoff, cont TF's per dietary recs  - assess oral intake, speech re-eval  - Prior hospitalist explained to pt's son Murali that her ECF will not accept dobhoff so the dobhoff would have to be removed or PEG would have to be considered, he does not think they want to pursue PEG.   Pt removed PEG overnight 3/2-3/3.  Monitor oral intake.     RLE pain  - b/l LE venous doppler neg for DVT  - XR of R hip, R knee, R tib/fib, and R ankle neg for fx or dislocation   - pt with skin tear on R shin  - RN to check R DP and PT pulses via doppler  - no indication to order further imaging      Quality:  DVT Prophylaxis: xarelto, scds  CODE status: DNR, select  Lentz: no  If COVID testing is negative, may discontinue isolation: yes      DISPO:  Inpt hospice at this time  DNAR/comfort   Discussed with son at bedside - explained potential need for morphine gtt - he states for now he does not want that  Discussed with RN        Geno Kaur MD  [facility] Hospitalist  Pager [phone]  Answering Service number: [phone]

## 2024-03-08 NOTE — HOSPICE RN NOTE
[facility] inpatient nursing rounds. Discussed patient status with hospital nurse Ara LEUNG. Family is hesitant on use of morphine for labored breathing. Comfort order set in place. Last dose morphine was 1 mg IVP at 13:05. Patient has moist cough, breathing a bit labored. Daughter Verónica at bedside and in agreement with patient to receive glycopyrrolate 0.4 mg IVP and morphine 1 mg IVP now for comfort. Last VS /92, , RR 16, temp 98.8 axillary. Patient remains appropriate for [facility] inpatient hospice level of care. Needs continued frequent nursing assessments for administration and titration of IV comfort medications. Hospice will continue to follow this patient closely for end of life symptom management and to support family. Please call [facility] with any questions or concerns.    Mojgan Kenney RN, [facility]  [facility] Liaison  [phone] after hours

## 2024-03-08 NOTE — PROGRESS NOTES
Pt here for inpatient hospice. Lethargic, moaning present when stimulated. 3L NC in place per family request. Xarelto is ordered if pt is alert enough to take. Brief and purewick in place d/t incontinence. Bedrest, clinitron bed present. Scope patch located behind R ear. No Morphine drip at this time per family request, okay to give IVP Morphine PRN for discomfort and agitation. Robinul given for excess secretions. See MAR for more PRN options. Pt looks comfortable at this time.

## 2024-03-08 NOTE — HOSPICE RN NOTE
Met with family at the bedside per family request. They had several questions about placing the pt on a morphine gtt. Pt son if hesitant to start her on a gtt because he believes it is too big of a jump to administer 1mg every hour, when she has only required 3mg in the last 12 hours. Pt has been requiring several doses of IV ativan for increased agitation. Explained to be son the benefits of pt being on a continuous morphine gtt to control breakthrough pain that the pt has been experiencing. Pt son requested we start with giving her 1mg of morphine every 4 hours, and keeping the 1mg every 1 hour prn for breakthrough pain. If she is needing any additional morphine form the scheduled 1mg Q4 hours then we floor RN will readdress with family the need for a continuous morphine gtt. Family and MD in agreement with this plan. Order for 1mg morphine Q4 placed.       Krystyna Mora, BSN, RN  [facility] RN Liaison  [phone] (after hours)

## 2024-03-08 NOTE — HOSPICE RN NOTE
[facility] Inpatient Nursing Rounds:     Patient seen and examined at bedside. Pt was sleeping, unresponsive to verbal stimulus, responsive to tactile stimulus. Breathing was shallow and unlabored.On 3L O2 nasal cannula. DNR/comfort signed and on chart.     Pt is not on a continuous gtt, family continues to be open and agreeable to giving prn IV medications such as ativan or morphine for increased SOB, labored breathing or agitation. Pt received PRN ativan and morphine for agitation and labored breathing, discussed in length with son and daughter at bedside regarding starting pt on a morphine gtt if she continues to have episodes of distress in order to avoid having peaks of pain, labored breathing or agitation. Pt son is hesitant to gtt, daughter is in agreement with keeping pt comfortable and starting morphine gtt when necessary.      PPS: 20%     Patient remains eligable for general inpatient hospice care for symptom management of pain, dyspnea, and agitation requiring frequent nursing assessments and interventions including titration of IV medications. POC discussed with the patient, family, and Ara LEUNG at bedside. Updated Dr. ambrose. All in agreement. [facility] will continue to support the patient and family.    Krystyna Mora, BONITAN, RN  [facility] RN Liaison   [phone] (after-hours)

## 2024-03-08 NOTE — PLAN OF CARE
Received patient on NC 3L saturating well. VSS. PRN Morphine and Ativan used to keep patient comfortable. Plan of care discussed with family at bedside. Patient is resting in bed at lowest position with bed rails up and call light within reach.

## 2024-03-09 NOTE — PLAN OF CARE
Problem: Delirium  Goal: Minimize duration of delirium  Description: Interventions:  - Encourage use of hearing aids, eye glasses  - Promote highest level of mobility daily  - Provide frequent reorientation  - Promote wakefulness i.e. lights on, blinds open  - Promote sleep, encourage patient's normal rest cycle i.e. lights off, TV off, minimize noise and interruptions  - Encourage family to assist in orientation and promotion of home routines  Outcome: Progressing     Problem: Patient/Family Goals  Goal: Patient/Family Long Term Goal  Description: Patient's Long Term Goal:   Keep patient comfortable    Interventions:  - morphine sulfate, Ativan and Rubinol as ordered  - position for comfort  - See additional Care Plan goals for specific interventions  Outcome: Progressing  Goal: Patient/Family Short Term Goal  Description: Patient's Short Term Goal:   03/08/2024 - for pt to remain comfortable    Interventions:   - supplemental O2 per family request, morphine, robimnul and Ativan as ordered, position for comfort  - See additional Care Plan goals for specific interventions  Outcome: Progressing     Problem: PAIN - ADULT  Goal: Verbalizes/displays adequate comfort level or patient's stated pain goal  Description: INTERVENTIONS:  - Encourage pt to monitor pain and request assistance  - Assess pain using appropriate pain scale  - Administer analgesics based on type and severity of pain and evaluate response  - Implement non-pharmacological measures as appropriate and evaluate response  - Consider cultural and social influences on pain and pain management  - Manage/alleviate anxiety  - Utilize distraction and/or relaxation techniques  - Monitor for opioid side effects  - Notify MD/LIP if interventions unsuccessful or patient reports new pain  - Anticipate increased pain with activity and pre-medicate as appropriate  Outcome: Progressing     Problem: SAFETY ADULT - FALL  Goal: Free from fall injury  Description:  INTERVENTIONS:  - Assess pt frequently for physical needs  - Identify cognitive and physical deficits and behaviors that affect risk of falls.  - [place] fall precautions as indicated by assessment.  - Educate pt/family on patient safety including physical limitations  - Instruct pt to call for assistance with activity based on assessment  - Modify environment to reduce risk of injury  - Provide assistive devices as appropriate  - Consider OT/PT consult to assist with strengthening/mobility  - Encourage toileting schedule  Outcome: Progressing

## 2024-03-09 NOTE — PLAN OF CARE
Data: Pt moaning and restless this morning, PRN Morphine and Ativan given with some relief. Pt still restless midmorning, and family was agreeable to start Morphine GTT. Morphine GTT started at 1MG/hr and pt tolerating and appears to be more comfortable. Congested cough, Scopolamine patch applied and PRN Robinul given. Swelling noted to right arm. Right arm elevated on pillows. Family at bedside, updated with plan of care, and verbalized understanding. Will continue to monitor.     Action: Keep pt comfortable and continue to monitor every 2 hours.    Education: POC for the day.    Response: Needs reinforcement.     Problem: Patient/Family Goals  Goal: Patient/Family Long Term Goal  Description: Patient's Long Term Goal:   Keep patient comfortable    Interventions:  - morphine sulfate, Ativan and Rubinol as ordered  - position for comfort  - See additional Care Plan goals for specific interventions  Outcome: Progressing  Goal: Patient/Family Short Term Goal  Description: Patient's Short Term Goal:   03/08/2024 - for pt to remain comfortable    Interventions:   - supplemental O2 per family request, morphine, robimnul and Ativan as ordered, position for comfort  - See additional Care Plan goals for specific interventions  Outcome: Progressing     Problem: PAIN - ADULT  Goal: Verbalizes/displays adequate comfort level or patient's stated pain goal  Description: INTERVENTIONS:  - Encourage pt to monitor pain and request assistance  - Assess pain using appropriate pain scale  - Administer analgesics based on type and severity of pain and evaluate response  - Implement non-pharmacological measures as appropriate and evaluate response  - Consider cultural and social influences on pain and pain management  - Manage/alleviate anxiety  - Utilize distraction and/or relaxation techniques  - Monitor for opioid side effects  - Notify MD/LIP if interventions unsuccessful or patient reports new pain  - Anticipate increased pain with  activity and pre-medicate as appropriate  Outcome: Progressing     Problem: SAFETY ADULT - FALL  Goal: Free from fall injury  Description: INTERVENTIONS:  - Assess pt frequently for physical needs  - Identify cognitive and physical deficits and behaviors that affect risk of falls.  - [place] fall precautions as indicated by assessment.  - Educate pt/family on patient safety including physical limitations  - Instruct pt to call for assistance with activity based on assessment  - Modify environment to reduce risk of injury  - Provide assistive devices as appropriate  - Consider OT/PT consult to assist with strengthening/mobility  - Encourage toileting schedule  Outcome: Progressing

## 2024-03-09 NOTE — PROGRESS NOTES
[facility] Hospitalist Progress Note     Nelly Potter Patient Status:  Inpatient    1935 MRN [id number]   Location [facility] 4SW-A Attending Castillo Mcgill MD   Hosp Day # 4 PCP Balta Wright MD     Chief Complaint:   Fu Sob, hypoxic    Subjective:     Patient seen and examined.  Pt resting comfortably - asleep   No family at bedside  Per RN getting agitated more frequently  Afebrile  VSS      Objective:    Review of Systems:   10 point ROS completed and was negative, except for pertinent positive and negatives stated in subjective.    Vital signs:  Temp:  [98.3 °F (36.8 °C)] 98.3 °F (36.8 °C)  Pulse:  [94] 94  Resp:  [22] 22  BP: (121)/(92) 121/92    Physical Exam:    General: No acute distress.  Elderly and chronically ill appearing female.  Oriented to person, not to place or time. Lethargic  HEENT:  EOMI, PERRLA, OP clear  Respiratory: Clear to auscultation bilaterally. No wheezes. No rhonchi.  Cardiovascular: S1, S2. Regular rate and rhythm. No murmurs.  Abdomen: Soft, nontender, nondistended.  Positive bowel sounds. No rebound or guarding.  Extremities: BUE/BLE edema. Anasarca.  RLE appears well perfused.  Bandage over R shin skin tear.    Neuro:  Grossly non focal, no motor deficits.        Diagnostic Data:    Labs:  Recent Labs   Lab 24  0530 24  0659   WBC 5.8 5.3   HGB 8.2* 8.1*   MCV 87.2 87.0   .0 195.0   BAND  --  6       Recent Labs   Lab 24  0536 24  0659   * 134*   BUN 18 18   CREATSERUM 0.36* 0.34*   CA 7.7* 8.2*   * 140   K 3.0* 3.2*    101   CO2 33.0* 32.0       CrCl cannot be calculated (Unknown ideal weight.).    No results for input(s): \"PTP\", \"INR\" in the last 168 hours.         COVID-19 Lab Results    COVID-19  Lab Results   Component Value Date    COVID19 Not Detected 2024    COVID19 Not Detected 2024    COVID19 Not Detected 2024       Pro-Calcitonin  No results for  input(s): \"PCT\" in the last 168 hours.      Cardiac  No results for input(s): \"TROP\", \"PBNP\" in the last 168 hours.      Creatinine Kinase  No results for input(s): \"CK\" in the last 168 hours.      Inflammatory Markers  No results for input(s): \"CRP\", \"KARUNA\", \"LDH\", \"DDIMER\" in the last 168 hours.      Imaging: Imaging data reviewed in Epic.    Medications:    morphINE  1 mg Intravenous q4h    LORazepam  1 mg Oral Once    QUEtiapine  25 mg Oral Nightly    rivaroxaban  20 mg Oral Daily with food       Assessment & Plan:     88 year old female with PMH significant for/anxiety, GERD, A-fib on Xarelto, HTN, DL, COPD, asthma, bladder CA, multiple myeloma, vertigo, dementia with short-term memory issues who presents from [facility] with reports of shortness of breath and a wet cough.     Comfort Care  Hospice  - dc all nonessential meds  - comfort care protocol  - family prefers scheduled tylenol for pain control and minimizing opioids right now  - Palliative/Hospice following  - currently stable for transfer to hospice     Cough, sob - 2/2 RSV infection +/- multifocal PNA  Acute hypoxic resp failure 2/2 above  Asthma/COPD exacerbation   -RSV + in ED  -cxr reviewed >> multifocal pneumonia  -supplemental O2 via vapotherm  >> weaned down to 40% >> cont to wean as able  -nebs prn  -cont iv abx - iv vanc for MRSA bacteremia, likely need 4 week course per ID, RUE midline placed.  IV vanco until 3/20. Dc orders placed by ID  -repeat blood cx x 2 from 2/21 (TD)  -ST, PT, OT recs appreciated   - completed course of IV lasix BID with significant improvement of anasarca, now switched over to po lasix 3/3   - ID dc'd abx given comfort care      Shock - presumably related to sepsis: 2/2 bacteremia, pneumonia  Lactic acidosis - resolved  -Was on norepi but currently off and BP stable  -stress steroids reduced to home dosing (on chronic decadron, tapering as OP every 6w per daughter)   - change midodrine to PRN for systolic < 100    -2/2 blood cx growing MRSA  -MRSA bacteremia >> ID consulted >> rec BRENNA   -cardiology consulted >> apprec recs >> family opting to not go for BRENNA  -TTE w/o obv vegetations  -surveillance cx NTD     AMS 2/2 ICU delirium most likely - resolving   -helpful to keep awake during day and asleep at  night  -minimal sedation during the day if possible  -pain control with tylenol  -seroquel at night  - remove restraints       BLE Edema  - likely from anasarca, b/l LE venous doppler neg for DVT     Anemia  -hgb dropped to 6.0   -prbc transfused  -maintain hgb >7  -stob ordered  - hgb now stable      Troponin elevated - likely demand ischemia  -downtrended  -cardiology reviewed     GERD  -PPI     HTN   -resume home meds if able  -monitor BP closely     DL  -statin     Afib on xarelto  -resumed xarelto and po diltiazem (changed diltiazem back to 180 mg ER daily dose)     Reported VT on tele  - continue to monitor   - follow lytes and mag    Depression, anxiety  -cont zoloft     Bladder ca  Multiple myeloma  Dementia  Vertigo  -stable chronic conditions - follows onc as OP       Dysphagia  Poor oral intake   - pt currently with dobhoff, cont TF's per dietary recs  - assess oral intake, speech re-eval  - Prior hospitalist explained to pt's son Murali that her ECF will not accept dobhoff so the dobhoff would have to be removed or PEG would have to be considered, he does not think they want to pursue PEG.   Pt removed PEG overnight 3/2-3/3.  Monitor oral intake.     RLE pain  - b/l LE venous doppler neg for DVT  - XR of R hip, R knee, R tib/fib, and R ankle neg for fx or dislocation   - pt with skin tear on R shin  - RN to check R DP and PT pulses via doppler  - no indication to order further imaging     Quality:  DVT Prophylaxis: xarelto, scds  CODE status: DNR, select  [place]: no  If COVID testing is negative, may discontinue isolation: yes      DISPO:  Inpt hospice at this time  DNAR/comfort   Discussed with son at bedside -  explained potential need for morphine gtt - he states for now he does not want that  Discussed with RN        Geno Kaur MD  [facility] Hospitalist  Pager [phone]  Answering Service number: [phone]

## 2024-03-09 NOTE — PROGRESS NOTES
Patient lethargic.  Her skin is cool to touch.  Moaning and restless at times - she is making attempts  to take her O2 tubing off her nose.  At 3L of supplemental O2, she is able to maintain her saturations above 90%.  When she takes it off, she desaturates to the low 80's.  Morphine and ATivan utilized to manage pain and restlessness.  Incontinent of Urine - kept clean and dry.  Purewick in place.  NO stool so far this shift.  Positioned for comfort.  Safety, contact and droplet isolation precautions in place. Will follow.

## 2024-03-09 NOTE — HOSPICE RN NOTE
[facility] inpatient nursing rounds. Daughter and son at bedside. Questions and concerns addressed. Discussed use of morphine for EOL symptoms. Patient is now on scheduled dose of morphine 1 mg IVP every 4 hrs for pain and SOB. Morphine gtt available if needed for comfort. Patient is also requiring IVP glycopyrrolate for congestion and IVP lorazepam for restlessness. Patient remains appropriate for GIP inpatient level of hospice care. Needs continued frequent nursing assessments for administration and titration of IV comfort medications. Unable to take medications by mouth. [facility] will continue to follow this patient closely for EOL pain and symptom management and to support family. Please call [facility] with any questions or concerns.,    Mojgan Kenney RN, [facility]  [facility] Liaison  [phone] after hours

## 2024-03-09 NOTE — HOSPICE RN NOTE
[facility] Inpatient Nursing Rounds:     Patient seen at bedside with daughter present. Patient RR= 22, labored, shallow, at times abdominal breathing. Daughter anxious about increasing EOL symptoms. Spent time reassuring her and talking/listening.      Morphine IVP administered  5 times, adding 1mg prn back on MAR q 1 for breakthru dyspnea/pain, Ativan IVP x 2, Robinul and scopolamine patch all in the past 24 hours.     PPS: 20%    Patient remains eligible for general inpatient hospice care for symptom management of dyspnea/pain requiring frequent nursing assessments and interventions including titration of IV medications. POC discussed with daughter and Ara LEUNG. All are in agreement. [facility] will continue to support the patient and family.       Kathia Lee RN  [facility] RN Liaison  [phone] (After-hours)

## 2024-03-10 NOTE — HOSPICE RN NOTE
[facility] Inpatient Nursing Rounds:     Patient seen at bedside with daughter present. Patient's breathing less labored since the gtt initiated. Patient remains on O2 NC 3L, audible secretions, slightly flushed. Discussed with RN  to administered multiple doses of Robinul when due throughout shift. Elevated patient's head with pillow, and slightly repositioned per daughters request, patient moaned and grimaced when moved.     Morphine gtt 1mg/hr for dyspnea/pain, IVP Robinul for secretions, scopolamine patch in place all in the past 24 hours.     PPS: 10%    Patient remains eligible for general inpatient hospice care for symptom management of dyspnea/pain/anxiety requiring frequent nursing assessments and interventions including titration of IV medications. POC discussed with daughter and Yasmin LEUNG. All are in agreement. [facility] will continue to support the patient and family.     Kathia Lee RN  [facility] RN Liaison  [phone] (After-hours)

## 2024-03-10 NOTE — PLAN OF CARE
Problem: Delirium  Goal: Minimize duration of delirium  Description: Interventions:  - Encourage use of hearing aids, eye glasses  - Promote highest level of mobility daily  - Provide frequent reorientation  - Promote wakefulness i.e. lights on, blinds open  - Promote sleep, encourage patient's normal rest cycle i.e. lights off, TV off, minimize noise and interruptions  - Encourage family to assist in orientation and promotion of home routines  Outcome: Progressing     Problem: Patient/Family Goals  Goal: Patient/Family Long Term Goal  Description: Patient's Long Term Goal:   Keep patient comfortable    Interventions:  - morphine sulfate, Ativan and Rubinol as ordered  - position for comfort  - See additional Care Plan goals for specific interventions  Outcome: Progressing  Goal: Patient/Family Short Term Goal  Description: Patient's Short Term Goal:   03/08/2024 - for pt to remain comfortable  03/09/2024 - for pt to remain comfortable    Interventions:   - supplemental O2 per family request, morphine, robimnul and Ativan as ordered, position for comfort  - See additional Care Plan goals for specific interventions  Outcome: Progressing     Problem: PAIN - ADULT  Goal: Verbalizes/displays adequate comfort level or patient's stated pain goal  Description: INTERVENTIONS:  - Encourage pt to monitor pain and request assistance  - Assess pain using appropriate pain scale  - Administer analgesics based on type and severity of pain and evaluate response  - Implement non-pharmacological measures as appropriate and evaluate response  - Consider cultural and social influences on pain and pain management  - Manage/alleviate anxiety  - Utilize distraction and/or relaxation techniques  - Monitor for opioid side effects  - Notify MD/LIP if interventions unsuccessful or patient reports new pain  - Anticipate increased pain with activity and pre-medicate as appropriate  Outcome: Progressing     Problem: SAFETY ADULT - FALL  Goal:  Free from fall injury  Description: INTERVENTIONS:  - Assess pt frequently for physical needs  - Identify cognitive and physical deficits and behaviors that affect risk of falls.  - [place] fall precautions as indicated by assessment.  - Educate pt/family on patient safety including physical limitations  - Instruct pt to call for assistance with activity based on assessment  - Modify environment to reduce risk of injury  - Provide assistive devices as appropriate  - Consider OT/PT consult to assist with strengthening/mobility  - Encourage toileting schedule  Outcome: Progressing

## 2024-03-10 NOTE — PROGRESS NOTES
[facility] Hospitalist Progress Note     Nelly Potter Patient Status:  Inpatient    1935 MRN [id number]   Location [facility] 4SW-A Attending Castillo Mcgill MD   Hosp Day # 5 PCP Balta Wright MD     Chief Complaint:   Fu Sob, hypoxic    Subjective:     Patient seen and examined.  Pt resting comfortably - asleep   No family at bedside  Per RN comfortable and sleeping  Afebrile  VSS      Objective:    Review of Systems:   10 point ROS completed and was negative, except for pertinent positive and negatives stated in subjective.    Vital signs:  Temp:  [98.3 °F (36.8 °C)-98.4 °F (36.9 °C)] 98.4 °F (36.9 °C)  Pulse:  [] 97  Resp:  [20-22] 20  BP: (115-125)/(75-92) 115/75  SpO2:  [94 %-96 %] 96 %    Physical Exam:    General: No acute distress.  Elderly and chronically ill appearing female.  Oriented to person, not to place or time. Lethargic  HEENT:  EOMI, PERRLA, OP clear  Respiratory: Clear to auscultation bilaterally. No wheezes. No rhonchi.  Cardiovascular: S1, S2. Regular rate and rhythm. No murmurs.  Abdomen: Soft, nontender, nondistended.  Positive bowel sounds. No rebound or guarding.  Extremities: BUE/BLE edema. Anasarca.  RLE appears well perfused.  Bandage over R shin skin tear.    Neuro:  Grossly non focal, no motor deficits.        Diagnostic Data:    Labs:  Recent Labs   Lab 24  0659   WBC 5.3   HGB 8.1*   MCV 87.0   .0   BAND 6       Recent Labs   Lab 24  0659   *   BUN 18   CREATSERUM 0.34*   CA 8.2*      K 3.2*      CO2 32.0       CrCl cannot be calculated (Unknown ideal weight.).    No results for input(s): \"PTP\", \"INR\" in the last 168 hours.         COVID-19 Lab Results    COVID-19  Lab Results   Component Value Date    COVID19 Not Detected 2024    COVID19 Not Detected 2024    COVID19 Not Detected 2024       Pro-Calcitonin  No results for input(s): \"PCT\" in the last 168  hours.      Cardiac  No results for input(s): \"TROP\", \"PBNP\" in the last 168 hours.      Creatinine Kinase  No results for input(s): \"CK\" in the last 168 hours.      Inflammatory Markers  No results for input(s): \"CRP\", \"KARUNA\", \"LDH\", \"DDIMER\" in the last 168 hours.      Imaging: Imaging data reviewed in Epic.    Medications:    LORazepam  1 mg Oral Once    QUEtiapine  25 mg Oral Nightly    rivaroxaban  20 mg Oral Daily with food       Assessment & Plan:     88 year old female with PMH significant for/anxiety, GERD, A-fib on Xarelto, HTN, DL, COPD, asthma, bladder CA, multiple myeloma, vertigo, dementia with short-term memory issues who presents from [facility] with reports of shortness of breath and a wet cough.     Comfort Care  Hospice  - dc all nonessential meds  - comfort care protocol  - family prefers scheduled tylenol for pain control and minimizing opioids right now  -morphine gtt started yest  - Palliative/Hospice following  - currently stable for transfer to hospice     Cough, sob - 2/2 RSV infection +/- multifocal PNA  Acute hypoxic resp failure 2/2 above  Asthma/COPD exacerbation   -RSV + in ED  -cxr reviewed >> multifocal pneumonia  -supplemental O2 via vapotherm  >> weaned down to 40% >> cont to wean as able  -nebs prn  -cont iv abx - iv vanc for MRSA bacteremia, likely need 4 week course per ID, RUE midline placed.  IV vanco until 3/20. Dc orders placed by ID  -repeat blood cx x 2 from 2/21 ([facility])  -ST, PT, OT recs appreciated   - completed course of IV lasix BID with significant improvement of anasarca, now switched over to po lasix 3/3   - ID dc'd abx given comfort care      Shock - presumably related to sepsis: 2/2 bacteremia, pneumonia  Lactic acidosis - resolved  -Was on norepi but currently off and BP stable  -stress steroids reduced to home dosing (on chronic decadron, tapering as OP every 6w per daughter)   - change midodrine to PRN for systolic < 100   -2/2 blood cx growing MRSA  -MRSA  bacteremia >> ID consulted >> rec BRENNA   -cardiology consulted >> apprec recs >> family opting to not go for BRENNA  -TTE w/o obv vegetations  -surveillance cx NTD     AMS 2/2 ICU delirium most likely - resolving   -helpful to keep awake during day and asleep at  night  -minimal sedation during the day if possible  -pain control with tylenol  -seroquel at night  - remove restraints       BLE Edema  - likely from anasarca, b/l LE venous doppler neg for DVT     Anemia  -hgb dropped to 6.0   -prbc transfused  -maintain hgb >7  -stob ordered  - hgb now stable      Troponin elevated - likely demand ischemia  -downtrended  -cardiology reviewed     GERD  -PPI     HTN   -resume home meds if able  -monitor BP closely     DL  -statin     Afib on xarelto  -resumed xarelto and po diltiazem (changed diltiazem back to 180 mg ER daily dose)     Reported VT on tele  - continue to monitor   - follow lytes and mag    Depression, anxiety  -cont zoloft     Bladder ca  Multiple myeloma  Dementia  Vertigo  -stable chronic conditions - follows onc as OP       Dysphagia  Poor oral intake   - pt currently with dobhoff, cont TF's per dietary recs  - assess oral intake, speech re-eval  - Prior hospitalist explained to pt's son Murali that her ECF will not accept dobhoff so the dobhoff would have to be removed or PEG would have to be considered, he does not think they want to pursue PEG.   Pt removed PEG overnight 3/2-3/3.  Monitor oral intake.     RLE pain  - b/l LE venous doppler neg for DVT  - XR of R hip, R knee, R tib/fib, and R ankle neg for fx or dislocation   - pt with skin tear on R shin  - RN to check R DP and PT pulses via doppler  - no indication to order further imaging     Quality:  DVT Prophylaxis: xarelto, scds  CODE status: DNR, select  [place]: no  If COVID testing is negative, may discontinue isolation: yes      DISPO:  Inpt hospice at this time  DNAR/comfort   Morphine gtt started   Discussed with RN        Geno Kaur MD  [place]  Hospitalist  Pager [phone]  Answering Service number: [phone]

## 2024-03-10 NOTE — PLAN OF CARE
Pt received somnolent, 3L of oxygen for comfort. Occasional congested cough, prn robinol given. Morphine gtt infusing per MAR. Daughter updated on POC at bedside. Isolation in place. Report given to MADHU Kay @[id number].

## 2024-03-10 NOTE — PROGRESS NOTES
Patient on hospice care.  Currently sedated, VSS.   Responding to pain.  Breathing easy/unlabored,  Saturating above 90% on O2 at 3L/NC.   Morphine drip infusing at 1mg/hour via right arm midline.  Incontinent of urine, no stool.  Kept clean and dry.  Purewick in place, Safety, aspiration, skin, droplet and contact isolation precautions in place.  Will follow.

## 2024-03-11 NOTE — PLAN OF CARE
Problem: Delirium  Goal: Minimize duration of delirium  Description: Interventions:  - Encourage use of hearing aids, eye glasses  - Promote highest level of mobility daily  - Provide frequent reorientation  - Promote wakefulness i.e. lights on, blinds open  - Promote sleep, encourage patient's normal rest cycle i.e. lights off, TV off, minimize noise and interruptions  - Encourage family to assist in orientation and promotion of home routines  Outcome: Progressing     Problem: Patient/Family Goals  Goal: Patient/Family Long Term Goal  Description: Patient's Long Term Goal:   Keep patient comfortable    Interventions:  - morphine sulfate, Ativan and Rubinol as ordered  - position for comfort  - See additional Care Plan goals for specific interventions  Outcome: Progressing  Goal: Patient/Family Short Term Goal  Description: Patient's Short Term Goal:   03/08/2024 - for pt to remain comfortable  03/09/2024 - for pt to remain comfortable  03/10/2024 - for pateint to remain comfortable      Interventions:   - supplemental O2 per family request, morphine, robimnul and Ativan as ordered, position for comfort  - See additional Care Plan goals for specific interventions  Outcome: Progressing     Problem: PAIN - ADULT  Goal: Verbalizes/displays adequate comfort level or patient's stated pain goal  Description: INTERVENTIONS:  - Encourage pt to monitor pain and request assistance  - Assess pain using appropriate pain scale  - Administer analgesics based on type and severity of pain and evaluate response  - Implement non-pharmacological measures as appropriate and evaluate response  - Consider cultural and social influences on pain and pain management  - Manage/alleviate anxiety  - Utilize distraction and/or relaxation techniques  - Monitor for opioid side effects  - Notify MD/LIP if interventions unsuccessful or patient reports new pain  - Anticipate increased pain with activity and pre-medicate as appropriate  Outcome:  Progressing     Problem: SAFETY ADULT - FALL  Goal: Free from fall injury  Description: INTERVENTIONS:  - Assess pt frequently for physical needs  - Identify cognitive and physical deficits and behaviors that affect risk of falls.  - [place] fall precautions as indicated by assessment.  - Educate pt/family on patient safety including physical limitations  - Instruct pt to call for assistance with activity based on assessment  - Modify environment to reduce risk of injury  - Provide assistive devices as appropriate  - Consider OT/PT consult to assist with strengthening/mobility  - Encourage toileting schedule  Outcome: Progressing

## 2024-03-11 NOTE — PROGRESS NOTES
Patient not breathing and has no pulse.  Her pupils are fixed.  She was pronounced dead at 0200 by Justine Perez, Charge RN.

## 2024-03-11 NOTE — PROGRESS NOTES
Patient unresponsive.  Morphine drip infusing at 1.5mg/hour via right upper arm midline.  She appears comfortable.  Hypotensive at 97/52 at 2104 and 78/54 at 2222.  +congestion - suctioned orally.  Obtained a moderate amount of thin white secretion.  She is currently saturating in the 80's to 90's on O2 at 3L/NC.   Atropine drops and Robinul were given.  RR=12 with short periods of apnea.          Her daughter, Verónica called at 2234 and was updated of above.  She decided to come and be with patient tonight.

## 2024-03-11 NOTE — PROGRESS NOTES
RR= 8 with longer periods of apnea.  Saturation in the 70's.  Robinul given to control secretions.  Ativan was also given per patient's daughter's request.  Patient's sonAdarsh is now at bedside as well.  Attempted to contact patient's son/PODENIZ Murali at [phone]  to give update on patient's condition, but there was no answer.  Message left.

## 2024-03-15 NOTE — DISCHARGE SUMMARY
[facility]  DISCHARGE SUMMARY     Nelly Potter Patient Status:  Inpatient    1935 MRN [id number]   Location [facility] 5NW-A Attending No att. providers found   Hosp Day # 6 PCP Balta Wright MD     Date of Admission: 3/5/2024  Date of Discharge: 3/11/2024  Discharge Disposition:  Hospice    Discharge Diagnosis:   Comfort Care  Hospice  Cough, sob - 2/2 RSV infection +/- multifocal PNA  Acute hypoxic resp failure 2/2 above  Asthma/COPD exacerbation   Shock - presumably related to sepsis: 2/2 bacteremia, pneumonia  Lactic acidosis - resolved  AMS 2/2 ICU delirium most likely - resolving   BLE Edema  Anemia  Troponin elevated - likely demand ischemia  GERD  HTN   DL  Afib on xarelto  Reported VT on tele  Depression, anxiety  Bladder ca  Multiple myeloma  Dementia  Vertigo   Dysphagia  Poor oral intake   RLE pain    History of Present Illness:   Nelly Potter is a 88 year old female with PMH significant for/anxiety, GERD, A-fib on Xarelto, HTN, DL, COPD, asthma, bladder CA, multiple myeloma, vertigo, dementia with short-term memory issues who presents from [facility] with reports of shortness of breath and a wet cough.  According to the son who is at bedside the patient developed worsening shortness of breath and a wet cough, was started on oxygen.  In the ED O2 sats were noted to be 70%, desaturating on a nonrebreather.  X-ray revealed multifocal consolidations consistent with pneumonia.  RSV positive.  Lactic acid 3, , troponin 75, hemoglobin 9.1.  Patient currently on high flow nasal cannula.  Complaining of some chest pain.  IV antibiotics were given in the ED.  Patient will be transferred to ICU for further care and management.  Son is at bedside.  Of note patient was admitted from 2024 to 2024 at which time she was found to have chest pain and diagnosed with A-fib/RVR.     Brief Synopsis:     Comfort Care  Hospice  - dc all nonessential meds  - comfort care  protocol  - family prefers scheduled tylenol for pain control and minimizing opioids right now  -morphine gtt started yest  - Palliative/Hospice following  - currently stable for transfer to hospice      Cough, sob - 2/2 RSV infection +/- multifocal PNA  Acute hypoxic resp failure 2/2 above  Asthma/COPD exacerbation   -RSV + in ED  -cxr reviewed >> multifocal pneumonia  -supplemental O2 via vapotherm  >> weaned down to 40% >> cont to wean as able  -nebs prn  -cont iv abx - iv vanc for MRSA bacteremia, likely need 4 week course per ID, RUE midline placed.  IV vanco until 3/20. Dc orders placed by ID  -[facility]  -ST, PT, OT recs appreciated   - completed course of IV lasix BID with significant improvement of anasarca, now switched over to po lasix 3/3   - ID dc'd abx given comfort care      Shock - presumably related to sepsis: 2/2 bacteremia, pneumonia  Lactic acidosis - resolved  -Was on norepi but currently off and BP stable  -stress steroids reduced to home dosing (on chronic decadron, tapering as OP every 6w per daughter)   - change midodrine to PRN for systolic < 100   -2/2 blood cx growing MRSA  -MRSA bacteremia >> ID consulted >> rec BRENNA   -cardiology consulted >> apprec recs >> family opting to not go for BRENNA  -TTE w/o obv vegetations  -surveillance cx NTD     AMS 2/2 ICU delirium most likely - resolving   -helpful to keep awake during day and asleep at  night  -minimal sedation during the day if possible  -pain control with tylenol  -seroquel at night  - remove restraints       BLE Edema  - likely from anasarca, b/l LE venous doppler neg for DVT     Anemia  -hgb dropped to 6.0   -prbc transfused  -maintain hgb >7  -stob ordered  - hgb now stable      Troponin elevated - likely demand ischemia  -downtrended  -cardiology reviewed     GERD  -PPI     HTN   -resume home meds if able  -monitor BP closely     DL  -statin     Afib on xarelto  -resumed xarelto and po diltiazem (changed  diltiazem back to 180 mg ER daily dose)     Reported VT on tele  - continue to monitor   - follow lytes and mag     Depression, anxiety  -cont zoloft     Bladder ca  Multiple myeloma  Dementia  Vertigo  -stable chronic conditions - follows onc as OP       Dysphagia  Poor oral intake   - pt currently with dobhoff, cont TF's per dietary recs  - assess oral intake, speech re-eval  - Prior hospitalist explained to pt's son Murali that her ECF will not accept dobhoff so the dobhoff would have to be removed or PEG would have to be considered, he does not think they want to pursue PEG.   Pt removed PEG overnight 3/2-3/3.  Monitor oral intake.      RLE pain  - b/l LE venous doppler neg for DVT  - XR of R hip, R knee, R tib/fib, and R ankle neg for fx or dislocation   - pt with skin tear on R shin  - RN to check R DP and PT pulses via doppler  - no indication to order further imaging      DISPO:  Inpt hospice at this time  DNAR/comfort   Morphine gtt   Discussed with RN  Pt  during inpt hospice       Discharge Medication List:     Discharge Medications        ASK your doctor about these medications        Instructions Prescription details   acetaminophen 325 MG Tabs  Commonly known as: Tylenol      Take 2 tablets (650 mg total) by mouth every 6 (six) hours as needed for Pain.   Refills: 0     acidophilus-pectin Caps  Commonly known as: Probiotic      Take 1 capsule by mouth daily.   Refills: 0     albuterol 108 (90 Base) MCG/ACT Aers  Commonly known as: Ventolin HFA  Ask about: Should I take this medication?      Inhale 2 puffs into the lungs every 4 (four) hours as needed for Wheezing.   Stop taking on: 2024  Quantity: 1 each  Refills: 0     bisacodyl 10 MG Supp  Commonly known as: Dulcolax      Place 1 suppository (10 mg total) rectally as needed (If no BM in 3 days).   Refills: 0     dexamethasone 2 MG Tabs  Commonly known as: Decadron      Take 3.75 mg by mouth daily with breakfast. Slow taper now to 3.75  starting 1/19/24   Refills: 0     Ferrous Sulfate Dried  (50 Fe) MG Tbcr      Take 160 mg by mouth daily.   Refills: 0     fleet enema 7-19 GM/118ML Enem      Place 133 mL rectally every third day as needed (if no BM in 3 days).   Refills: 0     folic acid 1 MG Tabs  Commonly known as: Folvite      Take 1 tablet (1 mg total) by mouth daily.   Refills: 0     gabapentin 300 MG Caps  Commonly known as: Neurontin      Take 1 capsule (300 mg total) by mouth in the morning and 1 capsule (300 mg total) before bedtime.   Refills: 0     ipratropium 0.03 % Soln  Commonly known as: Atrovent      2 sprays by Nasal route daily as needed for Rhinitis.   Refills: 0     ipratropium-albuterol 0.5-2.5 (3) MG/3ML Soln  Commonly known as: Duoneb      Take 3 mL by nebulization every 6 (six) hours while awake.   Refills: 0     midodrine 5 MG Tabs  Commonly known as: ProAmatine      Take 1 tablet (5 mg total) by mouth every 8 (eight) hours as needed (SBP less than 95 and DBP less than 50). If SBP <95 or DBP <50   Refills: 0     multivitamin Chew      Chew 1 tablet by mouth daily.   Refills: 0     polyethylene glycol (PEG 3350) 17 g Pack  Commonly known as: Miralax      Take 17 g by mouth daily.   Refills: 0     sennosides 8.6 MG Tabs  Commonly known as: Senokot      Take 2 tablets (17.2 mg total) by mouth 2 (two) times daily.   Refills: 0     sertraline 25 MG Tabs  Commonly known as: Zoloft      Take 1 tablet (25 mg total) by mouth daily.   Refills: 0     sodium chloride 1 g Tabs      Take 1 tablet (1 g total) by mouth 2 (two) times daily. For hyponatremia   Refills: 0     tamsulosin 0.4 MG Caps  Commonly known as: Flomax      Take 1 capsule (0.4 mg total) by mouth every evening.   Refills: 0     Xarelto 20 MG Tabs  Generic drug: rivaroxaban      Take 1 tablet (20 mg total) by mouth daily with food.   Refills: 0              [place]   N/[place]    Follow-up appointment:   No follow-up provider specified.  Appointments for Next 30  Days 3/15/2024 - 4/14/2024      None            Vital signs:       Temp:  [98.3 °F (36.8 °C)-98.4 °F (36.9 °C)] 98.4 °F (36.9 °C)  Pulse:  [] 97  Resp:  [20-22] 20  BP: (115-125)/(75-92) 115/75  SpO2:  [94 %-96 %] 96 %     Physical Exam:    General: No acute distress.  Elderly and chronically ill appearing female.  Oriented to person, not to place or time. Lethargic  HEENT:  EOMI, PERRLA, OP clear  Respiratory: Clear to auscultation bilaterally. No wheezes. No rhonchi.  Cardiovascular: S1, S2. Regular rate and rhythm. No murmurs.  Abdomen: Soft, nontender, nondistended.  Positive bowel sounds. No rebound or guarding.  Extremities: BUE/BLE edema. Anasarca.  RLE appears well perfused.  Bandage over R shin skin tear.    Neuro:  Grossly non focal, no motor deficits.       -----------------------------------------------------------------------------------------------  PATIENT DISCHARGE INSTRUCTIONS: See electronic chart    Geno Kaur MD    Time spent:  > 30 minutes

## (undated) NOTE — IP AVS SNAPSHOT
Patient Demographics     Address  [address]  [facility] [address] Phone  [phone] (Home)  [phone] (Mobile) *Preferred* E-mail Address  [email or web address]      Patient Contacts     Name Relation Home Work Mobile    Murali Potter Power of    [phone]    Verónica Sullivan   [phone]      Allergies as of 2/9/2024  Review status set to In Progress on 2/6/2024       Noted Reaction Type Reactions    Myrbetriq [mirabegron] 05/10/2017    NAUSEA ONLY, DIZZINESS    Codeine 06/20/2012   Side Effect NAUSEA ONLY    Evista [raloxifene Hydrochloride] 06/20/2012    RASH    Meclizine 06/20/2012    NAUSEA ONLY    Sulfamethoxazole-trimethoprim 06/20/2012    DIZZINESS      Code Status Information     Code Status    DNAR/Selective Treatment      Patient Instructions    None      Follow-up Information     Balta Wright MD Follow up.    Specialty: Family Practice  Contact information:  [address]  [place] [address] [phone]             Shannen Cabrera MD Follow up.    Specialty: CARDIOLOGY  Why: To see facility cardiologist. [facility] - family to call and schedule follow up with Dr. Cabrera in 2 weeks.  Contact information:  [address]  [facility] [address]  [facility] [address] [phone]                        Your Home Meds List      TAKE these medications       Instructions Authorizing Provider Morning Afternoon Evening As Needed   acetaminophen 325 MG Tabs  Commonly known as: Tylenol      Take 2 tablets (650 mg total) by mouth every 4 (four) hours as needed for Pain.          acidophilus-pectin Caps  Commonly known as: Probiotic  Next dose due: Tomorrow 2/10      Take 1 capsule by mouth daily.          albuterol 108 (90 Base) MCG/ACT Aers  Commonly known as: Ventolin HFA      Inhale 2 puffs into the lungs every 4 (four) hours as needed for Wheezing.  Stop taking on: March 9, 2024   Williams Liz         atorvastatin 10 MG  Tabs  Commonly known as: Lipitor  Next dose due: This evening 2/9      Take 1 tablet (10 mg total) by mouth daily.   Balta Wright         bisacodyl 10 MG Supp  Commonly known as: Dulcolax      Place 1 suppository (10 mg total) rectally as needed (If no BM in 3 days).          dexamethasone 2 MG Tabs  Commonly known as: Decadron  Next dose due: Tomorrow 2/10      Take 3.75 mg by mouth daily with breakfast. Slow taper now to 3.75 starting 1/19/24          dilTIAZem HCl ER Coated Beads 180 MG Cp24  Commonly known as: CARDIZEM CD  Start taking on: February 10, 2024  Next dose due: Tomorrow 2/10      Take 1 capsule (180 mg total) by mouth daily.  Stop taking on: March 11, 2024   Shisurendra Cabrera         docusate sodium 100 MG Caps  Commonly known as: Colace      Take 1 capsule (100 mg total) by mouth daily as needed for constipation.          Ferrous Sulfate Dried  (50 Fe) MG Tbcr  Next dose due: Tomorrow 2/10      Take 160 mg by mouth daily.          fexofenadine 180 MG Tabs  Commonly known as: Allegra      Take 1 tablet (180 mg total) by mouth daily as needed.   Balta Wright         fleet enema 7-19 GM/118ML Enem      Place 133 mL rectally every third day as needed (if no BM in 3 days).   Williams Liz         folic acid 1 MG Tabs  Commonly known as: Folvite  Next dose due: Tomorrow 2/10      Take 1 tablet (1 mg total) by mouth daily.          furosemide 40 MG Tabs  Commonly known as: Lasix  Next dose due: This evening 2/9      Take 1 tablet (40 mg total) by mouth 2 (two) times daily.   Shannen Cabrera         gabapentin 300 MG Caps  Commonly known as: Neurontin  Next dose due: This evening 2/9      Take 1 capsule (300 mg total) by mouth in the morning and 1 capsule (300 mg total) before bedtime.          ipratropium 0.03 % Soln  Commonly known as: Atrovent      2 sprays by Nasal route daily as needed for Rhinitis.          ipratropium-albuterol 0.5-2.5 (3) MG/3ML Soln  Commonly known as: Duoneb      Take 3  mL by nebulization every 6 (six) hours while awake.          lansoprazole 30 MG Cpdr  Commonly known as: Prevacid  Next dose due: This evening 2/9      Take 1 capsule (30 mg total) by mouth daily. Before meal   Balta Zeddies         midodrine 5 MG Tabs  Commonly known as: ProAmatine      Take 1 tablet (5 mg total) by mouth every 8 (eight) hours as needed (SBP less than 95 and DBP less than 50). If SBP <95 or DBP <50          multivitamin Chew  Next dose due: Tomorrow 2/10      Chew 1 tablet by mouth daily.          polyethylene glycol (PEG 3350) 17 g Pack  Commonly known as: Miralax      Take 17 g by mouth daily.   Williams Voncatarino         sennosides 8.6 MG Tabs  Commonly known as: Senokot      Take 2 tablets (17.2 mg total) by mouth daily as needed.          sertraline 25 MG Tabs  Commonly known as: Zoloft  Next dose due: Tomorrow 2/10      Take 1 tablet (25 mg total) by mouth daily.          sodium chloride 1 g Tabs  Next dose due: Tomorrow 2/10      Take 1 tablet (1 g total) by mouth 2 (two) times daily. For hyponatremia          tamsulosin 0.4 MG Caps  Commonly known as: Flomax  Next dose due: This evening 2/9      Take 1 capsule (0.4 mg total) by mouth every evening.          traMADol 50 MG Tabs  Commonly known as: Ultram      Take 0.5 tablets (25 mg total) by mouth every 8 (eight) hours as needed for Pain (moderate to severe pain not relieved by tylenol).   Jessie Villagran         Xarelto 20 MG Tabs  Generic drug: rivaroxaban  Next dose due: Tomorrow 2/10      Take 1 tablet (20 mg total) by mouth daily with food.                Where to Get Your Medications      These medications were sent to [facility]/pharmacy #[id number] - [place], [place] - [address] [phone], [phone]  [address] [address]    Phone: [phone]   albuterol 108 (90 Base) MCG/ACT Aers  dilTIAZem HCl ER Coated Beads 180 MG Cp24  furosemide 40 MG Tabs           4524-5515-A - MAR ACTION REPORT  (last 48 hrs)    **  SITE UNKNOWN **     Order ID Medication Name Action Time Action Reason Comments    [id number] atorvastatin (Lipitor) tab 10 mg 02/07/24 2115 Given      [id number] atorvastatin (Lipitor) tab 10 mg 02/08/24 2124 Given      [id number] dexamethasone (Decadron) tab 3.75 mg 02/08/24 0840 Given      [id number] dexamethasone (Decadron) tab 3.75 mg 02/09/24 0916 Given      [id number] dilTIAZem (cardIZEM) tab 30 mg 02/07/24 1809 Given      [id number] dilTIAZem (cardIZEM) tab 30 mg 02/08/24 0048 Given      [id number] dilTIAZem (cardIZEM) tab 30 mg 02/08/24 0601 Given      [id number] dilTIAZem (cardIZEM) tab 30 mg 02/08/24 1123 Given      [id number] dilTIAZem (cardIZEM) tab 30 mg 02/08/24 1711 Given      [id number] dilTIAZem (cardIZEM) tab 30 mg 02/08/24 2331 Given      [id number] dilTIAZem (cardIZEM) tab 30 mg 02/09/24 0552 Given      [id number] dilTIAZem (cardIZEM) tab 30 mg 02/09/24 1221 Given      [id number] docusate sodium (Colace) cap 100 mg 02/07/24 2115 Given      [id number] docusate sodium (Colace) cap 100 mg 02/08/24 0840 Given      [id number] docusate sodium (Colace) cap 100 mg 02/08/24 2124 Given      [id number] docusate sodium (Colace) cap 100 mg 02/09/24 0914 Given      [id number] fleet enema (Fleet) 7-19 GM/118ML rectal enema 133 mL 02/07/24 1809 Given      [id number] folic acid (Folvite) tab 1 mg 02/08/24 0840 Given      [id number] folic acid (Folvite) tab 1 mg 02/09/24 0912 Given      [id number] furosemide (Lasix) 10 mg/mL injection 40 mg 02/08/24 1605 Given      [id number] furosemide (Lasix) 10 mg/mL injection 40 mg 02/09/24 0921 Given      [id number] gabapentin (Neurontin) cap 300 mg 02/07/24 2115 Given      [id number] gabapentin (Neurontin) cap 300 mg 02/08/24 0840 Given      [id number] gabapentin (Neurontin) cap 300 mg 02/08/24 2124 Given      [id number] gabapentin (Neurontin) cap 300 mg 02/09/24 0915 Given      [id number] ipratropium-albuterol (Duoneb) 0.5-2.5 (3) MG/3ML inhalation solution 3 mL 02/07/24 2020 Given      [id number]  ipratropium-albuterol (Duoneb) 0.5-2.5 (3) MG/3ML inhalation solution 3 mL 02/08/24 0930 Given      [id number] ipratropium-albuterol (Duoneb) 0.5-2.5 (3) MG/3ML inhalation solution 3 mL 02/08/24 1401 Given      [id number] ipratropium-albuterol (Duoneb) 0.5-2.5 (3) MG/3ML inhalation solution 3 mL 02/08/24 1926 Given      [id number] ipratropium-albuterol (Duoneb) 0.5-2.5 (3) MG/3ML inhalation solution 3 mL 02/09/24 0850 Given      [id number] ipratropium-albuterol (Duoneb) 0.5-2.5 (3) MG/3ML inhalation solution 3 mL 02/09/24 1300 Given      [id number] lactulose (CHRONULAC) 10 GM/15ML solution 20 g 02/08/24 1123 Given      [id number] levoFLOXacin in dextrose 5% (Levaquin) 500 mg/100mL IVPB premix 500 mg 02/07/24 2116 New Bag      [id number] levoFLOXacin in dextrose 5% (Levaquin) 500 mg/100mL IVPB premix 500 mg 02/08/24 1811 New Bag      [id number] magnesium hydroxide (Milk of Magnesia) 400 MG/5ML oral suspension 30 mL 02/08/24 1008 Given      [id number] metRONIDAZOLE in sodium chloride 0.79% (Flagyl) 5 mg/mL IVPB premix 500 mg 02/07/24 1605 New Bag      [id number] metRONIDAZOLE in sodium chloride 0.79% (Flagyl) 5 mg/mL IVPB premix 500 mg 02/07/24 2244 New Bag      [id number] metRONIDAZOLE in sodium chloride 0.79% (Flagyl) 5 mg/mL IVPB premix 500 mg 02/08/24 0600 New Bag      [id number] metRONIDAZOLE in sodium chloride 0.79% (Flagyl) 5 mg/mL IVPB premix 500 mg 02/08/24 1514 New Bag      [id number] metRONIDAZOLE in sodium chloride 0.79% (Flagyl) 5 mg/mL IVPB premix 500 mg 02/08/24 2124 New Bag      [id number] metRONIDAZOLE in sodium chloride 0.79% (Flagyl) 5 mg/mL IVPB premix 500 mg 02/09/24 0552 New Bag      [id number] metRONIDAZOLE in sodium chloride 0.79% (Flagyl) 5 mg/mL IVPB premix 500 mg 02/09/24 1324 New Bag      [id number] pantoprazole (Protonix) DR tab 40 mg 02/08/24 0600 Given      [id number] pantoprazole (Protonix) DR tab 40 mg 02/09/24 0552 Given      [id number] polyethylene glycol (PEG 3350) (Miralax) 17 g oral packet 17 g  02/08/24 0840 Given      [id number] polyethylene glycol (PEG 3350) (Miralax) 17 g oral packet 17 g 02/09/24 0920 Given      [id number] polyethylene glycol-electrolyte (Golytely) 236 g oral solution 4,000 mL 02/08/24 1810 Given  per dr romo 1/3    [id number] potassium chloride (K-Dur) tab 40 mEq 02/08/24 1008 Given      [id number] rivaroxaban (Xarelto) tab 20 mg 02/08/24 0840 Given      [id number] rivaroxaban (Xarelto) tab 20 mg 02/09/24 0913 Given      [id number] sertraline (Zoloft) tab 25 mg 02/08/24 0840 Given      [id number] sertraline (Zoloft) tab 25 mg 02/09/24 0921 Given      [id number] sodium chloride tab 1 g 02/07/24 2115 Given      [id number] sodium chloride tab 1 g 02/08/24 0840 Given      [id number] sodium chloride tab 1 g 02/08/24 2130 Given      [id number] sodium chloride tab 1 g 02/09/24 0914 Given      [id number] tamsulosin (Flomax) cap 0.4 mg 02/07/24 1809 Given      [id number] tamsulosin (Flomax) cap 0.4 mg 02/08/24 1711 Given            BILATERAL NARES     Order ID Medication Name Action Time Action Reason Comments    [id number] ipratropium (Atrovent) 0.03 % nasal solution 2 spray 02/09/24 0937 Given              Recent Vital Signs    Flowsheet Row Most Recent Value   /61 Filed at 02/09/2024 1220   Pulse 90 Filed at 02/09/2024 1220   Resp 20 Filed at 02/09/2024 1220   Temp 98.3 °F (36.8 °C) Filed at 02/09/2024 1220   SpO2 94 % Filed at 02/09/2024 1220      Patient's Most Recent Weight    Flowsheet Row Most Recent Value   Patient Weight 69.2 kg (152 lb 9.6 oz)         Lab Results Last 24 Hours      CBC With Differential With Platelet [[id number]] (Abnormal)  Resulted: 02/09/24 0659, Result status: Final result   Ordering provider: Williams Liz APRN  02/08/24 2300 Resulting lab: [facility] ([facility])   Narrative:  The following orders were created for panel order CBC With Differential With Platelet.  Procedure                               Abnormality         Status                      ---------                               -----------         ------                     CBC W/ DIFFERENTIAL[[id number]]          Abnormal            Final result                 Please view results for these tests on the individual orders.    Specimen Information    Type Source Collected On   Blood — 02/09/24 0533            Basic Metabolic Panel (8) [[id number]] (Abnormal)  Resulted: 02/09/24 0634, Result status: Final result   Ordering provider: Williams Liz APRN  02/08/24 2300 Resulting lab: [facility] ([facility])    Specimen Information    Type Source Collected On   Blood — 02/09/24 0533          Components    Component Value Reference Range Flag Lab   Glucose 146 70 - 99 mg/dL [facility] ([facility])   Sodium 134 136 - 145 mmol/L L [facility] ([facility])   Potassium 4.1 3.5 - 5.1 mmol/L — [facility] ([facility])   Chloride 103 98 - 112 mmol/L — [facility] ([facility])   CO2 26.0 21.0 - 32.0 mmol/L — [facility] ([facility])   Anion Gap 5 0 - 18 mmol/L — [facility] ([facility])   BUN 13 9 - 23 mg/dL — [facility] ([facility])   Creatinine 0.25 0.55 - 1.02 mg/dL L [facility] ([facility])   Calcium, Total 7.9 8.5 - 10.1 mg/dL [facility] ([facility])   Calculated Osmolality 281 275 - 295 mOsm/kg — [facility] ([facility])   eGFR-Cr 107 >=60 mL/min/1.73m2 — [facility] ([facility])            Potassium [[id number]] (Normal)  Resulted: 02/09/24 0634, Result status: Final result   Ordering provider: Castillo Mcgill MD  02/08/24 2300 Resulting lab: [facility] ([facility])    Specimen Information    Type Source Collected On   Blood — 02/09/24 0533          Components    Component Value Reference Range Flag Lab   Potassium 4.1 3.5 - 5.1 mmol/L — [facility])            Testing Performed By     Lab - Abbreviation Name Director Address Valid Date Range    139 - [facility] ([facility]) [facility] ([facility]) Goldberg, Cathryn A. MD [address] [place] [address] 03/19/20 1441 - Present             Microbiology Results (All)     Procedure Component Value Units Date/Time    Blood Culture [[id number]] Collected: 02/06/24 1132    Order Status: Completed Lab Status: Preliminary result Updated: 02/08/24 1501    Specimen: Blood,peripheral      Blood Culture Result No Growth 2 Days    Blood Culture [[id number]] Collected: 02/06/24 1016    Order Status: Completed Lab Status: Preliminary result Updated: 02/08/24 1501    Specimen: Blood,peripheral      Blood Culture Result No Growth 2 Days    Narrative:      Aerobic Bottle Volume - 13 mL  Anaerobic Bottle Volume - 8 mL    Rapid SARS-CoV-2 by PCR [[id number]]  (Normal) Collected: 02/06/24 1407    Order Status: Completed Lab Status: Final result Updated: 02/06/24 1510    Specimen: Other from [place]      Rapid SARS-CoV-2 by PCR Not Detected      Pending Labs     Order Current Status    Blood Culture Preliminary result    Blood Culture Preliminary result         H&P - H&P Note      H&P signed by Castillo Mcgill MD at 2/7/2024  8:50 AM  Version 2 of 2    Author: Castillo Mcgill MD Service: Hospitalist Author Type: Physician    Filed: 2/7/2024  8:50 AM Date of Service: 2/6/2024  1:26 PM Status: Addendum    : Castillo Mcgill MD (Physician)    Related Notes: Original Note by Williams Liz APRN (Nurse Practitioner) filed at 2/6/2024  4:00 PM       [facility]   Hospitalist Team  History and Physical  Admit Date:  2/6/2024    Is this a shared or split note between Advanced Practice Provider and Physician? Yes    ASSESSMENT / PLAN:   Nelly Potter is an 88 year old female from [place] with a PMHx significant for AFIB (on xarelto), COPD, asthma, neuropathy, anxiety/MDD, DVT, bladder CA, HLD, HTN, multiple myeloma, and vertigo presents to the hospital with chest pain.  See below for details.    Chest pain  AFIB RVR  Hypoxia  H/O DVT  -endorsed chest pain to [facility] that started today, has since resolved  -ECG w ST and  frequent PVCs, RBBB  -troponin normal  -CXR with small left pleural effusion vs scarring, mild atelectasis at bases  -AFIB RVR identified on telemetry per ED MD  -IV diltiazem started  -cardiology consulted  -has been on 2L 02 x 2 days per the family, was initiated at [employer] for SP02 of 92, will give one dose of IV lasix  -xarelto    Abdominal pain  H/O UTI  H/O urinary retention  -endorsed abdominal pain  -CT abdomen/pelvis with constipation, possible thickened wall of colon which can indicate colitis, compression fx of T8 which may be chronic  -soap suds enema ordered in ED  -UA ok  -WBC 16.3, lactic ok  -will empirically start IV levaquin and IV flagyl given leukocytosis, abdominal pain and CT abdomen results  -blood cxs in process  -flomax    COPD  Asthma  -albuterol  -maintenance inhaler    H/O multiple myeloma  Hypotension  -dexamethasone   -midodrine started 2/2 hypotension following abrupt steroid withdrawal per the patient's son    HTN  -amlodipine    HLD  -statin    MDD/anxiety  -zoloft    Neuropathy  -gabapentin    GOC discussion with patient and son Mruali, patient is a DNAR selective treatment    She states she took all of her morning meds this a.m.    GOC: DNAR selective treatment    MA/ACO Reach  -Re- Entry: no  -Consults: cardiology  -Discharge Needs: TBD  -Appointments: TBD     FEN  -lytes in am  -diet-cardiac    Prophy  -SCD    Dispo  -pending clinical course  PCP: Balta Wright MD       Outpatient records or previous hospital records reviewed.   Further recommendations pending patient's clinical course.  [facility] hospitalist  team to continue to follow patient while in house  Concerns regarding plan of care were discussed with patient. Patient agrees with plan as detailed above. Discussed plan of care with Dr. Mcgill    Note: This chart was prepared using voice recognition software and may contain unintended word substitution errors.     Williams RUVALCABA  [facility]  Hospitalist Team   Available via Perfect Serve or Bubble Chat (check Availability)    2/6/2024      HISTORY:   CC:   Chief Complaint   Patient presents with    Chest Pain Angina     From [facility] with CP that started today. Per EMS, XR done today showing PNA. Patient denies pain.        PCP: Balta Wright MD    History of Present Illness: Nelly Potter is an 88 year old female from [place] with a PMHx significant for AFIB (on xarelto), COPD, asthma, neuropathy, CHF, anxiety/MDD, DVT, bladder CA, HLD, HTN, multiple myeloma, and vertigo presents to the hospital with chest pain.  She has recently transitioned to assisted living at [place].  The ID MD was rounding that morning and she endorsed chest pain so was brought to the Ed for evaluation.  Her chest pain has since resolved and not returned.  She arrived on 2L 02 per [place] that was started 2 days ago for SP02 of 92. Troponin normal.  CXR w small left pleural effusion.  AFIB RVR identified by ED MD on telemetry, IV diltiazem initiated.  Cardiology consulted.  She endorsed abdominal pain, CT abdomen with constipation and possible thickened wall of colon.  Soap suds enema ordered in ED.        Review of Systems  12 point systems reviewed, please see HPI for pertinent positives, otherwise negative    OBJECTIVE:  /89   Pulse (!) 124   Temp 97.9 °F (36.6 °C) (Temporal)   Resp (!) 31   Ht 5' 6\" (1.676 m)   Wt 156 lb (70.8 kg)   SpO2 100%   BMI 25.18 kg/m²     GENERAL: no apparent distress  NEUROLOGIC: A/A; Ox3: strength normal; sensations intact  HEENT: normocephalic, normal nose, moist mucus membranes  RESPIRATORY: normal expansion; non labored, CTA   CARDIOVASCULAR: AFIB RVR  ABDOMEN:  Soft, BS+; non distended, tender upon palpation, non rigid    EXTREMITIES: no LE edema    PMH  Past Medical History:   Diagnosis Date    Allergic rhinitis     Anxiety     Chronic midline low back pain without sciatica 10/13/2021    Constipation      Depression     Esophageal reflux     History of bladder cancer 8/1/2012    Incontinence     Long QT interval     per pt. hx of long QT.    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 Mathews    CYSTOURETHROSCOPY  10/24/12    BTS Cysto Dr. Mathews     CYSTOURETHROSCOPY  4/29/13, 8/7/13, 11/13/13, 11/12/14, 5/6/15, 11/11/15    cysto- Dr. Mathews, [place]    CYSTOURETHROSCOPY  10/11/2017    cysto Dr. mathews    CYSTOURETHROSCOPY,FULGUR .5-2CM LESN  7/23/12    cystourethroscopy, TURBT x 2- Dr. Mathews    FOOT SURGERY Left 10/25/11    lt foot    HIP REPLACEMENT SURGERY      left    HYSTERECTOMY      KNEE REPLACEMENT SURGERY Bilateral     OTHER SURGICAL HISTORY  1983     arthroscopic left knee    PART EXCIS 5TH METATARSAL HEAD Right 10/2/2014    Procedure: MADELINE'S BUNIONECTOMY;  Surgeon: Adryan Prajapati DPM;  Location: [facility]    PATIENT DOCUMENTED NOT TO HAVE EXPERIENCED ANY OF THE FOLLOWING EVENTS Right 10/2/2014    Procedure: MADELINE'S BUNIONECTOMY;  Surgeon: Adryan Prajapati DPM;  Location: [facility]    PATIENT WITH PREOPERATIVE ORDER FOR IV ANTIBIOTIC SURGICAL SITE INFECT Right 10/2/2014    Procedure: MADELINE'S BUNIONECTOMY;  Surgeon: Adryan Prajapati DPM;  Location: [facility]    REPAIR OF HAMMERTOE,ONE Left 10/2/2014    Procedure: ARTHROPLASTY ONE (1) TOE;  Surgeon: Adryan Prajapati DPM;  Location: [facility]    SINUS SURGERY    1983 & 1999    sinus    SLING OPER STRES INCONTINENCE  8/06    sling    SLING  OPER STRES INCONTINENCE  10/2011    sling repeat. TVT Exact. Dr. Dang at [facility]    TONSILLECTOMY          ALL:  Allergies   Allergen Reactions    Myrbetriq [Mirabegron] NAUSEA ONLY and DIZZINESS    Codeine NAUSEA ONLY    Evista [Raloxifene Hydrochloride] RASH    Meclizine NAUSEA ONLY    Sulfamethoxazole-Trimethoprim DIZZINESS        Home Medications:  No outpatient medications have been marked as taking for the 2/6/24 encounter (Hospital Encounter).       Soc Hx  Social History     Tobacco Use    Smoking status: Never    Smokeless tobacco: Never   Substance Use Topics    Alcohol use: Yes     Comment: 1 per day        Fam Hx  Family History   Problem Relation Age of Onset    Other (Other) Son         CF    Other (Other) Son         CF         DIAGNOSTIC DATA:   CBC/Chem  Recent Labs   Lab 02/01/24 0643 02/06/24  0639 02/06/24  1016   WBC 6.4 13.1* 16.3*   HGB 9.2* 9.1* 9.6*   MCV 79.8* 79.7* 79.8*   .0 203.0 225.0       Recent Labs   Lab 02/01/24 0643 02/06/24  0639 02/06/24  1016   * 134* 132*   K 3.5 3.6 3.8    101 99   CO2 30.0 29.0 28.0   BUN 12 18 16   CREATSERUM 0.36* 0.49* 0.36*   GLU 85 76 92   CA 8.0* 7.6* 8.1*       Recent Labs   Lab 02/01/24 0643 02/06/24  0639 02/06/24  1016   ALT 32 32 29   AST <3* <3* <3*   ALB 2.4* 2.2* 2.3*       No results for input(s): \"TROP\" in the last 168 hours.    Additional Diagnostics: ECG: ST w freq PVCs, RBBB    CXR: image personally reviewed pleural effusion: on the left    Radiology: CT ABDOMEN+PELVIS(CONTRAST ONLY)(CPT=74177)    Result Date: 2/6/2024  CONCLUSION:  There is a large amount of fecal material centered on the distal sigmoid colon and rectum most likely representing constipation/fecal impaction.  There is some possible wall thickening of the colon in this region which could represent stercoral colitis.  Limited views of the chest demonstrate probable basilar atelectasis versus consolidation with trace bilateral pleural effusions.  There is a  compression fracture of T8 which may be chronic although there is some sclerosis present in this region.  Correlation for symptoms is suggested.  If there is persistent concern then consider MRI.   LOCATION:  [id number]   Dictated by (CST): Adryan Zazueta MD on 2/06/2024 at 1:03 PM     Finalized by (CST): Adryan Zazueta MD on 2/06/2024 at 1:07 PM       XR CHEST AP PORTABLE  (CPT=71045)    Result Date: 2/6/2024  CONCLUSION:  Small left pleural effusion versus pleural scarring.  Mild scarring/atelectasis in the lower lungs.    LOCATION:  [id number]      Dictated by (CST): Basim Todd MD on 2/06/2024 at 11:35 AM     Finalized by (CST): Basim Todd MD on 2/06/2024 at 11:36 AM          SEE ATTENDING NOTE BELOW        Pt admitted for abdominal pain, afib w RVR and mild hypoxia.      Son at bedside in ER.      /89   Pulse (!) 124   Temp 97.9 °F (36.6 °C) (Temporal)   Resp (!) 31   Ht 5' 6\" (1.676 m)   Wt 156 lb (70.8 kg)   SpO2 100%   BMI 25.18 kg/m²     Sleepy  Decreased at bases  Irreg irreg rapid   Abd distended, + BS  No edema    Stercoral colitis  - aggressive bowel regimen w enemas etc.  Empiric abx given leukocytosis     Afib w RVR  - IV dilt, cards to see    Hypoxia  - CXR w pulm edema  - IV lasix      [facility] Hospitalist  Internal Medicine  Answering Service number: [phone]        Electronically signed by Castillo Mcgill MD on 2/7/2024  8:50 AM     H&P signed by Williams Liz APRN at 2/6/2024  4:00 PM  Version 1 of 2    Author: Williams Liz APRN Service: Hospitalist Author Type: Nurse Practitioner    Filed: 2/6/2024  4:00 PM Date of Service: 2/6/2024  1:26 PM Status: Signed    : Williams Liz APRN (Nurse Practitioner)    Related Notes: Addendum by Castillo Mcgill MD (Physician) filed at 2/7/2024  8:50 AM  Original Note by Williams Liz APRN (Nurse Practitioner) filed at 2/6/2024  3:55 PM    Cosigner: Castillo Mcgill MD at 2/7/2024   8:44 AM       [facility]   Hospitalist Team  History and Physical  Admit Date:  2/6/2024    Is this a shared or split note between Advanced Practice Provider and Physician? Yes    ASSESSMENT / PLAN:   Nelly Potter is an 88 year old female from [facility] with a PMHx significant for AFIB (on xarelto), COPD, asthma, neuropathy, anxiety/MDD, DVT, bladder CA, HLD, HTN, multiple myeloma, and vertigo presents to the hospital with chest pain.  See below for details.    Chest pain  AFIB RVR  Hypoxia  H/O DVT  -endorsed chest pain to [facility] ID MD that started today, has since resolved  -ECG w ST and frequent PVCs, RBBB  -troponin normal  -CXR with small left pleural effusion vs scarring, mild atelectasis at bases  -AFIB RVR identified on telemetry per ED MD  -IV diltiazem started  -cardiology consulted  -has been on 2L 02 x 2 days per the family, was initiated at [facility] in [place] for SP02 of 92, will give one dose of IV lasix  -xarelto    Abdominal pain  H/O UTI  H/O urinary retention  -endorsed abdominal pain  -CT abdomen/pelvis with constipation, possible thickened wall of colon which can indicate colitis, compression fx of T8 which may be chronic  -soap suds enema ordered in ED  -UA ok  -WBC 16.3, lactic ok  -will empirically start IV levaquin and IV flagyl given leukocytosis, abdominal pain and CT abdomen results  -blood cxs in process  -flomax    COPD  Asthma  -albuterol  -maintenance inhaler    H/O multiple myeloma  Hypotension  -dexamethasone   -midodrine started 2/2 hypotension following abrupt steroid withdrawal per the patient's son    HTN  -amlodipine    HLD  -statin    MDD/anxiety  -zoloft    Neuropathy  -gabapentin    GOC discussion with patient and son Murali, patient is a DNAR selective treatment    She states she took all of her morning meds this a.m.    GOC: DNAR selective treatment    MA/ACO Reach  -Re- Entry: no  -Consults: cardiology  -Discharge Needs: TBD  -Appointments: TBD      FEN  -lytes in am  -diet-cardiac    Prophy  -SCD    Dispo  -pending clinical course  PCP: Balta Wright MD       Outpatient records or previous hospital records reviewed.   Further recommendations pending patient's clinical course.  [facility] hospitalist  team to continue to follow patient while in house  Concerns regarding plan of care were discussed with patient. Patient agrees with plan as detailed above. Discussed plan of care with Dr. Mcgill    Note: This chart was prepared using voice recognition software and may contain unintended word substitution errors.     Williams RUVALCABA  [facility] Hospitalist Team   Available via Perfect Serve or Bubble Chat (check Availability)    2/6/2024      HISTORY:   CC:   Chief Complaint   Patient presents with    Chest Pain Angina     From [facility] with CP that started today. Per EMS, XR done today showing PNA. Patient denies pain.        PCP: Balta Wright MD    History of Present Illness: Nelly Potter is an 88 year old female from [facility] with a PMHx significant for AFIB (on xarelto), COPD, asthma, neuropathy, CHF, anxiety/MDD, DVT, bladder CA, HLD, HTN, multiple myeloma, and vertigo presents to the hospital with chest pain.  She has recently transitioned to assisted living at [facility].  The ID MD was rounding that morning and she endorsed chest pain so was brought to the Ed for evaluation.  Her chest pain has since resolved and not returned.  She arrived on 2L 02 per NC that was started 2 days ago for SP02 of 92. Troponin normal.  CXR w small left pleural effusion.  AFIB RVR identified by ED MD on telemetry, IV diltiazem initiated.  Cardiology consulted.  She endorsed abdominal pain, CT abdomen with constipation and possible thickened wall of colon.  Soap suds enema ordered in ED.        Review of Systems  12 point systems reviewed, please see HPI for pertinent positives, otherwise negative    OBJECTIVE:  /89   Pulse (!)  124   Temp 97.9 °F (36.6 °C) (Temporal)   Resp (!) 31   Ht 5' 6\" (1.676 m)   Wt 156 lb (70.8 kg)   SpO2 100%   BMI 25.18 kg/m²     GENERAL: no apparent distress  NEUROLOGIC: A/A; Ox3: strength normal; sensations intact  HEENT: normocephalic, normal nose, moist mucus membranes  RESPIRATORY: normal expansion; non labored, CTA   CARDIOVASCULAR: AFIB RVR  ABDOMEN:  Soft, BS+; non distended, tender upon palpation, non rigid    EXTREMITIES: no LE edema    PMH  Past Medical History:   Diagnosis Date    Allergic rhinitis     Anxiety     Chronic midline low back pain without sciatica 10/13/2021    Constipation     Depression     Esophageal reflux     History of bladder cancer 8/1/2012    Incontinence     Long QT interval     per pt. hx of long QT.    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 Mathews     CYSTOURETHROSCOPY  4/29/13, 8/7/13, 11/13/13, 11/12/14, 5/6/15, 11/11/15    cysto- Dr. Mathews, [place]    CYSTOURETHROSCOPY  10/11/2017    cysto Dr. mathews    CYSTOURETHROSCOPY,FULGUR .5-2CM LESN  7/23/12    cystourethroscopy, TURBT x 2- Dr. Mathews    FOOT SURGERY Left 10/25/11    lt foot    HIP REPLACEMENT SURGERY      left    HYSTERECTOMY      KNEE REPLACEMENT SURGERY Bilateral     OTHER SURGICAL HISTORY  1983     arthroscopic left knee    PART EXCIS 5TH METATARSAL HEAD Right 10/2/2014    Procedure: TAILOR'S BUNIONECTOMY;  Surgeon: Adryan Prajapati DPM;  Location:  [facility]    PATIENT DOCUMENTED NOT TO HAVE EXPERIENCED ANY OF THE FOLLOWING EVENTS Right 10/2/2014    Procedure: TAILOR'S BUNIONECTOMY;  Surgeon: Adryan Prajapati DPM;  Location: [facility]    PATIENT WITH PREOPERATIVE ORDER FOR IV ANTIBIOTIC SURGICAL SITE INFECT Right 10/2/2014    Procedure: TAILOR'S BUNIONECTOMY;  Surgeon: Adryan Prajapati DPM;  Location: [facility]    REPAIR OF HAMMERTOE,ONE Left 10/2/2014    Procedure: ARTHROPLASTY ONE (1) TOE;  Surgeon: Adryan Prajapati DPM;  Location: [facility]    SINUS SURGERY    1983 & 1999    sinus    SLING OPER STRES INCONTINENCE  8/06    sling    SLING OPER STRES INCONTINENCE  10/2011    sling repeat. TVT Exact. Dr. Dang at [facility]    TONSILLECTOMY          ALL:  Allergies   Allergen Reactions    Myrbetriq [Mirabegron] NAUSEA ONLY and DIZZINESS    Codeine NAUSEA ONLY    Evista [Raloxifene Hydrochloride] RASH    Meclizine NAUSEA ONLY    Sulfamethoxazole-Trimethoprim DIZZINESS        Home Medications:  No outpatient medications have been marked as taking for the 2/6/24 encounter (Hospital Encounter).       Soc Hx  Social History     Tobacco Use    Smoking status: Never    Smokeless tobacco: Never   Substance Use Topics    Alcohol use: Yes     Comment: 1 per day        Fam Hx  Family History   Problem Relation Age of Onset    Other (Other) Son         CF    Other (Other) Son         CF         DIAGNOSTIC DATA:   CBC/Chem  Recent Labs   Lab 02/01/24 0643 02/06/24  0639 02/06/24  1016   WBC 6.4 13.1* 16.3*   HGB 9.2* 9.1* 9.6*   MCV 79.8* 79.7* 79.8*   .0 203.0 225.0       Recent Labs   Lab 02/01/24 0643 02/06/24  0639 02/06/24  1016   * 134* 132*   K 3.5 3.6 3.8    101 99   CO2 30.0 29.0 28.0   BUN 12 18 16   CREATSERUM 0.36* 0.49* 0.36*   GLU 85 76 92   CA 8.0* 7.6* 8.1*       Recent Labs   Lab 02/01/24 0643 02/06/24  0639 02/06/24  1016   ALT 32 32 29   AST <3* <3* <3*   ALB 2.4* 2.2* 2.3*       No results for input(s): \"TROP\" in the last  168 hours.    Additional Diagnostics: ECG: ST w freq PVCs, RBBB    CXR: image personally reviewed pleural effusion: on the left    Radiology: CT ABDOMEN+PELVIS(CONTRAST ONLY)(CPT=74177)    Result Date: 2024  CONCLUSION:  There is a large amount of fecal material centered on the distal sigmoid colon and rectum most likely representing constipation/fecal impaction.  There is some possible wall thickening of the colon in this region which could represent stercoral colitis.  Limited views of the chest demonstrate probable basilar atelectasis versus consolidation with trace bilateral pleural effusions.  There is a compression fracture of T8 which may be chronic although there is some sclerosis present in this region.  Correlation for symptoms is suggested.  If there is persistent concern then consider MRI.   LOCATION:  [id number]   Dictated by (CST): Adryan Zazueta MD on 2024 at 1:03 PM     Finalized by (CST): Adryan Zazueta MD on 2024 at 1:07 PM       XR CHEST AP PORTABLE  (CPT=71045)    Result Date: 2024  CONCLUSION:  Small left pleural effusion versus pleural scarring.  Mild scarring/atelectasis in the lower lungs.    LOCATION:  [id number]      Dictated by (CST): Basim Todd MD on 2024 at 11:35 AM     Finalized by (CST): Basim Todd MD on 2024 at 11:36 AM          SEE ATTENDING NOTE BELOW        Electronically signed by Castillo Mcgill MD on 2024  8:44 AM              Consults - MD Consult Notes      Consults signed by Shannen Cabrera MD at 2024 10:24 AM     Author: Shannen Cabrera MD Service: Cardiology Author Type: Physician    Filed: 2024 10:24 AM Date of Service: 2024  8:49 AM Status: Signed    : Shannen Cabrera MD (Physician)     Consult Orders    1. Consult to Cardiology [[id number]] ordered by Williams Liz APRN at 24 1332               Cardiology Consultation Note      Nelly Potter Patient Status:  Inpatient    1935 MRN [id number]    Location [facility] 2NE-A Attending Castillo Mcgill MD   Hosp Day # 1 PCP Balta Wright MD     Reason for consultation:  AFIB    Impression:  AFIB RVR  UTI with leukocytosis   Constipation  HTN  HLD     Plan:  Continue diltiazem gtt. Add PO diltiazem 30 q6. Wean drip once heart rates are consistently <90  IV lasix 40 mg daily.  Echo done in December with normal LV function and no significant valvular disease. No need to repeat  Treatment of abdominal pain and UTI which likely exacerbated AFIB  Continue Xarelto  Tele  Daily labs      History of Present Illness:  Nelly Potter is a 88 year old female who presented to [facility] on 2/6/2024.      The patient has a history of A-fib, chronic UTI, chronic constipation who presents with chest pain.    [facility] and when the infectious disease doctor was rounding she was complaining of chest pain.  In the ER she stated that her chest pain had completely resolved.  She denies any currently as well.    She was however found to be in A-fib with RVR.  Started on diltiazem drip she was also found to have constipation and possible UTI.    Cardiology consultation was requested.    Medications:  Current Facility-Administered Medications   Medication Dose Route Frequency    metRONIDAZOLE in sodium chloride 0.79% (Flagyl) 5 mg/mL IVPB premix 500 mg  500 mg Intravenous Q8H    potassium chloride 40 mEq in 250mL sodium chloride 0.9% IVPB premix  40 mEq Intravenous Once    dilTIAZem (cardIZEM) 100 mg in sodium chloride 0.9% 100 mL IVPB-ADDV  2.5-20 mg/hr Intravenous Continuous    acetaminophen (Tylenol Extra Strength) tab 500 mg  500 mg Oral Q4H PRN    levoFLOXacin in dextrose 5% (Levaquin) 500 mg/100mL IVPB premix 500 mg  500 mg Intravenous Q24H    albuterol (Ventolin HFA) 108 (90 Base) MCG/ACT inhaler 2 puff  2 puff Inhalation Q4H PRN    atorvastatin (Lipitor) tab 10 mg  10 mg Oral Nightly    gabapentin (Neurontin) cap  300 mg  300 mg Oral BID    ipratropium (Atrovent) 0.03 % nasal solution 2 spray  2 spray Nasal Daily PRN    ipratropium-albuterol (Duoneb) 0.5-2.5 (3) MG/3ML inhalation solution 3 mL  3 mL Nebulization Q6H WA    pantoprazole (Protonix) DR tab 40 mg  40 mg Oral QAM AC    rivaroxaban (Xarelto) tab 20 mg  20 mg Oral Daily with food    sertraline (Zoloft) tab 25 mg  25 mg Oral Daily    tamsulosin (Flomax) cap 0.4 mg  0.4 mg Oral QPM    traMADol (Ultram) tab 25 mg  25 mg Oral Q8H PRN       Past Medical History:   Diagnosis Date    Allergic rhinitis     Anxiety     Chronic midline low back pain without sciatica 10/13/2021    Constipation     Depression     Esophageal reflux     History of bladder cancer 8/1/2012    Incontinence     Long QT interval     per pt. hx of long QT.    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 Mathews    CYSTOURETHROSCOPY  10/24/12    [facility] Cysto Dr. Mathews     CYSTOURETHROSCOPY  4/29/13, 8/7/13, 11/13/13, 11/12/14, 5/6/15, 11/11/15    cysto- Dr. Mathews, [place]    CYSTOURETHROSCOPY  10/11/2017    cysto Dr. mathews    CYSTOURETHROSCOPY,FULGUR .5-2CM LESN  7/23/12    cystourethroscopy, TURBT x 2- Dr. Mathews    FOOT SURGERY Left 10/25/11    lt foot    HIP REPLACEMENT SURGERY      left    HYSTERECTOMY      KNEE REPLACEMENT SURGERY Bilateral     OTHER SURGICAL HISTORY  1983     arthroscopic left knee    PART EXCIS 5TH METATARSAL HEAD Right 10/2/2014    Procedure: TAILOR'S BUNIONECTOMY;  Surgeon: Burgess  RAH Cornelius;  Location: [facility]    PATIENT DOCUMENTED NOT TO HAVE EXPERIENCED ANY OF THE FOLLOWING EVENTS Right 10/2/2014    Procedure: TAILOR'S BUNIONECTOMY;  Surgeon: Adryan Prajapati DPM;  Location: [facility]    PATIENT WITH PREOPERATIVE ORDER FOR IV ANTIBIOTIC SURGICAL SITE INFECT Right 10/2/2014    Procedure: TAILOR'S BUNIONECTOMY;  Surgeon: Adryan Prajapati DPM;  Location: [facility]    REPAIR OF HAMMERTOE,ONE Left 10/2/2014    Procedure: ARTHROPLASTY ONE (1) TOE;  Surgeon: Adryan Prajapati DPM;  Location: [facility]    SINUS SURGERY     &     sinus    SLING OPER STRES INCONTINENCE      sling    SLING OPER STRES INCONTINENCE  10/2011    sling repeat. TVT Exact. Dr. Dang at [facility]    TONSILLECTOMY         Family History  There is no family history of sudden cardiac death.    Social History   reports that she has never smoked. She has never used smokeless tobacco. She reports current alcohol use. She reports that she does not use drugs.     Allergies  Allergies   Allergen Reactions    Myrbetriq [Mirabegron] NAUSEA ONLY and DIZZINESS    Codeine NAUSEA ONLY    Evista [Raloxifene Hydrochloride] RASH    Meclizine NAUSEA ONLY    Sulfamethoxazole-Trimethoprim DIZZINESS         Review of Systems:  Constitutional: negative for fevers  Eyes: negative for visual disturbance  Ears, nose, mouth, throat, and face: negative for epistaxis  Respiratory: negative for dyspnea on exertion  Cardiovascular: negative for chest pain  Gastrointestinal: negative for melena  Genitourinary:negative for hematuria  Hematologic/lymphatic: negative for bleeding  Musculoskeletal:negative for myalgias  Neurological: negative for dizziness and headaches  Endocrine: negative for temperature intolerance      Physical Exam:  Blood pressure 105/77, pulse 80, temperature 97.8 °F (36.6 °C), temperature source Oral, resp. rate 17, height 5' 6\" (1.676 m), weight 152 lb 9.6 oz (69.2 kg), SpO2 95%.  Temp (24hrs), Av.7 °F  (36.5 °C), Min:97.5 °F (36.4 °C), Max:97.9 °F (36.6 °C)    Wt Readings from Last 3 Encounters:   02/06/24 152 lb 9.6 oz (69.2 kg)   02/05/24 157 lb (71.2 kg)   01/31/24 155 lb 6.4 oz (70.5 kg)       General: Awake and alert; in no acute distress  HEENT: Extraocular movements are intact; sclerae are anicteric; scalp is atrauamatic; no thyromegaly  Neck: Supple; no JVD; no carotid bruits  Cardiac: Regular rate and regular rhythm; no murmurs/rubs/gallops are appreciated; PMI is non-displaced; there is no evidence of a sternal heave  Lungs: Clear to auscultation bilaterally; no accessory muscle use is noted  Abdomen: Soft, non-tender; bowel sounds are normoactive; no hepatosplenomegaly  Extremities: No clubbing or cyanosis; moves all 4 extremities normally  Psychiatric: Normal mood and affect; answers questions appropriately  Dermatologic: No rashes; normal skin turgor    Diagnostic testing:    EKG: AFIB     Labs:   Lab Results   Component Value Date    INR 2.00 (H) 02/06/2024        Lab Results   Component Value Date    WBC 9.2 02/07/2024    HGB 8.0 02/07/2024    HCT 24.9 02/07/2024    .0 02/07/2024    CREATSERUM 0.29 02/07/2024    BUN 17 02/07/2024     02/07/2024    K 3.6 02/07/2024    K 3.6 02/07/2024     02/07/2024    CO2 25.0 02/07/2024    GLU 80 02/07/2024    CA 7.4 02/07/2024    ALB 2.3 02/06/2024    ALKPHO 65 02/06/2024    BILT 0.7 02/06/2024    TP 5.3 02/06/2024    AST <3 02/06/2024    ALT 29 02/06/2024    PTT 35.6 02/06/2024    INR 2.00 02/06/2024    PTP 22.9 02/06/2024         Thank you for allowing our practice to participate in the care of your patient. Please do not hesitate to contact me if you have any questions.    Shannen Cabrera MD        Electronically signed by Shannen Cabrera MD on 2/7/2024 10:24 AM           D/C Summary    No notes of this type exist for this encounter.        Physical Therapy Notes (last 72 hours)      Physical Therapy Note signed by Carolyn Coronel, PT  at 2/7/2024  1:23 PM  Version 1 of 1    Author: Carolyn Coronel, PT Service: Rehab Author Type: Physical Therapist    Filed: 2/7/2024  1:23 PM Date of Service: 2/7/2024  1:20 PM Status: Signed    : Carolyn Coronel, PT (Physical Therapist)       Order received for PT eval and chart reviewed. OT called staff at [facility] to confirm PLOF. Pt is dependent for mobility and ADL at baseline. Staff use total lift to transfer Pt out of bed to high back custom W/C. Pt without skilled IP PT needs at this time. PT will sign off.                 Occupational Therapy Notes (last 72 hours)      Occupational Therapy Note signed by Carolyn Roque OT at 2/7/2024  9:24 AM  Version 1 of 1    Author: Carolyn Roque OT Service: Rehab Author Type: Occupational Therapist    Filed: 2/7/2024  9:24 AM Date of Service: 2/7/2024  9:00 AM Status: Signed    : Carolyn Roque OT (Occupational Therapist)       OT order received, chart reviewed, spoke to RN at [facility], pt's home environment, pt with high back custom W/C at baseline and use of jj lift to t/f to W/C, pt has total A for all ADLs including self feeding, per RN pt only sometimes able to slightly lift arm off bed.  Pt presents with no skilled needs at this time and OT will sign off.  RN made aware.             Video Swallow Study Notes    No notes of this type exist for this encounter.        SLP Note - SLP Notes      SLP Note signed by Jason Vargas SLP at 2/8/2024 10:12 AM  Version 1 of 1    Author: Jason Vargas SLP Service: Rehab Author Type: Speech and Language Pathologist    Filed: 2/8/2024 10:12 AM Date of Service: 2/8/2024  8:33 AM Status: Signed    : Jason Vargas SLP (Speech and Language Pathologist)       ADULT SWALLOWING EVALUATION    ASSESSMENT    ASSESSMENT/OVERALL IMPRESSION:  Patient is an 87 y/o female admitted with chest pain and PMHx significant for afib, COPD, asthma, bladder cancer,  HTN, and multiple myeloma. SLP order received to evaluate oropharyngeal swallow d/t concern for aspiration. Patient received drowsy, but arousable, in bed. She denied history of dysphagia symptoms with PO intake and reported consuming a regular diet and thin liquids at baseline.    Patient presented with an intact oropharyngeal swallow. Bolus acceptance was adequate without evidence of anterior bolus loss. Mastication and AP bolus transit were thorough and efficient without evidence of oral residue. Pharyngeal swallow initiation appeared timely and hyolaryngeal excursion was adequate per palpation.  No overt s/s of aspiration observed and patient denied odynophagia and globus sensation across consistencies.     Recommend patient initiate a regular diet and thin liquids with general safe swallowing precautions. No further SLP services warranted at this time as no deficits identified which require skilled intervention. Education provided re: results and recommendations.         RECOMMENDATIONS   Diet Recommendations - Solids: Regular  Diet Recommendations - Liquids: Thin Liquids                        Compensatory Strategies Recommended: Small bites and sips  Aspiration Precautions: Upright position  Medication Administration Recommendations: One pill at a time  Treatment Plan/Recommendations: No further inpatient SLP service warranted    HISTORY   MEDICAL HISTORY  Reason for Referral: R/O aspiration    Problem List  Principal Problem:    Atrial fibrillation with RVR ([facility])  Active Problems:    Constipation, unspecified constipation type      Past Medical History  Past Medical History:   Diagnosis Date    Allergic rhinitis     Anxiety     Chronic midline low back pain without sciatica 10/13/2021    Constipation     Depression     Esophageal reflux     History of bladder cancer 8/1/2012    Incontinence     Long QT interval     per pt. hx of long QT.    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) 9/20/2018    Osteoarthrosis,  unspecified whether generalized or localized, unspecified site     Osteopenia of multiple sites 8/30/2018    Other and unspecified hyperlipidemia     Pleurisy 2012    Psychophysiological insomnia 9/20/2018    Unspecified essential hypertension     Urge incontinence     Vertigo        Prior Living Situation: Skilled nursing facility  Diet Prior to Admission: Unknown       Patient/Family Goals: none stated    SWALLOWING HISTORY  Current Diet Consistency: Regular;Thin liquids  Dysphagia History: as above  Imaging Results:   CXR 2/6/24  CONCLUSION:  Small left pleural effusion versus pleural scarring.  Mild scarring/atelectasis in the lower lungs.          LOCATION:  [id number]                  Dictated by (CST): Basim Todd MD on 2/06/2024 at 11:35 AM       Finalized by (CST): Basim Todd MD on 2/06/2024 at 11:36 AM     SUBJECTIVE       OBJECTIVE   ORAL MOTOR EXAMINATION  Dentition: Functional  Symmetry: Within Functional Limits  Strength: Within Functional Limits     Range of Motion: Within Functional Limits       Voice Quality: Clear  Respiratory Status: Unlabored  Consistencies Trialed: Thin liquids;Puree;Soft solid;Hard solid  Method of Presentation: Staff/Clinician assistance  Patient Positioning: Upright;Midline    Oral Phase of Swallow: Within Functional Limits                      Pharyngeal Phase of Swallow: Within Functional Limits           (Please note: Silent aspiration cannot be evaluated clinically. Videofluoroscopic Swallow Study is required to rule-out silent aspiration.)    Esophageal Phase of Swallow: No complaints consistent with possible esophageal involvement  Comments: d/w RN                  FOLLOW UP  Treatment Plan/Recommendations: No further inpatient SLP service warranted     Follow Up Needed (Documentation Required): No       Thank you for your referral.   If you have any questions, please contact JUANA Caceres    Electronically signed by Jason Vargas SLP on 2/8/2024 10:12 AM            Immunizations     Name Date      Covid-19 Pfizer 08/11/22     Covid-19 Pfizer 05/13/22     Covid-19 Pfizer 10/09/21     Covid-19 Pfizer 02/24/21     Covid-19 Pfizer 01/28/21     INFLUENZA 10/06/21     INFLUENZA 10/06/21     INFLUENZA 09/21/20     INFLUENZA 09/21/20     INFLUENZA 09/28/18     INFLUENZA 09/24/17     INFLUENZA 10/01/16     INFLUENZA 12/03/15     INFLUENZA 12/02/15     INFLUENZA 10/14/14     Pneumococcal (Prevnar 13) 10/14/14     Pneumovax 23 05/01/09     Pneumovax 23 07/01/04     TDAP 10/01/14     TDAP 06/25/07       Multidisciplinary Problems     Active Goals        Problem: Patient/Family Goals    Goal Priority Disciplines Outcome Interventions   Patient/Family Long Term Goal     Interdisciplinary Progressing    Description: Patient's Long Term Goal: discharge back to facility    Interventions:  - Cards/ Hospitalist, labs, chest xray, CT abd, IV abx, 1x enema, cardizem gtt  - See additional Care Plan goals for specific interventions   Patient/Family Short Term Goal     Interdisciplinary Progressing    Description: Patient's Short Term Goal: relieve pain    Interventions:   - IV abx, 1x enema  - See additional Care Plan goals for specific interventions

## (undated) NOTE — LETTER
[facility]  [address] [facility] [address]  [phone]    Blood Transfusion Consent    In the course of your treatment, it may become necessary to administer a transfusion of blood or blood components. This form provides basic information concerning this procedure and, if signed by you, authorizes its administration. By signing this form, you agree that all of your questions about the administration of blood or blood products have been answered by the ordering medical professional or designee.    Description of Procedure  Blood is introduced into one of your veins, commonly in the arm, using a sterilized disposable needle. The amount of blood transfused, and whether the transfusion will be of blood or blood components is a judgement the physician will make based on your particular needs.    Risks  The transfusion is a common procedure of low risk.  MINOR AND TEMPORARY REACTIONS ARE NOT UNCOMMON, including a slight bruise, swelling or local reaction in the area where the needle pierces your skin, or a nonserious reaction to the transfused material itself, including headache, fever or mild skin reaction, such as rash.  Serious reactions are possible, though very unlikely, and include severe allergic reaction (shock) and destruction (hemolysis) of transfused blood cells.  Infectious diseases which are known to be transmitted by blood transfusion include certain types of viral Hepatitis(liver infection from a virus), Human Immunodeficiency Virus (HIV-1,2) infection, a viral infection known to cause Acquired Immunodeficiency Syndrome (AIDS), as well as certain other bacterial, viral, and parasitic diseases. While a minimal risk of acquiring an infectious disease from transfused blood exists, in accordance with the Federal and State law, all due care has been taken in donor selection and testing to avoid transmission of disease.    Alternatives  If loss of blood poses serious threats during your  treatment, THERE IS NO EFFECTIVE ALTERNATIVE TO BLOOD TRANSFUSION. However, if you have any further questions on this matter, your provider will fully explain the alternatives to you if it has not already been done.    I, ___________Nelly Potter_______, have read/had read to me the above. I understand the matters bearing on the decision whether or not to authorize a transfusion of blood or blood components. I have no questions which have not been answered to my full satisfaction. I hereby consent to such transfusion as my physician may deem necessary or advisable in the course of my treatment.    ______________________________________________                    ___________________________  (Signature of Patient or Responsible party in case of minor,                 (Printed Name of Patient or incompetent, or unconscious patient)              Responsible Party)    ___________________________               _____________________  (Relationship to Patient if not self)                                    (Date and Time)    __________________________                                                           ______________________              (Signature of Witness)               (Printed Name of Witness)     Language line ()    Telephone/Verbal/Video Consent    __________________________                     ____________________  (Signature of 2nd Witness           (Printed Name of 2nd  Telephone/Verbal/Video Consent)           Witness)    Patient Name: Nelly Potter     : 1935                 Printed: 2024     Medical Record #: [id number]      Rev: 2023